# Patient Record
Sex: FEMALE | Race: ASIAN | NOT HISPANIC OR LATINO | URBAN - METROPOLITAN AREA
[De-identification: names, ages, dates, MRNs, and addresses within clinical notes are randomized per-mention and may not be internally consistent; named-entity substitution may affect disease eponyms.]

---

## 2017-09-15 ENCOUNTER — INPATIENT (INPATIENT)
Facility: HOSPITAL | Age: 82
LOS: 0 days | Discharge: LEFT BEFORE DISCHARGE | DRG: 446 | End: 2017-09-16
Attending: INTERNAL MEDICINE | Admitting: INTERNAL MEDICINE
Payer: MEDICARE

## 2017-09-15 VITALS
HEIGHT: 57 IN | RESPIRATION RATE: 18 BRPM | DIASTOLIC BLOOD PRESSURE: 81 MMHG | TEMPERATURE: 98 F | OXYGEN SATURATION: 99 % | HEART RATE: 76 BPM | WEIGHT: 80.03 LBS | SYSTOLIC BLOOD PRESSURE: 156 MMHG

## 2017-09-15 DIAGNOSIS — E78.5 HYPERLIPIDEMIA, UNSPECIFIED: ICD-10-CM

## 2017-09-15 DIAGNOSIS — R93.5 ABNORMAL FINDINGS ON DIAGNOSTIC IMAGING OF OTHER ABDOMINAL REGIONS, INCLUDING RETROPERITONEUM: ICD-10-CM

## 2017-09-15 DIAGNOSIS — K80.20 CALCULUS OF GALLBLADDER WITHOUT CHOLECYSTITIS WITHOUT OBSTRUCTION: ICD-10-CM

## 2017-09-15 DIAGNOSIS — R10.9 UNSPECIFIED ABDOMINAL PAIN: ICD-10-CM

## 2017-09-15 DIAGNOSIS — E11.9 TYPE 2 DIABETES MELLITUS WITHOUT COMPLICATIONS: ICD-10-CM

## 2017-09-15 DIAGNOSIS — E03.9 HYPOTHYROIDISM, UNSPECIFIED: ICD-10-CM

## 2017-09-15 DIAGNOSIS — R10.13 EPIGASTRIC PAIN: ICD-10-CM

## 2017-09-15 DIAGNOSIS — Z29.9 ENCOUNTER FOR PROPHYLACTIC MEASURES, UNSPECIFIED: ICD-10-CM

## 2017-09-15 DIAGNOSIS — G45.9 TRANSIENT CEREBRAL ISCHEMIC ATTACK, UNSPECIFIED: ICD-10-CM

## 2017-09-15 LAB
ALBUMIN SERPL ELPH-MCNC: 3.5 G/DL — SIGNIFICANT CHANGE UP (ref 3.5–5)
ALP SERPL-CCNC: 48 U/L — SIGNIFICANT CHANGE UP (ref 40–120)
ALT FLD-CCNC: 32 U/L DA — SIGNIFICANT CHANGE UP (ref 10–60)
ANION GAP SERPL CALC-SCNC: 5 MMOL/L — SIGNIFICANT CHANGE UP (ref 5–17)
ANION GAP SERPL CALC-SCNC: 8 MMOL/L — SIGNIFICANT CHANGE UP (ref 5–17)
APTT BLD: 30.5 SEC — SIGNIFICANT CHANGE UP (ref 27.5–37.4)
AST SERPL-CCNC: 29 U/L — SIGNIFICANT CHANGE UP (ref 10–40)
BASOPHILS # BLD AUTO: 0 K/UL — SIGNIFICANT CHANGE UP (ref 0–0.2)
BASOPHILS NFR BLD AUTO: 0.3 % — SIGNIFICANT CHANGE UP (ref 0–2)
BILIRUB DIRECT SERPL-MCNC: 0.2 MG/DL — SIGNIFICANT CHANGE UP (ref 0–0.2)
BILIRUB SERPL-MCNC: 0.5 MG/DL — SIGNIFICANT CHANGE UP (ref 0.2–1.2)
BUN SERPL-MCNC: 21 MG/DL — HIGH (ref 7–18)
BUN SERPL-MCNC: 26 MG/DL — HIGH (ref 7–18)
CALCIUM SERPL-MCNC: 9.2 MG/DL — SIGNIFICANT CHANGE UP (ref 8.4–10.5)
CALCIUM SERPL-MCNC: 9.6 MG/DL — SIGNIFICANT CHANGE UP (ref 8.4–10.5)
CHLORIDE SERPL-SCNC: 100 MMOL/L — SIGNIFICANT CHANGE UP (ref 96–108)
CHLORIDE SERPL-SCNC: 103 MMOL/L — SIGNIFICANT CHANGE UP (ref 96–108)
CHOLEST SERPL-MCNC: 92 MG/DL — SIGNIFICANT CHANGE UP (ref 10–199)
CO2 SERPL-SCNC: 26 MMOL/L — SIGNIFICANT CHANGE UP (ref 22–31)
CO2 SERPL-SCNC: 28 MMOL/L — SIGNIFICANT CHANGE UP (ref 22–31)
CREAT SERPL-MCNC: 0.86 MG/DL — SIGNIFICANT CHANGE UP (ref 0.5–1.3)
CREAT SERPL-MCNC: 1.01 MG/DL — SIGNIFICANT CHANGE UP (ref 0.5–1.3)
EOSINOPHIL # BLD AUTO: 0 K/UL — SIGNIFICANT CHANGE UP (ref 0–0.5)
EOSINOPHIL NFR BLD AUTO: 0 % — SIGNIFICANT CHANGE UP (ref 0–6)
FERRITIN SERPL-MCNC: 55 NG/ML — SIGNIFICANT CHANGE UP (ref 15–150)
FOLATE SERPL-MCNC: >20 NG/ML — SIGNIFICANT CHANGE UP (ref 4.8–24.2)
GGT SERPL-CCNC: 17 U/L — SIGNIFICANT CHANGE UP (ref 8–40)
GLUCOSE SERPL-MCNC: 141 MG/DL — HIGH (ref 70–99)
GLUCOSE SERPL-MCNC: 82 MG/DL — SIGNIFICANT CHANGE UP (ref 70–99)
HBA1C BLD-MCNC: 7.2 % — HIGH (ref 4–5.6)
HCT VFR BLD CALC: 32.7 % — LOW (ref 34.5–45)
HDLC SERPL-MCNC: 73 MG/DL — SIGNIFICANT CHANGE UP (ref 40–125)
HGB BLD-MCNC: 10.7 G/DL — LOW (ref 11.5–15.5)
INR BLD: 1.01 RATIO — SIGNIFICANT CHANGE UP (ref 0.88–1.16)
IRON SATN MFR SERPL: 18 % — SIGNIFICANT CHANGE UP (ref 15–50)
IRON SATN MFR SERPL: 54 UG/DL — SIGNIFICANT CHANGE UP (ref 40–150)
LIDOCAIN IGE QN: 453 U/L — HIGH (ref 73–393)
LIPID PNL WITH DIRECT LDL SERPL: 10 MG/DL — SIGNIFICANT CHANGE UP
LYMPHOCYTES # BLD AUTO: 0.7 K/UL — LOW (ref 1–3.3)
LYMPHOCYTES # BLD AUTO: 7.2 % — LOW (ref 13–44)
MAGNESIUM SERPL-MCNC: 2.2 MG/DL — SIGNIFICANT CHANGE UP (ref 1.6–2.6)
MCHC RBC-ENTMCNC: 29.1 PG — SIGNIFICANT CHANGE UP (ref 27–34)
MCHC RBC-ENTMCNC: 32.6 GM/DL — SIGNIFICANT CHANGE UP (ref 32–36)
MCV RBC AUTO: 89.2 FL — SIGNIFICANT CHANGE UP (ref 80–100)
MONOCYTES # BLD AUTO: 0.3 K/UL — SIGNIFICANT CHANGE UP (ref 0–0.9)
MONOCYTES NFR BLD AUTO: 3.4 % — SIGNIFICANT CHANGE UP (ref 2–14)
NEUTROPHILS # BLD AUTO: 8.6 K/UL — HIGH (ref 1.8–7.4)
NEUTROPHILS NFR BLD AUTO: 89 % — HIGH (ref 43–77)
PHOSPHATE SERPL-MCNC: 3.4 MG/DL — SIGNIFICANT CHANGE UP (ref 2.5–4.5)
PLATELET # BLD AUTO: 178 K/UL — SIGNIFICANT CHANGE UP (ref 150–400)
POTASSIUM SERPL-MCNC: 4.2 MMOL/L — SIGNIFICANT CHANGE UP (ref 3.5–5.3)
POTASSIUM SERPL-MCNC: 4.3 MMOL/L — SIGNIFICANT CHANGE UP (ref 3.5–5.3)
POTASSIUM SERPL-SCNC: 4.2 MMOL/L — SIGNIFICANT CHANGE UP (ref 3.5–5.3)
POTASSIUM SERPL-SCNC: 4.3 MMOL/L — SIGNIFICANT CHANGE UP (ref 3.5–5.3)
PROT SERPL-MCNC: 8.1 G/DL — SIGNIFICANT CHANGE UP (ref 6–8.3)
PROTHROM AB SERPL-ACNC: 11 SEC — SIGNIFICANT CHANGE UP (ref 9.8–12.7)
RBC # BLD: 3.3 M/UL — LOW (ref 3.8–5.2)
RBC # BLD: 3.67 M/UL — LOW (ref 3.8–5.2)
RBC # FLD: 13.7 % — SIGNIFICANT CHANGE UP (ref 10.3–14.5)
SODIUM SERPL-SCNC: 134 MMOL/L — LOW (ref 135–145)
SODIUM SERPL-SCNC: 136 MMOL/L — SIGNIFICANT CHANGE UP (ref 135–145)
TIBC SERPL-MCNC: 304 UG/DL — SIGNIFICANT CHANGE UP (ref 250–450)
TOTAL CHOLESTEROL/HDL RATIO MEASUREMENT: 1.3 RATIO — LOW (ref 3.3–7.1)
TRIGL SERPL-MCNC: 46 MG/DL — SIGNIFICANT CHANGE UP (ref 10–149)
TROPONIN I SERPL-MCNC: <0.015 NG/ML — SIGNIFICANT CHANGE UP (ref 0–0.04)
TSH SERPL-MCNC: 1.08 UU/ML — SIGNIFICANT CHANGE UP (ref 0.34–4.82)
UIBC SERPL-MCNC: 250 UG/DL — SIGNIFICANT CHANGE UP (ref 110–370)
VIT B12 SERPL-MCNC: 535 PG/ML — SIGNIFICANT CHANGE UP (ref 243–894)
WBC # BLD: 9.7 K/UL — SIGNIFICANT CHANGE UP (ref 3.8–10.5)
WBC # FLD AUTO: 9.7 K/UL — SIGNIFICANT CHANGE UP (ref 3.8–10.5)

## 2017-09-15 PROCEDURE — 71010: CPT | Mod: 26

## 2017-09-15 PROCEDURE — 74182 MRI ABDOMEN W/CONTRAST: CPT | Mod: 26

## 2017-09-15 PROCEDURE — 99222 1ST HOSP IP/OBS MODERATE 55: CPT

## 2017-09-15 PROCEDURE — 74177 CT ABD & PELVIS W/CONTRAST: CPT | Mod: 26

## 2017-09-15 PROCEDURE — 99285 EMERGENCY DEPT VISIT HI MDM: CPT | Mod: 25

## 2017-09-15 RX ORDER — INSULIN LISPRO 100/ML
VIAL (ML) SUBCUTANEOUS
Qty: 0 | Refills: 0 | Status: DISCONTINUED | OUTPATIENT
Start: 2017-09-15 | End: 2017-09-16

## 2017-09-15 RX ORDER — ASPIRIN/CALCIUM CARB/MAGNESIUM 324 MG
81 TABLET ORAL DAILY
Qty: 0 | Refills: 0 | Status: DISCONTINUED | OUTPATIENT
Start: 2017-09-15 | End: 2017-09-16

## 2017-09-15 RX ORDER — METRONIDAZOLE 500 MG
500 TABLET ORAL EVERY 8 HOURS
Qty: 0 | Refills: 0 | Status: DISCONTINUED | OUTPATIENT
Start: 2017-09-15 | End: 2017-09-15

## 2017-09-15 RX ORDER — ASPIRIN/CALCIUM CARB/MAGNESIUM 324 MG
324 TABLET ORAL ONCE
Qty: 0 | Refills: 0 | Status: COMPLETED | OUTPATIENT
Start: 2017-09-15 | End: 2017-09-15

## 2017-09-15 RX ORDER — MONTELUKAST 4 MG/1
10 TABLET, CHEWABLE ORAL DAILY
Qty: 0 | Refills: 0 | Status: DISCONTINUED | OUTPATIENT
Start: 2017-09-15 | End: 2017-09-16

## 2017-09-15 RX ORDER — SUCRALFATE 1 G
1 TABLET ORAL
Qty: 0 | Refills: 0 | Status: DISCONTINUED | OUTPATIENT
Start: 2017-09-15 | End: 2017-09-16

## 2017-09-15 RX ORDER — METRONIDAZOLE 500 MG
TABLET ORAL
Qty: 0 | Refills: 0 | Status: DISCONTINUED | OUTPATIENT
Start: 2017-09-15 | End: 2017-09-15

## 2017-09-15 RX ORDER — LEVOTHYROXINE SODIUM 125 MCG
25 TABLET ORAL DAILY
Qty: 0 | Refills: 0 | Status: DISCONTINUED | OUTPATIENT
Start: 2017-09-15 | End: 2017-09-16

## 2017-09-15 RX ORDER — DEXTROSE 50 % IN WATER 50 %
12.5 SYRINGE (ML) INTRAVENOUS ONCE
Qty: 0 | Refills: 0 | Status: DISCONTINUED | OUTPATIENT
Start: 2017-09-15 | End: 2017-09-16

## 2017-09-15 RX ORDER — SODIUM CHLORIDE 9 MG/ML
1000 INJECTION, SOLUTION INTRAVENOUS
Qty: 0 | Refills: 0 | Status: DISCONTINUED | OUTPATIENT
Start: 2017-09-15 | End: 2017-09-16

## 2017-09-15 RX ORDER — DEXTROSE 50 % IN WATER 50 %
25 SYRINGE (ML) INTRAVENOUS ONCE
Qty: 0 | Refills: 0 | Status: DISCONTINUED | OUTPATIENT
Start: 2017-09-15 | End: 2017-09-16

## 2017-09-15 RX ORDER — LISINOPRIL 2.5 MG/1
10 TABLET ORAL DAILY
Qty: 0 | Refills: 0 | Status: DISCONTINUED | OUTPATIENT
Start: 2017-09-15 | End: 2017-09-16

## 2017-09-15 RX ORDER — HEPARIN SODIUM 5000 [USP'U]/ML
5000 INJECTION INTRAVENOUS; SUBCUTANEOUS EVERY 12 HOURS
Qty: 0 | Refills: 0 | Status: DISCONTINUED | OUTPATIENT
Start: 2017-09-15 | End: 2017-09-16

## 2017-09-15 RX ORDER — DOCUSATE SODIUM 100 MG
100 CAPSULE ORAL THREE TIMES A DAY
Qty: 0 | Refills: 0 | Status: DISCONTINUED | OUTPATIENT
Start: 2017-09-15 | End: 2017-09-16

## 2017-09-15 RX ORDER — INSULIN LISPRO 100/ML
VIAL (ML) SUBCUTANEOUS EVERY 6 HOURS
Qty: 0 | Refills: 0 | Status: DISCONTINUED | OUTPATIENT
Start: 2017-09-15 | End: 2017-09-15

## 2017-09-15 RX ORDER — ALENDRONATE SODIUM 70 MG/1
1 TABLET ORAL
Qty: 0 | Refills: 0 | COMMUNITY

## 2017-09-15 RX ORDER — PANTOPRAZOLE SODIUM 20 MG/1
40 TABLET, DELAYED RELEASE ORAL ONCE
Qty: 0 | Refills: 0 | Status: COMPLETED | OUTPATIENT
Start: 2017-09-15 | End: 2017-09-15

## 2017-09-15 RX ORDER — PANTOPRAZOLE SODIUM 20 MG/1
40 TABLET, DELAYED RELEASE ORAL
Qty: 0 | Refills: 0 | Status: DISCONTINUED | OUTPATIENT
Start: 2017-09-15 | End: 2017-09-16

## 2017-09-15 RX ORDER — CIPROFLOXACIN LACTATE 400MG/40ML
VIAL (ML) INTRAVENOUS
Qty: 0 | Refills: 0 | Status: DISCONTINUED | OUTPATIENT
Start: 2017-09-15 | End: 2017-09-15

## 2017-09-15 RX ORDER — MONTELUKAST 4 MG/1
1 TABLET, CHEWABLE ORAL
Qty: 0 | Refills: 0 | COMMUNITY

## 2017-09-15 RX ORDER — SODIUM CHLORIDE 9 MG/ML
1000 INJECTION, SOLUTION INTRAVENOUS
Qty: 0 | Refills: 0 | Status: DISCONTINUED | OUTPATIENT
Start: 2017-09-15 | End: 2017-09-15

## 2017-09-15 RX ORDER — ONDANSETRON 8 MG/1
4 TABLET, FILM COATED ORAL EVERY 4 HOURS
Qty: 0 | Refills: 0 | Status: DISCONTINUED | OUTPATIENT
Start: 2017-09-15 | End: 2017-09-16

## 2017-09-15 RX ORDER — CIPROFLOXACIN LACTATE 400MG/40ML
200 VIAL (ML) INTRAVENOUS ONCE
Qty: 0 | Refills: 0 | Status: DISCONTINUED | OUTPATIENT
Start: 2017-09-15 | End: 2017-09-15

## 2017-09-15 RX ORDER — GLUCAGON INJECTION, SOLUTION 0.5 MG/.1ML
1 INJECTION, SOLUTION SUBCUTANEOUS ONCE
Qty: 0 | Refills: 0 | Status: DISCONTINUED | OUTPATIENT
Start: 2017-09-15 | End: 2017-09-16

## 2017-09-15 RX ORDER — CIPROFLOXACIN LACTATE 400MG/40ML
200 VIAL (ML) INTRAVENOUS EVERY 12 HOURS
Qty: 0 | Refills: 0 | Status: DISCONTINUED | OUTPATIENT
Start: 2017-09-15 | End: 2017-09-15

## 2017-09-15 RX ORDER — ATORVASTATIN CALCIUM 80 MG/1
80 TABLET, FILM COATED ORAL AT BEDTIME
Qty: 0 | Refills: 0 | Status: DISCONTINUED | OUTPATIENT
Start: 2017-09-15 | End: 2017-09-16

## 2017-09-15 RX ORDER — METRONIDAZOLE 500 MG
500 TABLET ORAL ONCE
Qty: 0 | Refills: 0 | Status: DISCONTINUED | OUTPATIENT
Start: 2017-09-15 | End: 2017-09-15

## 2017-09-15 RX ORDER — DEXTROSE 50 % IN WATER 50 %
1 SYRINGE (ML) INTRAVENOUS ONCE
Qty: 0 | Refills: 0 | Status: DISCONTINUED | OUTPATIENT
Start: 2017-09-15 | End: 2017-09-16

## 2017-09-15 RX ORDER — ASPIRIN/CALCIUM CARB/MAGNESIUM 324 MG
1 TABLET ORAL
Qty: 0 | Refills: 0 | COMMUNITY

## 2017-09-15 RX ORDER — MORPHINE SULFATE 50 MG/1
2 CAPSULE, EXTENDED RELEASE ORAL ONCE
Qty: 0 | Refills: 0 | Status: DISCONTINUED | OUTPATIENT
Start: 2017-09-15 | End: 2017-09-15

## 2017-09-15 RX ORDER — SODIUM CHLORIDE 9 MG/ML
3 INJECTION INTRAMUSCULAR; INTRAVENOUS; SUBCUTANEOUS ONCE
Qty: 0 | Refills: 0 | Status: COMPLETED | OUTPATIENT
Start: 2017-09-15 | End: 2017-09-15

## 2017-09-15 RX ADMIN — SODIUM CHLORIDE 3 MILLILITER(S): 9 INJECTION INTRAMUSCULAR; INTRAVENOUS; SUBCUTANEOUS at 01:08

## 2017-09-15 RX ADMIN — Medication 1 GRAM(S): at 18:09

## 2017-09-15 RX ADMIN — Medication 100 MILLIGRAM(S): at 15:40

## 2017-09-15 RX ADMIN — ATORVASTATIN CALCIUM 80 MILLIGRAM(S): 80 TABLET, FILM COATED ORAL at 23:03

## 2017-09-15 RX ADMIN — PANTOPRAZOLE SODIUM 40 MILLIGRAM(S): 20 TABLET, DELAYED RELEASE ORAL at 15:39

## 2017-09-15 RX ADMIN — SODIUM CHLORIDE 125 MILLILITER(S): 9 INJECTION, SOLUTION INTRAVENOUS at 06:55

## 2017-09-15 RX ADMIN — MORPHINE SULFATE 2 MILLIGRAM(S): 50 CAPSULE, EXTENDED RELEASE ORAL at 02:39

## 2017-09-15 RX ADMIN — HEPARIN SODIUM 5000 UNIT(S): 5000 INJECTION INTRAVENOUS; SUBCUTANEOUS at 18:09

## 2017-09-15 RX ADMIN — MORPHINE SULFATE 2 MILLIGRAM(S): 50 CAPSULE, EXTENDED RELEASE ORAL at 01:21

## 2017-09-15 RX ADMIN — Medication 324 MILLIGRAM(S): at 01:23

## 2017-09-15 NOTE — H&P ADULT - PROBLEM SELECTOR PLAN 2
-Result as above, however labs grossly normal including LFT.  -MRCP was performed and awaiting result.  -Plans as above.

## 2017-09-15 NOTE — H&P ADULT - HISTORY OF PRESENT ILLNESS
86 years old female lives at home alone, AAO x3, walks with cane, with PMH of DM on metformin, TIA multiple times, HTN, hypothyroidism, and osteoporosis came to ED with above symptoms last night. Patient's daughter at the bedside helped history taking, both patient and daughter are a good historian. She ate hamburger in lunch yesterday 86 years old female lives at home alone, AAO x3, walks with cane, with PMH of DM on metformin, acid reflux, constipation, gall stones, TIA multiple times, HTN, hypothyroidism, and osteoporosis came to ED with above symptoms last night. Patient's daughter at the bedside helped history taking, both patient and daughter are a good historian. She ate hamburger in lunch yesterday 86 years old female lives at home alone, AAO x3, walks with cane, with PMH of DM on metformin, acid reflux, constipation, gall stones, TIA multiple times, HTN, hypothyroidism, and osteoporosis came to ED with above symptoms last night. Patient's daughter at the bedside helped history taking, both patient and daughter are a good historian. She ate hamburger in lunch yesterday and started feeling burning like epigastric pain. In evening after she ate dinner, pain got worse and she started vomiting 4 times with food particles coming out. Denies any chest pain, SOB, dizziness, headache, melena, bowel habit change, urinary symptoms, fever, chills, or any other symptoms. Also endorsed unintentional gradual weight loss over the last few years. She frequently takes Advil for 'any pain' on her body PRN, which she took as well yesterday.     In ED, patient's vital signs normal, labs grossly normal except mild elevation of lipase to 453. CT of the abdomen with contrast was performed and showed; Cholelithiasis with hyperemia of the gallbladder and biliary tree. Mild intrahepatic bile duct dilatation. Correlate for cholecystitis. Superimposed cholangitis not excluded. Recommend MRI/MRCP.    MRCP was performed and result is pending. When seen by me, patient was totally asymptomatic with negative findings on abdominal exam. Surgery was consulted overnight and no acute surgical intervention was recommended. Admitted to the medicine floor for epigastric pain likely from gastritis, also possible acute cholecystitis however is quite less likely considering clinical presentation. 86 years old female lives at home alone, AAO x3, walks with cane, with PMH of DM on metformin, acid reflux, constipation, gall stones, TIA multiple times, HTN, hypothyroidism, and osteoporosis came to ED with above symptoms last night. Patient's daughter at the bedside helped history taking, both patient and daughter are a good historian. She ate hamburger in lunch yesterday and started feeling burning like epigastric pain. In evening after she ate dinner, pain got worse and she started vomiting 4 times with food particles coming out. Denies any chest pain, SOB, dizziness, headache, melena, bowel habit change, urinary symptoms, fever, chills, or any other symptoms. Also endorsed unintentional gradual weight loss over the last few years. She frequently takes Advil for 'any pain' on her body PRN, which she took as well yesterday.     In ED, patient's vital signs normal, labs grossly normal except mild elevation of lipase to 453. CT of the abdomen with contrast was performed and showed; Cholelithiasis with hyperemia of the gallbladder and biliary tree. Mild intrahepatic bile duct dilatation. Correlate for cholecystitis. Superimposed cholangitis not excluded. Recommend MRI/MRCP.    MRCP was performed and result is pending. When seen by me, patient was totally asymptomatic with negative findings on abdominal exam. Surgery was consulted overnight and no acute surgical intervention was recommended. Admitted to the medicine floor for epigastric pain likely from gastritis, also possible acute cholecystitis however is quite less likely considering clinical presentation.     GOC was discussed with patient and daughter at bedside, patient does not want any invasive procedure including  EGD/colonoscopy, and wants DNR/DNI.

## 2017-09-15 NOTE — CONSULT NOTE ADULT - SUBJECTIVE AND OBJECTIVE BOX
GI INITIAL CONSULT    HPI: 86F w/stated PMH p/w N/V and epigastric pain that started last night. Pt had multiple episodes of vomiting (NBNB) that have resolved on their own. Pt states that her abd pain resolved as well. Pt takes daily ASA 81 mg as well as prn Advil. Pt does NOT take any PPIs or H2RAs. Does not report any melena, diarrhea, constipation, or bleeding.    PMH: DM2, HTN, TIAs, hypthyroidism     Meds: MEDICATIONS  (STANDING):  dextrose 5%. 1000 milliLiter(s) (50 mL/Hr) IV Continuous <Continuous>  dextrose 50% Injectable 12.5 Gram(s) IV Push once  dextrose 50% Injectable 25 Gram(s) IV Push once  dextrose 50% Injectable 25 Gram(s) IV Push once  heparin  Injectable 5000 Unit(s) SubCutaneous every 12 hours  docusate sodium 100 milliGRAM(s) Oral three times a day  pantoprazole    Tablet 40 milliGRAM(s) Oral before breakfast  insulin lispro (HumaLOG) corrective regimen sliding scale   SubCutaneous Before meals and at bedtime  pantoprazole   Suspension 40 milliGRAM(s) Oral once    SH: noncontributory  FH: no h/o GI malignancies  ROS:  CONSTITUTIONAL: No fever, weight loss, or fatigue  EYES: No eye pain, visual disturbances, or discharge  ENT:  No difficulty hearing, tinnitus, vertigo; No sinus or throat pain  NECK: No pain or stiffness  RESPIRATORY: No cough, wheezing, chills or hemoptysis, shortness of Breath  CARDIOVASCULAR: No chest pain, palpitations, passing out, dizziness, or leg swelling  GASTROINTESTINAL: see HPI  GENITOURINARY: No dysuria, frequency, hematuria, or incontinence  NEUROLOGICAL: No headaches, memory loss, loss of strength, numbness, or tremors  SKIN: No itching, burning, rashes, or lesions   MUSCULOSKELETAL: No arthralgia, myalgia, or back pain.     Vitals: T(C): 36.6 (09-15-17 @ 12:06)  T(F): 97.8 (09-15-17 @ 12:06), Max: 98.2 (09-15-17 @ 07:10)  HR: 73 (09-15-17 @ 12:06) (73 - 76)  BP: 132/53 (09-15-17 @ 12:06) (122/69 - 156/81)    Gen: NAD  Chest: CTABL  CVS: RRR; S1/S2  Abd: S/NT/ND                        10.7   9.7   )-----------( 178      ( 15 Sep 2017 01:09 )             32.7   09-15    136  |  103  |  21<H>  ----------------------------<  82  4.2   |  28  |  0.86    Ca    9.2      15 Sep 2017 10:53  Phos  3.4     09-15  Mg     2.2     09-15    TPro  x   /  Alb  x   /  TBili  x   /  DBili  0.2  /  AST  x   /  ALT  x   /  AlkPhos  x   09-15    CT Abdomen and Pelvis w/ Oral Cont and w/ IV Cont (09.15.17 @ 04:41)  IMPRESSION: Cholelithiasis with hyperemia of the gallbladder and biliary   tree. Mild intrahepatic bile duct dilatation. Correlate for   cholecystitis. Superimposed cholangitis not excluded. Recommend MRI/MRCP.

## 2017-09-15 NOTE — H&P ADULT - PROBLEM SELECTOR PLAN 7
-IMPROVE score 2, will give heparin subcu q 12hrs. -Continue statin at home dose, check lipid panel.

## 2017-09-15 NOTE — CONSULT NOTE ADULT - PROBLEM SELECTOR RECOMMENDATION 9
RUQ US  GI consult  May need MRCP  Pt does not want surgery in case she needs one; in that case recommend percutaneous cholecystostomy by IR if indicated

## 2017-09-15 NOTE — H&P ADULT - PROBLEM SELECTOR PLAN 4
-Check Hba1c and monitor accucheck.  -Holding metformin at least 48 hours as patient received IV contrast.  -Sliding scale with diabetic diet. -No focal neurologic deficit.  -Past history of multiple TIA without true stroke. On aspirin, plavix, and statin for it.  -Per Dr. Rain, giving only aspirin, holding plavix for acute gastritis  -Neuro Dr. Boogie was consulted.

## 2017-09-15 NOTE — ED PROVIDER NOTE - OBJECTIVE STATEMENT
87 y/o female with PMHx of DM presents to the ED c/o epigastric pain/CP x 4PM today. Pt notes she has not been able to tolerate PO or pill intake, will vomit up. Pt denies shortness of breath, diaphoresis, or any other complaints. Pt allergic to Penicillin (hives).

## 2017-09-15 NOTE — H&P ADULT - PROBLEM SELECTOR PLAN 1
-More likely from gastritis, patient is taking NSAIDS frequently as well as aspirin. Dr. Griffith was consulted.  -Less likely pancreatitis/cholecystitis/cholangitis given normal LFT and normal pancreas on CT scan. Patient is not jaundiced and does not look toxic.  -Clear diet was started as patient is asymptomatic now. Advance diet as tolerated.  -Per Dr. Griffith, if MRCP result is benign then DC plan to home. Starting PPI and carafate as recommended.  -Pt and family refuse any invasive procedure, and surgery did not recommend acute intervention. Will continue medical management.  -Will continue aspirin only, holding plavix, per attending Dr. Rain. No NSAIDS.

## 2017-09-15 NOTE — H&P ADULT - PROBLEM SELECTOR PLAN 3
-MRCP was performed and awaiting result.  -Less likely cholecystitis given no fever and leukocytosis, not starting antibiotics.

## 2017-09-15 NOTE — CONSULT NOTE ADULT - ASSESSMENT
85 y/o woman with cholelithiasis with GB and biliary tree hyperemia and mild intrahepatic  bile duct dilatation.

## 2017-09-15 NOTE — H&P ADULT - PROBLEM SELECTOR PLAN 6
-Continue statin at home dose, check lipid panel. -Check TSH, continue synthroid 25mcg at home dose.

## 2017-09-15 NOTE — PROGRESS NOTE ADULT - ASSESSMENT
gastritis vs pud vs bile duct/ GB etiology.  no nsaid.  hold plavix for 4-5 days  stool for occult. ? had egd / colonoscopy 2 yrs back.  surgery appreciated.  will get GI.  ppi   clear liquid diet.

## 2017-09-15 NOTE — CONSULT NOTE ADULT - ASSESSMENT
H/o Multiple TIA's  The patient should be on single antiplatelet for stroke prevention i.e either ASA or Plavix, however given her medical issues I defer this decision (benefit vs risk) to primary medical team in collaboration with GI.

## 2017-09-15 NOTE — H&P ADULT - NSHPLABSRESULTS_GEN_ALL_CORE
LABS:                        10.7   9.7   )-----------( 178      ( 15 Sep 2017 01:09 )             32.7     09-15    136  |  103  |  21<H>  ----------------------------<  82  4.2   |  28  |  0.86    Ca    9.2      15 Sep 2017 10:53  Phos  3.4     09-15  Mg     2.2     09-15    TPro  x   /  Alb  x   /  TBili  x   /  DBili  0.2  /  AST  x   /  ALT  x   /  AlkPhos  x   09-15    PT/INR - ( 15 Sep 2017 01:09 )   PT: 11.0 sec;   INR: 1.01 ratio         PTT - ( 15 Sep 2017 01:09 )  PTT:30.5 sec    CAPILLARY BLOOD GLUCOSE  104 (15 Sep 2017 07:10)        LIVER FUNCTIONS - ( 15 Sep 2017 01:09 )  Alb: 3.5 g/dL / Pro: 8.1 g/dL / ALK PHOS: 48 U/L / ALT: 32 U/L DA / AST: 29 U/L / GGT: x             CARDIAC MARKERS ( 15 Sep 2017 01:09 )  <0.015 ng/mL / x     / x     / x     / x LABS:                        10.7   9.7   )-----------( 178      ( 15 Sep 2017 01:09 )             32.7     09-15    136  |  103  |  21<H>  ----------------------------<  82  4.2   |  28  |  0.86    Ca    9.2      15 Sep 2017 10:53  Phos  3.4     09-15  Mg     2.2     09-15    TPro  x   /  Alb  x   /  TBili  x   /  DBili  0.2  /  AST  x   /  ALT  x   /  AlkPhos  x   09-15    PT/INR - ( 15 Sep 2017 01:09 )   PT: 11.0 sec;   INR: 1.01 ratio         PTT - ( 15 Sep 2017 01:09 )  PTT:30.5 sec    CAPILLARY BLOOD GLUCOSE  104 (15 Sep 2017 07:10)        LIVER FUNCTIONS - ( 15 Sep 2017 01:09 )  Alb: 3.5 g/dL / Pro: 8.1 g/dL / ALK PHOS: 48 U/L / ALT: 32 U/L DA / AST: 29 U/L / GGT: x             CARDIAC MARKERS ( 15 Sep 2017 01:09 )  <0.015 ng/mL / x     / x     / x     / x    < from: CT Abdomen and Pelvis w/ Oral Cont and w/ IV Cont (09.15.17 @ 04:41) >    FINDINGS:    LOWER CHEST: Within normal limits.    LIVER: Within normal limits.  BILE DUCTS: The right hepatic duct is mildly hyperemic. Mild intrahepatic   bile duct dilatation .  GALLBLADDER: Cholelithiasis with hyperemic appearance of the gallbladder   mucosa..  SPLEEN: Within normal limits.  PANCREAS: Within normal limits.  ADRENALS: Within normal limits.  KIDNEYS/URETERS: Within normal limits.    BLADDER: Within normal limits.  REPRODUCTIVE ORGANS: 5.8 x 5.2 cm right adnexal cyst. Uterus within   normal limits.    BOWEL: No bowel obstruction. Diverticulosis coli. Normal appendix.  PERITONEUM: No ascites.  VESSELS:  Within normal limits.  RETROPERITONEUM: No lymphadenopathy.    ABDOMINAL WALL: Within normal limits.  BONES: Within normal limits.    IMPRESSION: Cholelithiasis with hyperemia of the gallbladder and biliary   tree. Mild intrahepatic bile duct dilatation. Correlate for   cholecystitis. Superimposed cholangitis not excluded. Recommend MRI/MRCP.    < end of copied text >    < from: Xray Chest 1 View AP/PA (09.15.17 @ 01:38) >    EXAM:  CHEST SINGLE AP OR PA                            PROCEDURE DATE:  09/15/2017          INTERPRETATION:  AP chest on September 15 at 1:14 AM. Patient has chest   pain.    Heart is magnified by technique.    The lung fields and pleural surfacesare unremarkable.    The chest is similar to December 8, 2012.    IMPRESSION: No infiltrate.    < end of copied text >

## 2017-09-15 NOTE — CONSULT NOTE ADULT - SUBJECTIVE AND OBJECTIVE BOX
CC:    HPI: HPI:  86 years old female lives at home alone, AAO x3, walks with cane, with PMH of DM on metformin, acid reflux, constipation, gall stones, TIA multiple times, HTN, hypothyroidism, and osteoporosis came to ED with above symptoms last night. Patient's daughter at the bedside helped history taking, both patient and daughter are a good historian. She ate hamburger in lunch yesterday and started feeling burning like epigastric pain. In evening after she ate dinner, pain got worse and she started vomiting 4 times with food particles coming out. Denies any chest pain, SOB, dizziness, headache, melena, bowel habit change, urinary symptoms, fever, chills, or any other symptoms. Also endorsed unintentional gradual weight loss over the last few years. She frequently takes Advil for 'any pain' on her body PRN, which she took as well yesterday.     In ED, patient's vital signs normal, labs grossly normal except mild elevation of lipase to 453. CT of the abdomen with contrast was performed and showed; Cholelithiasis with hyperemia of the gallbladder and biliary tree. Mild intrahepatic bile duct dilatation. Correlate for cholecystitis. Superimposed cholangitis not excluded. Recommend MRI/MRCP.    MRCP was performed and result is pending. When seen by me, patient was totally asymptomatic with negative findings on abdominal exam. Surgery was consulted overnight and no acute surgical intervention was recommended. Admitted to the medicine floor for epigastric pain likely from gastritis, also possible acute cholecystitis however is quite less likely considering clinical presentation.     GOC was discussed with patient and daughter at bedside, patient does not want any invasive procedure including  EGD/colonoscopy, and wants DNR/DNI. (15 Sep 2017 12:30)      ROS:  Constitutional, Neurological, Psychiatric, Eyes, ENT, Cardiovascular, Respiratory, Gastrointestinal, Genitourinary, Musculoskeletal, Integumentary, Endocrine and Heme/Lymph are otherwise negative. Except for    Vital Signs Last 24 Hrs  T(C): 36.6 (15 Sep 2017 12:06), Max: 36.8 (15 Sep 2017 07:10)  T(F): 97.8 (15 Sep 2017 12:06), Max: 98.2 (15 Sep 2017 07:10)  HR: 73 (15 Sep 2017 12:06) (73 - 76)  BP: 132/53 (15 Sep 2017 12:06) (122/69 - 156/81)  BP(mean): --  RR: 17 (15 Sep 2017 12:06) (16 - 18)  SpO2: 97% (15 Sep 2017 12:06) (97% - 99%)    Allergies    penicillin (Hives)    Intolerances      MEDICATIONS  (STANDING):  dextrose 5%. 1000 milliLiter(s) (50 mL/Hr) IV Continuous <Continuous>  dextrose 50% Injectable 12.5 Gram(s) IV Push once  dextrose 50% Injectable 25 Gram(s) IV Push once  dextrose 50% Injectable 25 Gram(s) IV Push once  heparin  Injectable 5000 Unit(s) SubCutaneous every 12 hours  docusate sodium 100 milliGRAM(s) Oral three times a day  pantoprazole    Tablet 40 milliGRAM(s) Oral before breakfast  insulin lispro (HumaLOG) corrective regimen sliding scale   SubCutaneous Before meals and at bedtime  pantoprazole   Suspension 40 milliGRAM(s) Oral once  aspirin enteric coated 81 milliGRAM(s) Oral daily  lisinopril 10 milliGRAM(s) Oral daily  atorvastatin 80 milliGRAM(s) Oral at bedtime  montelukast 10 milliGRAM(s) Oral daily  levothyroxine 25 MICROGram(s) Oral daily  sucralfate 1 Gram(s) Oral four times a day    MEDICATIONS  (PRN):  ondansetron Injectable 4 milliGRAM(s) IV Push every 4 hours PRN Nausea and/or Vomiting  dextrose Gel 1 Dose(s) Oral once PRN Blood Glucose LESS THAN 70 milliGRAM(s)/deciLiter  glucagon  Injectable 1 milliGRAM(s) IntraMuscular once PRN Glucose <70 milliGRAM(s)/deciLiter      LABS:                        10.7   9.7   )-----------( 178      ( 15 Sep 2017 01:09 )             32.7     09-15    136  |  103  |  21<H>  ----------------------------<  82  4.2   |  28  |  0.86    Ca    9.2      15 Sep 2017 10:53  Phos  3.4     09-15  Mg     2.2     09-15    TPro  x   /  Alb  x   /  TBili  x   /  DBili  0.2  /  AST  x   /  ALT  x   /  AlkPhos  x   09-15    PT/INR - ( 15 Sep 2017 01:09 )   PT: 11.0 sec;   INR: 1.01 ratio         PTT - ( 15 Sep 2017 01:09 )  PTT:30.5 sec        Neuro Imaging: none

## 2017-09-15 NOTE — CONSULT NOTE ADULT - PROBLEM SELECTOR RECOMMENDATION 9
-given ASA and concomitant NSAID use along with elevated BUN/Cr ratio pt likely has PUD vs. NSAID gastropathy  -pt strongly advised to stay off NSAIDs, but can cont ASA  -start Protonix 40 mg daily  -start Carafate liquid 1 gm QID x8 wks  -pt and family refusing all invasive procedures (e.g., EGD)  -given lab and clinical findings it is unlikely that the pt has cholangitis or any biliary pathology; however, given CT findings would await MRCP results; if MRCP is unrevelaing, can D/C pt home on PPI and Carafate as she is currently tolerating liquids without any issues  -please reconsult prn

## 2017-09-15 NOTE — H&P ADULT - NSHPPHYSICALEXAM_GEN_ALL_CORE
T(C): 36.6 (09-15-17 @ 12:06), Max: 36.8 (09-15-17 @ 07:10)  HR: 73 (09-15-17 @ 12:06) (73 - 76)  BP: 132/53 (09-15-17 @ 12:06) (122/69 - 156/81)  RR: 17 (09-15-17 @ 12:06) (16 - 18)  SpO2: 97% (09-15-17 @ 12:06) (97% - 99%)  Wt(kg): --  I&O's Summary    PHYSICAL EXAM:  GENERAL: NAD, well-groomed, well-developed  HEAD:  Atraumatic, Normocephalic  EYES: EOMI, PERRLA, conjunctiva and sclera clear  ENMT: No tonsillar erythema, exudates, or enlargement; Moist mucous membranes.  NECK: Supple, No JVD, Normal thyroid  NERVOUS SYSTEM:  Alert & Oriented X3, Good concentration; Motor Strength 5/5 B/L upper and lower extremities.  CHEST/LUNG: Clear to percussion bilaterally; No rales, rhonchi, wheezing, or rubs  HEART: Regular rate and rhythm; systolic murmurs, No rubs, or gallops  ABDOMEN: Soft, Nontender, Nondistended; Bowel sounds present  EXTREMITIES:  2+ Peripheral Pulses bilaterally, No clubbing, cyanosis, or edema  LYMPH: No lymphadenopathy noted  SKIN: No rashes or lesions T(C): 36.6 (09-15-17 @ 12:06), Max: 36.8 (09-15-17 @ 07:10)  HR: 73 (09-15-17 @ 12:06) (73 - 76)  BP: 132/53 (09-15-17 @ 12:06) (122/69 - 156/81)  RR: 17 (09-15-17 @ 12:06) (16 - 18)  SpO2: 97% (09-15-17 @ 12:06) (97% - 99%)  Wt(kg): --  I&O's Summary    PHYSICAL EXAM:  GENERAL: NAD, well-groomed, well-developed  HEAD:  Atraumatic, Normocephalic  EYES: EOMI, PERRLA, conjunctiva and sclera clear  ENMT: No tonsillar erythema, exudates, or enlargement; Moist mucous membranes.  NECK: Supple, No JVD, Normal thyroid  NERVOUS SYSTEM:  Alert & Oriented X3, Good concentration; Motor Strength 5/5 B/L upper and lower extremities.  CHEST/LUNG: Clear to percussion bilaterally; No rales, rhonchi, wheezing, or rubs  HEART: Regular rate and rhythm; systolic murmurs, No rubs, or gallops  ABDOMEN: Soft, Nontender, Nondistended; Bowel sounds present. Negative Marr sign.  EXTREMITIES:  2+ Peripheral Pulses bilaterally, No clubbing, cyanosis, or edema  LYMPH: No lymphadenopathy noted  SKIN: No rashes or lesions

## 2017-09-15 NOTE — H&P ADULT - ASSESSMENT
Patient is a 86y old  Female who presents with a chief complaint of 4 times of NBNB vomiting and epigastric pain onset last night after food intake. (15 Sep 2017 12:30). Admitted to the medicine floor for suspected NSAIDS induced gastritis, concern for cholecystitis.

## 2017-09-15 NOTE — ED PROVIDER NOTE - MEDICAL DECISION MAKING DETAILS
85 y/o female presents to the ED c/o epigastric pain/CP x today. Concern for intra-abdomin process. Will get CT abd pelvis with contrast. Less likely cardiac, will get 2 Trop however probably not cardiac due to nonreproducible pain. Will reeval after CT scan.

## 2017-09-15 NOTE — H&P ADULT - PROBLEM SELECTOR PLAN 5
-Check TSH, continue synthroid 25mcg at home dose. -Check Hba1c and monitor accucheck.  -Holding metformin at least 48 hours as patient received IV contrast.  -Sliding scale with diabetic diet.

## 2017-09-15 NOTE — CONSULT NOTE ADULT - SUBJECTIVE AND OBJECTIVE BOX
87 y/o woman c/o epigastric and chest pain for one day. Pain sharp with few episodes of vomiting. Pt denies fever/chills, SOB, dizziness, diaphoresis, hematemesis, dysuria or diarrhea. Pt has known hx of cholelithiasis and had undergone GI procedure (?ERCP) at [facility] few years ago. Presently, Pt feeling minimal to no pain.    PMH:   DM    PSH:   None    Social Hx:   no EtOH/tobacco    PE: elderly, thin woman, A & O X 3, NAD    Vital Signs Last 24 Hrs  T(C): 36.7 (15 Sep 2017 00:37), Max: 36.7 (15 Sep 2017 00:37)  T(F): 98 (15 Sep 2017 00:37), Max: 98 (15 Sep 2017 00:37)  HR: 76 (15 Sep 2017 00:37) (76 - 76)  BP: 156/81 (15 Sep 2017 00:37) (156/81 - 156/81)  BP(mean): --  RR: 18 (15 Sep 2017 00:37) (18 - 18)  SpO2: 99% (15 Sep 2017 00:37) (99% - 99%)    Sclerae anicteric  Abdomen: soft, ND, mild tenderness in epigastrium on deep palpation, no Marr's sign                          10.7   9.7   )-----------( 178      ( 15 Sep 2017 01:09 )             32.7     09-15    134<L>  |  100  |  26<H>  ----------------------------<  141<H>  4.3   |  26  |  1.01    Ca    9.6      15 Sep 2017 01:09    TPro  8.1  /  Alb  3.5  /  TBili  0.5  /  DBili  x   /  AST  29  /  ALT  32  /  AlkPhos  48  09-15    Lipase 453    Patient ID: [id number] Patient Name: MARISOL COMBS   YOB: 1930 Sex: F        EXAM: CT ABDOMEN AND PELVIS OC IC      PROCEDURE DATE: 09/15/2017        INTERPRETATION: CLINICAL INFORMATION: Abdominal pain    COMPARISON: None    PROCEDURE:  CT of the Abdomen and Pelvis was performed with intravenous contrast.  Intravenous contrast: 90 ml Omnipaque 350. 10 ml discarded.  Oral contrast: positive contrast was administered.  Sagittal and coronal reformats were performed.    FINDINGS:    LOWER CHEST: Within normal limits.    LIVER: Within normal limits.  BILE DUCTS: The right hepatic duct is mildly hyperemic. Mild intrahepatic  bile duct dilatation .  GALLBLADDER: Cholelithiasis with hyperemic appearance of the gallbladder  mucosa..  SPLEEN: Within normal limits.  PANCREAS: Within normal limits.  ADRENALS: Within normal limits.  KIDNEYS/URETERS: Within normal limits.    BLADDER: Within normal limits.  REPRODUCTIVE ORGANS: 5.8 x 5.2 cm right adnexal cyst. Uterus within normal  limits.    BOWEL: No bowel obstruction. Diverticulosis coli. Normal appendix.  PERITONEUM: No ascites.  VESSELS: Within normal limits.  RETROPERITONEUM: No lymphadenopathy.  ABDOMINAL WALL: Within normal limits.  BONES: Within normal limits.    IMPRESSION: Cholelithiasis with hyperemia of the gallbladder and biliary  tree. Mild intrahepatic bile duct dilatation. Correlate for cholecystitis.  Superimposed cholangitis not excluded. Recommend MRI/MRCP.      ARSENIO REID M.D., ATTENDING RADIOLOGIST

## 2017-09-16 ENCOUNTER — TRANSCRIPTION ENCOUNTER (OUTPATIENT)
Age: 82
End: 2017-09-16

## 2017-09-16 VITALS
HEART RATE: 75 BPM | DIASTOLIC BLOOD PRESSURE: 52 MMHG | OXYGEN SATURATION: 99 % | SYSTOLIC BLOOD PRESSURE: 108 MMHG | RESPIRATION RATE: 16 BRPM | TEMPERATURE: 98 F

## 2017-09-16 LAB
ANION GAP SERPL CALC-SCNC: 7 MMOL/L — SIGNIFICANT CHANGE UP (ref 5–17)
BUN SERPL-MCNC: 14 MG/DL — SIGNIFICANT CHANGE UP (ref 7–18)
CALCIUM SERPL-MCNC: 9.4 MG/DL — SIGNIFICANT CHANGE UP (ref 8.4–10.5)
CHLORIDE SERPL-SCNC: 107 MMOL/L — SIGNIFICANT CHANGE UP (ref 96–108)
CO2 SERPL-SCNC: 28 MMOL/L — SIGNIFICANT CHANGE UP (ref 22–31)
CREAT SERPL-MCNC: 0.85 MG/DL — SIGNIFICANT CHANGE UP (ref 0.5–1.3)
GLUCOSE SERPL-MCNC: 95 MG/DL — SIGNIFICANT CHANGE UP (ref 70–99)
HAV IGM SER-ACNC: SIGNIFICANT CHANGE UP
HBV CORE IGM SER-ACNC: SIGNIFICANT CHANGE UP
HBV SURFACE AG SER-ACNC: SIGNIFICANT CHANGE UP
HCT VFR BLD CALC: 30.9 % — LOW (ref 34.5–45)
HCV AB S/CO SERPL IA: 0.18 S/CO — SIGNIFICANT CHANGE UP
HCV AB SERPL-IMP: SIGNIFICANT CHANGE UP
HGB BLD-MCNC: 10.1 G/DL — LOW (ref 11.5–15.5)
LIDOCAIN IGE QN: 207 U/L — SIGNIFICANT CHANGE UP (ref 73–393)
MAGNESIUM SERPL-MCNC: 2.3 MG/DL — SIGNIFICANT CHANGE UP (ref 1.6–2.6)
MCHC RBC-ENTMCNC: 29.7 PG — SIGNIFICANT CHANGE UP (ref 27–34)
MCHC RBC-ENTMCNC: 32.8 GM/DL — SIGNIFICANT CHANGE UP (ref 32–36)
MCV RBC AUTO: 90.5 FL — SIGNIFICANT CHANGE UP (ref 80–100)
PHOSPHATE SERPL-MCNC: 3.5 MG/DL — SIGNIFICANT CHANGE UP (ref 2.5–4.5)
PLATELET # BLD AUTO: 157 K/UL — SIGNIFICANT CHANGE UP (ref 150–400)
POTASSIUM SERPL-MCNC: 4.1 MMOL/L — SIGNIFICANT CHANGE UP (ref 3.5–5.3)
POTASSIUM SERPL-SCNC: 4.1 MMOL/L — SIGNIFICANT CHANGE UP (ref 3.5–5.3)
RBC # BLD: 3.41 M/UL — LOW (ref 3.8–5.2)
RBC # FLD: 13.5 % — SIGNIFICANT CHANGE UP (ref 10.3–14.5)
RETICS #: 45.3 K/UL — SIGNIFICANT CHANGE UP (ref 25–125)
RETICS/RBC NFR: 1.4 % — SIGNIFICANT CHANGE UP (ref 0.5–2.5)
SODIUM SERPL-SCNC: 142 MMOL/L — SIGNIFICANT CHANGE UP (ref 135–145)
TRANSFERRIN SERPL-MCNC: 231 MG/DL — SIGNIFICANT CHANGE UP (ref 200–360)
WBC # BLD: 4.3 K/UL — SIGNIFICANT CHANGE UP (ref 3.8–10.5)
WBC # FLD AUTO: 4.3 K/UL — SIGNIFICANT CHANGE UP (ref 3.8–10.5)

## 2017-09-16 RX ORDER — PANTOPRAZOLE SODIUM 20 MG/1
1 TABLET, DELAYED RELEASE ORAL
Qty: 30 | Refills: 3 | OUTPATIENT
Start: 2017-09-16 | End: 2018-01-13

## 2017-09-16 RX ORDER — METFORMIN HYDROCHLORIDE 850 MG/1
1 TABLET ORAL
Qty: 0 | Refills: 0 | COMMUNITY

## 2017-09-16 RX ORDER — LISINOPRIL 2.5 MG/1
1 TABLET ORAL
Qty: 30 | Refills: 3
Start: 2017-09-16 | End: 2018-01-13

## 2017-09-16 RX ORDER — ROSUVASTATIN CALCIUM 5 MG/1
1 TABLET ORAL
Qty: 0 | Refills: 0 | COMMUNITY

## 2017-09-16 RX ORDER — SUCRALFATE 1 G
1 TABLET ORAL
Qty: 120 | Refills: 0 | OUTPATIENT
Start: 2017-09-16 | End: 2017-10-16

## 2017-09-16 RX ORDER — ATORVASTATIN CALCIUM 80 MG/1
1 TABLET, FILM COATED ORAL
Qty: 30 | Refills: 3 | OUTPATIENT
Start: 2017-09-16 | End: 2018-01-13

## 2017-09-16 RX ORDER — RAMIPRIL 5 MG
1 CAPSULE ORAL
Qty: 0 | Refills: 0 | COMMUNITY

## 2017-09-16 RX ADMIN — Medication 81 MILLIGRAM(S): at 12:26

## 2017-09-16 RX ADMIN — Medication 1 GRAM(S): at 05:30

## 2017-09-16 RX ADMIN — Medication 25 MICROGRAM(S): at 05:30

## 2017-09-16 RX ADMIN — PANTOPRAZOLE SODIUM 40 MILLIGRAM(S): 20 TABLET, DELAYED RELEASE ORAL at 05:30

## 2017-09-16 RX ADMIN — Medication 1 GRAM(S): at 12:27

## 2017-09-16 RX ADMIN — Medication 100 MILLIGRAM(S): at 13:39

## 2017-09-16 RX ADMIN — Medication 1 GRAM(S): at 17:44

## 2017-09-16 RX ADMIN — MONTELUKAST 10 MILLIGRAM(S): 4 TABLET, CHEWABLE ORAL at 17:44

## 2017-09-16 RX ADMIN — LISINOPRIL 10 MILLIGRAM(S): 2.5 TABLET ORAL at 05:30

## 2017-09-16 NOTE — PROGRESS NOTE ADULT - SUBJECTIVE AND OBJECTIVE BOX
HPI:  86 years old female lives at home alone, AAO x3, walks with cane, with PMH of DM on metformin, acid reflux, constipation, gall stones, TIA multiple times, HTN, hypothyroidism, and osteoporosis came to ED with above symptoms last night. Patient's daughter at the bedside helped history taking, both patient and daughter are a good historian. She ate hamburger in lunch yesterday and started feeling burning like epigastric pain. In evening after she ate dinner, pain got worse and she started vomiting 4 times with food particles coming out. Denies any chest pain, SOB, dizziness, headache, melena, bowel habit change, urinary symptoms, fever, chills, or any other symptoms. Also endorsed unintentional gradual weight loss over the last few years. She frequently takes Advil for 'any pain' on her body PRN, which she took as well yesterday.     In ED, patient's vital signs normal, labs grossly normal except mild elevation of lipase to 453. CT of the abdomen with contrast was performed and showed; Cholelithiasis with hyperemia of the gallbladder and biliary tree. Mild intrahepatic bile duct dilatation. Correlate for cholecystitis. Superimposed cholangitis not excluded. Recommend MRI/MRCP.    MRCP was performed and result is pending. When seen by me, patient was totally asymptomatic with negative findings on abdominal exam. Surgery was consulted overnight and no acute surgical intervention was recommended. Admitted to the medicine floor for epigastric pain likely from gastritis, also possible acute cholecystitis however is quite less likely considering clinical presentation.     GOC was discussed with patient and daughter at bedside, patient does not want any invasive procedure including  EGD/colonoscopy, and wants DNR/DNI. (15 Sep 2017 12:30)      Patient is a 86y old  Female who presents with a chief complaint of 4 times of NBNB vomiting and epigastric pain onset last night after food intake. (15 Sep 2017 12:30)  pt seen and examined  comfortable  no abd pain no vomiting  moved bowels    INTERVAL HPI/OVERNIGHT EVENTS:  T(C): 36.7 (09-16-17 @ 04:49), Max: 37.1 (09-15-17 @ 15:26)  HR: 68 (09-16-17 @ 04:49) (68 - 74)  BP: 134/55 (09-16-17 @ 04:49) (125/53 - 149/61)  RR: 15 (09-16-17 @ 04:49) (15 - 16)  SpO2: 98% (09-16-17 @ 04:49) (98% - 100%)  Wt(kg): --  I&O's Summary      REVIEW OF SYSTEMS: denies fever, chills, SOB, palpitations, chest pain, abdominal pain, nausea, vomitting, diarrhea, constipation, dizziness    MEDICATIONS  (STANDING):  dextrose 5%. 1000 milliLiter(s) (50 mL/Hr) IV Continuous <Continuous>  dextrose 50% Injectable 12.5 Gram(s) IV Push once  dextrose 50% Injectable 25 Gram(s) IV Push once  dextrose 50% Injectable 25 Gram(s) IV Push once  heparin  Injectable 5000 Unit(s) SubCutaneous every 12 hours  docusate sodium 100 milliGRAM(s) Oral three times a day  pantoprazole    Tablet 40 milliGRAM(s) Oral before breakfast  insulin lispro (HumaLOG) corrective regimen sliding scale   SubCutaneous Before meals and at bedtime  aspirin enteric coated 81 milliGRAM(s) Oral daily  lisinopril 10 milliGRAM(s) Oral daily  atorvastatin 80 milliGRAM(s) Oral at bedtime  montelukast 10 milliGRAM(s) Oral daily  levothyroxine 25 MICROGram(s) Oral daily  sucralfate 1 Gram(s) Oral four times a day    MEDICATIONS  (PRN):  ondansetron Injectable 4 milliGRAM(s) IV Push every 4 hours PRN Nausea and/or Vomiting  dextrose Gel 1 Dose(s) Oral once PRN Blood Glucose LESS THAN 70 milliGRAM(s)/deciLiter  glucagon  Injectable 1 milliGRAM(s) IntraMuscular once PRN Glucose <70 milliGRAM(s)/deciLiter      PHYSICAL EXAM:  GENERAL: NAD, well-groomed, well-developed  HEAD:  Atraumatic, Normocephalic  EYES: EOMI, PERRLA, conjunctiva and sclera clear  ENMT: No tonsillar erythema, exudates, or enlargement; Moist mucous membranes, Good dentition, No lesions  NECK: Supple, No JVD, Normal thyroid  NERVOUS SYSTEM:  Alert & Oriented X3, Good concentration; Motor Strength 5/5 B/L upper and lower extremities; DTRs 2+ intact and symmetric  CHEST/LUNG: Clear to percussion bilaterally; No rales, rhonchi, wheezing, or rubs  HEART: Regular rate and rhythm; No murmurs, rubs, or gallops  ABDOMEN: Soft, Nontender, Nondistended; Bowel sounds present no corey sign  EXTREMITIES:  2+ Peripheral Pulses, No clubbing, cyanosis, or edema  LYMPH: No lymphadenopathy noted  SKIN: No rashes or lesions  LABS:                        10.1   4.3   )-----------( 157      ( 16 Sep 2017 06:51 )             30.9     09-16    142  |  107  |  14  ----------------------------<  95  4.1   |  28  |  0.85    Ca    9.4      16 Sep 2017 06:51  Phos  3.5     09-16  Mg     2.3     09-16    TPro  x   /  Alb  x   /  TBili  x   /  DBili  0.2  /  AST  x   /  ALT  x   /  AlkPhos  x   09-15    PT/INR - ( 15 Sep 2017 01:09 )   PT: 11.0 sec;   INR: 1.01 ratio         PTT - ( 15 Sep 2017 01:09 )  PTT:30.5 sec    CAPILLARY BLOOD GLUCOSE  114 (16 Sep 2017 12:22)  109 (15 Sep 2017 21:58)  130 (15 Sep 2017 16:31)
HPI:  86 years old female lives at home alone, AAO x3, walks with cane, with PMH of DM on metformin, acid reflux, constipation, gall stones, TIA multiple times, HTN, hypothyroidism, and osteoporosis came to ED with above symptoms last night. Patient's daughter at the bedside helped history taking, both patient and daughter are a good historian. She ate hamburger in lunch yesterday (15 Sep 2017 12:30)  pt takes sometimes advil for headache.  also takes plavix and asa for multiple lacunar infarcts.  occassional heart burns  10 lbs wt loss in 2 years   ct abd has dilated duct and gall bladder abnormality.  pt vomited many time yesterday with upper abd discomfort, but today pt is ok.  wants to eat.      Patient is a 86y old  Female who presents with a chief complaint of 4 times of NBNB vomiting and epigastric pain onset last night after food intake. (15 Sep 2017 12:30)      INTERVAL HPI/OVERNIGHT EVENTS:  T(C): 36.6 (09-15-17 @ 12:06), Max: 36.8 (09-15-17 @ 07:10)  HR: 73 (09-15-17 @ 12:06) (73 - 76)  BP: 132/53 (09-15-17 @ 12:06) (122/69 - 156/81)  RR: 17 (09-15-17 @ 12:06) (16 - 18)  SpO2: 97% (09-15-17 @ 12:06) (97% - 99%)  Wt(kg): --  I&O's Summary      REVIEW OF SYSTEMS: denies fever, chills, SOB, palpitations, chest pain, abdominal pain, nausea, vomitting, diarrhea, constipation, dizziness    MEDICATIONS  (STANDING):  dextrose 5%. 1000 milliLiter(s) (50 mL/Hr) IV Continuous <Continuous>  dextrose 50% Injectable 12.5 Gram(s) IV Push once  dextrose 50% Injectable 25 Gram(s) IV Push once  dextrose 50% Injectable 25 Gram(s) IV Push once  heparin  Injectable 5000 Unit(s) SubCutaneous every 12 hours  docusate sodium 100 milliGRAM(s) Oral three times a day  pantoprazole    Tablet 40 milliGRAM(s) Oral before breakfast  insulin lispro (HumaLOG) corrective regimen sliding scale   SubCutaneous Before meals and at bedtime    MEDICATIONS  (PRN):  ondansetron Injectable 4 milliGRAM(s) IV Push every 4 hours PRN Nausea and/or Vomiting  dextrose Gel 1 Dose(s) Oral once PRN Blood Glucose LESS THAN 70 milliGRAM(s)/deciLiter  glucagon  Injectable 1 milliGRAM(s) IntraMuscular once PRN Glucose <70 milliGRAM(s)/deciLiter      PHYSICAL EXAM:  GENERAL: NAD, well-groomed, well-developed  HEAD:  Atraumatic, Normocephalic  EYES: EOMI, PERRLA, conjunctiva and sclera clear  ENMT: No tonsillar erythema, exudates, or enlargement; Moist mucous membranes, Good dentition, No lesions  NECK: Supple, No JVD, Normal thyroid  NERVOUS SYSTEM:  Alert & Oriented X3, Good concentration; Motor Strength 5/5 B/L upper and lower extremities; DTRs 2+ intact and symmetric  CHEST/LUNG: Clear to percussion bilaterally; No rales, rhonchi, wheezing, or rubs  HEART: Regular rate and rhythm; No murmurs, rubs, or gallops  ABDOMEN: Soft, Nontender, Nondistended; Bowel sounds present corey sign negative,  no epigastric tenderness  EXTREMITIES:  2+ Peripheral Pulses, No clubbing, cyanosis, or edema  LYMPH: No lymphadenopathy noted  SKIN: No rashes or lesions  LABS:                        10.7   9.7   )-----------( 178      ( 15 Sep 2017 01:09 )             32.7     09-15    136  |  103  |  21<H>  ----------------------------<  82  4.2   |  28  |  0.86    Ca    9.2      15 Sep 2017 10:53  Phos  3.4     09-15  Mg     2.2     09-15    TPro  x   /  Alb  x   /  TBili  x   /  DBili  0.2  /  AST  x   /  ALT  x   /  AlkPhos  x   09-15    PT/INR - ( 15 Sep 2017 01:09 )   PT: 11.0 sec;   INR: 1.01 ratio         PTT - ( 15 Sep 2017 01:09 )  PTT:30.5 sec    CAPILLARY BLOOD GLUCOSE  104 (15 Sep 2017 07:10)

## 2017-09-16 NOTE — DISCHARGE NOTE ADULT - CARE PROVIDERS DIRECT ADDRESSES
,DirectAddress_Unknown,DirectAddress_Unknown,[email or web address]

## 2017-09-16 NOTE — DISCHARGE NOTE ADULT - PATIENT PORTAL LINK FT
“You can access the Follow[facility] Patient Portal, offered by [facility], by registering with the following website: [email or web address]”

## 2017-09-16 NOTE — DISCHARGE NOTE ADULT - CARE PLAN
Principal Discharge DX:	Abdominal pain, unspecified location  Goal:	Follow up with your gastroenterologist as soon as possible  Instructions for follow-up, activity and diet:	Patient seen and examined at bedside in no acute distress requesting to be discharged because she is pain free. I explained to you and your daughter that while the diet in the hospital isn't causing her abdominal pain, fat containing food may - you and your daughter at bedside understand the risks with leaving against medical advice while the work up is incomplete. The risks explain include worsening abdominal pain, infection, sepsis, and possible death from complications. The patient is advised to follow up with her primary care physician, her neurologist, and her gastroenterologist. Please take the medications sent to your pharmacy as prescribed and if you experience worsening of your clinical condition to return to the nearest emergency department. The daughter at bedside agrees and demonstrates understanding.     Workup was incomplete as the HIDA scan was pending for Monday. Patient given the results of her studies so far during her hospital stay.  Secondary Diagnosis:	TIA (transient ischemic attack)  Instructions for follow-up, activity and diet:	Avoid NSAIDs like Advil and Motrin but continue to take aspirin and Plavix as prescribed. follow up with your neurologist to determine which medications would be best continued in light of your abdominal pain.

## 2017-09-16 NOTE — DISCHARGE NOTE ADULT - CARE PROVIDER_API CALL
Mason Griffith), Internal Medicine  [address] [place] [address]  Phone: [phone]  Fax: [phone]    Juan Rain), Internal Medicine  [address] [address]  Phone: [phone]  Fax: [phone]    Chinmay Boogie (MONA), Neurology; Vascular Neurology  [address] [address]  Phone: [phone]  Fax: [phone]

## 2017-09-16 NOTE — DISCHARGE NOTE ADULT - MEDICATION SUMMARY - MEDICATIONS TO STOP TAKING
I will STOP taking the medications listed below when I get home from the hospital:    ramipril 2.5 mg oral capsule  -- 1 cap(s) by mouth once a day    ibuprofen

## 2017-09-16 NOTE — DISCHARGE NOTE ADULT - PLAN OF CARE
Follow up with your gastroenterologist as soon as possible Patient seen and examined at bedside in no acute distress requesting to be discharged because she is pain free. I explained to you and your daughter that while the diet in the hospital isn't causing her abdominal pain, fat containing food may - you and your daughter at bedside understand the risks with leaving against medical advice while the work up is incomplete. The risks explain include worsening abdominal pain, infection, sepsis, and possible death from complications. The patient is advised to follow up with her primary care physician, her neurologist, and her gastroenterologist. Please take the medications sent to your pharmacy as prescribed and if you experience worsening of your clinical condition to return to the nearest emergency department. The daughter at bedside agrees and demonstrates understanding.     Workup was incomplete as the HIDA scan was pending for Monday. Patient given the results of her studies so far during her hospital stay. Avoid NSAIDs like Advil and Motrin but continue to take aspirin and Plavix as prescribed. follow up with your neurologist to determine which medications would be best continued in light of your abdominal pain.

## 2017-09-16 NOTE — DISCHARGE NOTE ADULT - MEDICATION SUMMARY - MEDICATIONS TO TAKE
I will START or STAY ON the medications listed below when I get home from the hospital:    Aspir 81 oral delayed release tablet  -- 1 tab(s) by mouth once a day  -- Indication: For TIA (transient ischemic attack)    ibuprofen  -- Indication: For STOP TAKING    lisinopril 10 mg oral tablet  -- 1 tab(s) by mouth once a day  -- Indication: For HTN    atorvastatin 80 mg oral tablet  -- 1 tab(s) by mouth once a day (at bedtime)  -- Indication: For Hyperlipidemia    Plavix 75 mg oral tablet  -- 1 tab(s) by mouth once a day  -- Indication: For TIA (transient ischemic attack)    alendronate 70 mg oral tablet  -- 1 tab(s) by mouth once a week  -- Indication: For Osteoporosis    Singulair 10 mg oral tablet  -- 1 tab(s) by mouth once a day  -- Indication: For Asthma    sucralfate 1 g oral tablet  -- 1 tab(s) by mouth 4 times a day  -- Indication: For Hyperlipidemia    pantoprazole 40 mg oral delayed release tablet  -- 1 tab(s) by mouth once a day (before a meal)  -- Indication: For GERD    Synthroid 25 mcg (0.025 mg) oral tablet  -- 1 tab(s) by mouth once a day  -- Indication: For Hypothyroid    Calcium 600+D oral tablet  -- 1 tab(s) by mouth 2 times a day  -- Indication: For Need for prophylactic measure    Centrum oral tablet  -- 1 tab(s) by mouth once a day  -- Indication: For Need for prophylactic measure

## 2017-09-16 NOTE — PROGRESS NOTE ADULT - ASSESSMENT
seen by GI avoid NSAID and ppi .  as per neuro for multiple tia  , need either asa or plavix so he is leaving on medicine and GI  seen by GI as per him only asa  i will cont asa  and plavix tiw then his pcp, neuro to decide.  Mri abd goes in favour of acute brock  i ordered hida scan.  has no corey sign.  surgery reeval. seen by GI avoid NSAID and ppi .  as per neuro for multiple tia  , need either asa or plavix so he is leaving on medicine and GI  seen by GI as per him only asa  i will cont asa  and plavix tiw then his pcp, neuro to decide.  Mri abd goes in favour of acute brock  i ordered hida scan.  has no corey sign.  surgery reeval.  a1c 7.2  on metformin  diet  out pt can be managed by PCP

## 2017-09-19 DIAGNOSIS — E11.9 TYPE 2 DIABETES MELLITUS WITHOUT COMPLICATIONS: ICD-10-CM

## 2017-09-19 DIAGNOSIS — Z86.73 PERSONAL HISTORY OF TRANSIENT ISCHEMIC ATTACK (TIA), AND CEREBRAL INFARCTION WITHOUT RESIDUAL DEFICITS: ICD-10-CM

## 2017-09-19 DIAGNOSIS — M81.0 AGE-RELATED OSTEOPOROSIS WITHOUT CURRENT PATHOLOGICAL FRACTURE: ICD-10-CM

## 2017-09-19 DIAGNOSIS — Z79.84 LONG TERM (CURRENT) USE OF ORAL HYPOGLYCEMIC DRUGS: ICD-10-CM

## 2017-09-19 DIAGNOSIS — R10.13 EPIGASTRIC PAIN: ICD-10-CM

## 2017-09-19 DIAGNOSIS — Z79.02 LONG TERM (CURRENT) USE OF ANTITHROMBOTICS/ANTIPLATELETS: ICD-10-CM

## 2017-09-19 DIAGNOSIS — R10.9 UNSPECIFIED ABDOMINAL PAIN: ICD-10-CM

## 2017-09-19 DIAGNOSIS — K21.9 GASTRO-ESOPHAGEAL REFLUX DISEASE WITHOUT ESOPHAGITIS: ICD-10-CM

## 2017-09-19 DIAGNOSIS — Z66 DO NOT RESUSCITATE: ICD-10-CM

## 2017-09-19 DIAGNOSIS — I10 ESSENTIAL (PRIMARY) HYPERTENSION: ICD-10-CM

## 2017-09-19 DIAGNOSIS — E78.5 HYPERLIPIDEMIA, UNSPECIFIED: ICD-10-CM

## 2017-09-19 DIAGNOSIS — T39.395A ADVERSE EFFECT OF OTHER NONSTEROIDAL ANTI-INFLAMMATORY DRUGS [NSAID], INITIAL ENCOUNTER: ICD-10-CM

## 2017-09-19 DIAGNOSIS — Z53.21 PROCEDURE AND TREATMENT NOT CARRIED OUT DUE TO PATIENT LEAVING PRIOR TO BEING SEEN BY HEALTH CARE PROVIDER: ICD-10-CM

## 2017-09-19 DIAGNOSIS — Z88.0 ALLERGY STATUS TO PENICILLIN: ICD-10-CM

## 2017-09-19 DIAGNOSIS — E03.9 HYPOTHYROIDISM, UNSPECIFIED: ICD-10-CM

## 2017-09-19 DIAGNOSIS — K59.00 CONSTIPATION, UNSPECIFIED: ICD-10-CM

## 2017-09-20 LAB
CULTURE RESULTS: SIGNIFICANT CHANGE UP
CULTURE RESULTS: SIGNIFICANT CHANGE UP
SPECIMEN SOURCE: SIGNIFICANT CHANGE UP
SPECIMEN SOURCE: SIGNIFICANT CHANGE UP

## 2017-09-25 DIAGNOSIS — K80.00 CALCULUS OF GALLBLADDER WITH ACUTE CHOLECYSTITIS WITHOUT OBSTRUCTION: ICD-10-CM

## 2017-10-19 PROCEDURE — 83690 ASSAY OF LIPASE: CPT

## 2017-10-19 PROCEDURE — 83036 HEMOGLOBIN GLYCOSYLATED A1C: CPT

## 2017-10-19 PROCEDURE — 83550 IRON BINDING TEST: CPT

## 2017-10-19 PROCEDURE — 85730 THROMBOPLASTIN TIME PARTIAL: CPT

## 2017-10-19 PROCEDURE — 84466 ASSAY OF TRANSFERRIN: CPT

## 2017-10-19 PROCEDURE — 85027 COMPLETE CBC AUTOMATED: CPT

## 2017-10-19 PROCEDURE — 84484 ASSAY OF TROPONIN QUANT: CPT

## 2017-10-19 PROCEDURE — 99285 EMERGENCY DEPT VISIT HI MDM: CPT | Mod: 25

## 2017-10-19 PROCEDURE — 80061 LIPID PANEL: CPT

## 2017-10-19 PROCEDURE — 82607 VITAMIN B-12: CPT

## 2017-10-19 PROCEDURE — 80048 BASIC METABOLIC PNL TOTAL CA: CPT

## 2017-10-19 PROCEDURE — 84100 ASSAY OF PHOSPHORUS: CPT

## 2017-10-19 PROCEDURE — 82746 ASSAY OF FOLIC ACID SERUM: CPT

## 2017-10-19 PROCEDURE — 71045 X-RAY EXAM CHEST 1 VIEW: CPT

## 2017-10-19 PROCEDURE — 84443 ASSAY THYROID STIM HORMONE: CPT

## 2017-10-19 PROCEDURE — 85610 PROTHROMBIN TIME: CPT

## 2017-10-19 PROCEDURE — 74177 CT ABD & PELVIS W/CONTRAST: CPT

## 2017-10-19 PROCEDURE — 80074 ACUTE HEPATITIS PANEL: CPT

## 2017-10-19 PROCEDURE — 82728 ASSAY OF FERRITIN: CPT

## 2017-10-19 PROCEDURE — 85045 AUTOMATED RETICULOCYTE COUNT: CPT

## 2017-10-19 PROCEDURE — 93005 ELECTROCARDIOGRAM TRACING: CPT

## 2017-10-19 PROCEDURE — 82248 BILIRUBIN DIRECT: CPT

## 2017-10-19 PROCEDURE — 83735 ASSAY OF MAGNESIUM: CPT

## 2017-10-19 PROCEDURE — 80053 COMPREHEN METABOLIC PANEL: CPT

## 2017-10-19 PROCEDURE — 74182 MRI ABDOMEN W/CONTRAST: CPT

## 2017-10-19 PROCEDURE — 87040 BLOOD CULTURE FOR BACTERIA: CPT

## 2017-10-19 PROCEDURE — 82977 ASSAY OF GGT: CPT

## 2020-02-01 ENCOUNTER — INPATIENT (INPATIENT)
Facility: HOSPITAL | Age: 85
LOS: 23 days | Discharge: NURSING, REHAB OR LONG-TERM CARE | DRG: 291 | End: 2020-02-25
Attending: INTERNAL MEDICINE | Admitting: INTERNAL MEDICINE
Payer: COMMERCIAL

## 2020-02-01 VITALS
DIASTOLIC BLOOD PRESSURE: 82 MMHG | TEMPERATURE: 97 F | HEART RATE: 80 BPM | SYSTOLIC BLOOD PRESSURE: 168 MMHG | RESPIRATION RATE: 18 BRPM | OXYGEN SATURATION: 100 % | WEIGHT: 80.03 LBS

## 2020-02-01 DIAGNOSIS — I10 ESSENTIAL (PRIMARY) HYPERTENSION: ICD-10-CM

## 2020-02-01 DIAGNOSIS — E11.9 TYPE 2 DIABETES MELLITUS WITHOUT COMPLICATIONS: ICD-10-CM

## 2020-02-01 DIAGNOSIS — D64.9 ANEMIA, UNSPECIFIED: ICD-10-CM

## 2020-02-01 DIAGNOSIS — R60.1 GENERALIZED EDEMA: ICD-10-CM

## 2020-02-01 DIAGNOSIS — Z87.19 PERSONAL HISTORY OF OTHER DISEASES OF THE DIGESTIVE SYSTEM: Chronic | ICD-10-CM

## 2020-02-01 DIAGNOSIS — E03.9 HYPOTHYROIDISM, UNSPECIFIED: ICD-10-CM

## 2020-02-01 DIAGNOSIS — R06.02 SHORTNESS OF BREATH: ICD-10-CM

## 2020-02-01 DIAGNOSIS — N18.3 CHRONIC KIDNEY DISEASE, STAGE 3 (MODERATE): ICD-10-CM

## 2020-02-01 LAB
ALBUMIN SERPL ELPH-MCNC: 3.6 G/DL — SIGNIFICANT CHANGE UP (ref 3.3–5)
ALP SERPL-CCNC: 70 U/L — SIGNIFICANT CHANGE UP (ref 40–120)
ALT FLD-CCNC: 14 U/L — SIGNIFICANT CHANGE UP (ref 10–45)
ANION GAP SERPL CALC-SCNC: 14 MMOL/L — SIGNIFICANT CHANGE UP (ref 5–17)
APPEARANCE UR: ABNORMAL
APTT BLD: 29.8 SEC — SIGNIFICANT CHANGE UP (ref 27.5–36.3)
AST SERPL-CCNC: 49 U/L — HIGH (ref 10–40)
BACTERIA # UR AUTO: ABNORMAL
BASOPHILS # BLD AUTO: 0.02 K/UL — SIGNIFICANT CHANGE UP (ref 0–0.2)
BASOPHILS NFR BLD AUTO: 0.5 % — SIGNIFICANT CHANGE UP (ref 0–2)
BILIRUB SERPL-MCNC: 0.7 MG/DL — SIGNIFICANT CHANGE UP (ref 0.2–1.2)
BILIRUB UR-MCNC: NEGATIVE — SIGNIFICANT CHANGE UP
BUN SERPL-MCNC: 26 MG/DL — HIGH (ref 7–23)
CALCIUM SERPL-MCNC: 9.6 MG/DL — SIGNIFICANT CHANGE UP (ref 8.4–10.5)
CHLORIDE SERPL-SCNC: 101 MMOL/L — SIGNIFICANT CHANGE UP (ref 96–108)
CO2 SERPL-SCNC: 21 MMOL/L — LOW (ref 22–31)
COLOR SPEC: YELLOW — SIGNIFICANT CHANGE UP
CREAT SERPL-MCNC: 1.45 MG/DL — HIGH (ref 0.5–1.3)
DIFF PNL FLD: NEGATIVE — SIGNIFICANT CHANGE UP
EOSINOPHIL # BLD AUTO: 0.03 K/UL — SIGNIFICANT CHANGE UP (ref 0–0.5)
EOSINOPHIL NFR BLD AUTO: 0.7 % — SIGNIFICANT CHANGE UP (ref 0–6)
EPI CELLS # UR: 2 /HPF — SIGNIFICANT CHANGE UP
GLUCOSE BLDC GLUCOMTR-MCNC: 123 MG/DL — HIGH (ref 70–99)
GLUCOSE SERPL-MCNC: 140 MG/DL — HIGH (ref 70–99)
GLUCOSE UR QL: NEGATIVE — SIGNIFICANT CHANGE UP
HCT VFR BLD CALC: 25.1 % — LOW (ref 34.5–45)
HGB BLD-MCNC: 7.8 G/DL — LOW (ref 11.5–15.5)
HYALINE CASTS # UR AUTO: 1 /LPF — SIGNIFICANT CHANGE UP (ref 0–2)
IMM GRANULOCYTES NFR BLD AUTO: 0.2 % — SIGNIFICANT CHANGE UP (ref 0–1.5)
INR BLD: 1.18 RATIO — HIGH (ref 0.88–1.16)
KETONES UR-MCNC: NEGATIVE — SIGNIFICANT CHANGE UP
LEUKOCYTE ESTERASE UR-ACNC: ABNORMAL
LYMPHOCYTES # BLD AUTO: 0.75 K/UL — LOW (ref 1–3.3)
LYMPHOCYTES # BLD AUTO: 18.1 % — SIGNIFICANT CHANGE UP (ref 13–44)
MAGNESIUM SERPL-MCNC: 2.2 MG/DL — SIGNIFICANT CHANGE UP (ref 1.6–2.6)
MCHC RBC-ENTMCNC: 29.8 PG — SIGNIFICANT CHANGE UP (ref 27–34)
MCHC RBC-ENTMCNC: 31.1 GM/DL — LOW (ref 32–36)
MCV RBC AUTO: 95.8 FL — SIGNIFICANT CHANGE UP (ref 80–100)
MONOCYTES # BLD AUTO: 0.34 K/UL — SIGNIFICANT CHANGE UP (ref 0–0.9)
MONOCYTES NFR BLD AUTO: 8.2 % — SIGNIFICANT CHANGE UP (ref 2–14)
NEUTROPHILS # BLD AUTO: 2.99 K/UL — SIGNIFICANT CHANGE UP (ref 1.8–7.4)
NEUTROPHILS NFR BLD AUTO: 72.3 % — SIGNIFICANT CHANGE UP (ref 43–77)
NITRITE UR-MCNC: NEGATIVE — SIGNIFICANT CHANGE UP
NRBC # BLD: 0 /100 WBCS — SIGNIFICANT CHANGE UP (ref 0–0)
NT-PROBNP SERPL-SCNC: 7125 PG/ML — HIGH (ref 0–300)
PH UR: 8 — SIGNIFICANT CHANGE UP (ref 5–8)
PHOSPHATE SERPL-MCNC: 1.8 MG/DL — LOW (ref 2.5–4.5)
PLATELET # BLD AUTO: 365 K/UL — SIGNIFICANT CHANGE UP (ref 150–400)
POTASSIUM SERPL-MCNC: 5 MMOL/L — SIGNIFICANT CHANGE UP (ref 3.5–5.3)
POTASSIUM SERPL-SCNC: 5 MMOL/L — SIGNIFICANT CHANGE UP (ref 3.5–5.3)
PROT SERPL-MCNC: 8.3 G/DL — SIGNIFICANT CHANGE UP (ref 6–8.3)
PROT UR-MCNC: 100 — SIGNIFICANT CHANGE UP
PROTHROM AB SERPL-ACNC: 13.5 SEC — HIGH (ref 10–12.9)
RBC # BLD: 2.62 M/UL — LOW (ref 3.8–5.2)
RBC # FLD: 17.4 % — HIGH (ref 10.3–14.5)
RBC CASTS # UR COMP ASSIST: 1 /HPF — SIGNIFICANT CHANGE UP (ref 0–4)
SODIUM SERPL-SCNC: 136 MMOL/L — SIGNIFICANT CHANGE UP (ref 135–145)
SP GR SPEC: 1.02 — SIGNIFICANT CHANGE UP (ref 1.01–1.02)
TROPONIN T, HIGH SENSITIVITY RESULT: 33 NG/L — SIGNIFICANT CHANGE UP (ref 0–51)
UROBILINOGEN FLD QL: ABNORMAL
WBC # BLD: 4.14 K/UL — SIGNIFICANT CHANGE UP (ref 3.8–10.5)
WBC # FLD AUTO: 4.14 K/UL — SIGNIFICANT CHANGE UP (ref 3.8–10.5)
WBC UR QL: 13 /HPF — HIGH (ref 0–5)

## 2020-02-01 PROCEDURE — 99285 EMERGENCY DEPT VISIT HI MDM: CPT

## 2020-02-01 PROCEDURE — 71250 CT THORAX DX C-: CPT | Mod: 26

## 2020-02-01 PROCEDURE — 93308 TTE F-UP OR LMTD: CPT | Mod: 26

## 2020-02-01 PROCEDURE — 71046 X-RAY EXAM CHEST 2 VIEWS: CPT | Mod: 26

## 2020-02-01 PROCEDURE — 93010 ELECTROCARDIOGRAM REPORT: CPT

## 2020-02-01 RX ORDER — MULTIVIT-MIN/FERROUS GLUCONATE 9 MG/15 ML
1 LIQUID (ML) ORAL DAILY
Refills: 0 | Status: DISCONTINUED | OUTPATIENT
Start: 2020-02-01 | End: 2020-02-25

## 2020-02-01 RX ORDER — FUROSEMIDE 40 MG
20 TABLET ORAL DAILY
Refills: 0 | Status: DISCONTINUED | OUTPATIENT
Start: 2020-02-02 | End: 2020-02-02

## 2020-02-01 RX ORDER — ASPIRIN/CALCIUM CARB/MAGNESIUM 324 MG
81 TABLET ORAL DAILY
Refills: 0 | Status: DISCONTINUED | OUTPATIENT
Start: 2020-02-01 | End: 2020-02-01

## 2020-02-01 RX ORDER — FUROSEMIDE 40 MG
40 TABLET ORAL ONCE
Refills: 0 | Status: COMPLETED | OUTPATIENT
Start: 2020-02-01 | End: 2020-02-01

## 2020-02-01 RX ORDER — DEXTROSE 50 % IN WATER 50 %
15 SYRINGE (ML) INTRAVENOUS ONCE
Refills: 0 | Status: DISCONTINUED | OUTPATIENT
Start: 2020-02-01 | End: 2020-02-04

## 2020-02-01 RX ORDER — DEXTROSE 50 % IN WATER 50 %
12.5 SYRINGE (ML) INTRAVENOUS ONCE
Refills: 0 | Status: DISCONTINUED | OUTPATIENT
Start: 2020-02-01 | End: 2020-02-04

## 2020-02-01 RX ORDER — HEPARIN SODIUM 5000 [USP'U]/ML
5000 INJECTION INTRAVENOUS; SUBCUTANEOUS EVERY 12 HOURS
Refills: 0 | Status: DISCONTINUED | OUTPATIENT
Start: 2020-02-01 | End: 2020-02-02

## 2020-02-01 RX ORDER — DEXTROSE 50 % IN WATER 50 %
25 SYRINGE (ML) INTRAVENOUS ONCE
Refills: 0 | Status: DISCONTINUED | OUTPATIENT
Start: 2020-02-01 | End: 2020-02-04

## 2020-02-01 RX ORDER — PANTOPRAZOLE SODIUM 20 MG/1
40 TABLET, DELAYED RELEASE ORAL
Refills: 0 | Status: DISCONTINUED | OUTPATIENT
Start: 2020-02-01 | End: 2020-02-16

## 2020-02-01 RX ORDER — LABETALOL HCL 100 MG
200 TABLET ORAL
Refills: 0 | Status: DISCONTINUED | OUTPATIENT
Start: 2020-02-01 | End: 2020-02-02

## 2020-02-01 RX ORDER — SODIUM CHLORIDE 9 MG/ML
1000 INJECTION, SOLUTION INTRAVENOUS
Refills: 0 | Status: DISCONTINUED | OUTPATIENT
Start: 2020-02-01 | End: 2020-02-04

## 2020-02-01 RX ORDER — ATORVASTATIN CALCIUM 80 MG/1
80 TABLET, FILM COATED ORAL AT BEDTIME
Refills: 0 | Status: DISCONTINUED | OUTPATIENT
Start: 2020-02-01 | End: 2020-02-02

## 2020-02-01 RX ORDER — LEVOTHYROXINE SODIUM 125 MCG
50 TABLET ORAL DAILY
Refills: 0 | Status: DISCONTINUED | OUTPATIENT
Start: 2020-02-01 | End: 2020-02-02

## 2020-02-01 RX ORDER — INSULIN LISPRO 100/ML
VIAL (ML) SUBCUTANEOUS
Refills: 0 | Status: DISCONTINUED | OUTPATIENT
Start: 2020-02-01 | End: 2020-02-02

## 2020-02-01 RX ORDER — GLUCAGON INJECTION, SOLUTION 0.5 MG/.1ML
1 INJECTION, SOLUTION SUBCUTANEOUS ONCE
Refills: 0 | Status: DISCONTINUED | OUTPATIENT
Start: 2020-02-01 | End: 2020-02-25

## 2020-02-01 RX ORDER — MIRTAZAPINE 45 MG/1
3.75 TABLET, ORALLY DISINTEGRATING ORAL AT BEDTIME
Refills: 0 | Status: DISCONTINUED | OUTPATIENT
Start: 2020-02-01 | End: 2020-02-02

## 2020-02-01 RX ADMIN — Medication 40 MILLIGRAM(S): at 22:25

## 2020-02-01 RX ADMIN — Medication 200 MILLIGRAM(S): at 22:21

## 2020-02-01 RX ADMIN — MIRTAZAPINE 3.75 MILLIGRAM(S): 45 TABLET, ORALLY DISINTEGRATING ORAL at 22:36

## 2020-02-01 RX ADMIN — ATORVASTATIN CALCIUM 80 MILLIGRAM(S): 80 TABLET, FILM COATED ORAL at 22:21

## 2020-02-01 RX ADMIN — HEPARIN SODIUM 5000 UNIT(S): 5000 INJECTION INTRAVENOUS; SUBCUTANEOUS at 19:03

## 2020-02-01 NOTE — H&P ADULT - NSICDXPASTMEDICALHX_GEN_ALL_CORE_FT
PAST MEDICAL HISTORY:  CAD in native artery     Diabetes     Hypertension     Hypothyroid     Mini stroke no residual paralysis

## 2020-02-01 NOTE — H&P ADULT - ASSESSMENT
89F with HTN, DM, hypothyroidism admitted after coming to the Emergency Department with generalized fatigue, hardly getting out of bed, diagnosed with generalized anasarca and bilaterally pleural effusions and severe anemia

## 2020-02-01 NOTE — H&P ADULT - PROBLEM SELECTOR PLAN 6
daughter states has h/o kidney disease  will monitor  at present stage 3 daughter states has h/o kidney disease but creatinine was 1.1 in Oct 2019 and 1.2 in Jan 2020  will monitor  at present fits criteria for EFRAIN on CKD stage 3

## 2020-02-01 NOTE — ED ADULT NURSE NOTE - OBJECTIVE STATEMENT
89 y F h/o DM, hypothyroid, HTN presents with SOB, weakness, and +2 pitting edema in the lower extremities and RUE. Pt reported to Urgent care today, X-ray performed showing RML consolidation or mass. Reports to a heart "valve issue" but is unclear of d/x. Pt resides in [place] but visiting her daughter for the last 2 weeks. Denies fever, chills, N/V/D. A&Ox3. Skin warm dry and intact. Crackles noted bilaterally, reports to HIGUERA- breathing in NAD. Abdomen soft, nontender, nondistended. Daughter at bedside.

## 2020-02-01 NOTE — ED PROVIDER NOTE - NS ED ROS FT
General: +fatigue  Eyes: Denies blurry vision  ENMT: Denies rhinorrhea  Respiratory: +HIGUERA  Cardiovascular: Denies palpitations, CP  Gastrointestinal: Denies abd pain, N/V/D/C, hematochezia, melena  : Denies dysuria, increased freq  Musculoskeletal: +LE edema  Endocrine: Denies increased thirst, increased frequency  Allergic/Immunologic: Denies rashes or hives

## 2020-02-01 NOTE — ED PROVIDER NOTE - PHYSICAL EXAMINATION
GENERAL: No acute distress, thin elderly female  HEAD:  Atraumatic, Normocephalic  EYES: EOMI, PERRLA, conjunctiva and sclera clear  NECK: Supple, no lymphadenopathy  CHEST/LUNG: crackles b/l, decreased breath sounds at bases  HEART: Regular rate and rhythm; +systolic murmur  ABDOMEN: Soft, non-tender, non-distended; normal bowel sounds, no organomegaly  EXTREMITIES:  2+ peripheral pulses b/l, 1+ pitting edema up to knees   NEUROLOGY: A&O x 3, no focal deficits  SKIN: No rashes or lesions

## 2020-02-01 NOTE — H&P ADULT - PROBLEM SELECTOR PLAN 1
unclear etiology  has bilaterally pleural effusions, upper and LE edema  albumin is 3.5 unlikely poor nutrition though that can certainly contribute  pulmonary evaluation for large bilateral pleural effusions  POCUS in Emergency Department normal LV function  will get formal echocardiogram  IV furosemide 40 qd with close watch on creatinine

## 2020-02-01 NOTE — H&P ADULT - PROBLEM SELECTOR PLAN 4
uncontrolled  not on any meds for HTN  will start labetalol 200 BID with hold parameters   avoid amlodipine secondary to leg swelling

## 2020-02-01 NOTE — H&P ADULT - PROBLEM SELECTOR PLAN 3
daughter in law states she has a hemoglobin in the low 8s usually and it is chronic  will send work up however for reversible causes  likely secondary to CKD

## 2020-02-01 NOTE — H&P ADULT - NSHPREVIEWOFSYSTEMS_GEN_ALL_CORE
Gen: no loss of wt no loss of appetite  ENT: no dizziness no hearing loss  Ophth: no blurring of vision no loss of vision  Resp: No cough no sputum production  CVS: No chest pain no palpitations no orthopnea  GI: no nausea, vomiting or diarrhea   : no dysuria, hematuria states has uterine prolapse   Endo: no polyuria no excessive sweating  Neuro: no weakness no paresthesias + some memory loss per daughter   Psych: No suicidal no depressive ideation  Heme: No petechiae no easy bruising  Msk: No joint pain no swelling  Skin: No rash no itching  All other ROS negative

## 2020-02-01 NOTE — ED PROVIDER NOTE - OBJECTIVE STATEMENT
89F with HTN, DM, hypothyroidism p/w SOB and LE edema. Accompanied by daughter. She had gone to urgent care today for extreme fatigue and HIGUERA for the last 2 days. CXR done at urgent care notable for b/l pleural effusions and RML consolidation. She was sent to the ED. Patient currently endorses some HIGUERA and LE swelling which is chronic. Denies fever, chills, cough, orthopnea, chest pain, GI/ sx. She ambulates with a cane.  She has been living with her daughter for about 2 weeks now, most of her medical care is in [place] including cardiology. Pt's daughter states the patient has some history of "valve problem". Does not take diuretic.

## 2020-02-01 NOTE — H&P ADULT - NSHPPHYSICALEXAM_GEN_ALL_CORE
PHYSICAL EXAM: vital signs as above  exam with daughter present  in no apparent distress  cachectic  HEENT: KALPESH EOMI  Neck: Supple, no JVD, no thyromegaly  Lungs: no wheeze, decreased breath sounds at bases R>>L  CVS: S1 S2 soft ejection systolic murmur best heard at left sternal border   Abdomen: no tenderness, no organomegaly, BS present  Neuro: AO x 2 pleasant no focal weakness, no sensory abnormalities  Skin: warm, dry  Ext: no cyanosis or clubbing, bilateral LE 2 + edema  Msk: no joint swelling or deformities  Back: no CVA tenderness, no kyphosis/scoliosis  no evidence of uterine prolapse at this time

## 2020-02-01 NOTE — ED ADULT NURSE REASSESSMENT NOTE - NS ED NURSE REASSESS COMMENT FT1
Report given to Jett Garcia on 6 Ed at 1805. Pt. at CT scan, will go to floor after. Awaiting TTE and Lasix. Resting comfortably in bed. IV intact. Daughter at bedside.

## 2020-02-01 NOTE — ED ADULT NURSE NOTE - NSIMPLEMENTINTERV_GEN_ALL_ED
Implemented All Fall with Harm Risk Interventions:  [place] to call system. Call bell, personal items and telephone within reach. Instruct patient to call for assistance. Room bathroom lighting operational. Non-slip footwear when patient is off stretcher. Physically safe environment: no spills, clutter or unnecessary equipment. Stretcher in lowest position, wheels locked, appropriate side rails in place. Provide visual cue, wrist band, yellow gown, etc. Monitor gait and stability. Monitor for mental status changes and reorient to person, place, and time. Review medications for side effects contributing to fall risk. Reinforce activity limits and safety measures with patient and family. Provide visual clues: red socks.

## 2020-02-01 NOTE — H&P ADULT - ATTENDING COMMENTS
discussed with daughter at bedside in detail   discussed with son and daughter in law over the phone in detail

## 2020-02-01 NOTE — ED PROVIDER NOTE - CLINICAL SUMMARY MEDICAL DECISION MAKING FREE TEXT BOX
Attending MD Cochran: 89F with unclear medical history presenting with fatigue and dyspnea, exam with anasarca, POCUS with b/l moderate pleural effusions. Ddx includes CHF, renal dysfunction. Plan for labs, EKG, CXR, admission for IV diuresis and formal TTE.

## 2020-02-01 NOTE — H&P ADULT - HISTORY OF PRESENT ILLNESS
89F with HTN, DM, hypothyroidism admitted after coming to the Emergency Department with generalized fatigue, hardly getting out of bed, which the daughter states is unusual for her mom. SHe also c/o shortness of breath and sw 89F with HTN, DM, hypothyroidism admitted after coming to the Emergency Department with generalized fatigue, hardly getting out of bed, which the daughter states is unusual for her mom. SHe also c/o shortness of breath and swellingonsolidation. She was sent to the ED. Patient currently endorses some HIGUERA and LE swelling which is chronic. Denies fever, chills, cough, orthopnea, chest pain, GI/ sx. She ambulates with a cane.  She has been living with her daughter for about 2 weeks now, most of her medical care is in [place] including cardiology. Pt's daughter states the patient has some history of "valve prophylaxisoblem". Does not take diuretic. 89F with HTN, DM, hypothyroidism admitted after coming to the Emergency Department with generalized fatigue, hardly getting out of bed, which the daughter states is unusual for her mom. She also c/o shortness of breath and swelling of upper and lower extremities. She had gone to urgent care today for extreme fatigue and HIGUERA for the last 2 days where a CXR done was notable for b/l pleural effusions and RML consolidation. She was sent to the Emergency Department. Denies fever, chills, cough, orthopnea, chest pain. She ambulates with a cane.  She has been living with her daughter for about 2 weeks now, her son is on a visit to [place], most of her medical care is in [place] including cardiology. Pt's daughter states the patient has some history of "valve problem". Does not take any water pills/diuretic. Currently comfortably lying flat in bed on the floor. The daughter states her mom has never been diagnosed with dementia, but her memory has been failing recently. The patient also has been c/o frequent protrusion of uterus thru vagina

## 2020-02-02 DIAGNOSIS — I10 ESSENTIAL (PRIMARY) HYPERTENSION: ICD-10-CM

## 2020-02-02 DIAGNOSIS — N17.9 ACUTE KIDNEY FAILURE, UNSPECIFIED: ICD-10-CM

## 2020-02-02 DIAGNOSIS — E78.49 OTHER HYPERLIPIDEMIA: ICD-10-CM

## 2020-02-02 DIAGNOSIS — I95.9 HYPOTENSION, UNSPECIFIED: ICD-10-CM

## 2020-02-02 DIAGNOSIS — E11.69 TYPE 2 DIABETES MELLITUS WITH OTHER SPECIFIED COMPLICATION: ICD-10-CM

## 2020-02-02 DIAGNOSIS — M81.8 OTHER OSTEOPOROSIS WITHOUT CURRENT PATHOLOGICAL FRACTURE: ICD-10-CM

## 2020-02-02 DIAGNOSIS — E03.9 HYPOTHYROIDISM, UNSPECIFIED: ICD-10-CM

## 2020-02-02 LAB
ALBUMIN SERPL ELPH-MCNC: 2.9 G/DL — LOW (ref 3.3–5)
ALBUMIN SERPL ELPH-MCNC: 2.9 G/DL — LOW (ref 3.3–5)
ALBUMIN SERPL ELPH-MCNC: 3 G/DL — LOW (ref 3.3–5)
ALP SERPL-CCNC: 154 U/L — HIGH (ref 40–120)
ALP SERPL-CCNC: 164 U/L — HIGH (ref 40–120)
ALP SERPL-CCNC: 83 U/L — SIGNIFICANT CHANGE UP (ref 40–120)
ALT FLD-CCNC: 206 U/L — HIGH (ref 10–45)
ALT FLD-CCNC: 215 U/L — HIGH (ref 10–45)
ALT FLD-CCNC: 33 U/L — SIGNIFICANT CHANGE UP (ref 10–45)
ANION GAP SERPL CALC-SCNC: 13 MMOL/L — SIGNIFICANT CHANGE UP (ref 5–17)
ANION GAP SERPL CALC-SCNC: 13 MMOL/L — SIGNIFICANT CHANGE UP (ref 5–17)
ANION GAP SERPL CALC-SCNC: 15 MMOL/L — SIGNIFICANT CHANGE UP (ref 5–17)
ANION GAP SERPL CALC-SCNC: 19 MMOL/L — HIGH (ref 5–17)
APTT BLD: 28.4 SEC — SIGNIFICANT CHANGE UP (ref 27.5–36.3)
AST SERPL-CCNC: 1219 U/L — HIGH (ref 10–40)
AST SERPL-CCNC: 1424 U/L — HIGH (ref 10–40)
AST SERPL-CCNC: 233 U/L — HIGH (ref 10–40)
BASE EXCESS BLDV CALC-SCNC: -4.4 MMOL/L — LOW (ref -2–2)
BASE EXCESS BLDV CALC-SCNC: -4.8 MMOL/L — LOW (ref -2–2)
BASE EXCESS BLDV CALC-SCNC: -7.5 MMOL/L — LOW (ref -2–2)
BASE EXCESS BLDV CALC-SCNC: -7.7 MMOL/L — LOW (ref -2–2)
BILIRUB SERPL-MCNC: 0.7 MG/DL — SIGNIFICANT CHANGE UP (ref 0.2–1.2)
BILIRUB SERPL-MCNC: 0.9 MG/DL — SIGNIFICANT CHANGE UP (ref 0.2–1.2)
BILIRUB SERPL-MCNC: 1.2 MG/DL — SIGNIFICANT CHANGE UP (ref 0.2–1.2)
BLD GP AB SCN SERPL QL: NEGATIVE — SIGNIFICANT CHANGE UP
BUN SERPL-MCNC: 24 MG/DL — HIGH (ref 7–23)
BUN SERPL-MCNC: 25 MG/DL — HIGH (ref 7–23)
BUN SERPL-MCNC: 28 MG/DL — HIGH (ref 7–23)
BUN SERPL-MCNC: 32 MG/DL — HIGH (ref 7–23)
CALCIUM SERPL-MCNC: 8.5 MG/DL — SIGNIFICANT CHANGE UP (ref 8.4–10.5)
CALCIUM SERPL-MCNC: 8.6 MG/DL — SIGNIFICANT CHANGE UP (ref 8.4–10.5)
CALCIUM SERPL-MCNC: 8.8 MG/DL — SIGNIFICANT CHANGE UP (ref 8.4–10.5)
CALCIUM SERPL-MCNC: 9 MG/DL — SIGNIFICANT CHANGE UP (ref 8.4–10.5)
CHLORIDE SERPL-SCNC: 100 MMOL/L — SIGNIFICANT CHANGE UP (ref 96–108)
CHLORIDE SERPL-SCNC: 102 MMOL/L — SIGNIFICANT CHANGE UP (ref 96–108)
CHOLEST SERPL-MCNC: 93 MG/DL — SIGNIFICANT CHANGE UP (ref 10–199)
CK MB CFR SERPL CALC: 3.7 NG/ML — SIGNIFICANT CHANGE UP (ref 0–3.8)
CK SERPL-CCNC: 83 U/L — SIGNIFICANT CHANGE UP (ref 25–170)
CO2 BLDV-SCNC: 19 MMOL/L — LOW (ref 22–30)
CO2 BLDV-SCNC: 20 MMOL/L — LOW (ref 22–30)
CO2 BLDV-SCNC: 22 MMOL/L — SIGNIFICANT CHANGE UP (ref 22–30)
CO2 BLDV-SCNC: 22 MMOL/L — SIGNIFICANT CHANGE UP (ref 22–30)
CO2 SERPL-SCNC: 12 MMOL/L — LOW (ref 22–31)
CO2 SERPL-SCNC: 15 MMOL/L — LOW (ref 22–31)
CO2 SERPL-SCNC: 17 MMOL/L — LOW (ref 22–31)
CO2 SERPL-SCNC: 17 MMOL/L — LOW (ref 22–31)
CREAT SERPL-MCNC: 1.5 MG/DL — HIGH (ref 0.5–1.3)
CREAT SERPL-MCNC: 1.55 MG/DL — HIGH (ref 0.5–1.3)
CREAT SERPL-MCNC: 1.68 MG/DL — HIGH (ref 0.5–1.3)
CREAT SERPL-MCNC: 1.75 MG/DL — HIGH (ref 0.5–1.3)
FERRITIN SERPL-MCNC: 151 NG/ML — HIGH (ref 15–150)
FERRITIN SERPL-MCNC: 99 NG/ML — SIGNIFICANT CHANGE UP (ref 15–150)
GAS PNL BLDA: SIGNIFICANT CHANGE UP
GAS PNL BLDV: SIGNIFICANT CHANGE UP
GAS PNL BLDV: SIGNIFICANT CHANGE UP
GLUCOSE BLDC GLUCOMTR-MCNC: 150 MG/DL — HIGH (ref 70–99)
GLUCOSE BLDC GLUCOMTR-MCNC: 83 MG/DL — SIGNIFICANT CHANGE UP (ref 70–99)
GLUCOSE BLDC GLUCOMTR-MCNC: 85 MG/DL — SIGNIFICANT CHANGE UP (ref 70–99)
GLUCOSE SERPL-MCNC: 123 MG/DL — HIGH (ref 70–99)
GLUCOSE SERPL-MCNC: 144 MG/DL — HIGH (ref 70–99)
GLUCOSE SERPL-MCNC: 162 MG/DL — HIGH (ref 70–99)
GLUCOSE SERPL-MCNC: 243 MG/DL — HIGH (ref 70–99)
HAPTOGLOB SERPL-MCNC: 42 MG/DL — SIGNIFICANT CHANGE UP (ref 34–200)
HBA1C BLD-MCNC: 5.7 % — HIGH (ref 4–5.6)
HCO3 BLDV-SCNC: 18 MMOL/L — LOW (ref 21–29)
HCO3 BLDV-SCNC: 19 MMOL/L — LOW (ref 21–29)
HCO3 BLDV-SCNC: 21 MMOL/L — SIGNIFICANT CHANGE UP (ref 21–29)
HCO3 BLDV-SCNC: 21 MMOL/L — SIGNIFICANT CHANGE UP (ref 21–29)
HCT VFR BLD CALC: 21.9 % — LOW (ref 34.5–45)
HCT VFR BLD CALC: 22.8 % — LOW (ref 34.5–45)
HCT VFR BLD CALC: 28.7 % — LOW (ref 34.5–45)
HCT VFR BLD CALC: 31.4 % — LOW (ref 34.5–45)
HDLC SERPL-MCNC: 61 MG/DL — SIGNIFICANT CHANGE UP
HGB BLD-MCNC: 6.6 G/DL — CRITICAL LOW (ref 11.5–15.5)
HGB BLD-MCNC: 7 G/DL — CRITICAL LOW (ref 11.5–15.5)
HGB BLD-MCNC: 9.1 G/DL — LOW (ref 11.5–15.5)
HGB BLD-MCNC: 9.7 G/DL — LOW (ref 11.5–15.5)
HOROWITZ INDEX BLDV+IHG-RTO: 21 — SIGNIFICANT CHANGE UP
HOROWITZ INDEX BLDV+IHG-RTO: SIGNIFICANT CHANGE UP
INR BLD: 1.44 RATIO — HIGH (ref 0.88–1.16)
IRON SATN MFR SERPL: 11 % — LOW (ref 14–50)
IRON SATN MFR SERPL: 11 % — LOW (ref 14–50)
IRON SATN MFR SERPL: 28 UG/DL — LOW (ref 30–160)
IRON SATN MFR SERPL: 29 UG/DL — LOW (ref 30–160)
LACTATE SERPL-SCNC: 4.8 MMOL/L — CRITICAL HIGH (ref 0.7–2)
LACTATE SERPL-SCNC: 5.2 MMOL/L — CRITICAL HIGH (ref 0.7–2)
LDH SERPL L TO P-CCNC: 878 U/L — HIGH (ref 50–242)
LIPID PNL WITH DIRECT LDL SERPL: 25 MG/DL — SIGNIFICANT CHANGE UP
MAGNESIUM SERPL-MCNC: 2.2 MG/DL — SIGNIFICANT CHANGE UP (ref 1.6–2.6)
MAGNESIUM SERPL-MCNC: 2.3 MG/DL — SIGNIFICANT CHANGE UP (ref 1.6–2.6)
MAGNESIUM SERPL-MCNC: 2.8 MG/DL — HIGH (ref 1.6–2.6)
MAGNESIUM SERPL-MCNC: 2.8 MG/DL — HIGH (ref 1.6–2.6)
MCHC RBC-ENTMCNC: 28.4 PG — SIGNIFICANT CHANGE UP (ref 27–34)
MCHC RBC-ENTMCNC: 29 PG — SIGNIFICANT CHANGE UP (ref 27–34)
MCHC RBC-ENTMCNC: 29.5 PG — SIGNIFICANT CHANGE UP (ref 27–34)
MCHC RBC-ENTMCNC: 30.1 GM/DL — LOW (ref 32–36)
MCHC RBC-ENTMCNC: 30.2 PG — SIGNIFICANT CHANGE UP (ref 27–34)
MCHC RBC-ENTMCNC: 30.7 GM/DL — LOW (ref 32–36)
MCHC RBC-ENTMCNC: 30.9 GM/DL — LOW (ref 32–36)
MCHC RBC-ENTMCNC: 31.7 GM/DL — LOW (ref 32–36)
MCV RBC AUTO: 91.4 FL — SIGNIFICANT CHANGE UP (ref 80–100)
MCV RBC AUTO: 91.8 FL — SIGNIFICANT CHANGE UP (ref 80–100)
MCV RBC AUTO: 97.8 FL — SIGNIFICANT CHANGE UP (ref 80–100)
MCV RBC AUTO: 98.3 FL — SIGNIFICANT CHANGE UP (ref 80–100)
NRBC # BLD: 0 /100 WBCS — SIGNIFICANT CHANGE UP (ref 0–0)
PCO2 BLDV: 40 MMHG — SIGNIFICANT CHANGE UP (ref 35–50)
PCO2 BLDV: 41 MMHG — SIGNIFICANT CHANGE UP (ref 35–50)
PCO2 BLDV: 44 MMHG — SIGNIFICANT CHANGE UP (ref 35–50)
PCO2 BLDV: 44 MMHG — SIGNIFICANT CHANGE UP (ref 35–50)
PH BLDV: 7.25 — LOW (ref 7.35–7.45)
PH BLDV: 7.27 — LOW (ref 7.35–7.45)
PH BLDV: 7.3 — LOW (ref 7.35–7.45)
PH BLDV: 7.32 — LOW (ref 7.35–7.45)
PHOSPHATE SERPL-MCNC: 3.5 MG/DL — SIGNIFICANT CHANGE UP (ref 2.5–4.5)
PHOSPHATE SERPL-MCNC: 3.5 MG/DL — SIGNIFICANT CHANGE UP (ref 2.5–4.5)
PHOSPHATE SERPL-MCNC: 4.1 MG/DL — SIGNIFICANT CHANGE UP (ref 2.5–4.5)
PHOSPHATE SERPL-MCNC: 4.2 MG/DL — SIGNIFICANT CHANGE UP (ref 2.5–4.5)
PLATELET # BLD AUTO: 209 K/UL — SIGNIFICANT CHANGE UP (ref 150–400)
PLATELET # BLD AUTO: 210 K/UL — SIGNIFICANT CHANGE UP (ref 150–400)
PLATELET # BLD AUTO: 272 K/UL — SIGNIFICANT CHANGE UP (ref 150–400)
PLATELET # BLD AUTO: 275 K/UL — SIGNIFICANT CHANGE UP (ref 150–400)
PO2 BLDV: 24 MMHG — LOW (ref 25–45)
PO2 BLDV: 37 MMHG — SIGNIFICANT CHANGE UP (ref 25–45)
PO2 BLDV: 44 MMHG — SIGNIFICANT CHANGE UP (ref 25–45)
PO2 BLDV: 47 MMHG — HIGH (ref 25–45)
POTASSIUM SERPL-MCNC: 4.9 MMOL/L — SIGNIFICANT CHANGE UP (ref 3.5–5.3)
POTASSIUM SERPL-MCNC: 5 MMOL/L — SIGNIFICANT CHANGE UP (ref 3.5–5.3)
POTASSIUM SERPL-MCNC: 5.1 MMOL/L — SIGNIFICANT CHANGE UP (ref 3.5–5.3)
POTASSIUM SERPL-MCNC: 5.1 MMOL/L — SIGNIFICANT CHANGE UP (ref 3.5–5.3)
POTASSIUM SERPL-SCNC: 4.9 MMOL/L — SIGNIFICANT CHANGE UP (ref 3.5–5.3)
POTASSIUM SERPL-SCNC: 5 MMOL/L — SIGNIFICANT CHANGE UP (ref 3.5–5.3)
POTASSIUM SERPL-SCNC: 5.1 MMOL/L — SIGNIFICANT CHANGE UP (ref 3.5–5.3)
POTASSIUM SERPL-SCNC: 5.1 MMOL/L — SIGNIFICANT CHANGE UP (ref 3.5–5.3)
PROT SERPL-MCNC: 6.2 G/DL — SIGNIFICANT CHANGE UP (ref 6–8.3)
PROT SERPL-MCNC: 6.7 G/DL — SIGNIFICANT CHANGE UP (ref 6–8.3)
PROT SERPL-MCNC: 7 G/DL — SIGNIFICANT CHANGE UP (ref 6–8.3)
PROTHROM AB SERPL-ACNC: 16.6 SEC — HIGH (ref 10–12.9)
RAPID RVP RESULT: SIGNIFICANT CHANGE UP
RBC # BLD: 2.24 M/UL — LOW (ref 3.8–5.2)
RBC # BLD: 2.3 M/UL — LOW (ref 3.8–5.2)
RBC # BLD: 2.32 M/UL — LOW (ref 3.8–5.2)
RBC # BLD: 3.14 M/UL — LOW (ref 3.8–5.2)
RBC # BLD: 3.42 M/UL — LOW (ref 3.8–5.2)
RBC # FLD: 16.7 % — HIGH (ref 10.3–14.5)
RBC # FLD: 16.8 % — HIGH (ref 10.3–14.5)
RBC # FLD: 17.5 % — HIGH (ref 10.3–14.5)
RBC # FLD: 17.5 % — HIGH (ref 10.3–14.5)
RETICS #: 73.6 K/UL — SIGNIFICANT CHANGE UP (ref 25–125)
RETICS/RBC NFR: 3.2 % — HIGH (ref 0.5–2.5)
RH IG SCN BLD-IMP: POSITIVE — SIGNIFICANT CHANGE UP
RH IG SCN BLD-IMP: POSITIVE — SIGNIFICANT CHANGE UP
SAO2 % BLDV: 32 % — LOW (ref 67–88)
SAO2 % BLDV: 61 % — LOW (ref 67–88)
SAO2 % BLDV: 70 % — SIGNIFICANT CHANGE UP (ref 67–88)
SAO2 % BLDV: 71 % — SIGNIFICANT CHANGE UP (ref 67–88)
SODIUM SERPL-SCNC: 130 MMOL/L — LOW (ref 135–145)
SODIUM SERPL-SCNC: 131 MMOL/L — LOW (ref 135–145)
SODIUM SERPL-SCNC: 132 MMOL/L — LOW (ref 135–145)
SODIUM SERPL-SCNC: 134 MMOL/L — LOW (ref 135–145)
T3 SERPL-MCNC: 43 NG/DL — LOW (ref 80–200)
T4 AB SER-ACNC: 4.1 UG/DL — LOW (ref 4.6–12)
TIBC SERPL-MCNC: 267 UG/DL — SIGNIFICANT CHANGE UP (ref 220–430)
TIBC SERPL-MCNC: 267 UG/DL — SIGNIFICANT CHANGE UP (ref 220–430)
TOTAL CHOLESTEROL/HDL RATIO MEASUREMENT: 1.5 RATIO — LOW (ref 3.3–7.1)
TRANSFERRIN SERPL-MCNC: 207 MG/DL — SIGNIFICANT CHANGE UP (ref 200–360)
TRIGL SERPL-MCNC: 37 MG/DL — SIGNIFICANT CHANGE UP (ref 10–149)
TROPONIN T, HIGH SENSITIVITY RESULT: 34 NG/L — SIGNIFICANT CHANGE UP (ref 0–51)
TROPONIN T, HIGH SENSITIVITY RESULT: 40 NG/L — SIGNIFICANT CHANGE UP (ref 0–51)
TSH SERPL-MCNC: 49.3 UIU/ML — HIGH (ref 0.27–4.2)
UIBC SERPL-MCNC: 238 UG/DL — SIGNIFICANT CHANGE UP (ref 110–370)
UIBC SERPL-MCNC: 238 UG/DL — SIGNIFICANT CHANGE UP (ref 110–370)
VIT B12 SERPL-MCNC: 1804 PG/ML — HIGH (ref 232–1245)
WBC # BLD: 5.4 K/UL — SIGNIFICANT CHANGE UP (ref 3.8–10.5)
WBC # BLD: 7.28 K/UL — SIGNIFICANT CHANGE UP (ref 3.8–10.5)
WBC # BLD: 8.59 K/UL — SIGNIFICANT CHANGE UP (ref 3.8–10.5)
WBC # BLD: 9.6 K/UL — SIGNIFICANT CHANGE UP (ref 3.8–10.5)
WBC # FLD AUTO: 5.4 K/UL — SIGNIFICANT CHANGE UP (ref 3.8–10.5)
WBC # FLD AUTO: 7.28 K/UL — SIGNIFICANT CHANGE UP (ref 3.8–10.5)
WBC # FLD AUTO: 8.59 K/UL — SIGNIFICANT CHANGE UP (ref 3.8–10.5)
WBC # FLD AUTO: 9.6 K/UL — SIGNIFICANT CHANGE UP (ref 3.8–10.5)

## 2020-02-02 PROCEDURE — 99292 CRITICAL CARE ADDL 30 MIN: CPT | Mod: 25

## 2020-02-02 PROCEDURE — 93306 TTE W/DOPPLER COMPLETE: CPT | Mod: 26

## 2020-02-02 PROCEDURE — 99223 1ST HOSP IP/OBS HIGH 75: CPT | Mod: GC

## 2020-02-02 PROCEDURE — 99222 1ST HOSP IP/OBS MODERATE 55: CPT | Mod: GC

## 2020-02-02 PROCEDURE — 71045 X-RAY EXAM CHEST 1 VIEW: CPT | Mod: 26

## 2020-02-02 PROCEDURE — 71045 X-RAY EXAM CHEST 1 VIEW: CPT | Mod: 26,77

## 2020-02-02 PROCEDURE — 93010 ELECTROCARDIOGRAM REPORT: CPT

## 2020-02-02 PROCEDURE — 36620 INSERTION CATHETER ARTERY: CPT

## 2020-02-02 PROCEDURE — 99291 CRITICAL CARE FIRST HOUR: CPT

## 2020-02-02 PROCEDURE — 36800 INSERTION OF CANNULA: CPT

## 2020-02-02 PROCEDURE — 36556 INSERT NON-TUNNEL CV CATH: CPT

## 2020-02-02 RX ORDER — HEPARIN SODIUM 5000 [USP'U]/ML
5000 INJECTION INTRAVENOUS; SUBCUTANEOUS EVERY 12 HOURS
Refills: 0 | Status: DISCONTINUED | OUTPATIENT
Start: 2020-02-02 | End: 2020-02-16

## 2020-02-02 RX ORDER — DEXTROSE 50 % IN WATER 50 %
50 SYRINGE (ML) INTRAVENOUS ONCE
Refills: 0 | Status: DISCONTINUED | OUTPATIENT
Start: 2020-02-02 | End: 2020-02-02

## 2020-02-02 RX ORDER — VASOPRESSIN 20 [USP'U]/ML
0.01 INJECTION INTRAVENOUS
Qty: 50 | Refills: 0 | Status: DISCONTINUED | OUTPATIENT
Start: 2020-02-02 | End: 2020-02-04

## 2020-02-02 RX ORDER — ONDANSETRON 8 MG/1
4 TABLET, FILM COATED ORAL ONCE
Refills: 0 | Status: COMPLETED | OUTPATIENT
Start: 2020-02-02 | End: 2020-02-02

## 2020-02-02 RX ORDER — NOREPINEPHRINE BITARTRATE/D5W 8 MG/250ML
0.05 PLASTIC BAG, INJECTION (ML) INTRAVENOUS
Qty: 16 | Refills: 0 | Status: DISCONTINUED | OUTPATIENT
Start: 2020-02-02 | End: 2020-02-02

## 2020-02-02 RX ORDER — GLUCAGON INJECTION, SOLUTION 0.5 MG/.1ML
3 INJECTION, SOLUTION SUBCUTANEOUS ONCE
Refills: 0 | Status: COMPLETED | OUTPATIENT
Start: 2020-02-02 | End: 2020-02-02

## 2020-02-02 RX ORDER — LEVOTHYROXINE SODIUM 125 MCG
37 TABLET ORAL AT BEDTIME
Refills: 0 | Status: DISCONTINUED | OUTPATIENT
Start: 2020-02-02 | End: 2020-02-07

## 2020-02-02 RX ORDER — NOREPINEPHRINE BITARTRATE/D5W 8 MG/250ML
0.01 PLASTIC BAG, INJECTION (ML) INTRAVENOUS
Qty: 8 | Refills: 0 | Status: DISCONTINUED | OUTPATIENT
Start: 2020-02-02 | End: 2020-02-02

## 2020-02-02 RX ORDER — MIDODRINE HYDROCHLORIDE 2.5 MG/1
20 TABLET ORAL ONCE
Refills: 0 | Status: COMPLETED | OUTPATIENT
Start: 2020-02-02 | End: 2020-02-02

## 2020-02-02 RX ORDER — INSULIN HUMAN 100 [IU]/ML
10 INJECTION, SOLUTION SUBCUTANEOUS ONCE
Refills: 0 | Status: DISCONTINUED | OUTPATIENT
Start: 2020-02-02 | End: 2020-02-02

## 2020-02-02 RX ORDER — FUROSEMIDE 40 MG
80 TABLET ORAL ONCE
Refills: 0 | Status: COMPLETED | OUTPATIENT
Start: 2020-02-02 | End: 2020-02-02

## 2020-02-02 RX ORDER — NOREPINEPHRINE BITARTRATE/D5W 8 MG/250ML
0.9 PLASTIC BAG, INJECTION (ML) INTRAVENOUS
Qty: 16 | Refills: 0 | Status: DISCONTINUED | OUTPATIENT
Start: 2020-02-02 | End: 2020-02-04

## 2020-02-02 RX ORDER — VASOPRESSIN 20 [USP'U]/ML
0.2 INJECTION INTRAVENOUS
Qty: 50 | Refills: 0 | Status: DISCONTINUED | OUTPATIENT
Start: 2020-02-02 | End: 2020-02-02

## 2020-02-02 RX ORDER — DOBUTAMINE HCL 250MG/20ML
2.5 VIAL (ML) INTRAVENOUS
Qty: 500 | Refills: 0 | Status: DISCONTINUED | OUTPATIENT
Start: 2020-02-02 | End: 2020-02-05

## 2020-02-02 RX ORDER — BUMETANIDE 0.25 MG/ML
3 INJECTION INTRAMUSCULAR; INTRAVENOUS
Qty: 20 | Refills: 0 | Status: DISCONTINUED | OUTPATIENT
Start: 2020-02-02 | End: 2020-02-03

## 2020-02-02 RX ORDER — MAGNESIUM SULFATE 500 MG/ML
2 VIAL (ML) INJECTION ONCE
Refills: 0 | Status: COMPLETED | OUTPATIENT
Start: 2020-02-02 | End: 2020-02-02

## 2020-02-02 RX ORDER — GLUCAGON INJECTION, SOLUTION 0.5 MG/.1ML
0.05 INJECTION, SOLUTION SUBCUTANEOUS
Qty: 5 | Refills: 0 | Status: DISCONTINUED | OUTPATIENT
Start: 2020-02-02 | End: 2020-02-02

## 2020-02-02 RX ORDER — ALBUTEROL 90 UG/1
10 AEROSOL, METERED ORAL ONCE
Refills: 0 | Status: DISCONTINUED | OUTPATIENT
Start: 2020-02-02 | End: 2020-02-02

## 2020-02-02 RX ORDER — BUMETANIDE 0.25 MG/ML
1 INJECTION INTRAMUSCULAR; INTRAVENOUS
Qty: 20 | Refills: 0 | Status: DISCONTINUED | OUTPATIENT
Start: 2020-02-02 | End: 2020-02-02

## 2020-02-02 RX ORDER — CHLORHEXIDINE GLUCONATE 213 G/1000ML
1 SOLUTION TOPICAL
Refills: 0 | Status: DISCONTINUED | OUTPATIENT
Start: 2020-02-02 | End: 2020-02-07

## 2020-02-02 RX ADMIN — VASOPRESSIN 1.2 UNIT(S)/MIN: 20 INJECTION INTRAVENOUS at 05:59

## 2020-02-02 RX ADMIN — Medication 37 MICROGRAM(S): at 17:59

## 2020-02-02 RX ADMIN — Medication 80 MILLIGRAM(S): at 06:00

## 2020-02-02 RX ADMIN — Medication 0.9 MICROGRAM(S)/KG/MIN: at 04:58

## 2020-02-02 RX ADMIN — Medication 5.45 MICROGRAM(S)/KG/MIN: at 05:59

## 2020-02-02 RX ADMIN — Medication 30.63 MICROGRAM(S)/KG/MIN: at 19:23

## 2020-02-02 RX ADMIN — Medication 30.63 MICROGRAM(S)/KG/MIN: at 09:31

## 2020-02-02 RX ADMIN — Medication 8.17 MICROGRAM(S)/KG/MIN: at 19:24

## 2020-02-02 RX ADMIN — PANTOPRAZOLE SODIUM 40 MILLIGRAM(S): 20 TABLET, DELAYED RELEASE ORAL at 08:32

## 2020-02-02 RX ADMIN — BUMETANIDE 15 MG/HR: 0.25 INJECTION INTRAMUSCULAR; INTRAVENOUS at 19:22

## 2020-02-02 RX ADMIN — Medication 50 GRAM(S): at 06:01

## 2020-02-02 RX ADMIN — VASOPRESSIN 1.2 UNIT(S)/MIN: 20 INJECTION INTRAVENOUS at 19:24

## 2020-02-02 RX ADMIN — BUMETANIDE 5 MG/HR: 0.25 INJECTION INTRAMUSCULAR; INTRAVENOUS at 10:19

## 2020-02-02 RX ADMIN — VASOPRESSIN 1.2 UNIT(S)/MIN: 20 INJECTION INTRAVENOUS at 09:30

## 2020-02-02 RX ADMIN — HEPARIN SODIUM 5000 UNIT(S): 5000 INJECTION INTRAVENOUS; SUBCUTANEOUS at 17:25

## 2020-02-02 RX ADMIN — BUMETANIDE 15 MG/HR: 0.25 INJECTION INTRAMUSCULAR; INTRAVENOUS at 17:25

## 2020-02-02 RX ADMIN — Medication 1 TABLET(S): at 13:26

## 2020-02-02 RX ADMIN — Medication 8.17 MICROGRAM(S)/KG/MIN: at 09:45

## 2020-02-02 RX ADMIN — CHLORHEXIDINE GLUCONATE 1 APPLICATION(S): 213 SOLUTION TOPICAL at 06:01

## 2020-02-02 RX ADMIN — Medication 5.45 MICROGRAM(S)/KG/MIN: at 09:31

## 2020-02-02 NOTE — CHART NOTE - NSCHARTNOTEFT_GEN_A_CORE
CCU Accept Note    JOHNNY COMBS  89y  Patient is a 89y old  Female who presents with a chief complaint of generalized weakness and body swelling (2020 19:22)    ====================  HPI & Hospital Course:   89F with HTN, DM, hypothyroidism admitted after coming to the Emergency Department with generalized fatigue, hardly getting out of bed, which the daughter states is unusual for her mom. She also c/o shortness of breath and swelling of upper and lower extremities. She had gone to urgent care today for extreme fatigue and HIGUERA for the last 2 days where a CXR done was notable for b/l pleural effusions and RML consolidation. She was sent to the Emergency Department. Denies fever, chills, cough, orthopnea, chest pain. She ambulates with a cane.  She has been living with her daughter for about 2 weeks now, her son is on a visit to [place], most of her medical care is in [place] including cardiology. Pt's daughter states the patient has some history of "valve problem". Does not take any water pills/diuretic. Currently comfortably lying flat in bed on the floor. The daughter states her mom has never been diagnosed with dementia, but her memory has been failing recently. The patient also has been c/o frequent protrusion of uterus thru vagina.    After admission, patient was given furosemide 40mg IV one time for bilateral pleural effusions. Patient was also started on labetalol 200 BID (SBPs were in the 170s). RRT was called for unresponsiveness. Patient was found to be have worsening pleural effusions and JVD and elevated lactate to 7.7. At that tinme, patients SBP was 60s and she was SOB. There was a concern for cardiogenic shock dude to intolerance of labetalol. Patient was started on levophed and dobutamine and started on Non-rebreather and transferred to the CCU for further management.     REVIEW OF SYSTEMS      General:  Ophthalmologic:  ENMT:	  Respiratory and Thorax:  Cardiovascular:	  Gastrointestinal:	  Genitourinary:	  Musculoskeletal:	  Neurological:	  Psychiatric:	  Hematology/Lymphatics:	  Endocrine:		    Past Medical History:   CAD in native artery   Diabetes   Hypertension   Hypothyroid   Mini stroke no residual paralysis.   Gallstones    Past Surgical History: None  Home Medications:  alendronate 70 mg oral tablet: 1 tab(s) orally once a week (Tuesday) (2020 18:49)  Calcium 600m tab(s) orally once a day (2020 18:49)  Centrum Adults oral tablet: 1 tab(s) orally once a day (2020 18:49)  clopidogrel 75 mg oral tablet: 1 tab(s) orally once a day (2020 18:49)  Ecotrin Adult Low Strength 81 mg oral delayed release tablet: 1 tab(s) orally once a day (2020 18:49)  levothyroxine 50 mcg (0.05 mg) oral tablet: 1 tab(s) orally once a day (2020 18:49)  metFORMIN 1000 mg oral tablet: Take 0.5 (1/2) tablet 2 times daily (2020 18:49)  mirtazapine 7.5 mg oral tablet: Take 0.5 (1/2) tablet once daily (2020 18:49)  pantoprazole 40 mg oral delayed release tablet: 1 tab(s) orally 2 times a day (2020 18:49)  rosuvastatin 10 mg oral tablet: 2 tab(s) orally once a day (2020 18:49)    Current Medications:   MEDICATIONS  (STANDING):  atorvastatin 80 milliGRAM(s) Oral at bedtime  chlorhexidine 2% Cloths 1 Application(s) Topical <User Schedule>  dextrose 5%. 1000 milliLiter(s) (50 mL/Hr) IV Continuous <Continuous>  dextrose 50% Injectable 12.5 Gram(s) IV Push once  dextrose 50% Injectable 25 Gram(s) IV Push once  dextrose 50% Injectable 25 Gram(s) IV Push once  furosemide   Injectable 20 milliGRAM(s) IV Push daily  glucagon  Injectable 3 milliGRAM(s) IV Push once  heparin  Injectable 5000 Unit(s) SubCutaneous every 12 hours  insulin lispro (HumaLOG) corrective regimen sliding scale   SubCutaneous three times a day before meals  labetalol 200 milliGRAM(s) Oral two times a day  levothyroxine 50 MICROGram(s) Oral daily  midodrine. 20 milliGRAM(s) Oral once  mirtazapine 3.75 milliGRAM(s) Oral at bedtime  multivitamin/minerals 1 Tablet(s) Oral daily  ondansetron Injectable 4 milliGRAM(s) IV Push once  pantoprazole    Tablet 40 milliGRAM(s) Oral before breakfast    MEDICATIONS  (PRN):  dextrose 40% Gel 15 Gram(s) Oral once PRN Blood Glucose LESS THAN 70 milliGRAM(s)/deciliter  glucagon  Injectable 1 milliGRAM(s) IntraMuscular once PRN Glucose LESS THAN 70 milligrams/deciliter      Allergies:     Family History:     Social History:     ====================  Vital Signs Last 24 Hrs  T(C): 36.5 (2020 22:11), Max: 36.5 (2020 22:11)  T(F): 97.7 (2020 22:11), Max: 97.7 (2020 22:11)  HR: 92 (2020 22:11) (80 - 95)  BP: 119/61 (2020 22:11) (119/61 - 174/84)  BP(mean): --  RR: 18 (2020 22:11) (17 - 18)  SpO2: 96% (2020 22:) (95% - 100%)    Adult Advanced Hemodynamics Last 24 Hrs  CVP(mm Hg): --  CVP(cm H2O): --  CO: --  CI: --  PA: --  PA(mean): --  PCWP: --  SVR: --  SVRI: --  PVR: --  PVRI: --    Physical Exam:   General: NAD  HEENT: PERRL, EOMI, normal sclera and conjunctiva, no oral lesions  Neck: Supple, + JVD  Lungs: CTA bilaterally  Heart: RRR, normal S1S2, no murmurs/rubs/gallops  Abdomen: Soft, ND/NT  Extremities: 2+ peripheral pulses, no cyanosis/clubbing/edema, full ROM  Skin: Warm, well-perfused  Neuro: A&O x2, no focal deficits      ====================  Labs & Imaging:   CBC Full  -  ( 2020 01:11 )  WBC Count : 5.40 K/uL  RBC Count : 2.32 M/uL  Hemoglobin : 7.0 g/dL  Hematocrit : 22.8 %  Platelet Count - Automated : 272 K/uL  Mean Cell Volume : 98.3 fl  Mean Cell Hemoglobin : 30.2 pg  Mean Cell Hemoglobin Concentration : 30.7 gm/dL  Auto Neutrophil # : x  Auto Lymphocyte # : x  Auto Monocyte # : x  Auto Eosinophil # : x  Auto Basophil # : x  Auto Neutrophil % : x  Auto Lymphocyte % : x  Auto Monocyte % : x  Auto Eosinophil % : x  Auto Basophil % : x        131<L>  |  100  |  24<H>  ----------------------------<  243<H>  4.9   |  12<L>  |  1.50<H>    Ca    9.0      2020 01:11  Phos  3.5       Mg     2.3         TPro  6.7  /  Alb  3.0<L>  /  TBili  0.7  /  DBili  x   /  AST  233<H>  /  ALT  33  /  AlkPhos  83  -    PT/INR - ( 2020 15:27 )   PT: 13.5 sec;   INR: 1.18 ratio         PTT - ( 2020 15:27 )  PTT:29.8 sec  ABG - ( 2020 01:10 )  pH, Arterial: 7.33  pH, Blood: x     /  pCO2: 28    /  pO2: 264   / HCO3: 14    / Base Excess: -10.1 /  SaO2: 100             Urinalysis Basic - ( 2020 18:11 )    Color: Yellow / Appearance: Slightly Turbid / S.023 / pH: x  Gluc: x / Ketone: Negative  / Bili: Negative / Urobili: 2 mg/dL   Blood: x / Protein: 100 / Nitrite: Negative   Leuk Esterase: Moderate / RBC: 1 /hpf / WBC 13 /HPF   Sq Epi: x / Non Sq Epi: 2 /hpf / Bacteria: Few          ====================  Assessment & Plan:   ====================  89F with HTN, DM, hypothyroidism admitted after coming to the Emergency Department with generalized fatigue, hardly getting out of bed, diagnosed with generalized anasarca and bilaterally pleural effusions and severe anemia. Course complicated by ADHF likely 2/2 to labetalol toxicity.     Neuro  -A&Ox2 during the RRT, now resolved, ctm    Resp  -SOB 2/2 to pulmonary edema  -on nasal canula, ctm    CV  #ADHF  -likely secondary to labetalol toxicity (received 200mg)    #GI    #ID    #Renal    #Heme    #Endo    #DVT PPx CCU Accept Note    JOHNNY COMBS  89y  Patient is a 89y old  Female who presents with a chief complaint of generalized weakness and body swelling (2020 19:22)    ====================  HPI & Hospital Course:   89F with HTN, DM, hypothyroidism admitted after coming to the Emergency Department with generalized fatigue, hardly getting out of bed, which the daughter states is unusual for her mom. She also c/o shortness of breath and swelling of upper and lower extremities. She had gone to urgent care today for extreme fatigue and HIGUERA for the last 2 days where a CXR done was notable for b/l pleural effusions and RML consolidation. She was sent to the Emergency Department. Denies fever, chills, cough, orthopnea, chest pain. She ambulates with a cane.  She has been living with her daughter for about 2 weeks now, her son is on a visit to [place], most of her medical care is in [place] including cardiology. Pt's daughter states the patient has some history of "valve problem". Does not take any water pills/diuretic. Currently comfortably lying flat in bed on the floor. The daughter states her mom has never been diagnosed with dementia, but her memory has been failing recently. The patient also has been c/o frequent protrusion of uterus thru vagina.    After admission, patient was given furosemide 40mg IV one time for bilateral pleural effusions. Patient was also started on labetalol 200 BID (SBPs were in the 170s). RRT was called for unresponsiveness. Patient was found to be have worsening pleural effusions and JVD and elevated lactate to 7.7. At that tinme, patients SBP was 60s and she was SOB. There was a concern for cardiogenic shock dude to intolerance of labetalol. Patient was started on levophed and dobutamine and started on Non-rebreather and transferred to the CCU for further management.     REVIEW OF SYSTEMS      General:  Gen: + weight loss for a few months and fatigue   ENT: no dizziness no hearing loss  Ophth: no blurring of vision no loss of vision  Resp: Dyspnea at baseline  CVS: No chest pain no palpitations no orthopnea  GI: no nausea, vomiting or diarrhea   : no dysuria, hematuria. 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   Gallstones    Past Surgical History: None  Home Medications:  alendronate 70 mg oral tablet: 1 tab(s) orally once a week (Tuesday) (2020 18:49)  Calcium 600m tab(s) orally once a day (2020 18:49)  Centrum Adults oral tablet: 1 tab(s) orally once a day (2020 18:49)  clopidogrel 75 mg oral tablet: 1 tab(s) orally once a day (2020 18:49)  Ecotrin Adult Low Strength 81 mg oral delayed release tablet: 1 tab(s) orally once a day (2020 18:49)  levothyroxine 50 mcg (0.05 mg) oral tablet: 1 tab(s) orally once a day (2020 18:49)  metFORMIN 1000 mg oral tablet: Take 0.5 (1/2) tablet 2 times daily (2020 18:49)  mirtazapine 7.5 mg oral tablet: Take 0.5 (1/2) tablet once daily (2020 18:49)  pantoprazole 40 mg oral delayed release tablet: 1 tab(s) orally 2 times a day (2020 18:49)  rosuvastatin 10 mg oral tablet: 2 tab(s) orally once a day (2020 18:49)    Current Medications:   MEDICATIONS  (STANDING):  atorvastatin 80 milliGRAM(s) Oral at bedtime  chlorhexidine 2% Cloths 1 Application(s) Topical <User Schedule>  dextrose 5%. 1000 milliLiter(s) (50 mL/Hr) IV Continuous <Continuous>  dextrose 50% Injectable 12.5 Gram(s) IV Push once  dextrose 50% Injectable 25 Gram(s) IV Push once  dextrose 50% Injectable 25 Gram(s) IV Push once  furosemide   Injectable 20 milliGRAM(s) IV Push daily  glucagon  Injectable 3 milliGRAM(s) IV Push once  heparin  Injectable 5000 Unit(s) SubCutaneous every 12 hours  insulin lispro (HumaLOG) corrective regimen sliding scale   SubCutaneous three times a day before meals  labetalol 200 milliGRAM(s) Oral two times a day  levothyroxine 50 MICROGram(s) Oral daily  midodrine. 20 milliGRAM(s) Oral once  mirtazapine 3.75 milliGRAM(s) Oral at bedtime  multivitamin/minerals 1 Tablet(s) Oral daily  ondansetron Injectable 4 milliGRAM(s) IV Push once  pantoprazole    Tablet 40 milliGRAM(s) Oral before breakfast    MEDICATIONS  (PRN):  dextrose 40% Gel 15 Gram(s) Oral once PRN Blood Glucose LESS THAN 70 milliGRAM(s)/deciliter  glucagon  Injectable 1 milliGRAM(s) IntraMuscular once PRN Glucose LESS THAN 70 milligrams/deciliter      Allergies:     Family History:     Social History:     ====================  Vital Signs Last 24 Hrs  T(C): 36.5 (2020 22:11), Max: 36.5 (2020 22:11)  T(F): 97.7 (2020 22:11), Max: 97.7 (2020 22:11)  HR: 92 (2020 22:11) (80 - 95)  BP: 119/61 (2020 22:11) (119/61 - 174/84)  BP(mean): --  RR: 18 (2020 22:11) (17 - 18)  SpO2: 96% (2020 22:11) (95% - 100%)    Adult Advanced Hemodynamics Last 24 Hrs  CVP(mm Hg): --  CVP(cm H2O): --  CO: --  CI: --  PA: --  PA(mean): --  PCWP: --  SVR: --  SVRI: --  PVR: --  PVRI: --    Physical Exam:   General: resting comfortable in bed with nasal canula  HEENT: PERRL, EOMI, normal sclera and conjunctiva, no oral lesions  Neck: Supple, + JVD  Lungs: crackles at base of lungs. no wheezing  Heart: RRR, normal S1S2, no murmurs/rubs/gallops  Abdomen: Soft, ND/NT  Extremities: 2+ peripheral pulses, no cyanosis/clubbing/edema, full ROM  Skin: Warm, well-perfused  Neuro: A&O x2, no focal deficits  Skin: RIJ catheter in place, without oozing. A-line L radial    ====================  Labs & Imaging:   CBC Full  -  ( 2020 01:11 )  WBC Count : 5.40 K/uL  RBC Count : 2.32 M/uL  Hemoglobin : 7.0 g/dL  Hematocrit : 22.8 %  Platelet Count - Automated : 272 K/uL  Mean Cell Volume : 98.3 fl  Mean Cell Hemoglobin : 30.2 pg  Mean Cell Hemoglobin Concentration : 30.7 gm/dL  Auto Neutrophil # : x  Auto Lymphocyte # : x  Auto Monocyte # : x  Auto Eosinophil # : x  Auto Basophil # : x  Auto Neutrophil % : x  Auto Lymphocyte % : x  Auto Monocyte % : x  Auto Eosinophil % : x  Auto Basophil % : x        131<L>  |  100  |  24<H>  ----------------------------<  243<H>  4.9   |  12<L>  |  1.50<H>    Ca    9.0      2020 01:11  Phos  3.5     02-  Mg     2.3     02-02    TPro  6.7  /  Alb  3.0<L>  /  TBili  0.7  /  DBili  x   /  AST  233<H>  /  ALT  33  /  AlkPhos  83  02-02    PT/INR - ( 2020 15:27 )   PT: 13.5 sec;   INR: 1.18 ratio         PTT - ( 2020 15:27 )  PTT:29.8 sec  ABG - ( 2020 01:10 )  pH, Arterial: 7.33  pH, Blood: x     /  pCO2: 28    /  pO2: 264   / HCO3: 14    / Base Excess: -10.1 /  SaO2: 100             Urinalysis Basic - ( 2020 18:11 )    Color: Yellow / Appearance: Slightly Turbid / S.023 / pH: x  Gluc: x / Ketone: Negative  / Bili: Negative / Urobili: 2 mg/dL   Blood: x / Protein: 100 / Nitrite: Negative   Leuk Esterase: Moderate / RBC: 1 /hpf / WBC 13 /HPF   Sq Epi: x / Non Sq Epi: 2 /hpf / Bacteria: Few    ====================  Assessment & Plan:   ====================  89F with HTN, DM, hypothyroidism admitted after coming to the Emergency Department with generalized fatigue, hardly getting out of bed, diagnosed with generalized anasarca and bilaterally pleural effusions and severe anemia. Course complicated by ADHF likely 2/2 to labetalol toxicity.     Neuro  -A&Ox2 during the RRT, now resolved, ctm    Resp  -SOB 2/2 to pulmonary edema  -on nasal canula, ctm    CV  #ADHF  - likely secondary to labetalol toxicity (received 200mg)  - CXR shows bilateral pleural effusions  - on levophed and vasopressin, requirements are decreasing. wean as tolerated  - received 40 lasix at 10pm and then 80iv at ~5am  - monitor Is and Os, keep net negative. K>4, Mg>2  #Cardiogenic Shock  - Likely secondary to excessive beta blockade  - received glucagon x3 and then briefly put on glucagon gtt in the CCU but started dry heaving  - c/w dobutamine, wean as tolerated (currently on 5)  - daughter states that pt has a "valve" issue but is not sure exactly  - f/u TTE    - CVP 12. CO 2.8 and CI 2.367 overnight  - f/u lactate and hemodynamics at 730am    GI  -NPO because was on bipap previously    ID  -no s/s of infection. no febrile, no cough, negative RVp    Renal  #EFRAIN   -likely secondary to fluid overload    Heme  #Anemia  -Hb dropped 6.6, repleting  -f/u iron studies, f/u haptoglobin (added on to lab)    Endo  #DM on oral agents  -c/w sliding scale insulin    #DVT PPx  -heparin subq CCU Accept Note    JOHNNY COMBS  89y  Patient is a 89y old  Female who presents with a chief complaint of generalized weakness and body swelling (2020 19:22)    ====================  HPI & Hospital Course:   89F with HTN, DM, hypothyroidism admitted after coming to the Emergency Department with generalized fatigue, hardly getting out of bed, which the daughter states is unusual for her mom. She also c/o shortness of breath and swelling of upper and lower extremities. She had gone to urgent care today for extreme fatigue and HIGUERA for the last 2 days where a CXR done was notable for b/l pleural effusions and RML consolidation. She was sent to the Emergency Department. Denies fever, chills, cough, orthopnea, chest pain. She ambulates with a cane.  She has been living with her daughter for about 2 weeks now, her son is on a visit to [place], most of her medical care is in [place] including cardiology. Pt's daughter states the patient has some history of "valve problem". Does not take any water pills/diuretic. Currently comfortably lying flat in bed on the floor. The daughter states her mom has never been diagnosed with dementia, but her memory has been failing recently. The patient also has been c/o frequent protrusion of uterus thru vagina.    After admission, patient was given furosemide 40mg IV one time for bilateral pleural effusions. Patient was also started on labetalol 200 BID (SBPs were in the 170s). RRT was called for unresponsiveness. Patient was found to be have worsening pleural effusions and JVD and elevated lactate to 7.7. At that tinme, patients SBP was 60s and she was SOB. There was a concern for cardiogenic shock dude to intolerance of labetalol. Patient was started on levophed and dobutamine and started on Non-rebreather and transferred to the CCU for further management.     REVIEW OF SYSTEMS      General:  Gen: + weight loss for a few months and fatigue   ENT: no dizziness no hearing loss  Ophth: no blurring of vision no loss of vision  Resp: Dyspnea at baseline  CVS: No chest pain no palpitations no orthopnea  GI: no nausea, vomiting or diarrhea   : no dysuria, hematuria. 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   Gallstones    Past Surgical History: None  Home Medications:  alendronate 70 mg oral tablet: 1 tab(s) orally once a week (Tuesday) (2020 18:49)  Calcium 600m tab(s) orally once a day (2020 18:49)  Centrum Adults oral tablet: 1 tab(s) orally once a day (2020 18:49)  clopidogrel 75 mg oral tablet: 1 tab(s) orally once a day (2020 18:49)  Ecotrin Adult Low Strength 81 mg oral delayed release tablet: 1 tab(s) orally once a day (2020 18:49)  levothyroxine 50 mcg (0.05 mg) oral tablet: 1 tab(s) orally once a day (2020 18:49)  metFORMIN 1000 mg oral tablet: Take 0.5 (1/2) tablet 2 times daily (2020 18:49)  mirtazapine 7.5 mg oral tablet: Take 0.5 (1/2) tablet once daily (2020 18:49)  pantoprazole 40 mg oral delayed release tablet: 1 tab(s) orally 2 times a day (2020 18:49)  rosuvastatin 10 mg oral tablet: 2 tab(s) orally once a day (2020 18:49)    Current Medications:   MEDICATIONS  (STANDING):  atorvastatin 80 milliGRAM(s) Oral at bedtime  chlorhexidine 2% Cloths 1 Application(s) Topical <User Schedule>  dextrose 5%. 1000 milliLiter(s) (50 mL/Hr) IV Continuous <Continuous>  dextrose 50% Injectable 12.5 Gram(s) IV Push once  dextrose 50% Injectable 25 Gram(s) IV Push once  dextrose 50% Injectable 25 Gram(s) IV Push once  furosemide   Injectable 20 milliGRAM(s) IV Push daily  glucagon  Injectable 3 milliGRAM(s) IV Push once  heparin  Injectable 5000 Unit(s) SubCutaneous every 12 hours  insulin lispro (HumaLOG) corrective regimen sliding scale   SubCutaneous three times a day before meals  labetalol 200 milliGRAM(s) Oral two times a day  levothyroxine 50 MICROGram(s) Oral daily  midodrine. 20 milliGRAM(s) Oral once  mirtazapine 3.75 milliGRAM(s) Oral at bedtime  multivitamin/minerals 1 Tablet(s) Oral daily  ondansetron Injectable 4 milliGRAM(s) IV Push once  pantoprazole    Tablet 40 milliGRAM(s) Oral before breakfast    MEDICATIONS  (PRN):  dextrose 40% Gel 15 Gram(s) Oral once PRN Blood Glucose LESS THAN 70 milliGRAM(s)/deciliter  glucagon  Injectable 1 milliGRAM(s) IntraMuscular once PRN Glucose LESS THAN 70 milligrams/deciliter      Allergies:     Family History:     Social History:     ====================  Vital Signs Last 24 Hrs  T(C): 36.5 (2020 22:11), Max: 36.5 (2020 22:11)  T(F): 97.7 (2020 22:11), Max: 97.7 (2020 22:11)  HR: 92 (2020 22:11) (80 - 95)  BP: 119/61 (2020 22:11) (119/61 - 174/84)  BP(mean): --  RR: 18 (2020 22:11) (17 - 18)  SpO2: 96% (2020 22:11) (95% - 100%)    Adult Advanced Hemodynamics Last 24 Hrs  CVP(mm Hg): --  CVP(cm H2O): --  CO: --  CI: --  PA: --  PA(mean): --  PCWP: --  SVR: --  SVRI: --  PVR: --  PVRI: --    Physical Exam:   General: resting comfortable in bed with nasal canula  HEENT: PERRL, EOMI, normal sclera and conjunctiva, no oral lesions  Neck: Supple, + JVD  Lungs: crackles at base of lungs. no wheezing  Heart: RRR, normal S1S2, no murmurs/rubs/gallops  Abdomen: Soft, ND/NT  Extremities: 2+ peripheral pulses, no cyanosis/clubbing/edema, full ROM  Skin: Warm, well-perfused  Neuro: A&O x2, no focal deficits  Skin: RIJ catheter in place, without oozing. A-line L radial    ====================  Labs & Imaging:   CBC Full  -  ( 2020 01:11 )  WBC Count : 5.40 K/uL  RBC Count : 2.32 M/uL  Hemoglobin : 7.0 g/dL  Hematocrit : 22.8 %  Platelet Count - Automated : 272 K/uL  Mean Cell Volume : 98.3 fl  Mean Cell Hemoglobin : 30.2 pg  Mean Cell Hemoglobin Concentration : 30.7 gm/dL  Auto Neutrophil # : x  Auto Lymphocyte # : x  Auto Monocyte # : x  Auto Eosinophil # : x  Auto Basophil # : x  Auto Neutrophil % : x  Auto Lymphocyte % : x  Auto Monocyte % : x  Auto Eosinophil % : x  Auto Basophil % : x        131<L>  |  100  |  24<H>  ----------------------------<  243<H>  4.9   |  12<L>  |  1.50<H>    Ca    9.0      2020 01:11  Phos  3.5     02-  Mg     2.3     02-02    TPro  6.7  /  Alb  3.0<L>  /  TBili  0.7  /  DBili  x   /  AST  233<H>  /  ALT  33  /  AlkPhos  83  02-02    PT/INR - ( 2020 15:27 )   PT: 13.5 sec;   INR: 1.18 ratio         PTT - ( 2020 15:27 )  PTT:29.8 sec  ABG - ( 2020 01:10 )  pH, Arterial: 7.33  pH, Blood: x     /  pCO2: 28    /  pO2: 264   / HCO3: 14    / Base Excess: -10.1 /  SaO2: 100             Urinalysis Basic - ( 2020 18:11 )    Color: Yellow / Appearance: Slightly Turbid / S.023 / pH: x  Gluc: x / Ketone: Negative  / Bili: Negative / Urobili: 2 mg/dL   Blood: x / Protein: 100 / Nitrite: Negative   Leuk Esterase: Moderate / RBC: 1 /hpf / WBC 13 /HPF   Sq Epi: x / Non Sq Epi: 2 /hpf / Bacteria: Few    ====================  Assessment & Plan:   ====================  89F with HTN, DM, hypothyroidism admitted after coming to the Emergency Department with generalized fatigue, hardly getting out of bed, diagnosed with generalized anasarca and bilaterally pleural effusions and severe anemia. Course complicated by ADHF likely 2/2 to labetalol toxicity.     Neuro  -A&Ox2 during the RRT, now resolved, ctm    Resp  -SOB 2/2 to pulmonary edema  -on nasal canula, ctm    CV  #ADHF  - likely secondary to labetalol toxicity (received 200mg)  - CXR shows bilateral pleural effusions, worsening throughout the night  - on levophed and vasopressin, requirements are decreasing. wean as tolerated.  - received 40 lasix at 10pm and then 80iv at ~5am  - monitor Is and Os, keep net negative. K>4, Mg>2  #Cardiogenic Shock  - Likely secondary to excessive beta blockade  - received glucagon x3 and then briefly put on glucagon gtt in the CCU but started dry heaving  - c/w dobutamine, wean as tolerated (currently on 5)  - daughter states that pt has a "valve" issue but is not sure exactly  - f/u TTE    - CVP 12. CO 2.8 and CI 2.367 overnight  - f/u lactate and hemodynamics in the AM    GI  -NPO because was on bipap previously    ID  -no s/s of infection. no febrile, no cough, negative RVP    Renal  #EFRAIN   -likely secondary to fluid overload  -strict Is and Os (has adams)  -replete electrolytes as needed  -trend creatinine     Heme  #Anemia  -Hb dropped 6.6, repleting  -f/u iron studies, f/u haptoglobin (added on to lab)    Endo  #DM on oral agents  -c/w sliding scale insulin  #Hypothyroidism  -c/w levothyroxine  -f/u TFTs    #DVT PPx  -SCDs because of anemia CCU Accept Note    JOHNNY COMBS  89y  Patient is a 89y old  Female who presents with a chief complaint of generalized weakness and body swelling (2020 19:22)    ====================  HPI & Hospital Course:   89F with HTN, DM, hypothyroidism admitted after coming to the Emergency Department with generalized fatigue, hardly getting out of bed, which the daughter states is unusual for her mom. She also c/o shortness of breath and swelling of upper and lower extremities. She had gone to urgent care today for extreme fatigue and HIGUERA for the last 2 days where a CXR done was notable for b/l pleural effusions and RML consolidation. She was sent to the Emergency Department. Denies fever, chills, cough, orthopnea, chest pain. She ambulates with a cane.  She has been living with her daughter for about 2 weeks now, her son is on a visit to [place], most of her medical care is in [place] including cardiology. Pt's daughter states the patient has some history of "valve problem". Does not take any water pills/diuretic. Currently comfortably lying flat in bed on the floor. The daughter states her mom has never been diagnosed with dementia, but her memory has been failing recently. The patient also has been c/o frequent protrusion of uterus thru vagina.    After admission, patient was given furosemide 40mg IV one time for bilateral pleural effusions. Patient was also started on labetalol 200 BID (SBPs were in the 170s). RRT was called for unresponsiveness. Patient was found to be have worsening pleural effusions and JVD and elevated lactate to 7.7. At that tinme, patients SBP was 60s and she was SOB. There was a concern for cardiogenic shock dude to intolerance of labetalol. Patient was started on levophed and dobutamine and started on Non-rebreather and transferred to the CCU for further management.     REVIEW OF SYSTEMS      General:  Gen: + weight loss for a few months and fatigue   ENT: no dizziness no hearing loss  Ophth: no blurring of vision no loss of vision  Resp: Dyspnea at baseline  CVS: No chest pain no palpitations no orthopnea  GI: no nausea, vomiting or diarrhea   : no dysuria, hematuria. 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   Gallstones    Past Surgical History: None  Home Medications:  alendronate 70 mg oral tablet: 1 tab(s) orally once a week (Tuesday) (2020 18:49)  Calcium 600m tab(s) orally once a day (2020 18:49)  Centrum Adults oral tablet: 1 tab(s) orally once a day (2020 18:49)  clopidogrel 75 mg oral tablet: 1 tab(s) orally once a day (2020 18:49)  Ecotrin Adult Low Strength 81 mg oral delayed release tablet: 1 tab(s) orally once a day (2020 18:49)  levothyroxine 50 mcg (0.05 mg) oral tablet: 1 tab(s) orally once a day (2020 18:49)  metFORMIN 1000 mg oral tablet: Take 0.5 (1/2) tablet 2 times daily (2020 18:49)  mirtazapine 7.5 mg oral tablet: Take 0.5 (1/2) tablet once daily (2020 18:49)  pantoprazole 40 mg oral delayed release tablet: 1 tab(s) orally 2 times a day (2020 18:49)  rosuvastatin 10 mg oral tablet: 2 tab(s) orally once a day (2020 18:49)    Current Medications:   MEDICATIONS  (STANDING):  atorvastatin 80 milliGRAM(s) Oral at bedtime  chlorhexidine 2% Cloths 1 Application(s) Topical <User Schedule>  dextrose 5%. 1000 milliLiter(s) (50 mL/Hr) IV Continuous <Continuous>  dextrose 50% Injectable 12.5 Gram(s) IV Push once  dextrose 50% Injectable 25 Gram(s) IV Push once  dextrose 50% Injectable 25 Gram(s) IV Push once  furosemide   Injectable 20 milliGRAM(s) IV Push daily  glucagon  Injectable 3 milliGRAM(s) IV Push once  heparin  Injectable 5000 Unit(s) SubCutaneous every 12 hours  insulin lispro (HumaLOG) corrective regimen sliding scale   SubCutaneous three times a day before meals  labetalol 200 milliGRAM(s) Oral two times a day  levothyroxine 50 MICROGram(s) Oral daily  midodrine. 20 milliGRAM(s) Oral once  mirtazapine 3.75 milliGRAM(s) Oral at bedtime  multivitamin/minerals 1 Tablet(s) Oral daily  ondansetron Injectable 4 milliGRAM(s) IV Push once  pantoprazole    Tablet 40 milliGRAM(s) Oral before breakfast    MEDICATIONS  (PRN):  dextrose 40% Gel 15 Gram(s) Oral once PRN Blood Glucose LESS THAN 70 milliGRAM(s)/deciliter  glucagon  Injectable 1 milliGRAM(s) IntraMuscular once PRN Glucose LESS THAN 70 milligrams/deciliter      Allergies:     Family History:     Social History:     ====================  Vital Signs Last 24 Hrs  T(C): 36.5 (2020 22:11), Max: 36.5 (2020 22:11)  T(F): 97.7 (2020 22:11), Max: 97.7 (2020 22:11)  HR: 92 (2020 22:11) (80 - 95)  BP: 119/61 (2020 22:11) (119/61 - 174/84)  BP(mean): --  RR: 18 (2020 22:11) (17 - 18)  SpO2: 96% (2020 22:11) (95% - 100%)    Adult Advanced Hemodynamics Last 24 Hrs  CVP(mm Hg): --  CVP(cm H2O): --  CO: --  CI: --  PA: --  PA(mean): --  PCWP: --  SVR: --  SVRI: --  PVR: --  PVRI: --    Physical Exam:   General: resting comfortable in bed with nasal canula  HEENT: PERRL, EOMI, normal sclera and conjunctiva, no oral lesions  Neck: Supple, + JVD  Lungs: crackles at base of lungs. no wheezing  Heart: RRR, normal S1S2, no murmurs/rubs/gallops  Abdomen: Soft, ND/NT  Extremities: 2+ peripheral pulses, no cyanosis/clubbing/edema, full ROM  Skin: Warm, well-perfused  Neuro: A&O x2, no focal deficits  Skin: RIJ catheter in place, without oozing. A-line L radial    ====================  Labs & Imaging:   CBC Full  -  ( 2020 01:11 )  WBC Count : 5.40 K/uL  RBC Count : 2.32 M/uL  Hemoglobin : 7.0 g/dL  Hematocrit : 22.8 %  Platelet Count - Automated : 272 K/uL  Mean Cell Volume : 98.3 fl  Mean Cell Hemoglobin : 30.2 pg  Mean Cell Hemoglobin Concentration : 30.7 gm/dL  Auto Neutrophil # : x  Auto Lymphocyte # : x  Auto Monocyte # : x  Auto Eosinophil # : x  Auto Basophil # : x  Auto Neutrophil % : x  Auto Lymphocyte % : x  Auto Monocyte % : x  Auto Eosinophil % : x  Auto Basophil % : x        131<L>  |  100  |  24<H>  ----------------------------<  243<H>  4.9   |  12<L>  |  1.50<H>    Ca    9.0      2020 01:11  Phos  3.5     02-  Mg     2.3     02-02    TPro  6.7  /  Alb  3.0<L>  /  TBili  0.7  /  DBili  x   /  AST  233<H>  /  ALT  33  /  AlkPhos  83  02-02    PT/INR - ( 2020 15:27 )   PT: 13.5 sec;   INR: 1.18 ratio         PTT - ( 2020 15:27 )  PTT:29.8 sec  ABG - ( 2020 01:10 )  pH, Arterial: 7.33  pH, Blood: x     /  pCO2: 28    /  pO2: 264   / HCO3: 14    / Base Excess: -10.1 /  SaO2: 100             Urinalysis Basic - ( 2020 18:11 )    Color: Yellow / Appearance: Slightly Turbid / S.023 / pH: x  Gluc: x / Ketone: Negative  / Bili: Negative / Urobili: 2 mg/dL   Blood: x / Protein: 100 / Nitrite: Negative   Leuk Esterase: Moderate / RBC: 1 /hpf / WBC 13 /HPF   Sq Epi: x / Non Sq Epi: 2 /hpf / Bacteria: Few    ====================  Assessment & Plan:   ====================  89F with HTN, DM, hypothyroidism admitted after coming to the Emergency Department with generalized fatigue, hardly getting out of bed, diagnosed with generalized anasarca and bilaterally pleural effusions and severe anemia. Course complicated by ADHF likely 2/2 to labetalol toxicity.     Neuro  -A&Ox2 during the RRT, now resolved, ctm    Resp  -SOB 2/2 to pulmonary edema  -on nasal canula, ctm    CV  #ADHF  - likely secondary to labetalol toxicity (received 200mg)  - CXR shows bilateral pleural effusions, worsening throughout the night  - on levophed and vasopressin, requirements are decreasing. wean as tolerated.  - received 40 lasix at 10pm and then 80iv at ~5am  - monitor Is and Os, keep net negative. K>4, Mg>2  #Cardiogenic Shock  - Likely secondary to excessive beta blockade  - received glucagon x3 and then briefly put on glucagon gtt in the CCU but started dry heaving  - c/w dobutamine, wean as tolerated (currently on 5)  - daughter states that pt has a "valve" issue but is not sure exactly  - f/u TTE    - CVP 12. CO 2.8 and CI 2.367 overnight  - f/u lactate and hemodynamics in the AM    GI  -soft +consistent carb diet    ID  -no s/s of infection. no febrile, no cough, negative RVP    Renal  #EFRAIN   -likely secondary to fluid overload  -strict Is and Os (has adams)  -replete electrolytes as needed  -trend creatinine     Heme  #Anemia  -Hb dropped 6.6, repleting  -f/u iron studies, f/u haptoglobin (added on to lab)    Endo  #DM on oral agents  -c/w sliding scale insulin  #Hypothyroidism  -c/w levothyroxine  -f/u TFTs    DVT PPx  -SCDs because of anemia CCU Accept Note    JOHNNY COMBS  89y  Patient is a 89y old  Female who presents with a chief complaint of generalized weakness and body swelling (2020 19:22)    ====================  HPI & Hospital Course:   89F with HTN, DM, hypothyroidism admitted after coming to the Emergency Department with generalized fatigue, hardly getting out of bed, which the daughter states is unusual for her mom. She also c/o shortness of breath and swelling of upper and lower extremities. She had gone to urgent care today for extreme fatigue and HIGUERA for the last 2 days where a CXR done was notable for b/l pleural effusions and RML consolidation. She was sent to the Emergency Department. Denies fever, chills, cough, orthopnea, chest pain. She ambulates with a cane.  She has been living with her daughter for about 2 weeks now, her son is on a visit to [place], most of her medical care is in [place] including cardiology. Pt's daughter states the patient has some history of "valve problem". Does not take any water pills/diuretic. Currently comfortably lying flat in bed on the floor. The daughter states her mom has never been diagnosed with dementia, but her memory has been failing recently. The patient also has been c/o frequent protrusion of uterus thru vagina.    After admission, patient was given furosemide 40mg IV one time for bilateral pleural effusions. Patient was also started on labetalol 200 BID (SBPs were in the 170s). RRT was called for unresponsiveness. Patient was found to be have worsening pleural effusions and JVD and elevated lactate to 7.7. At that tinme, patients SBP was 60s and she was SOB. There was a concern for cardiogenic shock dude to intolerance of labetalol. Patient was started on levophed and dobutamine and started on Non-rebreather and transferred to the CCU for further management.     REVIEW OF SYSTEMS      General:  Gen: + weight loss for a few months and fatigue   ENT: no dizziness no hearing loss  Ophth: no blurring of vision no loss of vision  Resp: Dyspnea at baseline  CVS: No chest pain no palpitations no orthopnea  GI: no nausea, vomiting or diarrhea   : no dysuria, hematuria. 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   Gallstones    Past Surgical History: None  Home Medications:  alendronate 70 mg oral tablet: 1 tab(s) orally once a week (Tuesday) (2020 18:49)  Calcium 600m tab(s) orally once a day (2020 18:49)  Centrum Adults oral tablet: 1 tab(s) orally once a day (2020 18:49)  clopidogrel 75 mg oral tablet: 1 tab(s) orally once a day (2020 18:49)  Ecotrin Adult Low Strength 81 mg oral delayed release tablet: 1 tab(s) orally once a day (2020 18:49)  levothyroxine 50 mcg (0.05 mg) oral tablet: 1 tab(s) orally once a day (2020 18:49)  metFORMIN 1000 mg oral tablet: Take 0.5 (1/2) tablet 2 times daily (2020 18:49)  mirtazapine 7.5 mg oral tablet: Take 0.5 (1/2) tablet once daily (2020 18:49)  pantoprazole 40 mg oral delayed release tablet: 1 tab(s) orally 2 times a day (2020 18:49)  rosuvastatin 10 mg oral tablet: 2 tab(s) orally once a day (2020 18:49)    Current Medications:   MEDICATIONS  (STANDING):  atorvastatin 80 milliGRAM(s) Oral at bedtime  chlorhexidine 2% Cloths 1 Application(s) Topical <User Schedule>  dextrose 5%. 1000 milliLiter(s) (50 mL/Hr) IV Continuous <Continuous>  dextrose 50% Injectable 12.5 Gram(s) IV Push once  dextrose 50% Injectable 25 Gram(s) IV Push once  dextrose 50% Injectable 25 Gram(s) IV Push once  furosemide   Injectable 20 milliGRAM(s) IV Push daily  glucagon  Injectable 3 milliGRAM(s) IV Push once  heparin  Injectable 5000 Unit(s) SubCutaneous every 12 hours  insulin lispro (HumaLOG) corrective regimen sliding scale   SubCutaneous three times a day before meals  labetalol 200 milliGRAM(s) Oral two times a day  levothyroxine 50 MICROGram(s) Oral daily  midodrine. 20 milliGRAM(s) Oral once  mirtazapine 3.75 milliGRAM(s) Oral at bedtime  multivitamin/minerals 1 Tablet(s) Oral daily  ondansetron Injectable 4 milliGRAM(s) IV Push once  pantoprazole    Tablet 40 milliGRAM(s) Oral before breakfast    MEDICATIONS  (PRN):  dextrose 40% Gel 15 Gram(s) Oral once PRN Blood Glucose LESS THAN 70 milliGRAM(s)/deciliter  glucagon  Injectable 1 milliGRAM(s) IntraMuscular once PRN Glucose LESS THAN 70 milligrams/deciliter      Allergies:     Family History:     Social History:     ====================  Vital Signs Last 24 Hrs  T(C): 36.5 (2020 22:11), Max: 36.5 (2020 22:11)  T(F): 97.7 (2020 22:11), Max: 97.7 (2020 22:11)  HR: 92 (2020 22:11) (80 - 95)  BP: 119/61 (2020 22:11) (119/61 - 174/84)  BP(mean): --  RR: 18 (2020 22:11) (17 - 18)  SpO2: 96% (2020 22:11) (95% - 100%)    Adult Advanced Hemodynamics Last 24 Hrs  CVP(mm Hg): --  CVP(cm H2O): --  CO: --  CI: --  PA: --  PA(mean): --  PCWP: --  SVR: --  SVRI: --  PVR: --  PVRI: --    Physical Exam:   General: resting comfortable in bed with nasal canula  HEENT: PERRL, EOMI, normal sclera and conjunctiva, no oral lesions  Neck: Supple, + JVD  Lungs: crackles at base of lungs. no wheezing  Heart: RRR, normal S1S2, no murmurs/rubs/gallops  Abdomen: Soft, ND/NT  Extremities: 2+ peripheral pulses, no cyanosis/clubbing/edema, full ROM  Skin: Warm, well-perfused  Neuro: A&O x2, no focal deficits  Skin: RIJ catheter in place, without oozing. A-line L radial    ====================  Labs & Imaging:   CBC Full  -  ( 2020 01:11 )  WBC Count : 5.40 K/uL  RBC Count : 2.32 M/uL  Hemoglobin : 7.0 g/dL  Hematocrit : 22.8 %  Platelet Count - Automated : 272 K/uL  Mean Cell Volume : 98.3 fl  Mean Cell Hemoglobin : 30.2 pg  Mean Cell Hemoglobin Concentration : 30.7 gm/dL  Auto Neutrophil # : x  Auto Lymphocyte # : x  Auto Monocyte # : x  Auto Eosinophil # : x  Auto Basophil # : x  Auto Neutrophil % : x  Auto Lymphocyte % : x  Auto Monocyte % : x  Auto Eosinophil % : x  Auto Basophil % : x        131<L>  |  100  |  24<H>  ----------------------------<  243<H>  4.9   |  12<L>  |  1.50<H>    Ca    9.0      2020 01:11  Phos  3.5     02-  Mg     2.3     02-02    TPro  6.7  /  Alb  3.0<L>  /  TBili  0.7  /  DBili  x   /  AST  233<H>  /  ALT  33  /  AlkPhos  83  02-02    PT/INR - ( 2020 15:27 )   PT: 13.5 sec;   INR: 1.18 ratio         PTT - ( 2020 15:27 )  PTT:29.8 sec  ABG - ( 2020 01:10 )  pH, Arterial: 7.33  pH, Blood: x     /  pCO2: 28    /  pO2: 264   / HCO3: 14    / Base Excess: -10.1 /  SaO2: 100             Urinalysis Basic - ( 2020 18:11 )    Color: Yellow / Appearance: Slightly Turbid / S.023 / pH: x  Gluc: x / Ketone: Negative  / Bili: Negative / Urobili: 2 mg/dL   Blood: x / Protein: 100 / Nitrite: Negative   Leuk Esterase: Moderate / RBC: 1 /hpf / WBC 13 /HPF   Sq Epi: x / Non Sq Epi: 2 /hpf / Bacteria: Few    ====================  Assessment & Plan:   ====================  89F with HTN, DM, hypothyroidism admitted after coming to the Emergency Department with generalized fatigue, hardly getting out of bed, diagnosed with generalized anasarca and bilaterally pleural effusions and severe anemia. Course complicated by ADHF likely 2/2 to labetalol toxicity.     Neuro  -A&Ox2 during the RRT, now resolved, ctm    Resp  -SOB 2/2 to pulmonary edema  -on nasal canula, ctm    CV  #ADHF  - likely secondary to labetalol toxicity (received 200mg)  - CXR shows bilateral pleural effusions, worsening throughout the night  - on levophed and vasopressin, requirements are decreasing. wean as tolerated.  - received 40 lasix at 10pm and then 80iv at ~5am  - monitor Is and Os, keep net negative. K>4, Mg>2  #Cardiogenic Shock  - Likely secondary to excessive beta blockade  - received glucagon x3 and then briefly put on glucagon gtt in the CCU but started dry heaving  - c/w dobutamine, wean as tolerated (currently on 5)  - daughter states that pt has a "valve" issue but is not sure exactly  - f/u TTE    - CVP 12. CO 2.8 and CI 2.367 overnight  - f/u lactate and hemodynamics in the AM  - holding home plavix right now for anemia    GI  -soft +consistent carb diet    ID  -no s/s of infection. no febrile, no cough, negative RVP    Renal  #EFRAIN   -likely secondary to fluid overload  -strict Is and Os (has angie)  -replete electrolytes as needed  -trend creatinine     Heme  #Anemia  -Hb dropped 6.6, repleting  -f/u iron studies, f/u haptoglobin (added on to lab)    Endo  #DM on oral agents  -c/w sliding scale insulin  #Hypothyroidism  -c/w levothyroxine  -f/u TFTs    DVT PPx  -SCDs because of anemia

## 2020-02-02 NOTE — DIETITIAN INITIAL EVALUATION ADULT. - OTHER INFO
DIET Hx:    DM Hx: Takes metformin PTA. Recent HbA1c of 5.7% (2/2) indicates good glycemic maintenance PTA with consideration for advanced age of 89 years.    WEIGHT Hx: Weights this admission: 80 pounds (2/1), 89.9 pounds (2/2).     Skin per chart: free of pressure injuries  Edema per chart: 1+ bilateral feet    Ht: 55 inches, Wt: , BMI: kg/m2, IBW:  pounds (+/-10%), %IBW: % DIET Hx: Daughter reports that ~1 year ago, pts PO intake declined to about 10% of baseline. At that time, was supplementing with Glucerna and Boost supplements. Daughter reports that intake has since improved to about 50% of baseline. Reports that pt has been requesting salt as of late. Otherwise denies pt following certain modified diet. NKFA. Denies PTA micronutrient supplementation. Pt with PMH of DM. Daughter confirms pt takes metformin PTA. Per report, finger sticks are not checked at home. Recent HbA1c of 5.7% (2/2) indicates good glycemic maintenance PTA with consideration for advanced age of 89 years.    WEIGHT Hx: Daughter reports UBW of 100 pounds 1 year ago and endorses gradual weight loss to recent UBW of 80 pounds. Weights this admission: 80 pounds (2/1), 89.9 pounds (2/2, suspect fluid rentention as pt with edema). Considering reported and current weights, pt noted with 20% weight loss in 1 year, which is severe. Nutrition Focused Physical Exam deferred at this time as pt verbalizing being uncomfortable and was visibly restless. Per visual examination, pt noted with muscle wasting to temples, clavicles, shoulders; however, cannot assess severity without palpation. Will follow up to perform as feasible.     Daughter reports pt with chewing difficulty related to poor dentition. Reports tolerating current diet texture; however, with poor appetite/intake and consumed ~25% of breakfast this morning. Denies nausea/vomiting, diarrhea/constipation or other acute GI distress at this time. Per chart, last BM today. RD briefly reviewed importance of monitoring sodium intake in setting of ADHF; extensive education deferred by daughter. RD availability made known.    Skin per chart: free of pressure injuries  Edema per chart: 1+ bilateral feet    Ht: 55 inches, Wt: 36.3 kg (2/1), BMI: 18.6 kg/m2, IBW: 87.5 pounds (+/-10%), %IBW: 91% DIET Hx: Daughter reports that ~1 year ago, pts PO intake declined to about 10% of baseline. At that time, was supplementing with Glucerna and Boost supplements. Daughter reports that intake has since improved to about 50% of baseline. Reports that pt has been requesting salt as of late. Otherwise denies pt following certain modified diet. NKFA. Denies PTA micronutrient supplementation. Pt with PMH of DM. Daughter confirms pt takes metformin PTA. Per report, finger sticks are not checked at home. Recent HbA1c of 5.7% (2/2) indicates good glycemic maintenance PTA, particularly with consideration for advanced age of 89 years.    WEIGHT Hx: Daughter reports UBW of 100 pounds 1 year ago and endorses gradual weight loss to recent UBW of 80 pounds. Weights this admission: 80 pounds (2/1), 89.9 pounds (2/2, suspect fluid rentention as pt with edema). Considering reported and current weights, pt noted with 20% weight loss in 1 year, which is severe. Nutrition Focused Physical Exam deferred at this time as pt verbalizing being uncomfortable and was visibly restless. Per visual examination, pt noted with muscle wasting to temples, clavicles, shoulders; however, cannot assess severity without palpation. Will follow up to perform as feasible.     Daughter reports pt with chewing difficulty related to poor dentition. Reports tolerating current diet texture; however, with poor appetite/intake and consumed ~25% of breakfast this morning. Denies nausea/vomiting, diarrhea/constipation or other acute GI distress at this time. Per chart, last BM today. RD briefly reviewed importance of monitoring sodium intake in setting of ADHF; extensive education deferred by daughter. RD availability made known.    Skin per chart: free of pressure injuries  Edema per chart: 1+ bilateral feet    Ht: 55 inches, Wt: 36.3 kg (2/1), BMI: 18.6 kg/m2, IBW: 87.5 pounds (+/-10%), %IBW: 91%

## 2020-02-02 NOTE — PROGRESS NOTE ADULT - SUBJECTIVE AND OBJECTIVE BOX
PATIENT:  JOHNNY COMBS  [id number]    CHIEF COMPLAINT:  Patient is a 89y old  Female who presents with a chief complaint of Cardiogenic Shock (2020 09:16)      INTERVAL HISTORY/OVERNIGHT EVENTS: Patient seen and examined at bedside. Vital signs stable overnight.     REVIEW OF SYSTEMS:    Constitutional:     [ ] negative [ ] fevers [ ] chills [ ] weight loss [ ] weight gain  HEENT:                  [ ] negative [ ] dry eyes [ ] eye irritation [ ] postnasal drip [ ] nasal congestion  CV:                         [ ] negative  [ ] chest pain [ ] orthopnea [ ] palpitations [ ] murmur  Resp:                     [ ] negative [ ] cough [ ] shortness of breath [ ] dyspnea [ ] wheezing [ ] sputum [ ] hemoptysis  GI:                          [ ] negative [ ] nausea [ ] vomiting [ ] diarrhea [ ] constipation [ ] abd pain [ ] dysphagia   :                        [ ] negative [ ] dysuria [ ] nocturia [ ] hematuria [ ] increased urinary frequency  Musculoskeletal: [ ] negative [ ] back pain [ ] myalgias [ ] arthralgias [ ] fracture  Skin:                       [ ] negative [ ] rash [ ] itch  Neurological:        [ ] negative [ ] headache [ ] dizziness [ ] syncope [ ] weakness [ ] numbness  Psychiatric:           [ ] negative [ ] anxiety [ ] depression  Endocrine:            [ ] negative [ ] diabetes [ ] thyroid problem  Heme/Lymph:      [ ] negative [ ] anemia [ ] bleeding problem  Allergic/Immune: [ ] negative [ ] itchy eyes [ ] nasal discharge [ ] hives [ ] angioedema    [x ] All other systems negative  [ ] Unable to assess ROS because ________.    MEDICATIONS:  MEDICATIONS  (STANDING):  atorvastatin 80 milliGRAM(s) Oral at bedtime  buMETAnide Infusion 1 mG/Hr (5 mL/Hr) IV Continuous <Continuous>  chlorhexidine 2% Cloths 1 Application(s) Topical <User Schedule>  dextrose 5%. 1000 milliLiter(s) (50 mL/Hr) IV Continuous <Continuous>  dextrose 50% Injectable 12.5 Gram(s) IV Push once  dextrose 50% Injectable 25 Gram(s) IV Push once  dextrose 50% Injectable 25 Gram(s) IV Push once  DOBUTamine Infusion 7.5 MICROgram(s)/kG/Min (8.168 mL/Hr) IV Continuous <Continuous>  heparin  Injectable 5000 Unit(s) SubCutaneous every 12 hours  insulin lispro (HumaLOG) corrective regimen sliding scale   SubCutaneous three times a day before meals  levothyroxine 50 MICROGram(s) Oral daily  mirtazapine 3.75 milliGRAM(s) Oral at bedtime  multivitamin/minerals 1 Tablet(s) Oral daily  norepinephrine Infusion 0.9 MICROgram(s)/kG/Min (30.628 mL/Hr) IV Continuous <Continuous>  pantoprazole    Tablet 40 milliGRAM(s) Oral before breakfast  vasopressin Infusion 0.02 Unit(s)/Min (1.2 mL/Hr) IV Continuous <Continuous>    MEDICATIONS  (PRN):  dextrose 40% Gel 15 Gram(s) Oral once PRN Blood Glucose LESS THAN 70 milliGRAM(s)/deciliter  glucagon  Injectable 1 milliGRAM(s) IntraMuscular once PRN Glucose LESS THAN 70 milligrams/deciliter      ALLERGIES:  Allergies    penicillin (Unknown)    Intolerances        OBJECTIVE:  ICU Vital Signs Last 24 Hrs  T(C): 36 (2020 09:30), Max: 36.5 (2020 22:11)  T(F): 96.8 (2020 09:30), Max: 97.7 (2020 22:11)  HR: 68 (2020 10:00) (64 - 95)  BP: 106/60 (2020 08:23) (79/55 - 174/84)  BP(mean): 77 (2020 08:23) (65 - 77)  ABP: 106/52 (2020 10:00) (82/42 - 154/70)  ABP(mean): 74 (2020 10:00) (58 - 104)  RR: 19 (2020 10:00) (13 - 35)  SpO2: 95% (2020 10:00) (82% - 100%)      Adult Advanced Hemodynamics Last 24 Hrs  CVP(mm Hg): 19 (2020 10:00) (14 - 20)  CVP(cm H2O): --  CO: --  CI: --  PA: --  PA(mean): --  PCWP: --  SVR: --  SVRI: --  PVR: --  PVRI: --  CAPILLARY BLOOD GLUCOSE      POCT Blood Glucose.: 85 mg/dL (2020 07:49)  POCT Blood Glucose.: 150 mg/dL (2020 00:55)  POCT Blood Glucose.: 123 mg/dL (2020 22:07)    CAPILLARY BLOOD GLUCOSE      POCT Blood Glucose.: 85 mg/dL (2020 07:49)    I&O's Summary    2020 07:01  -  2020 07:00  --------------------------------------------------------  IN: 461.8 mL / OUT: 110 mL / NET: 351.8 mL    2020 07:01  -  2020 10:15  --------------------------------------------------------  IN: 424.8 mL / OUT: 145 mL / NET: 279.8 mL      Daily Height in cm: 139.7 (2020 18:31)    Daily Weight in k.8 (2020 04:00)    PHYSICAL EXAMINATION:  General: WN/WD NAD  HEENT: PERRLA, EOMI, moist mucous membranes  Neurology: A&Ox3, nonfocal, TONY x 4  Respiratory: CTA B/L, normal respiratory effort, no wheezes, crackles, rales  CV: RRR, S1S2, no murmurs, rubs or gallops  Abdominal: Soft, NT, ND +BS, Last BM  Extremities: No edema, + peripheral pulses  Incisions:   Tubes:    LABS:  ABG - ( 2020 04:15 )  pH, Arterial: 7.39  pH, Blood: x     /  pCO2: 29    /  pO2: 190   / HCO3: 17    / Base Excess: -6.5  /  SaO2: 100                                     6.6    7.28  )-----------( 275      ( 2020 04:20 )             21.9     02-02    132<L>  |  102  |  25<H>  ----------------------------<  144<H>  5.0   |  17<L>  |  1.55<H>    Ca    8.5      2020 04:20  Phos  3.5       Mg     2.2     -    TPro  6.2  /  Alb  2.9<L>  /  TBili  0.9  /  DBili  x   /  AST  1424<H>  /  ALT  206<H>  /  AlkPhos  154<H>      LIVER FUNCTIONS - ( 2020 04:20 )  Alb: 2.9 g/dL / Pro: 6.2 g/dL / ALK PHOS: 154 U/L / ALT: 206 U/L / AST: 1424 U/L / GGT: x           PT/INR - ( 2020 04:20 )   PT: 16.6 sec;   INR: 1.44 ratio         PTT - ( 2020 04:20 )  PTT:28.4 sec  CKMB Units: 3.7 ng/mL ( @ 04:20)  Creatine Kinase, Serum: 83 U/L ( @ 04:20)    CARDIAC MARKERS ( 2020 04:20 )  x     / x     / 83 U/L / x     / 3.7 ng/mL      Urinalysis Basic - ( 2020 18:11 )    Color: Yellow / Appearance: Slightly Turbid / S.023 / pH: x  Gluc: x / Ketone: Negative  / Bili: Negative / Urobili: 2 mg/dL   Blood: x / Protein: 100 / Nitrite: Negative   Leuk Esterase: Moderate / RBC: 1 /hpf / WBC 13 /HPF   Sq Epi: x / Non Sq Epi: 2 /hpf / Bacteria: Few        TELEMETRY:     EKG:     IMAGING:

## 2020-02-02 NOTE — CONSULT NOTE ADULT - SUBJECTIVE AND OBJECTIVE BOX
[facility] DIVISION OF KIDNEY DISEASES AND HYPERTENSION -- INITIAL CONSULT NOTE  --------------------------------------------------------------------------------  HPI: Patient is a 89y old F with pmhx of HTN, DM, hypothyroidism admitted on 2/1 for generalized fatigue, found to be in cardiogenic shock, transferred to CCU for further management. In the CCU patient was placed on dobutamine gtt, levophed gtt, and vasopression. Given 80mg IV lasix and placed on bumex gtt with one dose of metolzaone. Minimal UOP with diuretics. On admission Scr was 1.45mg/dL, now 1.68mg/dL, acidosis worsening, lactate 5.2, pH 7.25-7.35. 195 cc of UOP total. History provided mostly by the daughter as patient is lethargic. Daughter states her mother has a hx of cardiac disease, namely valvular disease. 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 1000 milliLiter(s) IV Continuous <Continuous>  dextrose 50% Injectable 12.5 Gram(s) IV Push once  dextrose 50% Injectable 25 Gram(s) IV Push once  dextrose 50% Injectable 25 Gram(s) IV Push once  DOBUTamine Infusion 7.5 MICROgram(s)/kG/Min IV Continuous <Continuous>  heparin  Injectable 5000 Unit(s) SubCutaneous every 12 hours  levothyroxine Injectable 37 MICROGram(s) IV Push at bedtime  mirtazapine 3.75 milliGRAM(s) Oral at bedtime  multivitamin/minerals 1 Tablet(s) Oral daily  norepinephrine Infusion 0.9 MICROgram(s)/kG/Min IV Continuous <Continuous>  pantoprazole    Tablet 40 milliGRAM(s) Oral before breakfast  vasopressin Infusion 0.02 Unit(s)/Min IV Continuous <Continuous>    PRN Inpatient Medications  dextrose 40% Gel 15 Gram(s) Oral once PRN  glucagon  Injectable 1 milliGRAM(s) IntraMuscular once PRN      REVIEW OF SYSTEMS  --------------------------------------------------------------------------------  Unable to assess 2/2 clinical condition     VITALS/PHYSICAL EXAM  --------------------------------------------------------------------------------  T(C): 36.2 (02-02-20 @ 10:30), Max: 36.5 (02-01-20 @ 22:11)  HR: 72 (02-02-20 @ 13:15) (64 - 95)  BP: 151/78 (02-02-20 @ 11:45) (79/55 - 174/84)  RR: 16 (02-02-20 @ 13:15) (13 - 35)  SpO2: 95% (02-02-20 @ 13:15) (82% - 100%)  Wt(kg): --  Height (cm): 139.7 (02-01-20 @ 18:31)  Weight (kg): 36.3 (02-01-20 @ 13:54)  BMI (kg/m2): 18.6 (02-01-20 @ 18:31)  BSA (m2): 1.19 (02-01-20 @ 18:31)      02-01-20 @ 07:01  -  02-02-20 @ 07:00  --------------------------------------------------------  IN: 461.8 mL / OUT: 110 mL / NET: 351.8 mL    02-02-20 @ 07:01  -  02-02-20 @ 13:27  --------------------------------------------------------  IN: 825.6 mL / OUT: 195 mL / NET: 630.6 mL      Physical Exam:    	Gen: NAD  	HEENT: Anicteric, + JVP   	Pulm: Crackles bibasilar   	CV: RRR, S1S2; systolic murmur best heard at left sternal border   	Abd: +BS, soft, nontender/nondistended       	: No suprapubic tenderness  	MSK: Warm, 1+ edema to knees  	Neuro: Awake, lethargic      	Vascular Access:      LABS/STUDIES  --------------------------------------------------------------------------------              6.6    7.28  >-----------<  275      [02-02-20 @ 04:20]              21.9     130  |  102  |  28  ----------------------------<  123      [02-02-20 @ 12:09]  5.1   |  15  |  1.68        Ca     8.6     [02-02-20 @ 12:09]      Mg     2.8     [02-02-20 @ 12:09]      Phos  4.2     [02-02-20 @ 12:09]    TPro  7.0  /  Alb  2.9  /  TBili  1.2  /  DBili  x   /  AST  1219  /  ALT  215  /  AlkPhos  164  [02-02-20 @ 12:09]    PT/INR: PT 16.6 , INR 1.44       [02-02-20 @ 04:20]  PTT: 28.4       [02-02-20 @ 04:20]    CK 83      [02-02-20 @ 04:20]        [02-02-20 @ 04:20]    Creatinine Trend:  SCr 1.68 [02-02 @ 12:09]  SCr 1.55 [02-02 @ 04:20]  SCr 1.50 [02-02 @ 01:11]  SCr 1.45 [02-01 @ 15:27]    Urinalysis - [02-01-20 @ 18:11]      Color Yellow / Appearance Slightly Turbid / SG 1.023 / pH 8.0      Gluc Negative / Ketone Negative  / Bili Negative / Urobili 2 mg/dL       Blood Negative / Protein 100 / Leuk Est Moderate / Nitrite Negative      RBC 1 / WBC 13 / Hyaline 1 / Gran  / Sq Epi  / Non Sq Epi 2 / Bacteria Few      Iron 29, TIBC 267, %sat 11      [02-02-20 @ 09:42]  Ferritin 99      [02-02-20 @ 09:43]  HbA1c 5.7      [02-02-20 @ 09:20]  TSH 49.30      [02-02-20 @ 09:48]  Lipid: chol 93, TG 37, HDL 61, LDL 25      [02-02-20 @ 09:48]

## 2020-02-02 NOTE — DIETITIAN INITIAL EVALUATION ADULT. - PERTINENT LABORATORY DATA
(2/2) Sodium 132, BUN 25, Creatinine 1.55, Glucose 144, Albumin 2.9, Alkaline Phosphatase 154, AST 1424, , eGFR if Non African American 29, Lactate 4.8, HbA1c 5.7%

## 2020-02-02 NOTE — CONSULT NOTE ADULT - PROBLEM SELECTOR RECOMMENDATION 2
hgb a1c 5.7 is too tightly controlled given age and metformin is also not recommended given her EFRAIN and lactic acidosis  -if glucose remains stable, may d/c FS and sliding scale   -prelim plan to discharge without any DM medications hgb a1c 5.7 is more tightly controlled than necessary given age and metformin is also not recommended given her EFRAIN and lactic acidosis  -if glucose remains stable, may d/c FS and sliding scale   -consider discharge without any DM medications

## 2020-02-02 NOTE — PROGRESS NOTE ADULT - SUBJECTIVE AND OBJECTIVE BOX
Patient is a 89y old  Female who presents with a chief complaint of generalized weakness and body swelling (2020 13:26)      SUBJECTIVE / OVERNIGHT EVENTS: overnight events noted  s/p RRT and transfer to CCU due to hypotension  seen in the CCU    ROS:  CVS: No chest pain no palpitations no orthopnea  GI: no N/V/D  : no dysuria, no hematuria  Skin: No rash no itching        MEDICATIONS  (STANDING):  atorvastatin 80 milliGRAM(s) Oral at bedtime  buMETAnide Infusion 1 mG/Hr (5 mL/Hr) IV Continuous <Continuous>  chlorhexidine 2% Cloths 1 Application(s) Topical <User Schedule>  dextrose 5%. 1000 milliLiter(s) (50 mL/Hr) IV Continuous <Continuous>  dextrose 50% Injectable 12.5 Gram(s) IV Push once  dextrose 50% Injectable 25 Gram(s) IV Push once  dextrose 50% Injectable 25 Gram(s) IV Push once  DOBUTamine Infusion 7.5 MICROgram(s)/kG/Min (8.168 mL/Hr) IV Continuous <Continuous>  heparin  Injectable 5000 Unit(s) SubCutaneous every 12 hours  levothyroxine Injectable 37 MICROGram(s) IV Push at bedtime  mirtazapine 3.75 milliGRAM(s) Oral at bedtime  multivitamin/minerals 1 Tablet(s) Oral daily  norepinephrine Infusion 0.9 MICROgram(s)/kG/Min (30.628 mL/Hr) IV Continuous <Continuous>  pantoprazole    Tablet 40 milliGRAM(s) Oral before breakfast  vasopressin Infusion 0.02 Unit(s)/Min (1.2 mL/Hr) IV Continuous <Continuous>    MEDICATIONS  (PRN):  dextrose 40% Gel 15 Gram(s) Oral once PRN Blood Glucose LESS THAN 70 milliGRAM(s)/deciliter  glucagon  Injectable 1 milliGRAM(s) IntraMuscular once PRN Glucose LESS THAN 70 milligrams/deciliter        CAPILLARY BLOOD GLUCOSE      POCT Blood Glucose.: 83 mg/dL (2020 11:18)  POCT Blood Glucose.: 85 mg/dL (2020 07:49)  POCT Blood Glucose.: 150 mg/dL (2020 00:55)  POCT Blood Glucose.: 123 mg/dL (2020 22:07)    I&O's Summary    2020 07:01  -  2020 07:00  --------------------------------------------------------  IN: 461.8 mL / OUT: 110 mL / NET: 351.8 mL    2020 07:01  -  2020 13:54  --------------------------------------------------------  IN: 825.6 mL / OUT: 195 mL / NET: 630.6 mL        Vital Signs Last 24 Hrs  T(C): 36.4 (2020 13:30), Max: 36.5 (2020 22:11)  T(F): 97.5 (2020 13:30), Max: 97.7 (2020 22:11)  HR: 70 (2020 13:45) (64 - 95)  BP: 151/78 (2020 11:45) (79/55 - 174/84)  BP(mean): 104 (2020 11:45) (65 - 104)  RR: 22 (2020 13:45) (13 - 35)  SpO2: 93% (2020 13:45) (82% - 99%)    PHYSICAL EXAM: vital signs as above  restless and cachectic  HEENT: KALPESH EOMI  Neck: Supple, no JVD, no thyromegaly  Lungs: decreased breath sounds at bases R>>L  CVS: S1 S2 soft ejection systolic murmur best heard at left sternal border   Abdomen: no tenderness, no organomegaly, BS present  Neuro: confused moving all limbs   Skin: warm, dry  Ext: no cyanosis or clubbing, bilateral LE 2 + edema  Msk: no joint swelling or deformities  Back: no CVA tenderness, no kyphosis/scoliosis    LABS:                        6.6    7.28  )-----------( 275      ( 2020 04:20 )             21.9     02-02    130<L>  |  102  |  28<H>  ----------------------------<  123<H>  5.1   |  15<L>  |  1.68<H>    Ca    8.6      2020 12:09  Phos  4.2     -  Mg     2.8         TPro  7.0  /  Alb  2.9<L>  /  TBili  1.2  /  DBili  x   /  AST  1219<H>  /  ALT  215<H>  /  AlkPhos  164<H>  -    PT/INR - ( 2020 04:20 )   PT: 16.6 sec;   INR: 1.44 ratio         PTT - ( 2020 04:20 )  PTT:28.4 sec  CARDIAC MARKERS ( 2020 04:20 )  x     / x     / 83 U/L / x     / 3.7 ng/mL      Urinalysis Basic - ( 2020 18:11 )    Color: Yellow / Appearance: Slightly Turbid / S.023 / pH: x  Gluc: x / Ketone: Negative  / Bili: Negative / Urobili: 2 mg/dL   Blood: x / Protein: 100 / Nitrite: Negative   Leuk Esterase: Moderate / RBC: 1 /hpf / WBC 13 /HPF   Sq Epi: x / Non Sq Epi: 2 /hpf / Bacteria: Few          All consultant(s) notes reviewed and care discussed with other providers        Contact Number, Dr Moser [phone]

## 2020-02-02 NOTE — RAPID RESPONSE TEAM SUMMARY - NSADDTLFINDINGSRRT_GEN_ALL_CORE
RRT called for AMS. Pt was AAOx3 at baseline, however, was unresponsive when RRT called. On arrival, pt unresposive, tachycardic, hypotensive. In the process of getting an accurate O2 sat, pt's mental status returned. Pt noted that she was in no acute distress, no chest pain, no SOB. On physical exam, pt with with crackles over lungs, tachycardic heart, pitting edema.     Of note, pt was noted to be given 200mg Labetalol previously.

## 2020-02-02 NOTE — PROCEDURE NOTE - NSPROCDETAILS_GEN_ALL_CORE
sterile technique, catheter placed/ultrasound guidance/lumen(s) aspirated and flushed/sterile dressing applied/guidewire recovered
ultrasound guidance/connected to a pressurized flush line/hemostasis with direct pressure, dressing applied/location identified, draped/prepped, sterile technique used, needle inserted/introduced/sutured in place/positive blood return obtained via catheter/all materials/supplies accounted for at end of procedure
guidewire recovered/lumen(s) aspirated and flushed/sterile dressing applied/ultrasound guidance/sterile technique, catheter placed
lumen(s) aspirated and flushed/ultrasound guidance/guidewire recovered/sterile dressing applied/sterile technique, catheter placed

## 2020-02-02 NOTE — DIETITIAN INITIAL EVALUATION ADULT. - PROBLEM SELECTOR PLAN 6
daughter states has h/o kidney disease but creatinine was 1.1 in Oct 2019 and 1.2 in Jan 2020  will monitor  at present fits criteria for EFRAIN on CKD stage 3

## 2020-02-02 NOTE — CONSULT NOTE ADULT - ASSESSMENT
89y old F with pmhx of HTN, DM, hypothyroidism admitted on 2/1 for generalized fatigue, found to be in cardiogenic shock    #EFRAIN  - Scr 1.45mg/dL on admission, now up to 1.68mg/dL  - Patient with cardiogenic shock; currently on Dobutamine, Levophed, Vasopressin Gtt  - Minimal UOP with Lasix and Bumex gtt  - Daughter consented for CRRT, will start once catheter is placed as patient is having worsening acidosis, rising LFT's,  minimal UOP and signs of volume overload  - Monitor UOP and daily weights. Avoid volume depletion, NSAIDs, PPI's, ARB/ACE-I. Dose medications as per eGFR.      Mason Young   Nephrology Fellow  Pager NS: [phone]/ [facility]: [phone]  (After 5 pm or on weekends please page the on-call fellow) 89y old F with pmhx of HTN, DM, hypothyroidism admitted on 2/1 for generalized fatigue, found to be in cardiogenic shock    #EFRAIN  - Scr 1.45mg/dL on admission, now up to 1.68mg/dL  - Patient with cardiogenic shock; currently on Dobutamine, Levophed, Vasopressin Gtt  - Minimal UOP with Lasix and Bumex gtt, lactic acidosis  - Daughter consented for CRRT, will start once catheter is placed as patient is having worsening acidosis, rising LFT's,  minimal UOP and signs of volume overload  - Monitor UOP and daily weights. Avoid volume depletion, NSAIDs, PPI's, ARB/ACE-I. Dose medications as per eGFR.      Mason Young   Nephrology Fellow  Pager NS: [phone]/ [facility]: [phone]  (After 5 pm or on weekends please page the on-call fellow)

## 2020-02-02 NOTE — DIETITIAN INITIAL EVALUATION ADULT. - REASON INDICATOR FOR ASSESSMENT
Consult received for "BMI<18"  Source: pt, daughter, EMR    Per chart, pt is a 89 year old female with PMH of HTN, T2DM, hypothyroidism, admitted with generalized fatigue and HIGUERA. Found to have generalized anasarca and bilaterally pleural effusions and severe anemia. S/p RRT for unresponsiveness, transferred to CCU with cardiogenic shock, ADHF (2/2).

## 2020-02-02 NOTE — PROGRESS NOTE ADULT - SUBJECTIVE AND OBJECTIVE BOX
====================  CCU MIDNIGHT ROUNDS  ====================    JOHNNY COMBS  [id number]  Patient is a 89y old  Female who presents with a chief complaint of generalized weakness and body swelling (02 Feb 2020 13:54)      ====================  SUMMARY:  ====================        ====================  NEW EVENTS:  ====================        ====================  VITALS (Last 12 hrs):  ====================    T(C): 36.6 (02-02-20 @ 19:00), Max: 36.6 (02-02-20 @ 19:00)  T(F): 97.9 (02-02-20 @ 19:00), Max: 97.9 (02-02-20 @ 19:00)  HR: 80 (02-02-20 @ 19:45) (66 - 82)  BP: 151/78 (02-02-20 @ 11:45) (106/60 - 151/78)  BP(mean): 104 (02-02-20 @ 11:45) (77 - 104)  ABP: 112/46 (02-02-20 @ 19:45) (80/40 - 150/72)  ABP(mean): 74 (02-02-20 @ 19:45) (58 - 106)  RR: 12 (02-02-20 @ 19:45) (10 - 33)  SpO2: 97% (02-02-20 @ 19:45) (87% - 99%)  Wt(kg): --  CVP(mm Hg): 8 (02-02-20 @ 19:45) (6 - 21)  CVP(cm H2O): --  CO: --  CI: --  PA: --  PA(mean): --  PCWP: --  SVR: --  PVR: --    I&O's Summary    01 Feb 2020 07:01  -  02 Feb 2020 07:00  --------------------------------------------------------  IN: 461.8 mL / OUT: 110 mL / NET: 351.8 mL    02 Feb 2020 07:01  -  02 Feb 2020 19:55  --------------------------------------------------------  IN: 1222.5 mL / OUT: 675 mL / NET: 547.5 mL            ====================  NEW LABS:  ====================                          9.7    9.60  )-----------( 210      ( 02 Feb 2020 15:47 )             31.4     02-02    134<L>  |  102  |  32<H>  ----------------------------<  162<H>  5.1   |  17<L>  |  1.75<H>    Ca    8.8      02 Feb 2020 18:14  Phos  4.1     02-02  Mg     2.8     02-02    TPro  7.0  /  Alb  2.9<L>  /  TBili  1.2  /  DBili  x   /  AST  1219<H>  /  ALT  215<H>  /  AlkPhos  164<H>  02-02    PT/INR - ( 02 Feb 2020 04:20 )   PT: 16.6 sec;   INR: 1.44 ratio         PTT - ( 02 Feb 2020 04:20 )  PTT:28.4 sec  Creatine Kinase, Serum: 83 U/L (02-02-20 @ 04:20)    CKMB Units: 3.7 ng/mL (02-02 @ 04:20)    ABG - ( 02 Feb 2020 18:51 )  pH, Arterial: 7.34  pH, Blood: x     /  pCO2: 34    /  pO2: 70    / HCO3: 18    / Base Excess: -6.8  /  SaO2: 93                    ====================  PLAN:  ====================  #neuro  #card  #GI  #  #ID  #discharge planning ====================  CCU MIDNIGHT ROUNDS  ====================  JOHNNY COMBS  [id number]  Patient is a 89y old  Female who presents with a chief complaint of generalized weakness and body swelling (2020 13:54)  ====================  SUMMARY:  89F with HTN, DM, hypothyroidism admitted after coming to the Emergency Department with generalized fatigue, hardly getting out of bed, which the daughter states is unusual for her mom. She also c/o shortness of breath and swelling of upper and lower extremities. She had gone to urgent care today for extreme fatigue and HIGUERA for the last 2 days where a CXR done was notable for b/l pleural effusions and RML consolidation. She was sent to the Emergency Department. Denies fever, chills, cough, orthopnea, chest pain. She ambulates with a cane.  She has been living with her daughter for about 2 weeks now, her son is on a visit to [place], most of her medical care is in [place] including cardiology. Pt's daughter states the patient has some history of "valve problem". Does not take any water pills/diuretic. Currently comfortably lying flat in bed on the floor. The daughter states her mom has never been diagnosed with dementia, but her memory has been failing recently. The patient also has been c/o frequent protrusion of uterus thru vagina (2020 19:22)  ====================  ====================  NEW EVENTS: vaso 0.02, levo 0.1, dobutamine 7.5, bumex 3mg infusing, urine o/p 150's-175 cc/hr   ====================  ====================  VITALS (Last 12 hrs):  ====================  T(C): 36.6 (20 @ 19:00), Max: 36.6 (20 @ 19:00)  T(F): 97.9 (20 @ 19:00), Max: 97.9 (20 @ 19:00)  HR: 80 (20 @ 19:45) (66 - 82)  BP: 151/78 (20 @ 11:45) (106/60 - 151/78)  BP(mean): 104 (20 @ 11:45) (77 - 104)  ABP: 112/46 (20 @ 19:45) (80/40 - 150/72)  ABP(mean): 74 (20 @ 19:45) (58 - 106)  RR: 12 (20 @ 19:45) (10 - 33)  SpO2: 97% (20 @ 19:45) (87% - 99%)  CVP(mm Hg): 8 (20 @ 19:45) (6 - 21)  I&O's Summary    2020 07:  -  2020 07:00  --------------------------------------------------------  IN: 461.8 mL / OUT: 110 mL / NET: 351.8 mL    2020 07:  -  2020 19:55  --------------------------------------------------------  IN: 1222.5 mL / OUT: 675 mL / NET: 547.5 mL  ====================  NEW LABS:  ====================                          9.7    9.60  )-----------( 210      ( 2020 15:47 )             31.4         134<L>  |  102  |  32<H>  ----------------------------<  162<H>  5.1   |  17<L>  |  1.75<H>    Ca    8.8      2020 18:14  Phos  4.1       Mg     2.8         TPro  7.0  /  Alb  2.9<L>  /  TBili  1.2  /  DBili  x   /  AST  1219<H>  /  ALT  215<H>  /  AlkPhos  164<H>      PT/INR - ( 2020 04:20 )   PT: 16.6 sec;   INR: 1.44 ratio         PTT - ( 2020 04:20 )  PTT:28.4 sec  Creatine Kinase, Serum: 83 U/L (20 @ 04:20)    CKMB Units: 3.7 ng/mL ( @ 04:20)    ABG - ( 2020 18:51 )  pH, Arterial: 7.34  pH, Blood: x     /  pCO2: 34    /  pO2: 70    / HCO3: 18    / Base Excess: -6.8  /  SaO2: 93      ====================  PLAN:  ====================  #neuro  - no active issue at this time     #resp  -tolerating O2 2L SpO2 >92%    #card  ADHF  TTE EF 40-45%, severe TR, RA enlargement  - likely secondary to labetalol toxicity (received 200mg)  - c/w vasopressin, levophed wean as tolerated   - c/w dobutamine and bumex   - cvp 8, decrease bumex if cvp <6   - Renal consulted for worsening acidosis and oliguria, plan for CRRT now deferred as to pt is making urine 150- 175 cc/hr   - monitor Is and Os, keep net negative. K>4, Mg>2    Cardiogenic Shock  - Likely secondary to excessive beta blockade  - received glucagon x3 and then briefly put on glucagon gtt in the CCU but started dry heaving  - c/w  to 7.5  - holding home plavix 2/2 anemia  - Hold Statin given elevated LFTs    GI  -soft +consistent carb diet  - elevated LFTs likely 2/2 ADHF  - hold statin    ID  -no s/s of infection. no febrile, no cough, negative RVP    Renal  #EFRAIN   -likely secondary to fluid overload  -strict Is and Os (has adams)  -replete electrolytes as needed  -trend creatinine     Heme  #Anemia  - Likely 2/2 AOCD 2/2 long standing CKD  -Hb dropped 6.6, will receive 1 unit of pRBCs  -f/u iron studies, f/u haptoglobin (added on to lab)    Endo  #DM on oral agents  - Hold off on checking POCT as T2DM is well controlled  #Hypothyroidism  -c/w levothyroxine IV given bowel edema in the setting of ADHF  -f/u TFTs in 1 week    DVT PPx  -Hep subQ    #card  #GI  #  #ID  #discharge planning ====================  CCU MIDNIGHT ROUNDS  ====================  JOHNNY COMBS  [id number]  Patient is a 89y old  Female who presents with a chief complaint of generalized weakness and body swelling (2020 13:54)  ====================  SUMMARY:  89F with HTN, DM, hypothyroidism admitted after coming to the Emergency Department with generalized fatigue, hardly getting out of bed, which the daughter states is unusual for her mom. She also c/o shortness of breath and swelling of upper and lower extremities. She had gone to urgent care today for extreme fatigue and HIGUERA for the last 2 days where a CXR done was notable for b/l pleural effusions and RML consolidation. She was sent to the Emergency Department. Denies fever, chills, cough, orthopnea, chest pain. She ambulates with a cane.  She has been living with her daughter for about 2 weeks now, her son is on a visit to [place], most of her medical care is in [place] including cardiology. Pt's daughter states the patient has some history of "valve problem". Does not take any water pills/diuretic. Currently comfortably lying flat in bed on the floor. The daughter states her mom has never been diagnosed with dementia, but her memory has been failing recently. The patient also has been c/o frequent protrusion of uterus thru vagina (2020 19:22)  ====================  ====================  NEW EVENTS: vaso 0.02, levo 0.1, dobutamine 7.5, bumex 3mg infusing, urine o/p 150's-175 cc/hr   ====================  ====================  VITALS (Last 12 hrs):  ====================  T(C): 36.6 (20 @ 19:00), Max: 36.6 (20 @ 19:00)  T(F): 97.9 (20 @ 19:00), Max: 97.9 (20 @ 19:00)  HR: 80 (20 @ 19:45) (66 - 82)  BP: 151/78 (20 @ 11:45) (106/60 - 151/78)  BP(mean): 104 (20 @ 11:45) (77 - 104)  ABP: 112/46 (20 @ 19:45) (80/40 - 150/72)  ABP(mean): 74 (20 @ 19:45) (58 - 106)  RR: 12 (20 @ 19:45) (10 - 33)  SpO2: 97% (20 @ 19:45) (87% - 99%)  CVP(mm Hg): 8 (20 @ 19:45) (6 - 21)  I&O's Summary    2020 07:  -  2020 07:00  --------------------------------------------------------  IN: 461.8 mL / OUT: 110 mL / NET: 351.8 mL    2020 07:  -  2020 19:55  --------------------------------------------------------  IN: 1222.5 mL / OUT: 675 mL / NET: 547.5 mL  ====================  NEW LABS:  ====================                          9.7    9.60  )-----------( 210      ( 2020 15:47 )             31.4         134<L>  |  102  |  32<H>  ----------------------------<  162<H>  5.1   |  17<L>  |  1.75<H>    Ca    8.8      2020 18:14  Phos  4.1       Mg     2.8         TPro  7.0  /  Alb  2.9<L>  /  TBili  1.2  /  DBili  x   /  AST  1219<H>  /  ALT  215<H>  /  AlkPhos  164<H>      PT/INR - ( 2020 04:20 )   PT: 16.6 sec;   INR: 1.44 ratio         PTT - ( 2020 04:20 )  PTT:28.4 sec  Creatine Kinase, Serum: 83 U/L (20 @ 04:20)    CKMB Units: 3.7 ng/mL ( @ 04:20)    ABG - ( 2020 18:51 )  pH, Arterial: 7.34  pH, Blood: x     /  pCO2: 34    /  pO2: 70    / HCO3: 18    / Base Excess: -6.8  /  SaO2: 93      ====================  PLAN:  ====================  #neuro  - no active issue at this time     #resp  -tolerating O2 2L SpO2 >92%    #card  ADHF  TTE EF 40-45%, severe TR, RA enlargement  - likely secondary to labetalol toxicity (received 200mg)  - c/w vasopressin, levophed wean as tolerated   - c/w dobutamine and bumex   - cvp 8, decrease bumex if cvp <6   - Renal consulted for worsening acidosis and oliguria, plan for CRRT now deferred as to pt is making urine 150- 175 cc/hr   - monitor Is and Os, keep net negative. K>4, Mg>2    Cardiogenic Shock  - Likely secondary to excessive beta blockade  - received glucagon x3 and then briefly put on glucagon gtt in the CCU but started dry heaving  - c/w  to 7.5  - trend lactate, cVO2     GI  -soft +consistent carb diet  - elevated LFTs likely 2/2 ADHF    ID  - no s/s of infection  - afebrile, negative RVP    #Renal  EFRAIN   -likely secondary to fluid overload  -strict Is and Os   -replete electrolytes as needed  -monitor CMP     #Heme  Anemia  - s/p 2 units PRBC   -f/u iron studies, f/u haptoglobin (added on to lab)    Endo  T2DM  - well controlled, hold off ISS    Hypothyroidism  -c/w levothyroxine IV given bowel edema in the setting of ADHF    #DVT PPx  -Hep subQ ====================  CCU MIDNIGHT ROUNDS  ====================  JOHNNY COMBS  [id number]  Patient is a 89y old  Female who presents with a chief complaint of generalized weakness and body swelling (2020 13:54)  ====================  SUMMARY:  89F with HTN, DM, hypothyroidism admitted after coming to the Emergency Department with generalized fatigue, hardly getting out of bed, which the daughter states is unusual for her mom. She also c/o shortness of breath and swelling of upper and lower extremities. She had gone to urgent care today for extreme fatigue and HIGUERA for the last 2 days where a CXR done was notable for b/l pleural effusions and RML consolidation. She was sent to the Emergency Department. Denies fever, chills, cough, orthopnea, chest pain. She ambulates with a cane.  She has been living with her daughter for about 2 weeks now, her son is on a visit to [place], most of her medical care is in [place] including cardiology. Pt's daughter states the patient has some history of "valve problem". Does not take any water pills/diuretic. Currently comfortably lying flat in bed on the floor. The daughter states her mom has never been diagnosed with dementia, but her memory has been failing recently. The patient also has been c/o frequent protrusion of uterus thru vagina (2020 19:22)  ====================  ====================  NEW EVENTS: vaso 0.02, levo 0.1, dobutamine 7.5, bumex 3mg infusing, urine o/p 150's-175 cc/hr, pt pulled Left IJ joaquin   ====================  ====================  VITALS (Last 12 hrs):  ====================  T(C): 36.6 (20 @ 19:00), Max: 36.6 (20 @ 19:00)  T(F): 97.9 (20 @ 19:00), Max: 97.9 (20 @ 19:00)  HR: 80 (20 @ 19:45) (66 - 82)  BP: 151/78 (20 @ 11:45) (106/60 - 151/78)  BP(mean): 104 (20 @ 11:45) (77 - 104)  ABP: 112/46 (20 @ 19:45) (80/40 - 150/72)  ABP(mean): 74 (20 @ 19:45) (58 - 106)  RR: 12 (20 @ 19:45) (10 - 33)  SpO2: 97% (20 @ 19:45) (87% - 99%)  CVP(mm Hg): 8 (20 @ 19:45) (6 - 21)  I&O's Summary    2020 07:  -  2020 07:00  --------------------------------------------------------  IN: 461.8 mL / OUT: 110 mL / NET: 351.8 mL    2020 07:01  -  2020 19:55  --------------------------------------------------------  IN: 1222.5 mL / OUT: 675 mL / NET: 547.5 mL  ====================  NEW LABS:  ====================                          9.7    9.60  )-----------( 210      ( 2020 15:47 )             31.4         134<L>  |  102  |  32<H>  ----------------------------<  162<H>  5.1   |  17<L>  |  1.75<H>    Ca    8.8      2020 18:14  Phos  4.1       Mg     2.8         TPro  7.0  /  Alb  2.9<L>  /  TBili  1.2  /  DBili  x   /  AST  1219<H>  /  ALT  215<H>  /  AlkPhos  164<H>      PT/INR - ( 2020 04:20 )   PT: 16.6 sec;   INR: 1.44 ratio         PTT - ( 2020 04:20 )  PTT:28.4 sec  Creatine Kinase, Serum: 83 U/L (20 @ 04:20)    CKMB Units: 3.7 ng/mL ( @ 04:20)    ABG - ( 2020 18:51 )  pH, Arterial: 7.34  pH, Blood: x     /  pCO2: 34    /  pO2: 70    / HCO3: 18    / Base Excess: -6.8  /  SaO2: 93      ====================  PLAN:  ====================  #neuro  - no active issue at this time     #resp  -tolerating O2 2L SpO2 >92%    #card  ADHF  TTE EF 40-45%, severe TR, RA enlargement  - likely secondary to labetalol toxicity (received 200mg)  - c/w vasopressin, levophed wean as tolerated   - c/w dobutamine and bumex   - cvp 8, decrease bumex if cvp <6   - Renal consulted for worsening acidosis and oliguria, plan for CRRT now deferred as to pt is making urine 150- 175 cc/hr   - monitor Is and Os, keep net negative. K>4, Mg>2    Cardiogenic Shock  - Likely secondary to excessive beta blockade  - received glucagon x3 and then briefly put on glucagon gtt in the CCU but started dry heaving  - c/w  to 7.5  - trend lactate, cVO2     GI  -soft +consistent carb diet  - elevated LFTs likely 2/2 ADHF    ID  - no s/s of infection  - afebrile, negative RVP    #Renal  EFRAIN   -creat 1.88, unknown baseline   - oliguric, [facility] sh[facility] place for CRRT  - may need access placement d/t pulled [facility]   -likely secondary to fluid overload, now diuresing   - c/w bumex and dobutamine   -strict Is and Os   -replete electrolytes as needed  -monitor CMP     #Heme  Anemia  - s/p 2 units PRBC   -f/u iron studies, f/u haptoglobin (added on to lab)    Endo  T2DM  - well controlled, hold off ISS    Hypothyroidism  -c/w levothyroxine IV given bowel edema in the setting of ADHF    #DVT PPx  -Hep subQ

## 2020-02-02 NOTE — PROGRESS NOTE ADULT - PROBLEM SELECTOR PLAN 1
unclear etiology  now in the CCU  renal cardiology attending and pulmonary help appreciated  will defer management to CCU team  she may need hemodialysis

## 2020-02-02 NOTE — DIETITIAN INITIAL EVALUATION ADULT. - ETIOLOGY
inadequate protein-energy intake 2/2 decreased appetite, advanced age inadequate protein energy intake, malnutrition

## 2020-02-02 NOTE — CONSULT NOTE ADULT - PROBLEM SELECTOR RECOMMENDATION 9
with moderate bilateral pleural effusions : She has mod MR , Mod MS as well as mod AS: with mod pulmonary hypertension: Shei so n  and vasopressors at this time: cont per ccu

## 2020-02-02 NOTE — CONSULT NOTE ADULT - PROBLEM SELECTOR RECOMMENDATION 3
presented with HTN and became hypotensive requiring pressors after labetalol was given  -bp control as per ICU team

## 2020-02-02 NOTE — DIETITIAN INITIAL EVALUATION ADULT. - ADD RECOMMEND
Continue micronutrient supplementation. Change diet to Soft, Low Sodium, Ensure Enlive x1 daily, Ensure Pudding x1 daily to increase/optimize PO intake in setting of malnourishment. Continue micronutrient supplementation. Reinforce nutrition education PRN. Monitor tolerance to diet prescription, nutritional intake, weight trends, labs and skin integrity. Malnutrition/underweight alert placed in chart; discussed with provider.

## 2020-02-02 NOTE — PROGRESS NOTE ADULT - SUBJECTIVE AND OBJECTIVE BOX
JOHNNY COMBS   MRN#: [id number]    The patient is a 89y Female who was seen, evaluated, & examined with the CCU staff with a multidisciplinary care plan formulated & implemented.  All available clinical, laboratory, radiographic, pharmacologic, and electrocardiographic data were reviewed & analyzed.      The patient was in the CTICU in critical condition secondary to:     persistent cardiopulmonary dysfunction  cardiogenic shock-cardiovascular dysfunction  hyperlactatemia-acidosis    For support and evaluation & prevention of further decompensation secondary to persistent cardiopulmonary dysfunction and cardiogenic shock-cardiovascular dysfunction, respiratory status required:     supplemental oxygen with nasal cannula  continuous pulse oximetry monitoring  following ABGs with A-line monitoring    Invasive hemodynamic monitoring with     an A-line was required for continuous MAP/BP monitoring     to ensure adequate cardiovascular support and to evaluate for & help prevent decompensation while receiving     blood transfusions  IV Levophed infusion  IV Vasopressin infusion  IV Dobutamine infusion    secondary to     cardiogenic shock-cardiovascular dysfunction  hemodynamically significant anemia  hyperlactatemia-acidosis    In addition:  Presented with volume overload, likely new-onset heart failure  Given Labetalol for hypertension which likely threw her into cardiogenic shock   Did not tolerate glucagon drip  Now requiring Dobutamine with multiple pressors, central venous sat is low at 38% (although low hgb is likely contributing to this), lactate is elevated but decreasing - repeat central venous sat, increase Dobutamine, may add Primacor until Labetalol wears off  TTE on my read shows severely elevated right sided pressures with severe TR, mildly to moderately decrease RV function and moderately increased size with mildly reduced LV function  Stable on nasal cannula  Cr elevated, unclear baseline - ? if acute due to poor forward flow from heart failure  Not responding to Lasix 80 IV - start Bumex drip, add Metolazone  H/H low, unclear why - no signs of active bleed, ? chronic in the setting of kidney disease - transfusing 1 unit PRBC  Low likelihood for infection, no antibiotics currently    The patient required critical care management and I personally provided 75 minutes of non-continuous care to the patient, excluding separate procedures, in addition to discussing the patient and plan at length with the CCU staff and helping coordinate care. JOHNNY COMBS   MRN#: [id number]    The patient is a 89y Female who was seen, evaluated, & examined with the CCU staff with a multidisciplinary care plan formulated & implemented.  All available clinical, laboratory, radiographic, pharmacologic, and electrocardiographic data were reviewed & analyzed.      The patient was in the CTICU in critical condition secondary to:     persistent cardiopulmonary dysfunction  cardiogenic shock-cardiovascular dysfunction  hyperlactatemia-acidosis    For support and evaluation & prevention of further decompensation secondary to persistent cardiopulmonary dysfunction and cardiogenic shock-cardiovascular dysfunction, respiratory status required:     supplemental oxygen with nasal cannula  continuous pulse oximetry monitoring  following ABGs with A-line monitoring    Invasive hemodynamic monitoring with     an A-line was required for continuous MAP/BP monitoring     to ensure adequate cardiovascular support and to evaluate for & help prevent decompensation while receiving     blood transfusions  IV Levophed infusion  IV Vasopressin infusion  IV Dobutamine infusion    secondary to     cardiogenic shock-cardiovascular dysfunction  hemodynamically significant anemia  hyperlactatemia-acidosis    In addition:  Presented with volume overload, likely new-onset heart failure  Given Labetalol for hypertension which likely threw her into cardiogenic shock   Did not tolerate glucagon drip  Now requiring Dobutamine with multiple pressors, central venous sat is low at 38% (although low hgb is likely contributing to this), lactate is elevated but decreasing - repeat central venous sat, increase Dobutamine, may add Primacor until Labetalol wears off  TTE on my read shows severely elevated right sided pressures with severe TR, mildly to moderately decrease RV function and moderately increased size with mildly reduced LV function  Stable on nasal cannula  Cr elevated, unclear baseline - ? if acute due to poor forward flow from heart failure  Not responding to Lasix 80 IV - start Bumex drip, add Metolazone - may need CVVH if she does not respond to diuretics and is in ATN from poor perfusion  H/H low, unclear why - no signs of active bleed, ? chronic in the setting of kidney disease - transfusing 1 unit PRBC  Low likelihood for infection given no fever or leukocytosis, no antibiotics currently  TSH very elevated, ? hypothyroidism/thyroid storm vs sick euthyroid in a critically ill patient - will check TFTs and consult Endocrine    The patient required critical care management and I personally provided 75 minutes of non-continuous care to the patient, excluding separate procedures, in addition to discussing the patient and plan at length with the CCU staff and helping coordinate care.

## 2020-02-02 NOTE — CHART NOTE - NSCHARTNOTEFT_GEN_A_CORE
Upon Nutritional Assessment by the Registered Dietitian your patient was determined to meet criteria / has evidence of the following diagnosis/diagnoses:          [ ]  Mild Protein Calorie Malnutrition        [ ]  Moderate Protein Calorie Malnutrition        [x ] Severe Protein Calorie Malnutrition, chronic        [ ] Unspecified Protein Calorie Malnutrition        [x ] Underweight / BMI <19        [ ] Morbid Obesity / BMI > 40      Findings as based on:  [x ] Comprehensive nutrition assessment   [ ] Nutrition Focused Physical Exam  [x ] Other: BMI 18.6 kg/m2      Nutrition Plan/Recommendations:    1) Change diet to Soft, Low Sodium, Ensure Enlive x1 daily, Ensure Pudding x1 daily to increase/optimize PO intake in setting of malnourishment.   2) Continue micronutrient supplementation.  3) Reinforce nutrition education PRN.   4) Monitor tolerance to diet prescription, nutritional intake, weight trends, labs and skin integrity.       PROVIDER Section:     By signing this assessment you are acknowledging and agree with the diagnosis/diagnoses assigned by the Registered Dietitian    Comments:

## 2020-02-02 NOTE — DIETITIAN INITIAL EVALUATION ADULT. - DIET TYPE
Interview deferred to daughter as pt Cantonese speaking, restless and not appropriate for  services at time of RD interview.

## 2020-02-02 NOTE — CONSULT NOTE ADULT - ASSESSMENT
89F with HTN, DM, hypothyroidism admitted to CCU HF exacerbation and hypotension. 89F with HTN, DM, hypothyroidism admitted to CCU HF exacerbation and hypotension.  Consulted for hypothyroidism with TSh 49. Suspect severe hypothyroidism, pending FT4 and TT3. Switched levothyroxine from 50 mg po to 37 mcg IV, suspecting decreased absorption as reason for hypothyroidism. 89F with HTN, DM, hypothyroidism admitted to CCU HF exacerbation and hypotension.  Consulted for hypothyroidism with TSh 49. Suspect severe hypothyroidism, pending FT4 and TT3. Switched levothyroxine from 50 mg po to 37 mcg IV, as she may have decreased absorption contributing to hypothyroidism.

## 2020-02-02 NOTE — CONSULT NOTE ADULT - PROBLEM SELECTOR RECOMMENDATION 9
TSH 49 on LT4 50 mcg. Pharmacy called and patient gets regular refills. Hypotensive and edematous in the setting of HF exacerbation, severe pHTN, and oliguria. Currently on 3 pressors, requirements are downtitrating. No bradycardia or hypothermia and patient is AOx2. Suspect severe hypothyroidism without myxedema coma as severe pHTN and oliguria can both cause anasarca as well.   -would switch to LT4 IV 37 mcg qday, first dose now (even weight based would be 57 mcg oral for her)   -please send free thyroxine and total T3   -continue to monitor vitals   -would repeat TFTs in a week TSH 49 on LT4 50 mcg. Pharmacy called and patient gets regular refills. Suspecting decreased absorption as reason for hypothyroidism as patient is adherent. Hypotensive and edematous in the setting of HF exacerbation, severe pHTN, and oliguria. Currently on 3 pressors, requirements are downtitrating. No bradycardia or hypothermia and patient is AOx2. Suspect severe hypothyroidism without myxedema coma as severe pHTN and oliguria can both cause anasarca as well.   -would switch to LT4 IV 37 mcg qday, first dose now (even weight based would be 57 mcg oral for her)   -please send free thyroxine and total T3   -continue to monitor vitals   -would repeat TFTs in a week

## 2020-02-02 NOTE — PROCEDURE NOTE - NSINDICATIONS_GEN_A_CORE
venous access/critical illness
monitoring purposes/arterial puncture to obtain ABG's/blood sampling/critical patient
dialysis/CRRT
dialysis/CRRT/critical illness/emergency venous access

## 2020-02-02 NOTE — RAPID RESPONSE TEAM SUMMARY - NSSITUATIONBACKGROUNDRRT_GEN_ALL_CORE
89F with HTN, DM, hypothyroidism admitted after coming to the Emergency Department with generalized fatigue, hardly getting out of bed, diagnosed with generalized anasarca and bilaterally pleural effusions and severe anemia.

## 2020-02-02 NOTE — CONSULT NOTE ADULT - PROBLEM SELECTOR RECOMMENDATION 4
currently on rosuvastatin with LDL 25 currently on rosuvastatin with LDL 25  -discontinue statin due to LFTs currently on rosuvastatin with LDL 25  -discontinue statin due to elevated LFTs

## 2020-02-02 NOTE — CONSULT NOTE ADULT - SUBJECTIVE AND OBJECTIVE BOX
HPI:  89F with HTN, DM, hypothyroidism admitted to CCU HF exacerbation and hypotension.    Endocrine History: Spoke with patient and daughter at bedside. Spoke to patient in cantonese.   History limited as patient is not well aware of her medical history     Hypothyroidism for unknown amount of time. No hx of head/neck surgery or radiation in the past. Patient endorses adherence to LT4 1/2 to 1 hour before food, daughter confirms with pill box. She has been having increasing fatigue and ?feeling colder for the past 2-3 months. Patient lost 20 lbs in the same interim due to poor po intake. No constipation.     Osteoporosis on alendronate for an unknown period of time. Patient is a fall risk and had a fall at home this past month but no fractures in the past. Adherent to alendroante 70mg qweekly. She also takes vitamin D and calcium supplements.    T2DM diagnosed 2-3 years ago with hgb a1c of 5.7 on metformin 1g BID. Does not check FS at home. Diet has been poor of late due to lack of appetite. No known microvascular complications of diabetes. TIA in the past and no CAD.     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   No history of tobacco, etoh or illicit drug use.     Outpatient Medications: Home Medications:  alendronate 70 mg oral tablet: 1 tab(s) orally once a week (Tuesday) (2020 18:49)  Calcium 600m tab(s) orally once a day (2020 18:49)  Centrum Adults oral tablet: 1 tab(s) orally once a day (2020 18:49)  clopidogrel 75 mg oral tablet: 1 tab(s) orally once a day (2020 18:49)  Ecotrin Adult Low Strength 81 mg oral delayed release tablet: 1 tab(s) orally once a day (2020 18:49)  levothyroxine 50 mcg (0.05 mg) oral tablet: 1 tab(s) orally once a day (2020 18:49)  metFORMIN 1000 mg oral tablet: Take 0.5 (1/2) tablet 2 times daily (2020 18:49)  mirtazapine 7.5 mg oral tablet: Take 0.5 (1/2) tablet once daily (2020 18:49)  pantoprazole 40 mg oral delayed release tablet: 1 tab(s) orally 2 times a day (2020 18:49)  rosuvastatin 10 mg oral tablet: 2 tab(s) orally once a day (2020 18:49)      MEDICATIONS  (STANDING):  atorvastatin 80 milliGRAM(s) Oral at bedtime  buMETAnide Infusion 1 mG/Hr (5 mL/Hr) IV Continuous <Continuous>  chlorhexidine 2% Cloths 1 Application(s) Topical <User Schedule>  dextrose 5%. 1000 milliLiter(s) (50 mL/Hr) IV Continuous <Continuous>  dextrose 50% Injectable 12.5 Gram(s) IV Push once  dextrose 50% Injectable 25 Gram(s) IV Push once  dextrose 50% Injectable 25 Gram(s) IV Push once  DOBUTamine Infusion 7.5 MICROgram(s)/kG/Min (8.168 mL/Hr) IV Continuous <Continuous>  heparin  Injectable 5000 Unit(s) SubCutaneous every 12 hours  levothyroxine Injectable 37 MICROGram(s) IV Push at bedtime  mirtazapine 3.75 milliGRAM(s) Oral at bedtime  multivitamin/minerals 1 Tablet(s) Oral daily  norepinephrine Infusion 0.9 MICROgram(s)/kG/Min (30.628 mL/Hr) IV Continuous <Continuous>  pantoprazole    Tablet 40 milliGRAM(s) Oral before breakfast  vasopressin Infusion 0.02 Unit(s)/Min (1.2 mL/Hr) IV Continuous <Continuous>    MEDICATIONS  (PRN):  dextrose 40% Gel 15 Gram(s) Oral once PRN Blood Glucose LESS THAN 70 milliGRAM(s)/deciliter  glucagon  Injectable 1 milliGRAM(s) IntraMuscular once PRN Glucose LESS THAN 70 milligrams/deciliter      Allergies    penicillin (Unknown)    Intolerances      Review of Systems:  Constitutional: No fever, chills   Neuro: No tremors, headache   Cardiovascular: No chest pain, palpitations  Respiratory: + SOB, no cough  GI: No nausea, vomiting, abdominal pain  : No dysuria, polyuria   Skin: no rash, ulcers   Psych: no depression, anxiety   Endocrine: no polyphagia, polydipsia. + fatigue and LE swelling.     ALL OTHER SYSTEMS REVIEWED AND NEGATIVE        PHYSICAL EXAM:  VITALS: T(C): 36.2 (02-02-20 @ 10:30)  T(F): 97.2 (20 @ 10:30), Max: 97.7 (20 @ 22:11)  HR: 72 (20 @ 12:45) (64 - 95)  BP: 151/78 (20 @ 11:45) (79/55 - 174/84)  RR:  (13 - 35)  SpO2:  (82% - 100%)  Wt(kg): --  GENERAL: NAD, well-groomed, well-developed  EYES: No proptosis, anicteric  HEENT:  Atraumatic, Normocephalic, moist mucous membranes  THYROID: Normal size, nodular. Nontender, no surgical scars.   RESPIRATORY: Clear to auscultation bilaterally; No rales, rhonchi, wheezing  CARDIOVASCULAR: Regular rate and rhythm; No murmurs  GI: Soft, nontender, non distended, normal bowel sounds  SKIN: Dry, intact, No rashes or lesions. b/l LE pitting edema up to abd   NEURO: AOx2, moves all extremities spontaneously   PSYCH: Reactive affect, euthymic mood    POCT Blood Glucose.: 83 mg/dL (20 @ 11:18)  POCT Blood Glucose.: 85 mg/dL (20 @ 07:49)  POCT Blood Glucose.: 150 mg/dL (20 @ 00:55)  POCT Blood Glucose.: 123 mg/dL (20 @ 22:07)                            6.6    7.28  )-----------( 275      ( 2020 04:20 )             21.9       02-    130<L>  |  102  |  28<H>  ----------------------------<  123<H>  5.1   |  15<L>  |  1.68<H>    EGFR if : 31<L>  EGFR if non : 27<L>    Ca    8.6      02-  Mg     2.8     02-  Phos  4.2     02-02    TPro  7.0  /  Alb  2.9<L>  /  TBili  1.2  /  DBili  x   /  AST  1219<H>  /  ALT  215<H>  /  AlkPhos  164<H>        Thyroid Function Tests:   @ 09:48 TSH 49.30 FreeT4 -- T3 -- Anti TPO -- Anti Thyroglobulin Ab -- TSI --      Hemoglobin A1C, Whole Blood: 5.7 % <H> [4.0 - 5.6] (20 @ 09:20)       Chol 93 LDL 25 HDL 61 Trig 37      Radiology: HPI:  89F with HTN, DM, hypothyroidism admitted to CCU HF exacerbation and hypotension.    Endocrine History: Spoke with patient and daughter at bedside. Spoke to patient in cantonese.   History limited as patient is not well aware of her medical history     Hypothyroidism for unknown amount of time. No hx of head/neck surgery or radiation in the past. Patient endorses adherence to LT4 1/2 to 1 hour before food, daughter confirms with pill box. She has been having increasing fatigue and ?feeling colder for the past 2-3 months. Patient lost 20 lbs in the same interim due to poor po intake. No constipation.     Osteoporosis on alendronate for an unknown period of time. Patient is a fall risk and had a fall at home this past month but no fractures in the past. Adherent to alendronate 70mg qweekly. She also takes vitamin D and calcium supplements.    T2DM diagnosed 2-3 years ago with hgb a1c of 5.7 on metformin 1g BID. Does not check FS at home. Diet has been poor of late due to lack of appetite. No known microvascular complications of diabetes. TIA in the past and no CAD.     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   No history of tobacco, etoh or illicit drug use.     Outpatient Medications: Home Medications:  alendronate 70 mg oral tablet: 1 tab(s) orally once a week (Tuesday) (2020 18:49)  Calcium 600m tab(s) orally once a day (2020 18:49)  Centrum Adults oral tablet: 1 tab(s) orally once a day (2020 18:49)  clopidogrel 75 mg oral tablet: 1 tab(s) orally once a day (2020 18:49)  Ecotrin Adult Low Strength 81 mg oral delayed release tablet: 1 tab(s) orally once a day (2020 18:49)  levothyroxine 50 mcg (0.05 mg) oral tablet: 1 tab(s) orally once a day (2020 18:49)  metFORMIN 1000 mg oral tablet: Take 0.5 (1/2) tablet 2 times daily (2020 18:49)  mirtazapine 7.5 mg oral tablet: Take 0.5 (1/2) tablet once daily (2020 18:49)  pantoprazole 40 mg oral delayed release tablet: 1 tab(s) orally 2 times a day (2020 18:49)  rosuvastatin 10 mg oral tablet: 2 tab(s) orally once a day (2020 18:49)      MEDICATIONS  (STANDING):  atorvastatin 80 milliGRAM(s) Oral at bedtime  buMETAnide Infusion 1 mG/Hr (5 mL/Hr) IV Continuous <Continuous>  chlorhexidine 2% Cloths 1 Application(s) Topical <User Schedule>  dextrose 5%. 1000 milliLiter(s) (50 mL/Hr) IV Continuous <Continuous>  dextrose 50% Injectable 12.5 Gram(s) IV Push once  dextrose 50% Injectable 25 Gram(s) IV Push once  dextrose 50% Injectable 25 Gram(s) IV Push once  DOBUTamine Infusion 7.5 MICROgram(s)/kG/Min (8.168 mL/Hr) IV Continuous <Continuous>  heparin  Injectable 5000 Unit(s) SubCutaneous every 12 hours  levothyroxine Injectable 37 MICROGram(s) IV Push at bedtime  mirtazapine 3.75 milliGRAM(s) Oral at bedtime  multivitamin/minerals 1 Tablet(s) Oral daily  norepinephrine Infusion 0.9 MICROgram(s)/kG/Min (30.628 mL/Hr) IV Continuous <Continuous>  pantoprazole    Tablet 40 milliGRAM(s) Oral before breakfast  vasopressin Infusion 0.02 Unit(s)/Min (1.2 mL/Hr) IV Continuous <Continuous>    MEDICATIONS  (PRN):  dextrose 40% Gel 15 Gram(s) Oral once PRN Blood Glucose LESS THAN 70 milliGRAM(s)/deciliter  glucagon  Injectable 1 milliGRAM(s) IntraMuscular once PRN Glucose LESS THAN 70 milligrams/deciliter      Allergies    penicillin (Unknown)    Intolerances      Review of Systems:  Constitutional: No fever, chills   Neuro: No tremors, headache   Cardiovascular: No chest pain, palpitations  Respiratory: + SOB, no cough  GI: No nausea, vomiting, abdominal pain  : No dysuria, polyuria   Skin: no rash, ulcers   Psych: no depression, anxiety   Endocrine: no polyphagia, polydipsia. + fatigue and LE swelling.     ALL OTHER SYSTEMS REVIEWED AND NEGATIVE        PHYSICAL EXAM:  VITALS: T(C): 36.2 (20 @ 10:30)  T(F): 97.2 (20 @ 10:30), Max: 97.7 (20 @ 22:11)  HR: 72 (20 @ 12:45) (64 - 95)  BP: 151/78 (20 @ 11:45) (79/55 - 174/84)  RR:  (13 - 35)  SpO2:  (82% - 100%)  Wt(kg): 36  GENERAL: NAD, well-groomed, well-developed  EYES: No proptosis, anicteric  HEENT:  Atraumatic, Normocephalic, moist mucous membranes  THYROID: Normal size, nodular. Nontender, no surgical scars.   RESPIRATORY: Clear to auscultation bilaterally; No rales, rhonchi, wheezing  CARDIOVASCULAR: Regular rate and rhythm; No murmurs  GI: Soft, nontender, non distended, normal bowel sounds  SKIN: Dry, intact, No rashes or lesions. b/l LE pitting edema up to abd   NEURO: AOx2, moves all extremities spontaneously   PSYCH: Reactive affect, euthymic mood    POCT Blood Glucose.: 83 mg/dL (20 @ 11:18)  POCT Blood Glucose.: 85 mg/dL (20 @ 07:49)  POCT Blood Glucose.: 150 mg/dL (20 @ 00:55)  POCT Blood Glucose.: 123 mg/dL (20 @ 22:07)                            6.6    7.28  )-----------( 275      ( 2020 04:20 )             21.9       02-    130<L>  |  102  |  28<H>  ----------------------------<  123<H>  5.1   |  15<L>  |  1.68<H>    EGFR if : 31<L>  EGFR if non : 27<L>    Ca    8.6      02-  Mg     2.8     02-  Phos  4.2     02-    TPro  7.0  /  Alb  2.9<L>  /  TBili  1.2  /  DBili  x   /  AST  1219<H>  /  ALT  215<H>  /  AlkPhos  164<H>        Thyroid Function Tests:   @ 09:48 TSH 49.30 FreeT4 -- T3 -- Anti TPO -- Anti Thyroglobulin Ab -- TSI --      Hemoglobin A1C, Whole Blood: 5.7 % <H> [4.0 - 5.6] (20 @ 09:20)       Chol 93 LDL 25 HDL 61 Trig 37      Radiology:

## 2020-02-02 NOTE — CONSULT NOTE ADULT - SUBJECTIVE AND OBJECTIVE BOX
Patient is a 89y old  Female who presents with a chief complaint of generalized weakness and body swelling (2020 10:14)      HPI:  89F with HTN, DM, hypothyroidism admitted after coming to the Emergency Department with generalized fatigue, hardly getting out of bed, which the daughter states is unusual for her mom. She also c/o shortness of breath and swelling of upper and lower extremities. She had gone to urgent care today for extreme fatigue and HIGUERA for the last 2 days where a CXR done was notable for b/l pleural effusions and RML consolidation. She was sent to the Emergency Department. Denies fever, chills, cough, orthopnea, chest pain. She ambulates with a cane.  She has been living with her daughter for about 2 weeks now, her son is on a visit to [place], most of her medical care is in [place] including cardiology. Pt's daughter states the patient has some history of "valve problem". Does not take any water pills/diuretic. Currently comfortably lying flat in bed on the floor. The daughter states her mom has never been diagnosed with dementia, but her memory has been failing recently. The patient also has been c/o frequent protrusion of uterus thru vagina (2020 19:22)   spoke to daughter at bedside: as well as the ccu team     she basically was very weak at home and was on bed for two days and hence was brought in to the hospital: She became hypotensive yesterday on floor and currently on vasopressin, ner epi as wellas dobutamine and getting blood transfusion : She has no underlying lung disease per daughter at bedside    ?FOLLOWING PRESENT  [x ] Hx of PE/DVT, [ x] Hx COPD, [x ] Hx of Asthma, [y ] Hx of Hospitalization, [ x]  Hx of BiPAP/CPAP use, [x ] Hx of JAZMYN    Allergies    penicillin (Unknown)    Intolerances        PAST MEDICAL & SURGICAL HISTORY:  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  Diabetes  Hypothyroid  Hypertension  History of gallstones      FAMILY HISTORY:  No pertinent family history in first degree relatives      Social History: [ x ] TOBACCO                  [  x] ETOH                                 [  x] IVDA/DRUGS    REVIEW OF SYSTEMS      General:x	    Skin/Breast:x  	  Ophthalmologic:x  	  ENMT:	x    Respiratory and Thorax: weakness: no sob : no cough : no phlegm   	  Cardiovascular:	x    Gastrointestinal:	x    Genitourinary:	x    Musculoskeletal:	x    Neurological:	x    Psychiatric:	x    Hematology/Lymphatics:	x    Endocrine:	x    Allergic/Immunologic:	x    MEDICATIONS  (STANDING):  atorvastatin 80 milliGRAM(s) Oral at bedtime  buMETAnide Infusion 1 mG/Hr (5 mL/Hr) IV Continuous <Continuous>  chlorhexidine 2% Cloths 1 Application(s) Topical <User Schedule>  dextrose 5%. 1000 milliLiter(s) (50 mL/Hr) IV Continuous <Continuous>  dextrose 50% Injectable 12.5 Gram(s) IV Push once  dextrose 50% Injectable 25 Gram(s) IV Push once  dextrose 50% Injectable 25 Gram(s) IV Push once  DOBUTamine Infusion 7.5 MICROgram(s)/kG/Min (8.168 mL/Hr) IV Continuous <Continuous>  heparin  Injectable 5000 Unit(s) SubCutaneous every 12 hours  insulin lispro (HumaLOG) corrective regimen sliding scale   SubCutaneous three times a day before meals  levothyroxine 50 MICROGram(s) Oral daily  mirtazapine 3.75 milliGRAM(s) Oral at bedtime  multivitamin/minerals 1 Tablet(s) Oral daily  norepinephrine Infusion 0.9 MICROgram(s)/kG/Min (30.628 mL/Hr) IV Continuous <Continuous>  pantoprazole    Tablet 40 milliGRAM(s) Oral before breakfast  vasopressin Infusion 0.02 Unit(s)/Min (1.2 mL/Hr) IV Continuous <Continuous>    MEDICATIONS  (PRN):  dextrose 40% Gel 15 Gram(s) Oral once PRN Blood Glucose LESS THAN 70 milliGRAM(s)/deciliter  glucagon  Injectable 1 milliGRAM(s) IntraMuscular once PRN Glucose LESS THAN 70 milligrams/deciliter       Vital Signs Last 24 Hrs  T(C): 36.2 (2020 10:30), Max: 36.5 (2020 22:11)  T(F): 97.2 (2020 10:30), Max: 97.7 (2020 22:11)  HR: 72 (2020 10:45) (64 - 95)  BP: 106/60 (2020 08:23) (79/55 - 174/84)  BP(mean): 77 (2020 08:23) (65 - 77)  RR: 22 (2020 10:45) (13 - 35)  SpO2: 94% (2020 10:45) (82% - 100%)        I&O's Summary    2020 07:01  -  2020 07:00  --------------------------------------------------------  IN: 461.8 mL / OUT: 110 mL / NET: 351.8 mL    2020 07:01  -  2020 11:14  --------------------------------------------------------  IN: 424.8 mL / OUT: 145 mL / NET: 279.8 mL        Physical Exam:   GENERAL: NAD, well-groomed, well-developed  HEENT: KALPESH/   Atraumatic, Normocephalic  ENMT: No tonsillar erythema, exudates, or enlargement; Moist mucous membranes, Good dentition, No lesions  NECK: Supple, No JVD, Normal thyroid  CHEST/LUNG: decreased air e ntry bilaterally: no wheezing  CVS: Regular rate and rhythm; No murmurs, rubs, or gallops  GI: : Soft, Nontender, Nondistended; Bowel sounds present  NERVOUS SYSTEM:  awake not in any distress  EXTREMITIES:+edema  LYMPH: No lymphadenopathy noted  SKIN: No rashes or lesions  ENDOCRINOLOGY: No Thyromegaly  PSYCH: Appropriate    Labs:  ABG - ( 2020 04:15 )  pH, Arterial: 7.39  pH, Blood: x     /  pCO2: 29    /  pO2: 190   / HCO3: 17    / Base Excess: -6.5  /  SaO2: 100             RA<44<4>>47<<7.255>>Venous<<3><<4><<5<<479>>, Venous<44<4>>24<<7.305>>Venous<<3><<4><<5<<249>>  CARDIAC MARKERS ( 2020 04:20 )  x     / x     / 83 U/L / x     / 3.7 ng/mL                            6.6    7.28  )-----------( 275      ( 2020 04:20 )             21.9                         7.0    5.40  )-----------( 272      ( 2020 01:11 )             22.8                         7.8    4.14  )-----------( 365      ( 2020 15:27 )             25.1     02-02    132<L>  |  102  |  25<H>  ----------------------------<  144<H>  5.0   |  17<L>  |  1.55<H>  02-    131<L>  |  100  |  24<H>  ----------------------------<  243<H>  4.9   |  12<L>  |  1.50<H>  -    136  |  101  |  26<H>  ----------------------------<  140<H>  5.0   |  21<L>  |  1.45<H>    Ca    8.5      2020 04:20  Ca    9.0      2020 01:11  Ca    9.6      2020 15:27  Phos  3.5     02-02  Phos  3.5     02-02  Phos  1.8     02-01  Mg     2.2     02-02  Mg     2.3     02-02  Mg     2.2     02-01    TPro  6.2  /  Alb  2.9<L>  /  TBili  0.9  /  DBili  x   /  AST  1424<H>  /  ALT  206<H>  /  AlkPhos  154<H>  02-02  TPro  6.7  /  Alb  3.0<L>  /  TBili  0.7  /  DBili  x   /  AST  233<H>  /  ALT  33  /  AlkPhos  83  02-02  TPro  8.3  /  Alb  3.6  /  TBili  0.7  /  DBili  x   /  AST  49<H>  /  ALT  14  /  AlkPhos  70  02-01    CAPILLARY BLOOD GLUCOSE      POCT Blood Glucose.: 85 mg/dL (2020 07:49)  POCT Blood Glucose.: 150 mg/dL (2020 00:55)  POCT Blood Glucose.: 123 mg/dL (2020 22:07)    LIVER FUNCTIONS - ( 2020 04:20 )  Alb: 2.9 g/dL / Pro: 6.2 g/dL / ALK PHOS: 154 U/L / ALT: 206 U/L / AST: 1424 U/L / GGT: x           PT/INR - ( 2020 04:20 )   PT: 16.6 sec;   INR: 1.44 ratio         PTT - ( 2020 04:20 )  PTT:28.4 sec  Urinalysis Basic - ( 2020 18:11 )    Color: Yellow / Appearance: Slightly Turbid / S.023 / pH: x  Gluc: x / Ketone: Negative  / Bili: Negative / Urobili: 2 mg/dL   Blood: x / Protein: 100 / Nitrite: Negative   Leuk Esterase: Moderate / RBC: 1 /hpf / WBC 13 /HPF   Sq Epi: x / Non Sq Epi: 2 /hpf / Bacteria: Few      D DImerLactate, Blood: 4.8 mmol/L ( @ 04:20)    Serum Pro-Brain Natriuretic Peptide: 7125 pg/mL ( @ 15:27)      Studies  Chest X-RAY  CT SCAN Chest   CT Abdomen  Venous Dopplers: LE:   Others  < from: Xray Chest 1 View- PORTABLE-Urgent (20 @ 05:33) >    ******PRELIMINARY REPORT******    ******PRELIMINARY REPORT******          EXAM:  XR CHEST PORTABLE URGENT 1V                            PROCEDURE DATE:  2020      ******PRELIMINARY REPORT******    ******PRELIMINARY REPORT******              INTERPRETATION:  right ij with tip in svc. discussed with dr jacques.      < from: CT Chest No Cont (20 @ 18:03) >    EXAM:  CT CHEST                            PROCEDURE DATE:  2020            INTERPRETATION:  CLINICAL INFORMATION: Shortness of breath    COMPARISON: Chest radiograph 2020.    PROCEDURE:   CT of the Chest was performed without intravenouscontrast.  Sagittal and coronal reformats were performed.      FINDINGS:    LUNGS, PLEURA, AND AIRWAYS: Moderate bilateral pleural effusions. Areas of adjacent compressive atelectasis and near complete collapse of the right middle lobe.     MEDIASTINUM AND RAVI: Calcified lymph node is present in the left hilum.    VESSELS: Calcifications of the thoracic aorta and coronary arteries.    HEART: Cardiomegaly. No pericardial effusion.    CHEST WALL AND LOWER NECK: Anasarca.    VISUALIZED UPPER ABDOMEN: Small perihepatic ascites. Atherosclerotic changes of the abdominal aorta.    BONES: Degenerative changes of the spine and chronic fracture of the posterior right 12th rib.    IMPRESSION:     Moderate bilateral pleural effusions.                     CATHI TAYLOR M.D., RADIOLOGY RESIDENT  This document has been electronically signed.  JENNIFER MCKENNA M.D., ATTENDING RADIOLOGIST  This document has been electronically signed. 2020  9:04AM        < end of copied text >          ******PRELIMINARY REPORT******    ******PRELIMINARY REPORT******          ANITRA VALENCIA M.D., RADIOLOGY RESIDENT    < end of copied text >      < from: Transthoracic Echocardiogram (20 @ 06:46) >  equals > 58.56 mm Hg, assuming right atrial pressure equals  > 15 mm Hg, consistent with moderate pulmonary  hypertension.  ------------------------------------------------------------------------  Conclusions:  1. Mitral annular calcification and calcified mitral  leaflets with decreased diastolic opening. Mild-moderate  mitral regurgitation. Peak mitral valve gradient equals 13  mm Hg, mean transmitral valve gradient equals 6 mm Hg,  consistent with moderate mitral stenosis.  Gradient  measured at a HR of 71 bpm.  2. Calcified aortic valve with decreased opening. Peak  transaortic valve gradient equals 6 mm Hg, mean transaortic  valve gradient equals 3 mm Hg, estimated aortic valvearea  equals 1 sqcm (by continuity equation), aortic valve  velocity time integral equals 26 cm, consistent with  moderate aortic stenosis. Mild aortic regurgitation.  3. Mild global left ventricular systolic dysfunction.  4. Moderate diastolic dysfunction (Stage II).  5. Severe right atrial enlargement.  6. The right ventricle is not well visualized; grossly  right ventricular enlargement with mildly decreased right  ventricular systolic function.  7. Thickened and tethered tricuspid valve. Severe tricuspid  regurgitation.  8. Estimated pulmonary artery systolic pressure equals 52  mm Hg, assuming right atrial pressure equals > 15 mm Hg,  consistent with moderate pulmonary pressures.  9. Thickened pericardium with small pericardial effusion.  The largest pocket measures 1.1cm anterior to the right  ventricle.   No evidence of right atrial or right  ventricular collapse.  No echocardiographic evidence of  pericardial tamponade.  *** No previous Echo exam.  ------------------------------------------------------------------------  Confirmed on  2020 - 10:00:29 by Lamberto Arevalo M.D.  ------------------------------------------------------------------------    < end of copied text >

## 2020-02-02 NOTE — PROCEDURE NOTE - NSPOSTPRCRAD_GEN_A_CORE
Pending
no pneumothorax/central line located in the superior vena cava/post-procedure radiography performed/depth of insertion/post procedure radiography not performed

## 2020-02-02 NOTE — CHART NOTE - NSCHARTNOTEFT_GEN_A_CORE
RRT Medicine Attending Note    RRT called for unresponsiveness    During triage patient identified to be in shock in setting of anasarca with b/l pleural effusion, jugular vein distention and elevated lactate. Norepinephrine and dobutamine started.  Patient accepted to CCU    See MAR notation for further detail of RRT intervention    I provided 45 minutes (01:15 to 02:00) of critical care in the management of shock.    Jakub Yap MD  Department of Hospital Medicine  pager: [phone] (available from 20:00 to 08:00) RRT Medicine Attending Note    RRT called for unresponsiveness    During triage patient identified to be in shock in setting of anasarca with b/l pleural effusion, jugular vein distention and elevated lactate. Concern for cardiogenic shock with comorbid intolerance to labetalol. Norepinephrine and dobutamine started.  Patient accepted to CCU    See MAR notation for further detail of RRT intervention    I provided 45 minutes (01:15 to 02:00) of critical care in the management of shock.    Jakub Yap MD  Department of Hospital Medicine  pager: [phone] (available from 20:00 to 08:00)

## 2020-02-02 NOTE — CHART NOTE - NSCHARTNOTEFT_GEN_A_CORE
JOHNNY COMBS    Notified by RN patient with hypotension and hypoxemias after OOB to bathroom and called RRT. Informed pt's daughter at [phone] regarding pt's condition. Pt is transferring to CCU by RRT team.                                Maya Lozano ANP-BC  Spectralink #[id number]

## 2020-02-03 PROBLEM — M81.0 AGE-RELATED OSTEOPOROSIS WITHOUT CURRENT PATHOLOGICAL FRACTURE: Chronic | Status: ACTIVE | Noted: 2017-09-15

## 2020-02-03 PROBLEM — E03.9 HYPOTHYROIDISM, UNSPECIFIED: Chronic | Status: ACTIVE | Noted: 2017-09-15

## 2020-02-03 PROBLEM — K80.20 CALCULUS OF GALLBLADDER WITHOUT CHOLECYSTITIS WITHOUT OBSTRUCTION: Chronic | Status: ACTIVE | Noted: 2017-09-15

## 2020-02-03 PROBLEM — E11.9 TYPE 2 DIABETES MELLITUS WITHOUT COMPLICATIONS: Chronic | Status: ACTIVE | Noted: 2017-09-15

## 2020-02-03 PROBLEM — E78.5 HYPERLIPIDEMIA, UNSPECIFIED: Chronic | Status: ACTIVE | Noted: 2017-09-15

## 2020-02-03 LAB
ALBUMIN SERPL ELPH-MCNC: 2.8 G/DL — LOW (ref 3.3–5)
ALBUMIN SERPL ELPH-MCNC: 2.9 G/DL — LOW (ref 3.3–5)
ALBUMIN SERPL ELPH-MCNC: 2.9 G/DL — LOW (ref 3.3–5)
ALP SERPL-CCNC: 137 U/L — HIGH (ref 40–120)
ALP SERPL-CCNC: 143 U/L — HIGH (ref 40–120)
ALP SERPL-CCNC: 148 U/L — HIGH (ref 40–120)
ALT FLD-CCNC: 115 U/L — HIGH (ref 10–45)
ALT FLD-CCNC: 137 U/L — HIGH (ref 10–45)
ALT FLD-CCNC: 160 U/L — HIGH (ref 10–45)
ANION GAP SERPL CALC-SCNC: 12 MMOL/L — SIGNIFICANT CHANGE UP (ref 5–17)
ANION GAP SERPL CALC-SCNC: 15 MMOL/L — SIGNIFICANT CHANGE UP (ref 5–17)
ANION GAP SERPL CALC-SCNC: 15 MMOL/L — SIGNIFICANT CHANGE UP (ref 5–17)
APTT BLD: 69.7 SEC — HIGH (ref 27.5–36.3)
AST SERPL-CCNC: 278 U/L — HIGH (ref 10–40)
AST SERPL-CCNC: 431 U/L — HIGH (ref 10–40)
AST SERPL-CCNC: 637 U/L — HIGH (ref 10–40)
BASOPHILS # BLD AUTO: 0 K/UL — SIGNIFICANT CHANGE UP (ref 0–0.2)
BASOPHILS # BLD AUTO: 0.01 K/UL — SIGNIFICANT CHANGE UP (ref 0–0.2)
BASOPHILS NFR BLD AUTO: 0 % — SIGNIFICANT CHANGE UP (ref 0–2)
BASOPHILS NFR BLD AUTO: 0.1 % — SIGNIFICANT CHANGE UP (ref 0–2)
BILIRUB SERPL-MCNC: 0.8 MG/DL — SIGNIFICANT CHANGE UP (ref 0.2–1.2)
BILIRUB SERPL-MCNC: 0.9 MG/DL — SIGNIFICANT CHANGE UP (ref 0.2–1.2)
BILIRUB SERPL-MCNC: 1.1 MG/DL — SIGNIFICANT CHANGE UP (ref 0.2–1.2)
BUN SERPL-MCNC: 31 MG/DL — HIGH (ref 7–23)
BUN SERPL-MCNC: 34 MG/DL — HIGH (ref 7–23)
BUN SERPL-MCNC: 38 MG/DL — HIGH (ref 7–23)
CALCIUM SERPL-MCNC: 8.7 MG/DL — SIGNIFICANT CHANGE UP (ref 8.4–10.5)
CALCIUM SERPL-MCNC: 8.7 MG/DL — SIGNIFICANT CHANGE UP (ref 8.4–10.5)
CALCIUM SERPL-MCNC: 8.8 MG/DL — SIGNIFICANT CHANGE UP (ref 8.4–10.5)
CHLORIDE SERPL-SCNC: 101 MMOL/L — SIGNIFICANT CHANGE UP (ref 96–108)
CHLORIDE SERPL-SCNC: 98 MMOL/L — SIGNIFICANT CHANGE UP (ref 96–108)
CHLORIDE SERPL-SCNC: 99 MMOL/L — SIGNIFICANT CHANGE UP (ref 96–108)
CO2 SERPL-SCNC: 18 MMOL/L — LOW (ref 22–31)
CO2 SERPL-SCNC: 19 MMOL/L — LOW (ref 22–31)
CO2 SERPL-SCNC: 23 MMOL/L — SIGNIFICANT CHANGE UP (ref 22–31)
CREAT SERPL-MCNC: 1.88 MG/DL — HIGH (ref 0.5–1.3)
CREAT SERPL-MCNC: 2.02 MG/DL — HIGH (ref 0.5–1.3)
CREAT SERPL-MCNC: 2.08 MG/DL — HIGH (ref 0.5–1.3)
EOSINOPHIL # BLD AUTO: 0.01 K/UL — SIGNIFICANT CHANGE UP (ref 0–0.5)
EOSINOPHIL # BLD AUTO: 0.03 K/UL — SIGNIFICANT CHANGE UP (ref 0–0.5)
EOSINOPHIL NFR BLD AUTO: 0.1 % — SIGNIFICANT CHANGE UP (ref 0–6)
EOSINOPHIL NFR BLD AUTO: 0.4 % — SIGNIFICANT CHANGE UP (ref 0–6)
GAS PNL BLDA: SIGNIFICANT CHANGE UP
GAS PNL BLDV: SIGNIFICANT CHANGE UP
GLUCOSE SERPL-MCNC: 158 MG/DL — HIGH (ref 70–99)
GLUCOSE SERPL-MCNC: 162 MG/DL — HIGH (ref 70–99)
GLUCOSE SERPL-MCNC: 192 MG/DL — HIGH (ref 70–99)
HCT VFR BLD CALC: 26.7 % — LOW (ref 34.5–45)
HCT VFR BLD CALC: 27.4 % — LOW (ref 34.5–45)
HGB BLD-MCNC: 8.8 G/DL — LOW (ref 11.5–15.5)
HGB BLD-MCNC: 8.9 G/DL — LOW (ref 11.5–15.5)
IMM GRANULOCYTES NFR BLD AUTO: 0.4 % — SIGNIFICANT CHANGE UP (ref 0–1.5)
IMM GRANULOCYTES NFR BLD AUTO: 0.4 % — SIGNIFICANT CHANGE UP (ref 0–1.5)
INR BLD: 1.4 RATIO — HIGH (ref 0.88–1.16)
LACTATE SERPL-SCNC: 1 MMOL/L — SIGNIFICANT CHANGE UP (ref 0.7–2)
LYMPHOCYTES # BLD AUTO: 0.59 K/UL — LOW (ref 1–3.3)
LYMPHOCYTES # BLD AUTO: 0.65 K/UL — LOW (ref 1–3.3)
LYMPHOCYTES # BLD AUTO: 8.3 % — LOW (ref 13–44)
LYMPHOCYTES # BLD AUTO: 8.7 % — LOW (ref 13–44)
MAGNESIUM SERPL-MCNC: 2.4 MG/DL — SIGNIFICANT CHANGE UP (ref 1.6–2.6)
MAGNESIUM SERPL-MCNC: 2.6 MG/DL — SIGNIFICANT CHANGE UP (ref 1.6–2.6)
MAGNESIUM SERPL-MCNC: 2.7 MG/DL — HIGH (ref 1.6–2.6)
MCHC RBC-ENTMCNC: 29 PG — SIGNIFICANT CHANGE UP (ref 27–34)
MCHC RBC-ENTMCNC: 29.4 PG — SIGNIFICANT CHANGE UP (ref 27–34)
MCHC RBC-ENTMCNC: 32.5 GM/DL — SIGNIFICANT CHANGE UP (ref 32–36)
MCHC RBC-ENTMCNC: 33 GM/DL — SIGNIFICANT CHANGE UP (ref 32–36)
MCV RBC AUTO: 88.1 FL — SIGNIFICANT CHANGE UP (ref 80–100)
MCV RBC AUTO: 90.4 FL — SIGNIFICANT CHANGE UP (ref 80–100)
MONOCYTES # BLD AUTO: 0.55 K/UL — SIGNIFICANT CHANGE UP (ref 0–0.9)
MONOCYTES # BLD AUTO: 0.62 K/UL — SIGNIFICANT CHANGE UP (ref 0–0.9)
MONOCYTES NFR BLD AUTO: 7.7 % — SIGNIFICANT CHANGE UP (ref 2–14)
MONOCYTES NFR BLD AUTO: 8.3 % — SIGNIFICANT CHANGE UP (ref 2–14)
NEUTROPHILS # BLD AUTO: 5.9 K/UL — SIGNIFICANT CHANGE UP (ref 1.8–7.4)
NEUTROPHILS # BLD AUTO: 6.16 K/UL — SIGNIFICANT CHANGE UP (ref 1.8–7.4)
NEUTROPHILS NFR BLD AUTO: 82.4 % — HIGH (ref 43–77)
NEUTROPHILS NFR BLD AUTO: 83.2 % — HIGH (ref 43–77)
NRBC # BLD: 0 /100 WBCS — SIGNIFICANT CHANGE UP (ref 0–0)
NRBC # BLD: 0 /100 WBCS — SIGNIFICANT CHANGE UP (ref 0–0)
PHOSPHATE SERPL-MCNC: 4.5 MG/DL — SIGNIFICANT CHANGE UP (ref 2.5–4.5)
PHOSPHATE SERPL-MCNC: 5 MG/DL — HIGH (ref 2.5–4.5)
PHOSPHATE SERPL-MCNC: 5 MG/DL — HIGH (ref 2.5–4.5)
PLATELET # BLD AUTO: 215 K/UL — SIGNIFICANT CHANGE UP (ref 150–400)
PLATELET # BLD AUTO: 220 K/UL — SIGNIFICANT CHANGE UP (ref 150–400)
POTASSIUM SERPL-MCNC: 4.4 MMOL/L — SIGNIFICANT CHANGE UP (ref 3.5–5.3)
POTASSIUM SERPL-MCNC: 4.4 MMOL/L — SIGNIFICANT CHANGE UP (ref 3.5–5.3)
POTASSIUM SERPL-MCNC: 4.6 MMOL/L — SIGNIFICANT CHANGE UP (ref 3.5–5.3)
POTASSIUM SERPL-SCNC: 4.4 MMOL/L — SIGNIFICANT CHANGE UP (ref 3.5–5.3)
POTASSIUM SERPL-SCNC: 4.4 MMOL/L — SIGNIFICANT CHANGE UP (ref 3.5–5.3)
POTASSIUM SERPL-SCNC: 4.6 MMOL/L — SIGNIFICANT CHANGE UP (ref 3.5–5.3)
PROT SERPL-MCNC: 6.4 G/DL — SIGNIFICANT CHANGE UP (ref 6–8.3)
PROT SERPL-MCNC: 6.4 G/DL — SIGNIFICANT CHANGE UP (ref 6–8.3)
PROT SERPL-MCNC: 6.5 G/DL — SIGNIFICANT CHANGE UP (ref 6–8.3)
PROTHROM AB SERPL-ACNC: 16.3 SEC — HIGH (ref 10–12.9)
RBC # BLD: 3.03 M/UL — LOW (ref 3.8–5.2)
RBC # BLD: 3.03 M/UL — LOW (ref 3.8–5.2)
RBC # FLD: 16.5 % — HIGH (ref 10.3–14.5)
RBC # FLD: 16.8 % — HIGH (ref 10.3–14.5)
SODIUM SERPL-SCNC: 133 MMOL/L — LOW (ref 135–145)
SODIUM SERPL-SCNC: 133 MMOL/L — LOW (ref 135–145)
SODIUM SERPL-SCNC: 134 MMOL/L — LOW (ref 135–145)
WBC # BLD: 7.1 K/UL — SIGNIFICANT CHANGE UP (ref 3.8–10.5)
WBC # BLD: 7.48 K/UL — SIGNIFICANT CHANGE UP (ref 3.8–10.5)
WBC # FLD AUTO: 7.1 K/UL — SIGNIFICANT CHANGE UP (ref 3.8–10.5)
WBC # FLD AUTO: 7.48 K/UL — SIGNIFICANT CHANGE UP (ref 3.8–10.5)

## 2020-02-03 PROCEDURE — 99233 SBSQ HOSP IP/OBS HIGH 50: CPT | Mod: GC

## 2020-02-03 PROCEDURE — 71045 X-RAY EXAM CHEST 1 VIEW: CPT | Mod: 26

## 2020-02-03 PROCEDURE — 99232 SBSQ HOSP IP/OBS MODERATE 35: CPT | Mod: GC

## 2020-02-03 PROCEDURE — 93010 ELECTROCARDIOGRAM REPORT: CPT

## 2020-02-03 RX ORDER — BUMETANIDE 0.25 MG/ML
0.5 INJECTION INTRAMUSCULAR; INTRAVENOUS
Qty: 20 | Refills: 0 | Status: DISCONTINUED | OUTPATIENT
Start: 2020-02-03 | End: 2020-02-03

## 2020-02-03 RX ORDER — BUMETANIDE 0.25 MG/ML
2 INJECTION INTRAMUSCULAR; INTRAVENOUS
Qty: 20 | Refills: 0 | Status: DISCONTINUED | OUTPATIENT
Start: 2020-02-03 | End: 2020-02-03

## 2020-02-03 RX ORDER — MIRTAZAPINE 45 MG/1
0 TABLET, ORALLY DISINTEGRATING ORAL
Qty: 0 | Refills: 0 | DISCHARGE

## 2020-02-03 RX ORDER — CLOPIDOGREL BISULFATE 75 MG/1
1 TABLET, FILM COATED ORAL
Qty: 0 | Refills: 0 | DISCHARGE

## 2020-02-03 RX ORDER — METFORMIN HYDROCHLORIDE 850 MG/1
0 TABLET ORAL
Qty: 0 | Refills: 0 | DISCHARGE

## 2020-02-03 RX ORDER — ROSUVASTATIN CALCIUM 5 MG/1
1 TABLET ORAL
Qty: 0 | Refills: 0 | DISCHARGE

## 2020-02-03 RX ORDER — MULTIVIT-MIN/FERROUS GLUCONATE 9 MG/15 ML
1 LIQUID (ML) ORAL
Qty: 0 | Refills: 0 | DISCHARGE

## 2020-02-03 RX ORDER — LEVOTHYROXINE SODIUM 125 MCG
1 TABLET ORAL
Qty: 0 | Refills: 0 | DISCHARGE

## 2020-02-03 RX ORDER — SODIUM CHLORIDE 9 MG/ML
250 INJECTION INTRAMUSCULAR; INTRAVENOUS; SUBCUTANEOUS ONCE
Refills: 0 | Status: COMPLETED | OUTPATIENT
Start: 2020-02-03 | End: 2020-02-03

## 2020-02-03 RX ADMIN — Medication 8.17 MICROGRAM(S)/KG/MIN: at 08:24

## 2020-02-03 RX ADMIN — Medication 30.63 MICROGRAM(S)/KG/MIN: at 08:26

## 2020-02-03 RX ADMIN — BUMETANIDE 10 MG/HR: 0.25 INJECTION INTRAMUSCULAR; INTRAVENOUS at 08:27

## 2020-02-03 RX ADMIN — Medication 5.45 MICROGRAM(S)/KG/MIN: at 21:39

## 2020-02-03 RX ADMIN — SODIUM CHLORIDE 250 MILLILITER(S): 9 INJECTION INTRAMUSCULAR; INTRAVENOUS; SUBCUTANEOUS at 12:20

## 2020-02-03 RX ADMIN — PANTOPRAZOLE SODIUM 40 MILLIGRAM(S): 20 TABLET, DELAYED RELEASE ORAL at 06:14

## 2020-02-03 RX ADMIN — VASOPRESSIN 1.2 UNIT(S)/MIN: 20 INJECTION INTRAVENOUS at 20:00

## 2020-02-03 RX ADMIN — Medication 5.45 MICROGRAM(S)/KG/MIN: at 14:34

## 2020-02-03 RX ADMIN — VASOPRESSIN 1.2 UNIT(S)/MIN: 20 INJECTION INTRAVENOUS at 08:25

## 2020-02-03 RX ADMIN — HEPARIN SODIUM 5000 UNIT(S): 5000 INJECTION INTRAVENOUS; SUBCUTANEOUS at 17:05

## 2020-02-03 RX ADMIN — Medication 37 MICROGRAM(S): at 21:39

## 2020-02-03 RX ADMIN — CHLORHEXIDINE GLUCONATE 1 APPLICATION(S): 213 SOLUTION TOPICAL at 06:15

## 2020-02-03 RX ADMIN — Medication 5.45 MICROGRAM(S)/KG/MIN: at 15:17

## 2020-02-03 RX ADMIN — Medication 1 TABLET(S): at 12:19

## 2020-02-03 RX ADMIN — HEPARIN SODIUM 5000 UNIT(S): 5000 INJECTION INTRAVENOUS; SUBCUTANEOUS at 06:15

## 2020-02-03 RX ADMIN — BUMETANIDE 2.5 MG/HR: 0.25 INJECTION INTRAMUSCULAR; INTRAVENOUS at 10:09

## 2020-02-03 NOTE — PROGRESS NOTE ADULT - ASSESSMENT
89F with HTN, DM, hypothyroidism admitted to CCU HF exacerbation and hypotension.  Consulted for hypothyroidism with TSh 49, c/w severe hypothyroidism

## 2020-02-03 NOTE — PROGRESS NOTE ADULT - ATTENDING COMMENTS
Patient is seen and examined with fellow, NP and the CCU house-staff. I agree with the history, physical and the assessment and plan.  on pressor support will wean to maintain SBP > 90  CVP ~ 2  appears to have over diuresed - will give 250 cc   f/u with endo

## 2020-02-03 NOTE — PHYSICAL THERAPY INITIAL EVALUATION ADULT - PLANNED THERAPY INTERVENTIONS, PT EVAL
transfer training/bed mobility training/Stair negotiation GOAL: Patient will negotiate up / down 5 steps with CG step to step pattern in 2 weeks/gait training

## 2020-02-03 NOTE — PROGRESS NOTE ADULT - ASSESSMENT
#neuro  - no active issue at this time     #resp  -tolerating O2 2L SpO2 >92%    #card  ADHF  TTE EF 40-45%, severe TR, RA enlargement  - likely secondary to labetalol toxicity (received 200mg)  - c/w vasopressin, levophed wean as tolerated   - c/w dobutamine and bumex   - cvp 8, decrease bumex if cvp <6   - Renal consulted for worsening acidosis and oliguria, plan for CRRT now deferred as to pt is making urine 150- 175 cc/hr   - monitor Is and Os, keep net negative. K>4, Mg>2    Cardiogenic Shock  - Likely secondary to excessive beta blockade  - received glucagon x3 and then briefly put on glucagon gtt in the CCU but started dry heaving  - c/w  to 7.5  - trend lactate, cVO2     GI  -soft +consistent carb diet  - elevated LFTs likely 2/2 ADHF    ID  - no s/s of infection  - afebrile, negative RVP    #Renal  EFRAIN   -creat 1.88, unknown baseline   [facility]  [facility]   -likely secondary to fluid overload, now diuresing   - c/w bumex and dobutamine   -strict Is and Os   -replete electrolytes as needed  -monitor CMP     #Heme  Anemia  - s/p 2 units PRBC   -f/u iron studies, f/u haptoglobin (added on to lab)    Endo  T2DM  - well controlled, hold off ISS    Hypothyroidism  -c/w levothyroxine IV given bowel edema in the setting of ADHF    #DVT PPx  -Hep subQ #neuro  - no active issue at this time     #resp  -tolerating O2 2L SpO2 >92%    #card  ADHF  TTE EF 40-45%, severe TR, RA enlargement  - off levophed. on a touch of vasopressin, will turn off once BP stable with goal SBP>90.   - will call PCP and pharmacy for more collateral before starting med rec   - monitor Is and Os, keep net negative. K>4, Mg>2  - unknown etiology of heart failure and enlargement of RA/LV. will get cardiac MRI     Cardiogenic Shock  - Likely secondary to excessive beta blockade  - received glucagon x3 and then briefly put on glucagon gtt in the CCU but started dry heaving  - c/w  to 7.5, will wean as tolerated  -     GI  -soft +consistent carb diet  - elevated LFTs likely 2/2 ADHF    ID  - no s/s of infection  - afebrile, negative RVP    #Renal  EFRAIN   -creat 1.88, unknown baseline   - [facility]  - may need access placement d/t [facility]   -likely secondary to fluid overload, now diuresing   - c/w bumex and dobutamine   -strict Is and Os   -replete electrolytes as needed  -monitor CMP     #Heme  Anemia  - s/p 2 units PRBC   -f/u iron studies, f/u haptoglobin (added on to lab)    Endo  T2DM  - well controlled, hold off ISS    Hypothyroidism  -c/w levothyroxine IV given bowel edema in the setting of ADHF    #DVT PPx  -Hep subQ #neuro  - no active issue at this time     #resp  -tolerating O2 2L SpO2 >92%    #card  ADHF  TTE EF 40-45%, severe TR, RA enlargement  - off levophed. on a touch of vasopressin, will turn off once BP stable with goal SBP>90.   - will call PCP and pharmacy for more collateral before starting med rec   - monitor Is and Os, keep net negative. K>4, Mg>2  - unknown etiology of heart failure and enlargement of RA/LV. will get cardiac MRI     Cardiogenic Shock  - Likely secondary to excessive beta blockade  - received glucagon x3 and then briefly put on glucagon gtt in the CCU but started dry heaving  - c/w  to 7.5, will wean as tolerated  - off bumex.   - latest HD -> CO 7.428, CI 6.24,   - CVP was 4 with SBP 99    GI  -soft +consistent carb diet  - elevated LFTs likely 2/2 ADHF    ID  - no s/s of infection  - afebrile, negative RVP    #Renal  EFRAIN   -creat 2.02, increasing, unknown baseline   -making urine, now off bumex  -likely secondary to fluid overload, now diuresing   -strict Is and Os   -replete electrolytes as needed  -monitor CMP     #Heme  Anemia  - s/p 2 units PRBC   -iron 28, TIBC 238 iron sat 11 ferritin 151.     Endo  T2DM  - well controlled, hold off ISS    Hypothyroidism  -c/w levothyroxine IV given bowel edema in the setting of ADHF    #DVT PPx  -Hep subQ 89F with HTN, DM, hypothyroidism admitted after coming to the Emergency Department with generalized fatigue, hardly getting out of bed, diagnosed with generalized anasarca and bilaterally pleural effusions and severe anemia. Course complicated by ADHF and cardiogenic shock    #neuro  - no active issue at this time     #resp  -tolerating O2 2L SpO2 >92%    #card  ADHF  TTE EF 40-45%, severe TR, RA enlargement  - off levophed. on a touch of vasopressin, will turn off once BP stable with goal SBP>90.   - will call PCP and pharmacy for more collateral before starting med rec   - monitor Is and Os, keep net negative. K>4, Mg>2  - unknown etiology of heart failure and enlargement of RA/LV. will get cardiac MRI     Cardiogenic Shock  - Likely secondary to excessive beta blockade  - received glucagon x3 and then briefly put on glucagon gtt in the CCU but started dry heaving  - c/w  to 7.5, will wean as tolerated  - off bumex.   - latest HD -> CO 7.428, CI 6.24,   - CVP was 4 with SBP 99    GI  -soft +consistent carb diet  - elevated LFTs likely 2/2 ADHF    ID  - no s/s of infection  - afebrile, negative RVP    #Renal  EFRAIN   -creat 2.02, increasing, unknown baseline   -making urine, now off bumex  -likely secondary to fluid overload, now diuresing   -strict Is and Os   -replete electrolytes as needed  -monitor CMP     #Heme  Anemia  - s/p 2 units PRBC   -iron 28, TIBC 238 iron sat 11 ferritin 151.     Endo  T2DM  - well controlled, hold off ISS    Hypothyroidism  -c/w levothyroxine IV given bowel edema in the setting of ADHF    #DVT PPx  -Hep subQ

## 2020-02-03 NOTE — PROGRESS NOTE ADULT - PROBLEM SELECTOR PLAN 1
- Scr 1.45 on admission, continuously uptrending, now 2.02 this AM.  - Patient with cardiogenic shock; currently on Dobutamine, Levophed, Vasopressin gtt. Pt. with decreasing dosages of IV vasopressors.  - Pt. now with improved UOP since initiating Bumex gtt.  - Daughter consented for CRRT, however not initiated d/t patient's self-removal of HD catheter. Pt. also with improved UOP.  - Electrolytes currently stable. No urgent indication for RRT/HD treatment currently.  - Monitor UOP and daily weights. Avoid volume depletion, NSAIDs, PPI's, ARB/ACE-I. Dose medications as per eGFR - Scr 1.45 on admission, continuously uptrending, now 2.02 this AM.  - Patient with cardiogenic shock; currently on Dobutamine, Levophed, Vasopressin gtt. Pt. with decreasing dosages of IV vasopressors.  - Pt. now with improved UOP since initiating Bumex gtt.  - Daughter consented for CRRT, however not initiated d/t patient's self-removal of HD catheter. Pt. also with improved UOP.  - Electrolytes currently stable. No urgent indication for RRT/HD treatment currently.  - Monitor UOP and daily weights. Avoid volume depletion, NSAIDs, PPI's, ARB/ACE-I. Dose medications as per eGFR.    [place] He  Nephrology Fellow  Cell: [phone] (from 8 am to 5 pm)  (After 5 pm or on weekends please page on-call fellow)

## 2020-02-03 NOTE — PHYSICAL THERAPY INITIAL EVALUATION ADULT - GENERAL OBSERVATIONS, REHAB EVAL
Patient received supine in bed in CCU, NAD, VSS on room air, +CCU monitors, +Right IJ TLC infusing (vaso gtt, dobutamine gtt), +right UE PIV capped, +Left radial yasmeen, +adams, +warming blanket, daughter present throughout.

## 2020-02-03 NOTE — PHYSICAL THERAPY INITIAL EVALUATION ADULT - ADDITIONAL COMMENTS
Patient lives in private home with son and daughter in law, 4 steps to enter, 1 flight to bedroom with chair lift. Patient ambulates with cane and is independent with basic ADLs (bathing/dressing/feeding), family assists with IADLs.

## 2020-02-03 NOTE — PHYSICAL THERAPY INITIAL EVALUATION ADULT - PERTINENT HX OF CURRENT PROBLEM, REHAB EVAL
88 yo F with HTN, DM, hypothyroidism admitted with generalized anasarca and bilateral pleural effusions and severe anemia, course complicated by ADHF with acute hypotension. 2/1: Chest Ct: Moderate bilateral pleural effusions. TTE: Trace Pericardial Effusion with no evidence of tamponade

## 2020-02-03 NOTE — PROGRESS NOTE ADULT - SUBJECTIVE AND OBJECTIVE BOX
Gopal Banks, PGY1 MD  Internal Medicine Resident    PATIENT:  JOHNNY COMBS  [id number]    CHIEF COMPLAINT:  Patient is a 89y old  Female who presents with a chief complaint of generalized weakness and body swelling (2020 19:55)      INTERVAL HISTORY/OVERNIGHT EVENTS:      REVIEW OF SYSTEMS:    Constitutional:     [ ] negative [ ] fevers [ ] chills [ ] weight loss [ ] weight gain  HEENT:                  [ ] negative [ ] dry eyes [ ] eye irritation [ ] postnasal drip [ ] nasal congestion  CV:                         [ ] negative  [ ] chest pain [ ] orthopnea [ ] palpitations [ ] murmur  Resp:                     [ ] negative [ ] cough [ ] shortness of breath [ ] dyspnea [ ] wheezing [ ] sputum [ ] hemoptysis  GI:                          [ ] negative [ ] nausea [ ] vomiting [ ] diarrhea [ ] constipation [ ] abd pain [ ] dysphagia   :                        [ ] negative [ ] dysuria [ ] nocturia [ ] hematuria [ ] increased urinary frequency  Musculoskeletal: [ ] negative [ ] back pain [ ] myalgias [ ] arthralgias [ ] fracture  Skin:                       [ ] negative [ ] rash [ ] itch  Neurological:        [ ] negative [ ] headache [ ] dizziness [ ] syncope [ ] weakness [ ] numbness  Psychiatric:           [ ] negative [ ] anxiety [ ] depression  Endocrine:            [ ] negative [ ] diabetes [ ] thyroid problem  Heme/Lymph:      [ ] negative [ ] anemia [ ] bleeding problem  Allergic/Immune: [ ] negative [ ] itchy eyes [ ] nasal discharge [ ] hives [ ] angioedema    [ ] All other systems negative  [ ] Unable to assess ROS because ________.    MEDICATIONS:  MEDICATIONS  (STANDING):  buMETAnide Infusion 2 mG/Hr (10 mL/Hr) IV Continuous <Continuous>  chlorhexidine 2% Cloths 1 Application(s) Topical <User Schedule>  CRRT Treatment    <Continuous>  dextrose 5%. 1000 milliLiter(s) (50 mL/Hr) IV Continuous <Continuous>  dextrose 50% Injectable 12.5 Gram(s) IV Push once  dextrose 50% Injectable 25 Gram(s) IV Push once  dextrose 50% Injectable 25 Gram(s) IV Push once  DOBUTamine Infusion 7.5 MICROgram(s)/kG/Min (8.168 mL/Hr) IV Continuous <Continuous>  heparin  Injectable 5000 Unit(s) SubCutaneous every 12 hours  levothyroxine Injectable 37 MICROGram(s) IV Push at bedtime  multivitamin/minerals 1 Tablet(s) Oral daily  norepinephrine Infusion 0.9 MICROgram(s)/kG/Min (30.628 mL/Hr) IV Continuous <Continuous>  pantoprazole    Tablet 40 milliGRAM(s) Oral before breakfast  PrismaSATE Dialysate BGK 4 / 2.5 5000 milliLiter(s) (600 mL/Hr) CRRT <Continuous>  PrismaSOL Filtration BGK 4 / 2.5 5000 milliLiter(s) (800 mL/Hr) CRRT <Continuous>  PrismaSOL Filtration BGK 4 / 2.5 5000 milliLiter(s) (200 mL/Hr) CRRT <Continuous>  vasopressin Infusion 0.02 Unit(s)/Min (1.2 mL/Hr) IV Continuous <Continuous>    MEDICATIONS  (PRN):  dextrose 40% Gel 15 Gram(s) Oral once PRN Blood Glucose LESS THAN 70 milliGRAM(s)/deciliter  glucagon  Injectable 1 milliGRAM(s) IntraMuscular once PRN Glucose LESS THAN 70 milligrams/deciliter      ALLERGIES:  Allergies    penicillin (Unknown)    Intolerances        OBJECTIVE:  ICU Vital Signs Last 24 Hrs  T(C): 36.4 (2020 06:30), Max: 37 (2020 22:00)  T(F): 97.5 (2020 06:30), Max: 98.6 (2020 22:00)  HR: 78 (2020 07:00) (66 - 88)  BP: 151/78 (2020 11:45) (106/60 - 151/78)  BP(mean): 104 (2020 11:45) (77 - 104)  ABP: 138/52 (2020 07:00) (80/40 - 150/72)  ABP(mean): 86 (2020 07:00) (58 - 106)  RR: 14 (2020 07:00) (10 - 33)  SpO2: 96% (2020 07:00) (87% - 100%)      Adult Advanced Hemodynamics Last 24 Hrs  CVP(mm Hg): 9 (2020 07:00) (1 - 37)  CVP(cm H2O): --  CO: --  CI: --  PA: --  PA(mean): --  PCWP: --  SVR: --  SVRI: --  PVR: --  PVRI: --  CAPILLARY BLOOD GLUCOSE      POCT Blood Glucose.: 83 mg/dL (2020 11:18)    CAPILLARY BLOOD GLUCOSE      POCT Blood Glucose.: 83 mg/dL (2020 11:18)    I&O's Summary    2020 07:01  -  2020 07:00  --------------------------------------------------------  IN: 1615.4 mL / OUT: 2200 mL / NET: -584.6 mL      Daily     Daily Weight in k.3 (2020 11:16)    PHYSICAL EXAMINATION:  General: WN/WD NAD  HEENT: PERRLA, EOMI, moist mucous membranes  Neurology: A&Ox3, nonfocal, TONY x 4  Respiratory: CTA B/L, normal respiratory effort, no wheezes, crackles, rales  CV: RRR, S1S2, no murmurs, rubs or gallops  Abdominal: Soft, NT, ND +BS, Last BM  Extremities: No edema, + peripheral pulses  Incisions:   Tubes:    LABS:  ABG - ( 2020 06:10 )  pH, Arterial: 7.37  pH, Blood: x     /  pCO2: 35    /  pO2: 133   / HCO3: 20    / Base Excess: -4.2  /  SaO2: 99                                      8.9    7.48  )-----------( 215      ( 2020 06:11 )             27.4     02-03    133<L>  |  99  |  34<H>  ----------------------------<  158<H>  4.4   |  19<L>  |  2.02<H>    Ca    8.8      2020 06:11  Phos  5.0     02-03  Mg     2.6     02-03    TPro  6.4  /  Alb  2.9<L>  /  TBili  0.9  /  DBili  x   /  AST  431<H>  /  ALT  137<H>  /  AlkPhos  143<H>  02-03    LIVER FUNCTIONS - ( 2020 06:11 )  Alb: 2.9 g/dL / Pro: 6.4 g/dL / ALK PHOS: 143 U/L / ALT: 137 U/L / AST: 431 U/L / GGT: x           PT/INR - ( 2020 06:11 )   PT: 16.3 sec;   INR: 1.40 ratio         PTT - ( 2020 06:11 )  PTT:69.7 sec    CARDIAC MARKERS ( 2020 04:20 )  x     / x     / 83 U/L / x     / 3.7 ng/mL      Urinalysis Basic - ( 2020 18:11 )    Color: Yellow / Appearance: Slightly Turbid / S.023 / pH: x  Gluc: x / Ketone: Negative  / Bili: Negative / Urobili: 2 mg/dL   Blood: x / Protein: 100 / Nitrite: Negative   Leuk Esterase: Moderate / RBC: 1 /hpf / WBC 13 /HPF   Sq Epi: x / Non Sq Epi: 2 /hpf / Bacteria: Few        TELEMETRY:     EKG:     IMAGING: Gopal Banks, PGY1 MD  Internal Medicine Resident    PATIENT:  JOHNNY COMBS  [id number]    CHIEF COMPLAINT:  Patient is a 89y old  Female who presents with a chief complaint of generalized weakness and body swelling (2020 19:55)      INTERVAL HISTORY/OVERNIGHT EVENTS:  Overnight, patient's levophed has been weaned off and bumex titrated. CVP has been coming down. Patient pulled out shiley as well. Patient continues to make urine, around 150-200cc/hr.     REVIEW OF SYSTEMS:    Constitutional:     [x] negative [ ] fevers [ ] chills [ ] weight loss [ ] weight gain  HEENT:                  [x] negative [ ] dry eyes [ ] eye irritation [ ] postnasal drip [ ] nasal congestion  CV:                         [x] negative  [ ] chest pain [ ] orthopnea [ ] palpitations [ ] murmur  Resp:                     [x] negative [ ] cough [ ] shortness of breath [ ] dyspnea [ ] wheezing [ ] sputum [ ] hemoptysis  GI:                          [x] negative [ ] nausea [ ] vomiting [ ] diarrhea [ ] constipation [ ] abd pain [ ] dysphagia   :                        [x] negative [ ] dysuria [ ] nocturia [ ] hematuria [ ] increased urinary frequency  Musculoskeletal: [x] negative [ ] back pain [ ] myalgias [ ] arthralgias [ ] fracture  Skin:                       [x] negative [ ] rash [ ] itch  Neurological:        [x] negative [ ] headache [ ] dizziness [ ] syncope [ ] weakness [ ] numbness  Psychiatric:           [x] negative [ ] anxiety [ ] depression  Endocrine:            [x] negative [ ] diabetes [ ] thyroid problem  Heme/Lymph:      [x] negative [ ] anemia [ ] bleeding problem  Allergic/Immune: [x] negative [ ] itchy eyes [ ] nasal discharge [ ] hives [ ] angioedema    [x All other systems negative  [ ] Unable to assess ROS because ________.    MEDICATIONS:  MEDICATIONS  (STANDING):  buMETAnide Infusion 2 mG/Hr (10 mL/Hr) IV Continuous <Continuous>  chlorhexidine 2% Cloths 1 Application(s) Topical <User Schedule>  CRRT Treatment    <Continuous>  dextrose 5%. 1000 milliLiter(s) (50 mL/Hr) IV Continuous <Continuous>  dextrose 50% Injectable 12.5 Gram(s) IV Push once  dextrose 50% Injectable 25 Gram(s) IV Push once  dextrose 50% Injectable 25 Gram(s) IV Push once  DOBUTamine Infusion 7.5 MICROgram(s)/kG/Min (8.168 mL/Hr) IV Continuous <Continuous>  heparin  Injectable 5000 Unit(s) SubCutaneous every 12 hours  levothyroxine Injectable 37 MICROGram(s) IV Push at bedtime  multivitamin/minerals 1 Tablet(s) Oral daily  norepinephrine Infusion 0.9 MICROgram(s)/kG/Min (30.628 mL/Hr) IV Continuous <Continuous>  pantoprazole    Tablet 40 milliGRAM(s) Oral before breakfast  PrismaSATE Dialysate BGK 4 / 2.5 5000 milliLiter(s) (600 mL/Hr) CRRT <Continuous>  PrismaSOL Filtration BGK 4 / 2.5 5000 milliLiter(s) (800 mL/Hr) CRRT <Continuous>  PrismaSOL Filtration BGK 4 / 2.5 5000 milliLiter(s) (200 mL/Hr) CRRT <Continuous>  vasopressin Infusion 0.02 Unit(s)/Min (1.2 mL/Hr) IV Continuous <Continuous>    MEDICATIONS  (PRN):  dextrose 40% Gel 15 Gram(s) Oral once PRN Blood Glucose LESS THAN 70 milliGRAM(s)/deciliter  glucagon  Injectable 1 milliGRAM(s) IntraMuscular once PRN Glucose LESS THAN 70 milligrams/deciliter      ALLERGIES:  Allergies    penicillin (Unknown)    Intolerances        OBJECTIVE:  ICU Vital Signs Last 24 Hrs  T(C): 36.4 (2020 06:30), Max: 37 (2020 22:00)  T(F): 97.5 (2020 06:30), Max: 98.6 (2020 22:00)  HR: 78 (2020 07:00) (66 - 88)  BP: 151/78 (2020 11:45) (106/60 - 151/78)  BP(mean): 104 (2020 11:45) (77 - 104)  ABP: 138/52 (2020 07:00) (80/40 - 150/72)  ABP(mean): 86 (2020 07:00) (58 - 106)  RR: 14 (2020 07:00) (10 - 33)  SpO2: 96% (2020 07:00) (87% - 100%)      Adult Advanced Hemodynamics Last 24 Hrs  CVP(mm Hg): 9 (2020 07:00) (1 - 37)  CVP(cm H2O): --  CO: --  CI: --  PA: --  PA(mean): --  PCWP: --  SVR: --  SVRI: --  PVR: --  PVRI: --  CAPILLARY BLOOD GLUCOSE      POCT Blood Glucose.: 83 mg/dL (2020 11:18)    CAPILLARY BLOOD GLUCOSE      POCT Blood Glucose.: 83 mg/dL (2020 11:18)    I&O's Summary    2020 07:01  -  2020 07:00  --------------------------------------------------------  IN: 1615.4 mL / OUT: 2200 mL / NET: -584.6 mL      Daily     Daily Weight in k.3 (2020 11:16)    PHYSICAL EXAMINATION:  General: WN/WD NAD  HEENT: PERRLA, EOMI, moist mucous membranes  Neurology: A&Ox3, nonfocal, TONY x 4  Respiratory: CTA B/L, normal respiratory effort, no wheezes, crackles, rales  CV: RRR, S1S2, no murmurs, rubs or gallops  Abdominal: Soft, NT, ND +BS, Last BM  Extremities: No edema, + peripheral pulses  Incisions:   Tubes:    LABS:  ABG - ( 2020 06:10 )  pH, Arterial: 7.37  pH, Blood: x     /  pCO2: 35    /  pO2: 133   / HCO3: 20    / Base Excess: -4.2  /  SaO2: 99                                      8.9    7.48  )-----------( 215      ( 2020 06:11 )             27.4     02-03    133<L>  |  99  |  34<H>  ----------------------------<  158<H>  4.4   |  19<L>  |  2.02<H>    Ca    8.8      2020 06:11  Phos  5.0     02-03  Mg     2.6     -    TPro  6.4  /  Alb  2.9<L>  /  TBili  0.9  /  DBili  x   /  AST  431<H>  /  ALT  137<H>  /  AlkPhos  143<H>  -03    LIVER FUNCTIONS - ( 2020 06:11 )  Alb: 2.9 g/dL / Pro: 6.4 g/dL / ALK PHOS: 143 U/L / ALT: 137 U/L / AST: 431 U/L / GGT: x           PT/INR - ( 2020 06:11 )   PT: 16.3 sec;   INR: 1.40 ratio         PTT - ( 2020 06:11 )  PTT:69.7 sec    CARDIAC MARKERS ( 2020 04:20 )  x     / x     / 83 U/L / x     / 3.7 ng/mL      Urinalysis Basic - ( 2020 18:11 )    Color: Yellow / Appearance: Slightly Turbid / S.023 / pH: x  Gluc: x / Ketone: Negative  / Bili: Negative / Urobili: 2 mg/dL   Blood: x / Protein: 100 / Nitrite: Negative   Leuk Esterase: Moderate / RBC: 1 /hpf / WBC 13 /HPF   Sq Epi: x / Non Sq Epi: 2 /hpf / Bacteria: Few        TELEMETRY:     EKG:     IMAGING:    < from: Transthoracic Echocardiogram (20 @ 06:46) >  Conclusions:  1. Mitral annular calcification and calcified mitral  leaflets with decreased diastolic opening. Mild-moderate  mitral regurgitation. Peak mitral valve gradient equals 13  mm Hg, mean transmitral valve gradient equals 6 mm Hg,  consistent with moderate mitral stenosis.  Gradient  measured at a HR of 71 bpm.  2. Calcified aortic valve with decreased opening. Peak  transaortic valve gradient equals 6 mm Hg, mean transaortic  valve gradient equals 3 mm Hg, estimated aortic valvearea  equals 1 sqcm (by continuity equation), aortic valve  velocity time integral equals 26 cm, consistent with  moderate aortic stenosis. Mild aortic regurgitation.  3. Mild global left ventricular systolic dysfunction.  4. Moderate diastolic dysfunction (Stage II).  5. Severe right atrial enlargement.  6. The right ventricle is not well visualized; grossly  right ventricular enlargement with mildly decreased right  ventricular systolic function.  7. Thickened and tethered tricuspid valve. Severe tricuspid  regurgitation.  8. Estimated pulmonary artery systolic pressure equals 52  mm Hg, assuming right atrial pressure equals > 15 mm Hg,  consistent with moderate pulmonary pressures.  9. Thickened pericardium with small pericardial effusion.  The largest pocket measures 1.1cm anterior to the right  ventricle.   No evidence of right atrial or right  ventricular collapse.  No echocardiographic evidence of  pericardial tamponade.    < end of copied text > Gopal Banks, PGY1 MD  Internal Medicine Resident    PATIENT:  JOHNNY COMBS  [id number]    CHIEF COMPLAINT:  Patient is a 89y old  Female who presents with a chief complaint of generalized weakness and body swelling (2020 19:55)      INTERVAL HISTORY/OVERNIGHT EVENTS:  Overnight, patient's levophed has been weaned off and bumex titrated. CVP has been coming down. Patient pulled out shiley as well. Patient continues to make urine, around 150-200cc/hr.     REVIEW OF SYSTEMS:    Constitutional:     [x] negative [ ] fevers [ ] chills [ ] weight loss [ ] weight gain  HEENT:                  [x] negative [ ] dry eyes [ ] eye irritation [ ] postnasal drip [ ] nasal congestion  CV:                         [x] negative  [ ] chest pain [ ] orthopnea [ ] palpitations [ ] murmur  Resp:                     [x] negative [ ] cough [ ] shortness of breath [ ] dyspnea [ ] wheezing [ ] sputum [ ] hemoptysis  GI:                          [x] negative [ ] nausea [ ] vomiting [ ] diarrhea [ ] constipation [ ] abd pain [ ] dysphagia   :                        [x] negative [ ] dysuria [ ] nocturia [ ] hematuria [ ] increased urinary frequency  Musculoskeletal: [x] negative [ ] back pain [ ] myalgias [ ] arthralgias [ ] fracture  Skin:                       [x] negative [ ] rash [ ] itch  Neurological:        [x] negative [ ] headache [ ] dizziness [ ] syncope [ ] weakness [ ] numbness  Psychiatric:           [x] negative [ ] anxiety [ ] depression  Endocrine:            [x] negative [ ] diabetes [ ] thyroid problem  Heme/Lymph:      [x] negative [ ] anemia [ ] bleeding problem  Allergic/Immune: [x] negative [ ] itchy eyes [ ] nasal discharge [ ] hives [ ] angioedema    [x All other systems negative  [ ] Unable to assess ROS because ________.    MEDICATIONS:  MEDICATIONS  (STANDING):  buMETAnide Infusion 2 mG/Hr (10 mL/Hr) IV Continuous <Continuous>  chlorhexidine 2% Cloths 1 Application(s) Topical <User Schedule>  CRRT Treatment    <Continuous>  dextrose 5%. 1000 milliLiter(s) (50 mL/Hr) IV Continuous <Continuous>  dextrose 50% Injectable 12.5 Gram(s) IV Push once  dextrose 50% Injectable 25 Gram(s) IV Push once  dextrose 50% Injectable 25 Gram(s) IV Push once  DOBUTamine Infusion 7.5 MICROgram(s)/kG/Min (8.168 mL/Hr) IV Continuous <Continuous>  heparin  Injectable 5000 Unit(s) SubCutaneous every 12 hours  levothyroxine Injectable 37 MICROGram(s) IV Push at bedtime  multivitamin/minerals 1 Tablet(s) Oral daily  norepinephrine Infusion 0.9 MICROgram(s)/kG/Min (30.628 mL/Hr) IV Continuous <Continuous>  pantoprazole    Tablet 40 milliGRAM(s) Oral before breakfast  PrismaSATE Dialysate BGK 4 / 2.5 5000 milliLiter(s) (600 mL/Hr) CRRT <Continuous>  PrismaSOL Filtration BGK 4 / 2.5 5000 milliLiter(s) (800 mL/Hr) CRRT <Continuous>  PrismaSOL Filtration BGK 4 / 2.5 5000 milliLiter(s) (200 mL/Hr) CRRT <Continuous>  vasopressin Infusion 0.02 Unit(s)/Min (1.2 mL/Hr) IV Continuous <Continuous>    MEDICATIONS  (PRN):  dextrose 40% Gel 15 Gram(s) Oral once PRN Blood Glucose LESS THAN 70 milliGRAM(s)/deciliter  glucagon  Injectable 1 milliGRAM(s) IntraMuscular once PRN Glucose LESS THAN 70 milligrams/deciliter      ALLERGIES:  Allergies    penicillin (Unknown)    Intolerances        OBJECTIVE:  ICU Vital Signs Last 24 Hrs  T(C): 36.4 (2020 06:30), Max: 37 (2020 22:00)  T(F): 97.5 (2020 06:30), Max: 98.6 (2020 22:00)  HR: 78 (2020 07:00) (66 - 88)  BP: 151/78 (2020 11:45) (106/60 - 151/78)  BP(mean): 104 (2020 11:45) (77 - 104)  ABP: 138/52 (2020 07:00) (80/40 - 150/72)  ABP(mean): 86 (2020 07:00) (58 - 106)  RR: 14 (2020 07:00) (10 - 33)  SpO2: 96% (2020 07:00) (87% - 100%)      Adult Advanced Hemodynamics Last 24 Hrs  CVP(mm Hg): 9 (2020 07:00) (1 - 37)  CVP(cm H2O): --  CO: --  CI: --  PA: --  PA(mean): --  PCWP: --  SVR: --  SVRI: --  PVR: --  PVRI: --  CAPILLARY BLOOD GLUCOSE      POCT Blood Glucose.: 83 mg/dL (2020 11:18)    CAPILLARY BLOOD GLUCOSE      POCT Blood Glucose.: 83 mg/dL (2020 11:18)    I&O's Summary    2020 07:01  -  2020 07:00  --------------------------------------------------------  IN: 1615.4 mL / OUT: 2200 mL / NET: -584.6 mL      Daily     Daily Weight in k.3 (2020 11:16)    PHYSICAL EXAMINATION:  General: WN/WD NAD  HEENT: PERRLA, EOMI, moist mucous membranes  Neurology: A&Ox3, nonfocal, TONY x 4  Respiratory: CTA B/L, normal respiratory effort, no wheezes, + crackles, rales  CV: tricuspid regurg murmur. + JVD.   Abdominal: Soft, NT, ND +BS, Last BM  Extremities: +1 pitting edema, + peripheral pulses    LABS:  ABG - ( 2020 06:10 )  pH, Arterial: 7.37  pH, Blood: x     /  pCO2: 35    /  pO2: 133   / HCO3: 20    / Base Excess: -4.2  /  SaO2: 99                                      8.9    7.48  )-----------( 215      ( 2020 06:11 )             27.4     02-03    133<L>  |  99  |  34<H>  ----------------------------<  158<H>  4.4   |  19<L>  |  2.02<H>    Ca    8.8      2020 06:11  Phos  5.0     02-03  Mg     2.6     02-03    TPro  6.4  /  Alb  2.9<L>  /  TBili  0.9  /  DBili  x   /  AST  431<H>  /  ALT  137<H>  /  AlkPhos  143<H>  -    LIVER FUNCTIONS - ( 2020 06:11 )  Alb: 2.9 g/dL / Pro: 6.4 g/dL / ALK PHOS: 143 U/L / ALT: 137 U/L / AST: 431 U/L / GGT: x           PT/INR - ( 2020 06:11 )   PT: 16.3 sec;   INR: 1.40 ratio         PTT - ( 2020 06:11 )  PTT:69.7 sec    CARDIAC MARKERS ( 2020 04:20 )  x     / x     / 83 U/L / x     / 3.7 ng/mL      Urinalysis Basic - ( 2020 18:11 )    Color: Yellow / Appearance: Slightly Turbid / S.023 / pH: x  Gluc: x / Ketone: Negative  / Bili: Negative / Urobili: 2 mg/dL   Blood: x / Protein: 100 / Nitrite: Negative   Leuk Esterase: Moderate / RBC: 1 /hpf / WBC 13 /HPF   Sq Epi: x / Non Sq Epi: 2 /hpf / Bacteria: Few        TELEMETRY:     EKG:     IMAGING:    < from: Transthoracic Echocardiogram (20 @ 06:46) >  Conclusions:  1. Mitral annular calcification and calcified mitral  leaflets with decreased diastolic opening. Mild-moderate  mitral regurgitation. Peak mitral valve gradient equals 13  mm Hg, mean transmitral valve gradient equals 6 mm Hg,  consistent with moderate mitral stenosis.  Gradient  measured at a HR of 71 bpm.  2. Calcified aortic valve with decreased opening. Peak  transaortic valve gradient equals 6 mm Hg, mean transaortic  valve gradient equals 3 mm Hg, estimated aortic valvearea  equals 1 sqcm (by continuity equation), aortic valve  velocity time integral equals 26 cm, consistent with  moderate aortic stenosis. Mild aortic regurgitation.  3. Mild global left ventricular systolic dysfunction.  4. Moderate diastolic dysfunction (Stage II).  5. Severe right atrial enlargement.  6. The right ventricle is not well visualized; grossly  right ventricular enlargement with mildly decreased right  ventricular systolic function.  7. Thickened and tethered tricuspid valve. Severe tricuspid  regurgitation.  8. Estimated pulmonary artery systolic pressure equals 52  mm Hg, assuming right atrial pressure equals > 15 mm Hg,  consistent with moderate pulmonary pressures.  9. Thickened pericardium with small pericardial effusion.  The largest pocket measures 1.1cm anterior to the right  ventricle.   No evidence of right atrial or right  ventricular collapse.  No echocardiographic evidence of  pericardial tamponade.    < end of copied text >

## 2020-02-03 NOTE — PROGRESS NOTE ADULT - SUBJECTIVE AND OBJECTIVE BOX
Patient is a 89y old  Female who presents with a chief complaint of generalized weakness and body swelling (2020 08:21)      Any change in ROS: Still on room air: on low dose vasopressin: off another vasopressros    MEDICATIONS  (STANDING):  chlorhexidine 2% Cloths 1 Application(s) Topical <User Schedule>  dextrose 5%. 1000 milliLiter(s) (50 mL/Hr) IV Continuous <Continuous>  dextrose 50% Injectable 12.5 Gram(s) IV Push once  dextrose 50% Injectable 25 Gram(s) IV Push once  dextrose 50% Injectable 25 Gram(s) IV Push once  DOBUTamine Infusion 7.5 MICROgram(s)/kG/Min (8.168 mL/Hr) IV Continuous <Continuous>  heparin  Injectable 5000 Unit(s) SubCutaneous every 12 hours  levothyroxine Injectable 37 MICROGram(s) IV Push at bedtime  multivitamin/minerals 1 Tablet(s) Oral daily  norepinephrine Infusion 0.9 MICROgram(s)/kG/Min (30.628 mL/Hr) IV Continuous <Continuous>  pantoprazole    Tablet 40 milliGRAM(s) Oral before breakfast  vasopressin Infusion 0.02 Unit(s)/Min (1.2 mL/Hr) IV Continuous <Continuous>    MEDICATIONS  (PRN):  dextrose 40% Gel 15 Gram(s) Oral once PRN Blood Glucose LESS THAN 70 milliGRAM(s)/deciliter  glucagon  Injectable 1 milliGRAM(s) IntraMuscular once PRN Glucose LESS THAN 70 milligrams/deciliter    Vital Signs Last 24 Hrs  T(C): 36.7 (2020 12:00), Max: 37 (2020 22:00)  T(F): 98.1 (2020 12:00), Max: 98.6 (2020 22:00)  HR: 85 (2020 14:00) (66 - 88)  BP: 136/60 (2020 08:15) (136/60 - 136/60)  BP(mean): 92 (2020 08:15) (92 - 92)  RR: 34 (2020 12:45) (10 - 34)  SpO2: 94% (2020 14:00) (89% - 100%)    I&O's Summary    2020 07:01  -  2020 07:00  --------------------------------------------------------  IN: 1615.4 mL / OUT: 2200 mL / NET: -584.6 mL    2020 07:01  -  2020 14:07  --------------------------------------------------------  IN: 311.7 mL / OUT: 1000 mL / NET: -688.3 mL          Physical Exam:   GENERAL: NAD, well-groomed, well-developed  HEENT: KALPESH/   Atraumatic, Normocephalic  ENMT: No tonsillar erythema, exudates, or enlargement; Moist mucous membranes, Good dentition, No lesions  NECK: Supple, No JVD, Normal thyroid  CHEST/LUNG: decreased air entry bilaterally:   CVS: Regular rate and rhythm; No murmurs, rubs, or gallops  GI: : Soft, Nontender, Nondistended; Bowel sounds present  NERVOUS SYSTEM:  Alert & Oriented X3  EXTREMITIES:  2+ Peripheral Pulses, No clubbing, cyanosis, or edema  LYMPH: No lymphadenopathy noted  SKIN: No rashes or lesions  ENDOCRINOLOGY: No Thyromegaly  PSYCH: Appropriate    Labs:  ABG - ( 2020 06:10 )  pH, Arterial: 7.37  pH, Blood: x     /  pCO2: 35    /  pO2: 133   / HCO3: 20    / Base Excess: -4.2  /  SaO2: 99              28, 28, 21, 21, RA, 21  CARDIAC MARKERS ( 2020 04:20 )  x     / x     / 83 U/L / x     / 3.7 ng/mL                            8.9    7.48  )-----------( 215      ( 2020 06:11 )             27.4                         9.1    8.59  )-----------( 209      ( 2020 23:52 )             28.7                         9.7    9.60  )-----------( 210      ( 2020 15:47 )             31.4                         6.6    7.28  )-----------( 275      ( 2020 04:20 )             21.9                         7.0    5.40  )-----------( 272      ( 2020 01:11 )             22.8                         7.8    4.14  )-----------( 365      ( 2020 15:27 )             25.1     02-03    133<L>  |  99  |  34<H>  ----------------------------<  158<H>  4.4   |  19<L>  |  2.02<H>  02-02    134<L>  |  101  |  31<H>  ----------------------------<  162<H>  4.6   |  18<L>  |  1.88<H>  02-02    134<L>  |  102  |  32<H>  ----------------------------<  162<H>  5.1   |  17<L>  |  1.75<H>  02-02    130<L>  |  102  |  28<H>  ----------------------------<  123<H>  5.1   |  15<L>  |  1.68<H>  02-02    132<L>  |  102  |  25<H>  ----------------------------<  144<H>  5.0   |  17<L>  |  1.55<H>  02-02    131<L>  |  100  |  24<H>  ----------------------------<  243<H>  4.9   |  12<L>  |  1.50<H>  02-01    136  |  101  |  26<H>  ----------------------------<  140<H>  5.0   |  21<L>  |  1.45<H>    Ca    8.8      2020 06:11  Ca    8.7      2020 23:52  Ca    8.8      2020 18:14  Ca    8.6      2020 12:09  Ca    8.5      2020 04:20  Phos  5.0     02-  Phos  4.5     -  Phos  4.1     02-  Phos  4.2     02-  Phos  3.5     02-  Mg     2.6     02-  Mg     2.7     02-  Mg     2.8     02-  Mg     2.8     02-02  Mg     2.2     02-    TPro  6.4  /  Alb  2.9<L>  /  TBili  0.9  /  DBili  x   /  AST  431<H>  /  ALT  137<H>  /  AlkPhos  143<H>    TPro  6.4  /  Alb  2.8<L>  /  TBili  1.1  /  DBili  x   /  AST  637<H>  /  ALT  160<H>  /  AlkPhos  148<H>  -  TPro  7.0  /  Alb  2.9<L>  /  TBili  1.2  /  DBili  x   /  AST  1219<H>  /  ALT  215<H>  /  AlkPhos  164<H>  02-  TPro  6.2  /  Alb  2.9<L>  /  TBili  0.9  /  DBili  x   /  AST  1424<H>  /  ALT  206<H>  /  AlkPhos  154<H>  -  TPro  6.7  /  Alb  3.0<L>  /  TBili  0.7  /  DBili  x   /  AST  233<H>  /  ALT  33  /  AlkPhos  83  02-  TPro  8.3  /  Alb  3.6  /  TBili  0.7  /  DBili  x   /  AST  49<H>  /  ALT  14  /  AlkPhos  70  02-01    CAPILLARY BLOOD GLUCOSE          LIVER FUNCTIONS - ( 2020 06:11 )  Alb: 2.9 g/dL / Pro: 6.4 g/dL / ALK PHOS: 143 U/L / ALT: 137 U/L / AST: 431 U/L / GGT: x           PT/INR - ( 2020 06:11 )   PT: 16.3 sec;   INR: 1.40 ratio         PTT - ( 2020 06:11 )  PTT:69.7 sec  Urinalysis Basic - ( 2020 18:11 )    Color: Yellow / Appearance: Slightly Turbid / S.023 / pH: x  Gluc: x / Ketone: Negative  / Bili: Negative / Urobili: 2 mg/dL   Blood: x / Protein: 100 / Nitrite: Negative   Leuk Esterase: Moderate / RBC: 1 /hpf / WBC 13 /HPF   Sq Epi: x / Non Sq Epi: 2 /hpf / Bacteria: Few      Serum Pro-Brain Natriuretic Peptide: 7125 pg/mL ( @ 15:27)  Lactate, Blood: 5.2 mmol/L ( @ 12:09)  Lactate, Blood: 4.8 mmol/L ( @ 04:20)        RECENT CULTURES:   @ 08:21 .Blood Blood-Peripheral       < from: Xray Chest 1 View- PORTABLE-Routine (20 @ 05:18) >    EXAM:  XR CHEST PORTABLE ROUTINE 1V                            PROCEDURE DATE:  2020            INTERPRETATION:  A single chest x-ray was obtained on February 3, 2020.    Indication: Hypoxemia.    Impression:    The heart is enlarged. Bilateral pleural effusion. Bibasilar pneumonia and/or atelectasis cannot be ruled out entirely. A central line is present on the right and the tip is in the superior vena cava. A left central line was removed. No pneumothorax. Calcified aortic knob. Degenerative changes of the thoracic spine.                    TATE CYR M.D., ATTENDING RADIOLOGIST  This document has been electronically signed. Feb  3 2020  9:20AM        < end of copied text >           No growth to date.          RESPIRATORY CULTURES:          Studies  Chest X-RAY  CT SCAN Chest   Venous Dopplers: LE:   CT Abdomen  Others

## 2020-02-03 NOTE — PROGRESS NOTE ADULT - PROBLEM SELECTOR PLAN 3
osteoporosis with no fractures in the past  -would continue to hold alendronate given EFRAIN.   GFR 21

## 2020-02-03 NOTE — PROGRESS NOTE ADULT - ATTENDING COMMENTS
EFRAIN: likely due to poor renal perfusion in the setting of shock  Serum creatinine noted to be normal in 2017. No values in 2018/2019. Admitted with serum creatinine of 1.4  Renal function appears to have plateaued. Excellent U/O. Now off diuretics   Remains on pressors  Acidosis resolved. Transaminitis improving    Please check urine Protein/creatinine/myeloma w/u Renal sono  will follow      Juancho Dickson MD  O: [phone]  C: [phone]

## 2020-02-03 NOTE — PROGRESS NOTE ADULT - PROBLEM SELECTOR PLAN 1
TSH 49, TT3 43, TT4 4.1 on LT4 50 mcg.  Suspecting decreased absorption as reason for hypothyroidism as patient is adherent. Hypotensive and edematous in the setting of HF exacerbation, severe pHTN, and oliguria. Currently still on 2 pressors, requirements are downtitrating. No bradycardia or hypothermia and patient is AOx2. Suspect severe hypothyroidism without myxedema coma as severe pHTN and oliguria can both cause anasarca as well.   -currently on levothyroxine 37mcg IV q daily. c/w current dose  -can check Ft4 and repeat TSH

## 2020-02-03 NOTE — PROGRESS NOTE ADULT - PROBLEM SELECTOR PLAN 2
hgb a1c 5.7 is more tightly controlled than necessary given age and metformin is also not recommended given her EFRAIN and lactic acidosis  -currently off FS and HISS.   -consider discharge without any DM medications.

## 2020-02-03 NOTE — PROGRESS NOTE ADULT - SUBJECTIVE AND OBJECTIVE BOX
[facility] DIVISION OF KIDNEY DISEASES AND HYPERTENSION -- FOLLOW UP NOTE  --------------------------------------------------------------------------------  HPI: 89 yo F with  HTN, DM, hypothyroidism admitted on 2/1/20 for generalized fatigue, found to be in cardiogenic shock, transferred to CCU for further management. In the CCU patient was placed on dobutamine gtt, levophed gtt, and vasopression. Pt. started on diuretics, currently on Bumex gtt at 2 mg/hr with improved UOP of 2L in the past 24 hours. On admission Scr was 1.45, increased to 1.68 on 2/2/20. Scr elevated to 2.02 this AM.     Pt. seen and examined at bedside this AM. Overnight had improved UOP. Pt. had self-removed non-tunneled HD catheter. CRRT was not initiated. Pt. now with improving pressor requirements as well. Unable to complete ROS.    PAST HISTORY  --------------------------------------------------------------------------------  No significant changes to PMH, PSH, FHx, SHx, unless otherwise noted    ALLERGIES & MEDICATIONS  --------------------------------------------------------------------------------  Allergies    penicillin (Unknown)    Intolerances    Standing Inpatient Medications  buMETAnide Infusion 2 mG/Hr IV Continuous <Continuous>  chlorhexidine 2% Cloths 1 Application(s) Topical <User Schedule>  dextrose 5%. 1000 milliLiter(s) IV Continuous <Continuous>  dextrose 50% Injectable 12.5 Gram(s) IV Push once  dextrose 50% Injectable 25 Gram(s) IV Push once  dextrose 50% Injectable 25 Gram(s) IV Push once  DOBUTamine Infusion 7.5 MICROgram(s)/kG/Min IV Continuous <Continuous>  heparin  Injectable 5000 Unit(s) SubCutaneous every 12 hours  levothyroxine Injectable 37 MICROGram(s) IV Push at bedtime  multivitamin/minerals 1 Tablet(s) Oral daily  norepinephrine Infusion 0.9 MICROgram(s)/kG/Min IV Continuous <Continuous>  pantoprazole    Tablet 40 milliGRAM(s) Oral before breakfast  vasopressin Infusion 0.02 Unit(s)/Min IV Continuous <Continuous>    REVIEW OF SYSTEMS  --------------------------------------------------------------------------------  Unable to obtain.    VITALS/PHYSICAL EXAM  --------------------------------------------------------------------------------  T(C): 36.4 (02-03-20 @ 06:30), Max: 37 (02-02-20 @ 22:00)  HR: 78 (02-03-20 @ 07:00) (66 - 88)  BP: 151/78 (02-02-20 @ 11:45) (106/60 - 151/78)  RR: 14 (02-03-20 @ 07:00) (10 - 33)  SpO2: 96% (02-03-20 @ 07:00) (87% - 100%)  Wt(kg): --  Height (cm): 139.7 (02-01-20 @ 18:31)  Weight (kg): 36.3 (02-01-20 @ 13:54)  BMI (kg/m2): 18.6 (02-01-20 @ 18:31)  BSA (m2): 1.19 (02-01-20 @ 18:31)      02-02-20 @ 07:01  -  02-03-20 @ 07:00  --------------------------------------------------------  IN: 1615.4 mL / OUT: 2200 mL / NET: -584.6 mL    Physical Exam:  	Gen: NAD  	HEENT: Anicteric, + JVP   	Pulm: Crackles bibasilar   	CV: RRR, S1S2; systolic murmur best heard at left sternal border   	Abd: +BS, soft, nontender/nondistended       	: No suprapubic tenderness  	MSK: Warm, b/l LE edema present  	Neuro: Awake, lethargic      	Vascular Access: LIJ non-tunneled HD catheter removed.      LABS/STUDIES  --------------------------------------------------------------------------------              8.9    7.48  >-----------<  215      [02-03-20 @ 06:11]              27.4     133  |  99  |  34  ----------------------------<  158      [02-03-20 @ 06:11]  4.4   |  19  |  2.02        Ca     8.8     [02-03-20 @ 06:11]      Mg     2.6     [02-03-20 @ 06:11]      Phos  5.0     [02-03-20 @ 06:11]    Creatinine Trend:  SCr 2.02 [02-03 @ 06:11]  SCr 1.88 [02-02 @ 23:52]  SCr 1.75 [02-02 @ 18:14]  SCr 1.68 [02-02 @ 12:09]  SCr 1.55 [02-02 @ 04:20]    Iron 28, TIBC 267, %sat 11      [02-02-20 @ 14:19]  Ferritin 151      [02-02-20 @ 14:19]

## 2020-02-03 NOTE — PROGRESS NOTE ADULT - ATTENDING COMMENTS
Breathing wis seems OK: pretty hypotensive but getting better: cont blood transfusion and wean off vasopressors as tolerated:  2/3: breathing wise OK: on room air and in no respiratory distress

## 2020-02-04 LAB
ALBUMIN SERPL ELPH-MCNC: 3 G/DL — LOW (ref 3.3–5)
ALBUMIN SERPL ELPH-MCNC: 3.1 G/DL — LOW (ref 3.3–5)
ALBUMIN SERPL ELPH-MCNC: 3.1 G/DL — LOW (ref 3.3–5)
ALP SERPL-CCNC: 125 U/L — HIGH (ref 40–120)
ALP SERPL-CCNC: 131 U/L — HIGH (ref 40–120)
ALP SERPL-CCNC: 136 U/L — HIGH (ref 40–120)
ALT FLD-CCNC: 101 U/L — HIGH (ref 10–45)
ALT FLD-CCNC: 78 U/L — HIGH (ref 10–45)
ALT FLD-CCNC: 97 U/L — HIGH (ref 10–45)
ANION GAP SERPL CALC-SCNC: 12 MMOL/L — SIGNIFICANT CHANGE UP (ref 5–17)
ANION GAP SERPL CALC-SCNC: 12 MMOL/L — SIGNIFICANT CHANGE UP (ref 5–17)
ANION GAP SERPL CALC-SCNC: 14 MMOL/L — SIGNIFICANT CHANGE UP (ref 5–17)
ANION GAP SERPL CALC-SCNC: 14 MMOL/L — SIGNIFICANT CHANGE UP (ref 5–17)
APTT BLD: 32.7 SEC — SIGNIFICANT CHANGE UP (ref 27.5–36.3)
AST SERPL-CCNC: 127 U/L — HIGH (ref 10–40)
AST SERPL-CCNC: 183 U/L — HIGH (ref 10–40)
AST SERPL-CCNC: 207 U/L — HIGH (ref 10–40)
BASOPHILS # BLD AUTO: 0 K/UL — SIGNIFICANT CHANGE UP (ref 0–0.2)
BASOPHILS # BLD AUTO: 0.01 K/UL — SIGNIFICANT CHANGE UP (ref 0–0.2)
BASOPHILS NFR BLD AUTO: 0 % — SIGNIFICANT CHANGE UP (ref 0–2)
BASOPHILS NFR BLD AUTO: 0.2 % — SIGNIFICANT CHANGE UP (ref 0–2)
BILIRUB SERPL-MCNC: 0.6 MG/DL — SIGNIFICANT CHANGE UP (ref 0.2–1.2)
BILIRUB SERPL-MCNC: 0.7 MG/DL — SIGNIFICANT CHANGE UP (ref 0.2–1.2)
BILIRUB SERPL-MCNC: 0.8 MG/DL — SIGNIFICANT CHANGE UP (ref 0.2–1.2)
BUN SERPL-MCNC: 39 MG/DL — HIGH (ref 7–23)
BUN SERPL-MCNC: 39 MG/DL — HIGH (ref 7–23)
BUN SERPL-MCNC: 40 MG/DL — HIGH (ref 7–23)
BUN SERPL-MCNC: 40 MG/DL — HIGH (ref 7–23)
CALCIUM SERPL-MCNC: 8.7 MG/DL — SIGNIFICANT CHANGE UP (ref 8.4–10.5)
CALCIUM SERPL-MCNC: 8.9 MG/DL — SIGNIFICANT CHANGE UP (ref 8.4–10.5)
CALCIUM SERPL-MCNC: 9 MG/DL — SIGNIFICANT CHANGE UP (ref 8.4–10.5)
CALCIUM SERPL-MCNC: 9.2 MG/DL — SIGNIFICANT CHANGE UP (ref 8.4–10.5)
CHLORIDE SERPL-SCNC: 90 MMOL/L — LOW (ref 96–108)
CHLORIDE SERPL-SCNC: 91 MMOL/L — LOW (ref 96–108)
CHLORIDE SERPL-SCNC: 92 MMOL/L — LOW (ref 96–108)
CHLORIDE SERPL-SCNC: 95 MMOL/L — LOW (ref 96–108)
CO2 SERPL-SCNC: 25 MMOL/L — SIGNIFICANT CHANGE UP (ref 22–31)
CO2 SERPL-SCNC: 27 MMOL/L — SIGNIFICANT CHANGE UP (ref 22–31)
CO2 SERPL-SCNC: 29 MMOL/L — SIGNIFICANT CHANGE UP (ref 22–31)
CO2 SERPL-SCNC: 30 MMOL/L — SIGNIFICANT CHANGE UP (ref 22–31)
CREAT SERPL-MCNC: 2.3 MG/DL — HIGH (ref 0.5–1.3)
CREAT SERPL-MCNC: 2.3 MG/DL — HIGH (ref 0.5–1.3)
CREAT SERPL-MCNC: 2.33 MG/DL — HIGH (ref 0.5–1.3)
CREAT SERPL-MCNC: 2.36 MG/DL — HIGH (ref 0.5–1.3)
EOSINOPHIL # BLD AUTO: 0.05 K/UL — SIGNIFICANT CHANGE UP (ref 0–0.5)
EOSINOPHIL # BLD AUTO: 0.16 K/UL — SIGNIFICANT CHANGE UP (ref 0–0.5)
EOSINOPHIL NFR BLD AUTO: 0.9 % — SIGNIFICANT CHANGE UP (ref 0–6)
EOSINOPHIL NFR BLD AUTO: 2.7 % — SIGNIFICANT CHANGE UP (ref 0–6)
GAS PNL BLDA: SIGNIFICANT CHANGE UP
GAS PNL BLDV: SIGNIFICANT CHANGE UP
GLUCOSE BLDC GLUCOMTR-MCNC: 158 MG/DL — HIGH (ref 70–99)
GLUCOSE SERPL-MCNC: 158 MG/DL — HIGH (ref 70–99)
GLUCOSE SERPL-MCNC: 168 MG/DL — HIGH (ref 70–99)
GLUCOSE SERPL-MCNC: 216 MG/DL — HIGH (ref 70–99)
GLUCOSE SERPL-MCNC: 259 MG/DL — HIGH (ref 70–99)
HCT VFR BLD CALC: 27.9 % — LOW (ref 34.5–45)
HCT VFR BLD CALC: 28 % — LOW (ref 34.5–45)
HCT VFR BLD CALC: 28 % — LOW (ref 34.5–45)
HGB BLD-MCNC: 8.9 G/DL — LOW (ref 11.5–15.5)
HGB BLD-MCNC: 8.9 G/DL — LOW (ref 11.5–15.5)
HGB BLD-MCNC: 9 G/DL — LOW (ref 11.5–15.5)
IMM GRANULOCYTES NFR BLD AUTO: 0.3 % — SIGNIFICANT CHANGE UP (ref 0–1.5)
IMM GRANULOCYTES NFR BLD AUTO: 0.4 % — SIGNIFICANT CHANGE UP (ref 0–1.5)
INR BLD: 1.08 RATIO — SIGNIFICANT CHANGE UP (ref 0.88–1.16)
LYMPHOCYTES # BLD AUTO: 0.65 K/UL — LOW (ref 1–3.3)
LYMPHOCYTES # BLD AUTO: 0.83 K/UL — LOW (ref 1–3.3)
LYMPHOCYTES # BLD AUTO: 10.9 % — LOW (ref 13–44)
LYMPHOCYTES # BLD AUTO: 15.5 % — SIGNIFICANT CHANGE UP (ref 13–44)
MAGNESIUM SERPL-MCNC: 2.3 MG/DL — SIGNIFICANT CHANGE UP (ref 1.6–2.6)
MAGNESIUM SERPL-MCNC: 2.3 MG/DL — SIGNIFICANT CHANGE UP (ref 1.6–2.6)
MCHC RBC-ENTMCNC: 28.2 PG — SIGNIFICANT CHANGE UP (ref 27–34)
MCHC RBC-ENTMCNC: 28.4 PG — SIGNIFICANT CHANGE UP (ref 27–34)
MCHC RBC-ENTMCNC: 28.5 PG — SIGNIFICANT CHANGE UP (ref 27–34)
MCHC RBC-ENTMCNC: 31.8 GM/DL — LOW (ref 32–36)
MCHC RBC-ENTMCNC: 31.9 GM/DL — LOW (ref 32–36)
MCHC RBC-ENTMCNC: 32.1 GM/DL — SIGNIFICANT CHANGE UP (ref 32–36)
MCV RBC AUTO: 88.6 FL — SIGNIFICANT CHANGE UP (ref 80–100)
MCV RBC AUTO: 88.6 FL — SIGNIFICANT CHANGE UP (ref 80–100)
MCV RBC AUTO: 89.1 FL — SIGNIFICANT CHANGE UP (ref 80–100)
MONOCYTES # BLD AUTO: 0.47 K/UL — SIGNIFICANT CHANGE UP (ref 0–0.9)
MONOCYTES # BLD AUTO: 0.52 K/UL — SIGNIFICANT CHANGE UP (ref 0–0.9)
MONOCYTES NFR BLD AUTO: 7.8 % — SIGNIFICANT CHANGE UP (ref 2–14)
MONOCYTES NFR BLD AUTO: 9.7 % — SIGNIFICANT CHANGE UP (ref 2–14)
NEUTROPHILS # BLD AUTO: 3.92 K/UL — SIGNIFICANT CHANGE UP (ref 1.8–7.4)
NEUTROPHILS # BLD AUTO: 4.69 K/UL — SIGNIFICANT CHANGE UP (ref 1.8–7.4)
NEUTROPHILS NFR BLD AUTO: 73.3 % — SIGNIFICANT CHANGE UP (ref 43–77)
NEUTROPHILS NFR BLD AUTO: 78.3 % — HIGH (ref 43–77)
NRBC # BLD: 0 /100 WBCS — SIGNIFICANT CHANGE UP (ref 0–0)
PHOSPHATE SERPL-MCNC: 4.4 MG/DL — SIGNIFICANT CHANGE UP (ref 2.5–4.5)
PHOSPHATE SERPL-MCNC: 4.7 MG/DL — HIGH (ref 2.5–4.5)
PLATELET # BLD AUTO: 210 K/UL — SIGNIFICANT CHANGE UP (ref 150–400)
PLATELET # BLD AUTO: 211 K/UL — SIGNIFICANT CHANGE UP (ref 150–400)
PLATELET # BLD AUTO: 219 K/UL — SIGNIFICANT CHANGE UP (ref 150–400)
POTASSIUM SERPL-MCNC: 3.6 MMOL/L — SIGNIFICANT CHANGE UP (ref 3.5–5.3)
POTASSIUM SERPL-MCNC: 3.9 MMOL/L — SIGNIFICANT CHANGE UP (ref 3.5–5.3)
POTASSIUM SERPL-MCNC: 4 MMOL/L — SIGNIFICANT CHANGE UP (ref 3.5–5.3)
POTASSIUM SERPL-MCNC: 4 MMOL/L — SIGNIFICANT CHANGE UP (ref 3.5–5.3)
POTASSIUM SERPL-SCNC: 3.6 MMOL/L — SIGNIFICANT CHANGE UP (ref 3.5–5.3)
POTASSIUM SERPL-SCNC: 3.9 MMOL/L — SIGNIFICANT CHANGE UP (ref 3.5–5.3)
POTASSIUM SERPL-SCNC: 4 MMOL/L — SIGNIFICANT CHANGE UP (ref 3.5–5.3)
POTASSIUM SERPL-SCNC: 4 MMOL/L — SIGNIFICANT CHANGE UP (ref 3.5–5.3)
PROT SERPL-MCNC: 6.3 G/DL — SIGNIFICANT CHANGE UP (ref 6–8.3)
PROT SERPL-MCNC: 6.3 G/DL — SIGNIFICANT CHANGE UP (ref 6–8.3)
PROT SERPL-MCNC: 6.7 G/DL — SIGNIFICANT CHANGE UP (ref 6–8.3)
PROT SERPL-MCNC: 6.8 G/DL — SIGNIFICANT CHANGE UP (ref 6–8.3)
PROT SERPL-MCNC: 7.1 G/DL — SIGNIFICANT CHANGE UP (ref 6–8.3)
PROTHROM AB SERPL-ACNC: 12.5 SEC — SIGNIFICANT CHANGE UP (ref 10–12.9)
RBC # BLD: 3.13 M/UL — LOW (ref 3.8–5.2)
RBC # BLD: 3.16 M/UL — LOW (ref 3.8–5.2)
RBC # BLD: 3.16 M/UL — LOW (ref 3.8–5.2)
RBC # FLD: 16 % — HIGH (ref 10.3–14.5)
RBC # FLD: 16 % — HIGH (ref 10.3–14.5)
RBC # FLD: 16.1 % — HIGH (ref 10.3–14.5)
SODIUM SERPL-SCNC: 131 MMOL/L — LOW (ref 135–145)
SODIUM SERPL-SCNC: 133 MMOL/L — LOW (ref 135–145)
SODIUM SERPL-SCNC: 133 MMOL/L — LOW (ref 135–145)
SODIUM SERPL-SCNC: 134 MMOL/L — LOW (ref 135–145)
WBC # BLD: 5.35 K/UL — SIGNIFICANT CHANGE UP (ref 3.8–10.5)
WBC # BLD: 5.99 K/UL — SIGNIFICANT CHANGE UP (ref 3.8–10.5)
WBC # BLD: 6.13 K/UL — SIGNIFICANT CHANGE UP (ref 3.8–10.5)
WBC # FLD AUTO: 5.35 K/UL — SIGNIFICANT CHANGE UP (ref 3.8–10.5)
WBC # FLD AUTO: 5.99 K/UL — SIGNIFICANT CHANGE UP (ref 3.8–10.5)
WBC # FLD AUTO: 6.13 K/UL — SIGNIFICANT CHANGE UP (ref 3.8–10.5)

## 2020-02-04 PROCEDURE — 93306 TTE W/DOPPLER COMPLETE: CPT | Mod: 26

## 2020-02-04 PROCEDURE — 76770 US EXAM ABDO BACK WALL COMP: CPT | Mod: 26

## 2020-02-04 PROCEDURE — 99232 SBSQ HOSP IP/OBS MODERATE 35: CPT | Mod: GC

## 2020-02-04 PROCEDURE — 93010 ELECTROCARDIOGRAM REPORT: CPT

## 2020-02-04 RX ORDER — CLOPIDOGREL BISULFATE 75 MG/1
75 TABLET, FILM COATED ORAL DAILY
Refills: 0 | Status: DISCONTINUED | OUTPATIENT
Start: 2020-02-04 | End: 2020-02-11

## 2020-02-04 RX ORDER — ATORVASTATIN CALCIUM 80 MG/1
80 TABLET, FILM COATED ORAL AT BEDTIME
Refills: 0 | Status: DISCONTINUED | OUTPATIENT
Start: 2020-02-04 | End: 2020-02-25

## 2020-02-04 RX ORDER — MONTELUKAST 4 MG/1
10 TABLET, CHEWABLE ORAL EVERY 24 HOURS
Refills: 0 | Status: DISCONTINUED | OUTPATIENT
Start: 2020-02-04 | End: 2020-02-25

## 2020-02-04 RX ORDER — HYDRALAZINE HCL 50 MG
20 TABLET ORAL ONCE
Refills: 0 | Status: COMPLETED | OUTPATIENT
Start: 2020-02-04 | End: 2020-02-04

## 2020-02-04 RX ORDER — HYDRALAZINE HCL 50 MG
10 TABLET ORAL EVERY 8 HOURS
Refills: 0 | Status: DISCONTINUED | OUTPATIENT
Start: 2020-02-04 | End: 2020-02-04

## 2020-02-04 RX ORDER — POTASSIUM CHLORIDE 20 MEQ
40 PACKET (EA) ORAL ONCE
Refills: 0 | Status: COMPLETED | OUTPATIENT
Start: 2020-02-04 | End: 2020-02-04

## 2020-02-04 RX ORDER — MIRTAZAPINE 45 MG/1
3.75 TABLET, ORALLY DISINTEGRATING ORAL AT BEDTIME
Refills: 0 | Status: DISCONTINUED | OUTPATIENT
Start: 2020-02-04 | End: 2020-02-04

## 2020-02-04 RX ORDER — INSULIN LISPRO 100/ML
VIAL (ML) SUBCUTANEOUS
Refills: 0 | Status: DISCONTINUED | OUTPATIENT
Start: 2020-02-04 | End: 2020-02-25

## 2020-02-04 RX ORDER — INSULIN LISPRO 100/ML
VIAL (ML) SUBCUTANEOUS AT BEDTIME
Refills: 0 | Status: DISCONTINUED | OUTPATIENT
Start: 2020-02-04 | End: 2020-02-25

## 2020-02-04 RX ORDER — ASPIRIN/CALCIUM CARB/MAGNESIUM 324 MG
81 TABLET ORAL DAILY
Refills: 0 | Status: DISCONTINUED | OUTPATIENT
Start: 2020-02-04 | End: 2020-02-11

## 2020-02-04 RX ORDER — HYDRALAZINE HCL 50 MG
20 TABLET ORAL
Refills: 0 | Status: DISCONTINUED | OUTPATIENT
Start: 2020-02-04 | End: 2020-02-05

## 2020-02-04 RX ADMIN — PANTOPRAZOLE SODIUM 40 MILLIGRAM(S): 20 TABLET, DELAYED RELEASE ORAL at 06:00

## 2020-02-04 RX ADMIN — Medication 40 MILLIEQUIVALENT(S): at 07:37

## 2020-02-04 RX ADMIN — HEPARIN SODIUM 5000 UNIT(S): 5000 INJECTION INTRAVENOUS; SUBCUTANEOUS at 17:32

## 2020-02-04 RX ADMIN — Medication 3: at 17:00

## 2020-02-04 RX ADMIN — Medication 37 MICROGRAM(S): at 22:51

## 2020-02-04 RX ADMIN — Medication 81 MILLIGRAM(S): at 15:49

## 2020-02-04 RX ADMIN — Medication 1 TABLET(S): at 15:49

## 2020-02-04 RX ADMIN — HEPARIN SODIUM 5000 UNIT(S): 5000 INJECTION INTRAVENOUS; SUBCUTANEOUS at 06:00

## 2020-02-04 RX ADMIN — CLOPIDOGREL BISULFATE 75 MILLIGRAM(S): 75 TABLET, FILM COATED ORAL at 15:50

## 2020-02-04 RX ADMIN — Medication 10 MILLIGRAM(S): at 10:27

## 2020-02-04 RX ADMIN — VASOPRESSIN 0.6 UNIT(S)/MIN: 20 INJECTION INTRAVENOUS at 04:55

## 2020-02-04 RX ADMIN — MONTELUKAST 10 MILLIGRAM(S): 4 TABLET, CHEWABLE ORAL at 18:42

## 2020-02-04 RX ADMIN — ATORVASTATIN CALCIUM 80 MILLIGRAM(S): 80 TABLET, FILM COATED ORAL at 22:51

## 2020-02-04 RX ADMIN — Medication 20 MILLIGRAM(S): at 15:50

## 2020-02-04 RX ADMIN — CHLORHEXIDINE GLUCONATE 1 APPLICATION(S): 213 SOLUTION TOPICAL at 06:01

## 2020-02-04 RX ADMIN — Medication 20 MILLIGRAM(S): at 22:51

## 2020-02-04 NOTE — PROGRESS NOTE ADULT - SUBJECTIVE AND OBJECTIVE BOX
[facility] DIVISION OF KIDNEY DISEASES AND HYPERTENSION -- FOLLOW UP NOTE  --------------------------------------------------------------------------------  HPI: 89 yo F with  HTN, DM, hypothyroidism admitted on 2/1/20 for generalized fatigue, found to be in cardiogenic shock, transferred to CCU for further management. Pt. started on diuretics. On admission Scr was 1.45, increased to 1.68 on 2/2/20. Scr elevated but stable at 2.33 this AM.     Pt. seen and examined at bedside this AM. Non-oliguric urine output in past 24 hours. No longer on IV vasopressor support. States that she has LE tenderness. Otherwise denies CP, SOB, N/V/F/C.     PAST HISTORY  --------------------------------------------------------------------------------  No significant changes to PMH, PSH, FHx, SHx, unless otherwise noted    ALLERGIES & MEDICATIONS  --------------------------------------------------------------------------------  Allergies    penicillin (Hives)  penicillin (Unknown)    Intolerances    Standing Inpatient Medications  chlorhexidine 2% Cloths 1 Application(s) Topical <User Schedule>  dextrose 5%. 1000 milliLiter(s) IV Continuous <Continuous>  dextrose 50% Injectable 12.5 Gram(s) IV Push once  dextrose 50% Injectable 25 Gram(s) IV Push once  dextrose 50% Injectable 25 Gram(s) IV Push once  DOBUTamine Infusion 5 MICROgram(s)/kG/Min IV Continuous <Continuous>  heparin  Injectable 5000 Unit(s) SubCutaneous every 12 hours  levothyroxine Injectable 37 MICROGram(s) IV Push at bedtime  multivitamin/minerals 1 Tablet(s) Oral daily  pantoprazole    Tablet 40 milliGRAM(s) Oral before breakfast    REVIEW OF SYSTEMS  --------------------------------------------------------------------------------  Gen: + lethargy, + fatigue  Respiratory: No dyspnea  CV: No chest pain  GI: No abdominal pain  MSK: + LE tenderness  Neuro: No dizziness  Heme: No bleeding    All other systems were reviewed and are negative, except as noted.    VITALS/PHYSICAL EXAM  --------------------------------------------------------------------------------  T(C): 36.9 (02-04-20 @ 07:15), Max: 37.4 (02-03-20 @ 16:00)  HR: 76 (02-04-20 @ 08:30) (66 - 88)  BP: --  RR: 19 (02-04-20 @ 08:30) (11 - 29)  SpO2: 92% (02-04-20 @ 08:30) (92% - 100%)  Wt(kg): --    02-03-20 @ 07:01  -  02-04-20 @ 07:00  --------------------------------------------------------  IN: 881.9 mL / OUT: 4920 mL / NET: -4038.1 mL    02-04-20 @ 07:01  -  02-04-20 @ 09:23  --------------------------------------------------------  IN: 0 mL / OUT: 250 mL / NET: -250 mL    Physical Exam:  	Gen: NAD  	HEENT: Anicteric  	Pulm: Bibasilar crackles   	CV: RRR, S1S2; systolic murmur best heard at left sternal border   	Abd: +BS, soft, nontender/nondistended       	: No suprapubic tenderness  	MSK: Warm, b/l LE edema present (improved)  	Neuro: Awake, lethargic    LABS/STUDIES  --------------------------------------------------------------------------------              8.9    5.35  >-----------<  210      [02-04-20 @ 06:04]              28.0     133  |  92  |  39  ----------------------------<  168      [02-04-20 @ 06:04]  3.6   |  27  |  2.33        Ca     9.2     [02-04-20 @ 06:04]      Mg     2.3     [02-04-20 @ 06:04]      Phos  4.4     [02-04-20 @ 06:04]    Creatinine Trend:  SCr 2.33 [02-04 @ 06:04]  SCr 2.30 [02-04 @ 00:45]  SCr 2.08 [02-03 @ 15:51]  SCr 2.02 [02-03 @ 06:11]  SCr 1.88 [02-02 @ 23:52]

## 2020-02-04 NOTE — PROGRESS NOTE ADULT - PROBLEM SELECTOR PLAN 1
- Scr 1.45 on admission, continuously uptrending, however likely plateaued/stable at 2.33 this AM.  - Patient with cardiogenic shock; currently on Dobutamine. Pt. now off IV vasopressors.  - Pt. now with improved UOP, nonoliguric likely to have resolving ATN.  - Electrolytes currently stable. No urgent indication for RRT/HD treatment currently.  - Monitor UOP and daily weights. Avoid volume depletion, NSAIDs, PPI's, ARB/ACE-I. Dose medications as per eGFR.    [place] He  Nephrology Fellow  Cell: [phone] (from 8 am to 5 pm)  (After 5 pm or on weekends please page on-call fellow) - Scr 1.45 on admission, continuously uptrending, however likely plateaued/stable at 2.33 this AM.  - Patient with cardiogenic shock; currently on Dobutamine. Pt. now off IV vasopressors.  - Pt. now with improved UOP, nonoliguric likely to have resolving ATN.  - Electrolytes currently stable. No urgent indication for RRT/HD treatment currently.  - Monitor UOP and daily weights. Avoid volume depletion, NSAIDs, PPI's, ARB/ACE-I. Dose medications as per eGFR.    - Please check renal US, SPEP, SIFE, serum FLC, and spot urine TP/CR.    [place] He  Nephrology Fellow  Cell: [phone] (from 8 am to 5 pm)  (After 5 pm or on weekends please page on-call fellow) - Scr 1.45 on admission, continuously uptrending, however likely plateaued/stable at 2.33 this AM.  - Patient with cardiogenic shock; currently on Dobutamine. Pt. now off IV vasopressors.  - Pt. now with improved UOP, nonoliguric likely to have resolving ATN.  - Electrolytes currently stable. No urgent indication for RRT/HD treatment currently.  - Monitor UOP and daily weights. Avoid volume depletion, NSAIDs, PPI's, ARB/ACE-I. Dose medications as per eGFR.  - Please check renal US, SPEP, SIFE, serum FLC, and spot urine TP/CR.    [place] He  Nephrology Fellow  Cell: [phone] (from 8 am to 5 pm)  (After 5 pm or on weekends please page on-call fellow)

## 2020-02-04 NOTE — CHART NOTE - NSCHARTNOTEFT_GEN_A_CORE
====================  CCU MIDNIGHT ROUNDS  ====================    MARISOL COMBS  [id number]    ====================  SUMMARY: HPI:  89F with HTN, DM, hypothyroidism admitted after coming to the Emergency Department with generalized fatigue, hardly getting out of bed, which the daughter states is unusual for her mom. She also c/o shortness of breath and swelling of upper and lower extremities. She had gone to urgent care today for extreme fatigue and HIGUERA for the last 2 days where a CXR done was notable for b/l pleural effusions and RML consolidation. She was sent to the Emergency Department. Denies fever, chills, cough, orthopnea, chest pain. She ambulates with a cane.  She has been living with her daughter for about 2 weeks now, her son is on a visit to [place], most of her medical care is in [place] including cardiology. Pt's daughter states the patient has some history of "valve problem". Does not take any water pills/diuretic. Currently comfortably lying flat in bed on the floor. The daughter states her mom has never been diagnosed with dementia, but her memory has been failing recently. The patient also has been c/o frequent protrusion of uterus thru vagina (01 Feb 2020 19:22)    ====================        ====================  NEW EVENTS:  ====================        ====================  VITALS (Last 12 hrs):  ====================    T(C): 37.4 (02-03-20 @ 19:30), Max: 37.4 (02-03-20 @ 16:00)  HR: 78 (02-04-20 @ 00:30) (74 - 85)  BP: --  BP(mean): --  ABP: 152/56 (02-04-20 @ 00:30) (92/30 - 152/56)  ABP(mean): 94 (02-04-20 @ 00:30) (50 - 94)  RR: 16 (02-04-20 @ 00:30) (12 - 29)  SpO2: 100% (02-04-20 @ 00:30) (92% - 100%)  Wt(kg): --  CVP(mm Hg): 6 (02-04-20 @ 00:30) (-1 - 14)  CO: --  CI: --  PA: --  PA(mean): --  PA(direct): --  PCWP: --  SVR: --    TELEMETRY:        *BLOOD GAS/ARTERIAL/MIXED/VENOUS  *LACTATE    I&O's Summary    02 Feb 2020 07:01  -  03 Feb 2020 07:00  --------------------------------------------------------  IN: 1615.4 mL / OUT: 2200 mL / NET: -584.6 mL    03 Feb 2020 07:01  -  04 Feb 2020 01:14  --------------------------------------------------------  IN: 545.3 mL / OUT: 3320 mL / NET: -2774.7 mL        ====================  PLAN:  ====================    HEALTH ISSUES - PROBLEM Dx:  Hypotension: Hypotension  Other osteoporosis: Other osteoporosis  Other hyperlipidemia: Other hyperlipidemia  Essential hypertension: Essential hypertension  Type 2 diabetes mellitus with other specified complication, without long-term current use of insulin: Type 2 diabetes mellitus with other specified complication, without long-term current use of insulin  Hypothyroidism, unspecified type: Hypothyroidism, unspecified type  EFRAIN (acute kidney injury): EFRAIN (acute kidney injury)  CKD (chronic kidney disease), stage III: CKD (chronic kidney disease), stage III  Hypothyroid: Hypothyroid  Hypertension: Hypertension  Severe anemia: Severe anemia  Diabetes: Diabetes  Anasarca: Anasarca        HEALTH ISSUES - R/O PROBLEM Dx:      RENÉE AnnU PA #[id number] ====================  CCU MIDNIGHT ROUNDS  ====================    MARISOL COMBS  [id number]    ====================  SUMMARY: HPI:  89F with HTN, DM, hypothyroidism admitted after coming to the Emergency Department with generalized fatigue, hardly getting out of bed, which the daughter states is unusual for her mom. She also c/o shortness of breath and swelling of upper and lower extremities. She had gone to urgent care today for extreme fatigue and HIGUERA for the last 2 days where a CXR done was notable for b/l pleural effusions and RML consolidation. She was sent to the Emergency Department. Denies fever, chills, cough, orthopnea, chest pain. She ambulates with a cane.  She has been living with her daughter for about 2 weeks now, her son is on a visit to [place], most of her medical care is in [place] including cardiology. Pt's daughter states the patient has some history of "valve problem". Does not take any water pills/diuretic. Currently comfortably lying flat in bed on the floor. The daughter states her mom has never been diagnosed with dementia, but her memory has been failing recently. The patient also has been c/o frequent protrusion of uterus thru vagina (2020 19:22)    ====================        ====================  NEW EVENTS:  Remains off levo and bumex gtt.   Remains on Vaso.   No complaints.   ====================        ====================  VITALS (Last 12 hrs):  ====================    T(C): 37.4 (20 @ 19:30), Max: 37.4 (20 @ 16:00)  HR: 78 (20 @ 00:30) (74 - 85)  ABP: 152/56 (20 @ 00:30) (92/30 - 152/56)  ABP(mean): 94 (20 @ 00:30) (50 - 94)  RR: 16 (20 @ 00:30) (12 - 29)  SpO2: 100% (20 @ 00:30) (92% - 100%)  CVP(mm Hg): 6 (20 @ 00:30) (-1 - 14)      TELEMETRY: NSR        I&O's Summary    2020 07:01  -  2020 07:00  --------------------------------------------------------  IN: 1615.4 mL / OUT: 2200 mL / NET: -584.6 mL    2020 07:01  -  2020 01:14  --------------------------------------------------------  IN: 545.3 mL / OUT: 3320 mL / NET: -2774.7 mL        ====================  PLAN:  # ADHF, cardiogenic shock  - TTE EF 40-45%, severe TR, RA enlargement  - continue  @5; trend hemodynamics and consider weaning   - off levophed, wean vaso as tolerated  - monitor Is and Os, keep net negative. K>4, Mg>2    ====================    HEALTH ISSUES - PROBLEM Dx:  Hypotension: Hypotension  Other osteoporosis: Other osteoporosis  Other hyperlipidemia: Other hyperlipidemia  Essential hypertension: Essential hypertension  Type 2 diabetes mellitus with other specified complication, without long-term current use of insulin: Type 2 diabetes mellitus with other specified complication, without long-term current use of insulin  Hypothyroidism, unspecified type: Hypothyroidism, unspecified type  EFRAIN (acute kidney injury): EFRAIN (acute kidney injury)  CKD (chronic kidney disease), stage III: CKD (chronic kidney disease), stage III  Hypothyroid: Hypothyroid  Hypertension: Hypertension  Severe anemia: Severe anemia  Diabetes: Diabetes  Anasarca: Anasarca        HEALTH ISSUES - R/O PROBLEM Dx:      CHARAN Ann #[id number]

## 2020-02-04 NOTE — PROGRESS NOTE ADULT - ASSESSMENT
89F with HTN, DM, hypothyroidism admitted after coming to the Emergency Department with generalized fatigue, hardly getting out of bed, diagnosed with generalized anasarca and bilaterally pleural effusions and severe anemia. Course complicated by ADHF and cardiogenic shock    #neuro  - no active issue at this time   - will call pharmacy to confirm that patient takes plavix for hx of stroke vs TIA    #resp  -tolerating O2 2L SpO2 >92%    #card  ADHF  TTE EF 40-45%, severe TR, RA enlargement  - off levophed. on a touch of vasopressin, will turn off once BP stable with goal SBP>90.   - will call PCP and pharmacy for more collateral before starting med rec   - monitor Is and Os, keep net negative. K>4, Mg>2  - unknown etiology of heart failure and enlargement of RA/LV. will get cardiac MRI once creatine is stable    Cardiogenic Shock  - Likely secondary to excessive beta blockade  - received glucagon x3 and then briefly put on glucagon gtt in the CCU but started dry heaving  - c/w  to 7.5, will wean as tolerated  - off bumex.   - latest HD -> CO 7.428, CI 6.24,   - CVP was 4 with SBP 99    GI  -soft +consistent carb diet  - elevated LFTs likely 2/2 ADHF    ID  - no s/s of infection  - afebrile, negative RVP    #Renal  EFRAIN   -creat 2.33, increasing, unknown baseline   -making urine, now off bumex  -likely secondary to fluid overload, now diuresing   -strict Is and Os   -replete electrolytes as needed  -monitor CMP     #Heme  Anemia  - s/p 2 units PRBC   - iron 28, TIBC 238 iron sat 11 ferritin 151.      Endo  T2DM  - well controlled, hold off ISS    Hypothyroidism  -c/w levothyroxine IV given bowel edema in the setting of ADHF    #DVT PPx  -Hep subQ 89F with HTN, DM, hypothyroidism admitted after coming to the Emergency Department with generalized fatigue bilaterally pleural effusions and severe anemia. Course complicated by ADHF and cardiogenic shock.     Neuro  - no active issue at this time   - restarted home aspirin and plavix for TIA    Resp  -tolerating O2 2L SpO2 >92%    Cards  #ADHF   - TTE EF 40-45%, severe TR, RA enlargement  - off levophed, off vasopressin since 5AM. SBP goal >90  - monitor Is and Os, keep net negative. K>4, Mg>2. currently net negative 4L  - unknown etiology of heart failure and enlargement of RA/LV.   - will get cardiac MRI once creatine is stable (seems to have plateaued   - f/u repeat echocardiogram today    #Cardiogenic Shock  - likely due to beta blockers in the setting of existing severe HF  - received glucagon x3 and then briefly put on glucagon gtt in the CCU but started dry heaving  - off bumex, net negative 4L  - latest HD 2/4 8Am -> CO 4.77, CI 2.5, SVR 1174 with CVP of 7  - will add hydralazine 10 TID for afterload reduction. If patient's SBP remains >90, will decrease dobumatine to 2.5 on 2/4 in the afternoon.       GI  -soft +consistent carb diet  - elevated LFTs likely 2/2 ADHF    ID  - no s/s of infection  - afebrile, negative RVP    Renal  #EFRAIN likely ATN 2/2 to cardiogenic shock  -creat 2.33, likely plateud, unknown baseline   -making urine, now off bumex  -likely secondary to fluid overload, now diuresing   -strict Is and Os   -replete electrolytes as needed  -monitor CMP   -nephro recs appreciated: f/u renal US, SPEP, SIFE, serum FLC, spot urine TP/CR    Heme  #Anemia  -likely multifactorial in nature, 2/2 to iron deficiency (given patient initially had failure to thrive) and also CKD  - s/p 2 units PRBC   - iron 28, TIBC 238 iron sat 11 ferritin 151.    - encourage oral intake    Endo  #T2DM  - well controlled, hold off ISS    #Hypothyroidism  -c/w levothyroxine IV given bowel edema in the setting of ADHF    #DVT PPx  -Hep subQ

## 2020-02-04 NOTE — CHART NOTE - NSCHARTNOTEFT_GEN_A_CORE
====================  CCU MIDNIGHT ROUNDS  ====================    MARISOL COMBS  [id number]    ====================  SUMMARY: HPI:  89F with HTN, DM, hypothyroidism admitted after coming to the Emergency Department with generalized fatigue, hardly getting out of bed, which the daughter states is unusual for her mom. She also c/o shortness of breath and swelling of upper and lower extremities. She had gone to urgent care today for extreme fatigue and HIGUERA for the last 2 days where a CXR done was notable for b/l pleural effusions and RML consolidation. She was sent to the Emergency Department. Denies fever, chills, cough, orthopnea, chest pain. She ambulates with a cane.  She has been living with her daughter for about 2 weeks now, her son is on a visit to [place], most of her medical care is in [place] including cardiology. Pt's daughter states the patient has some history of "valve problem". Does not take any water pills/diuretic. Currently comfortably lying flat in bed on the floor. The daughter states her mom has never been diagnosed with dementia, but her memory has been failing recently. The patient also has been c/o frequent protrusion of uterus thru vagina (2020 19:22)    ====================        ====================  NEW EVENTS:   weaned to 2.5mcg today with the addition of PO hydralazine.   F/U central sats and lactates.   ====================        ====================  VITALS (Last 12 hrs):  ====================    T(C): 36.8 (20 @ 20:00), Max: 36.9 (20 @ 12:00)  HR: 76 (20 @ 20:30) (72 - 84)  ABP: 106/40 (20 @ 20:30) (104/38 - 132/54)  ABP(mean): 64 (20 @ 20:30) (62 - 84)  RR: 18 (20 @ 20:30) (13 - 38)  SpO2: 100% (20 @ 20:30) (92% - 100%)  CVP(mm Hg): 4 (20 @ 20:30) (1 - 14)        TELEMETRY: NSR        I&O's Summary    2020 07:  -  2020 07:00  --------------------------------------------------------  IN: 881.9 mL / OUT: 4920 mL / NET: -4038.1 mL    2020 07:01  -  2020 21:35  --------------------------------------------------------  IN: 560.2 mL / OUT: 1500 mL / NET: -939.8 mL        ====================  PLAN:  # ADHF, cardiogenic shock  - possibly due to BB toxicity s/p glucagon  - TTE EF 40-45%, severe TR, RA enlargement  - remains off pressors and off standing diuretics  -  weaned to 2.5mcg, monitor lactates and central sats  - continue with PO hydralazine for afterload reduction   - monitor Is and Os, weights, lytes  - cMRI once Cr stabilized   ====================    HEALTH ISSUES - PROBLEM Dx:  Hypotension: Hypotension  Other osteoporosis: Other osteoporosis  Other hyperlipidemia: Other hyperlipidemia  Essential hypertension: Essential hypertension  Type 2 diabetes mellitus with other specified complication, without long-term current use of insulin: Type 2 diabetes mellitus with other specified complication, without long-term current use of insulin  Hypothyroidism, unspecified type: Hypothyroidism, unspecified type  EFRAIN (acute kidney injury): EFRAIN (acute kidney injury)  CKD (chronic kidney disease), stage III: CKD (chronic kidney disease), stage III  Hypothyroid: Hypothyroid  Hypertension: Hypertension  Severe anemia: Severe anemia  Diabetes: Diabetes  Anasarca: Anasarca      Muna Varela, CCU PA #[id number]

## 2020-02-04 NOTE — PROGRESS NOTE ADULT - SUBJECTIVE AND OBJECTIVE BOX
Gopal Banks, PGY1 MD  Internal Medicine Resident    PATIENT:  MARISOL COMBS  [id number]    CHIEF COMPLAINT:  Patient is a 89y old  Female who presents with a chief complaint of generalized weakness and body swelling (03 Feb 2020 14:59)      INTERVAL HISTORY/OVERNIGHT EVENTS:  -Patient's vasopressin was turned off around ~5am, and patient's SBP>100.  -no other acute events overnight.   -Has been net negative 4L, with 300-400cc/hr of urine.   -venous saturation is 72.  -has frequent PVCs in the morning, around 730am.     REVIEW OF SYSTEMS:    Constitutional:     [ ] negative [ ] fevers [ ] chills [ ] weight loss [ ] weight gain  HEENT:                  [ ] negative [ ] dry eyes [ ] eye irritation [ ] postnasal drip [ ] nasal congestion  CV:                         [ ] negative  [ ] chest pain [ ] orthopnea [ ] palpitations [ ] murmur  Resp:                     [ ] negative [ ] cough [ ] shortness of breath [ ] dyspnea [ ] wheezing [ ] sputum [ ] hemoptysis  GI:                          [ ] negative [ ] nausea [ ] vomiting [ ] diarrhea [ ] constipation [ ] abd pain [ ] dysphagia   :                        [ ] negative [ ] dysuria [ ] nocturia [ ] hematuria [ ] increased urinary frequency  Musculoskeletal: [ ] negative [ ] back pain [ ] myalgias [ ] arthralgias [ ] fracture  Skin:                       [ ] negative [ ] rash [ ] itch  Neurological:        [ ] negative [ ] headache [ ] dizziness [ ] syncope [ ] weakness [ ] numbness  Psychiatric:           [ ] negative [ ] anxiety [ ] depression  Endocrine:            [ ] negative [ ] diabetes [ ] thyroid problem  Heme/Lymph:      [ ] negative [ ] anemia [ ] bleeding problem  Allergic/Immune: [ ] negative [ ] itchy eyes [ ] nasal discharge [ ] hives [ ] angioedema    [ ] All other systems negative  [ ] Unable to assess ROS because ________.    MEDICATIONS:  MEDICATIONS  (STANDING):  chlorhexidine 2% Cloths 1 Application(s) Topical <User Schedule>  dextrose 5%. 1000 milliLiter(s) (50 mL/Hr) IV Continuous <Continuous>  dextrose 50% Injectable 12.5 Gram(s) IV Push once  dextrose 50% Injectable 25 Gram(s) IV Push once  dextrose 50% Injectable 25 Gram(s) IV Push once  DOBUTamine Infusion 5 MICROgram(s)/kG/Min (5.445 mL/Hr) IV Continuous <Continuous>  heparin  Injectable 5000 Unit(s) SubCutaneous every 12 hours  levothyroxine Injectable 37 MICROGram(s) IV Push at bedtime  multivitamin/minerals 1 Tablet(s) Oral daily  pantoprazole    Tablet 40 milliGRAM(s) Oral before breakfast  potassium chloride    Tablet ER 40 milliEquivalent(s) Oral once    MEDICATIONS  (PRN):  dextrose 40% Gel 15 Gram(s) Oral once PRN Blood Glucose LESS THAN 70 milliGRAM(s)/deciliter  glucagon  Injectable 1 milliGRAM(s) IntraMuscular once PRN Glucose LESS THAN 70 milligrams/deciliter      ALLERGIES:  Allergies    penicillin (Hives)  penicillin (Unknown)    Intolerances        OBJECTIVE:  ICU Vital Signs Last 24 Hrs  T(C): 36.9 (04 Feb 2020 06:00), Max: 37.4 (03 Feb 2020 16:00)  T(F): 98.4 (04 Feb 2020 06:00), Max: 99.3 (03 Feb 2020 16:00)  HR: 68 (04 Feb 2020 06:45) (68 - 88)  BP: 136/60 (03 Feb 2020 08:15) (136/60 - 136/60)  BP(mean): 92 (03 Feb 2020 08:15) (92 - 92)  ABP: 108/42 (04 Feb 2020 06:45) (86/34 - 166/70)  ABP(mean): 66 (04 Feb 2020 06:45) (50 - 110)  RR: 11 (04 Feb 2020 06:45) (10 - 29)  SpO2: 100% (04 Feb 2020 06:45) (92% - 100%)      Adult Advanced Hemodynamics Last 24 Hrs  CVP(mm Hg): 5 (04 Feb 2020 06:45) (-1 - 20)  CVP(cm H2O): --  CO: --  CI: --  PA: --  PA(mean): --  PCWP: --  SVR: --  SVRI: --  PVR: --  PVRI: --  CAPILLARY BLOOD GLUCOSE        CAPILLARY BLOOD GLUCOSE      POCT Blood Glucose.: 83 mg/dL (02 Feb 2020 11:18)    I&O's Summary    03 Feb 2020 07:01  -  04 Feb 2020 07:00  --------------------------------------------------------  IN: 881.9 mL / OUT: 4920 mL / NET: -4038.1 mL      Daily     Daily     PHYSICAL EXAMINATION:  General: WN/WD NAD  HEENT: PERRLA, EOMI, moist mucous membranes  Neurology: A&Ox3, nonfocal, TONY x 4  Respiratory: CTA B/L, normal respiratory effort, no wheezes, crackles, rales  CV: RRR, S1S2, no murmurs, rubs or gallops  Abdominal: Soft, NT, ND +BS, Last BM  Extremities: No edema, + peripheral pulses  Incisions:   Tubes:    LABS:  ABG - ( 04 Feb 2020 00:02 )  pH, Arterial: 7.43  pH, Blood: x     /  pCO2: 41    /  pO2: 154   / HCO3: 27    / Base Excess: 2.8   /  SaO2: 100                                     8.9    5.35  )-----------( 210      ( 04 Feb 2020 06:04 )             28.0     02-04    133<L>  |  92<L>  |  39<H>  ----------------------------<  168<H>  3.6   |  27  |  2.33<H>    Ca    9.2      04 Feb 2020 06:04  Phos  4.4     02-04  Mg     2.3     02-04    TPro  7.1  /  Alb  3.1<L>  /  TBili  0.8  /  DBili  x   /  AST  183<H>  /  ALT  97<H>  /  AlkPhos  136<H>  02-04    LIVER FUNCTIONS - ( 04 Feb 2020 06:04 )  Alb: 3.1 g/dL / Pro: 7.1 g/dL / ALK PHOS: 136 U/L / ALT: 97 U/L / AST: 183 U/L / GGT: x           PT/INR - ( 04 Feb 2020 06:04 )   PT: 12.5 sec;   INR: 1.08 ratio         PTT - ( 04 Feb 2020 06:04 )  PTT:32.7 sec            TELEMETRY:     EKG:     IMAGING: Gopal Banks, PGY1 MD  Internal Medicine Resident    PATIENT:  MARISOL COMBS  [id number]    CHIEF COMPLAINT:  Patient is a 89y old  Female who presents with a chief complaint of generalized weakness and body swelling (03 Feb 2020 14:59)      INTERVAL HISTORY/OVERNIGHT EVENTS:  -Patient's vasopressin was turned off around ~5am, and patient's SBP>100.  -no other acute events overnight.   -Has been net negative 4L, with 300-400cc/hr of urine.   -venous saturation is 72.  -has frequent PVCs in the morning, around 730am.     REVIEW OF SYSTEMS:    Constitutional:     [x] negative [ ] fevers [ ] chills [ ] weight loss [ ] weight gain  HEENT:                  [x] negative [ ] dry eyes [ ] eye irritation [ ] postnasal drip [ ] nasal congestion  CV:                         [x] negative  [ ] chest pain [ ] orthopnea [ ] palpitations [ ] murmur  Resp:                     [x] negative [ ] cough [ ] shortness of breath [ ] dyspnea [ ] wheezing [ ] sputum [ ] hemoptysis  GI:                          [x] negative [ ] nausea [ ] vomiting [ ] diarrhea [ ] constipation [ ] abd pain [ ] dysphagia   :                        [x] negative [ ] dysuria [ ] nocturia [ ] hematuria [ ] increased urinary frequency  Musculoskeletal: [ ] negative [x] body aches in the upper and lower extremities [ ] back pain [ ] myalgias [ ] arthralgias [ ] fracture  Skin:                       [x] negative [ ] rash [ ] itch  Neurological:        [x] negative [ ] headache [ ] dizziness [ ] syncope [ ] weakness [ ] numbness  Psychiatric:           [x] negative [ ] anxiety [ ] depression  Endocrine:            [x] negative [ ] diabetes [ ] thyroid problem  Heme/Lymph:      [x] negative [ ] anemia [ ] bleeding problem  Allergic/Immune: [x] negative [ ] itchy eyes [ ] nasal discharge [ ] hives [ ] angioedema    [x] All other systems negative  [ ] Unable to assess ROS because ________.    MEDICATIONS:  MEDICATIONS  (STANDING):  chlorhexidine 2% Cloths 1 Application(s) Topical <User Schedule>  dextrose 5%. 1000 milliLiter(s) (50 mL/Hr) IV Continuous <Continuous>  dextrose 50% Injectable 12.5 Gram(s) IV Push once  dextrose 50% Injectable 25 Gram(s) IV Push once  dextrose 50% Injectable 25 Gram(s) IV Push once  DOBUTamine Infusion 5 MICROgram(s)/kG/Min (5.445 mL/Hr) IV Continuous <Continuous>  heparin  Injectable 5000 Unit(s) SubCutaneous every 12 hours  levothyroxine Injectable 37 MICROGram(s) IV Push at bedtime  multivitamin/minerals 1 Tablet(s) Oral daily  pantoprazole    Tablet 40 milliGRAM(s) Oral before breakfast  potassium chloride    Tablet ER 40 milliEquivalent(s) Oral once    MEDICATIONS  (PRN):  dextrose 40% Gel 15 Gram(s) Oral once PRN Blood Glucose LESS THAN 70 milliGRAM(s)/deciliter  glucagon  Injectable 1 milliGRAM(s) IntraMuscular once PRN Glucose LESS THAN 70 milligrams/deciliter      ALLERGIES:  Allergies    penicillin (Hives)  penicillin (Unknown)    Intolerances        OBJECTIVE:  ICU Vital Signs Last 24 Hrs  T(C): 36.9 (04 Feb 2020 06:00), Max: 37.4 (03 Feb 2020 16:00)  T(F): 98.4 (04 Feb 2020 06:00), Max: 99.3 (03 Feb 2020 16:00)  HR: 68 (04 Feb 2020 06:45) (68 - 88)  BP: 136/60 (03 Feb 2020 08:15) (136/60 - 136/60)  BP(mean): 92 (03 Feb 2020 08:15) (92 - 92)  ABP: 108/42 (04 Feb 2020 06:45) (86/34 - 166/70)  ABP(mean): 66 (04 Feb 2020 06:45) (50 - 110)  RR: 11 (04 Feb 2020 06:45) (10 - 29)  SpO2: 100% (04 Feb 2020 06:45) (92% - 100%)      Adult Advanced Hemodynamics Last 24 Hrs  CVP(mm Hg): 5 (04 Feb 2020 06:45) (-1 - 20)  CVP(cm H2O): --  CO: --  CI: --  PA: --  PA(mean): --  PCWP: --  SVR: --  SVRI: --  PVR: --  PVRI: --  CAPILLARY BLOOD GLUCOSE        CAPILLARY BLOOD GLUCOSE      POCT Blood Glucose.: 83 mg/dL (02 Feb 2020 11:18)    I&O's Summary    03 Feb 2020 07:01  -  04 Feb 2020 07:00  --------------------------------------------------------  IN: 881.9 mL / OUT: 4920 mL / NET: -4038.1 mL      Daily     Daily     PHYSICAL EXAMINATION:  General: WN/WD NAD  HEENT: PERRLA, EOMI, moist mucous membranes  Neurology: A&Ox3, nonfocal, TONY x 4  Respiratory: CTA B/L, normal respiratory effort, no wheezes, + crackles, rales  CV: tricuspid regurg murmur. + JVD.   Abdominal: Soft, NT, ND +BS, Last BM  Extremities: +1 pitting edema, + peripheral pulses      LABS:  ABG - ( 04 Feb 2020 00:02 )  pH, Arterial: 7.43  pH, Blood: x     /  pCO2: 41    /  pO2: 154   / HCO3: 27    / Base Excess: 2.8   /  SaO2: 100                                     8.9    5.35  )-----------( 210      ( 04 Feb 2020 06:04 )             28.0     02-04    133<L>  |  92<L>  |  39<H>  ----------------------------<  168<H>  3.6   |  27  |  2.33<H>    Ca    9.2      04 Feb 2020 06:04  Phos  4.4     02-04  Mg     2.3     02-04    TPro  7.1  /  Alb  3.1<L>  /  TBili  0.8  /  DBili  x   /  AST  183<H>  /  ALT  97<H>  /  AlkPhos  136<H>  02-04    LIVER FUNCTIONS - ( 04 Feb 2020 06:04 )  Alb: 3.1 g/dL / Pro: 7.1 g/dL / ALK PHOS: 136 U/L / ALT: 97 U/L / AST: 183 U/L / GGT: x           PT/INR - ( 04 Feb 2020 06:04 )   PT: 12.5 sec;   INR: 1.08 ratio         PTT - ( 04 Feb 2020 06:04 )  PTT:32.7 sec            TELEMETRY:     EKG:     IMAGING:

## 2020-02-04 NOTE — PROGRESS NOTE ADULT - ATTENDING COMMENTS
EFRAIN: likely due to poor renal perfusion in the setting of shock  Serum creatinine noted to be normal in 2017. No values in 2018/2019. Admitted with serum creatinine of 1.4  Renal function slightly worse with excellent U/O. Overall improved  No longer on diuretics/Pressors  On dobutamine  Acidosis resolved. Transaminitis improving    Please check urine Protein/creatinine/myeloma    Renal sono with atrophic. 8.2 cm echogenic kidneys  will follow      Juancho Dickson MD  O: [phone]  C: [phone]

## 2020-02-05 LAB
% ALBUMIN: 45.3 % — SIGNIFICANT CHANGE UP
% ALPHA 1: 6 % — SIGNIFICANT CHANGE UP
% ALPHA 2: 8.7 % — SIGNIFICANT CHANGE UP
% BETA: 11.8 % — SIGNIFICANT CHANGE UP
% GAMMA: 28.2 % — SIGNIFICANT CHANGE UP
% M SPIKE: 4.6 % — SIGNIFICANT CHANGE UP
ALBUMIN SERPL ELPH-MCNC: 2.9 G/DL — LOW (ref 3.3–5)
ALBUMIN SERPL ELPH-MCNC: 2.9 G/DL — LOW (ref 3.6–5.5)
ALBUMIN SERPL ELPH-MCNC: 3 G/DL — LOW (ref 3.3–5)
ALBUMIN/GLOB SERPL ELPH: 0.9 RATIO — SIGNIFICANT CHANGE UP
ALP SERPL-CCNC: 117 U/L — SIGNIFICANT CHANGE UP (ref 40–120)
ALP SERPL-CCNC: 120 U/L — SIGNIFICANT CHANGE UP (ref 40–120)
ALPHA1 GLOB SERPL ELPH-MCNC: 0.4 G/DL — SIGNIFICANT CHANGE UP (ref 0.1–0.4)
ALPHA2 GLOB SERPL ELPH-MCNC: 0.5 G/DL — SIGNIFICANT CHANGE UP (ref 0.5–1)
ALT FLD-CCNC: 53 U/L — HIGH (ref 10–45)
ALT FLD-CCNC: 66 U/L — HIGH (ref 10–45)
ANION GAP SERPL CALC-SCNC: 14 MMOL/L — SIGNIFICANT CHANGE UP (ref 5–17)
ANION GAP SERPL CALC-SCNC: 18 MMOL/L — HIGH (ref 5–17)
AST SERPL-CCNC: 77 U/L — HIGH (ref 10–40)
AST SERPL-CCNC: 90 U/L — HIGH (ref 10–40)
B-GLOBULIN SERPL ELPH-MCNC: 0.7 G/DL — SIGNIFICANT CHANGE UP (ref 0.5–1)
BILIRUB SERPL-MCNC: 0.8 MG/DL — SIGNIFICANT CHANGE UP (ref 0.2–1.2)
BILIRUB SERPL-MCNC: 0.9 MG/DL — SIGNIFICANT CHANGE UP (ref 0.2–1.2)
BUN SERPL-MCNC: 39 MG/DL — HIGH (ref 7–23)
BUN SERPL-MCNC: 42 MG/DL — HIGH (ref 7–23)
CALCIUM SERPL-MCNC: 9.2 MG/DL — SIGNIFICANT CHANGE UP (ref 8.4–10.5)
CALCIUM SERPL-MCNC: 9.5 MG/DL — SIGNIFICANT CHANGE UP (ref 8.4–10.5)
CHLORIDE SERPL-SCNC: 88 MMOL/L — LOW (ref 96–108)
CHLORIDE SERPL-SCNC: 89 MMOL/L — LOW (ref 96–108)
CO2 SERPL-SCNC: 29 MMOL/L — SIGNIFICANT CHANGE UP (ref 22–31)
CO2 SERPL-SCNC: 31 MMOL/L — SIGNIFICANT CHANGE UP (ref 22–31)
CREAT ?TM UR-MCNC: 15 MG/DL — SIGNIFICANT CHANGE UP
CREAT SERPL-MCNC: 2.16 MG/DL — HIGH (ref 0.5–1.3)
CREAT SERPL-MCNC: 2.32 MG/DL — HIGH (ref 0.5–1.3)
GAMMA GLOBULIN: 1.8 G/DL — HIGH (ref 0.6–1.6)
GAS PNL BLDA: SIGNIFICANT CHANGE UP
GAS PNL BLDA: SIGNIFICANT CHANGE UP
GAS PNL BLDV: SIGNIFICANT CHANGE UP
GLUCOSE BLDC GLUCOMTR-MCNC: 115 MG/DL — HIGH (ref 70–99)
GLUCOSE BLDC GLUCOMTR-MCNC: 204 MG/DL — HIGH (ref 70–99)
GLUCOSE BLDC GLUCOMTR-MCNC: 232 MG/DL — HIGH (ref 70–99)
GLUCOSE BLDC GLUCOMTR-MCNC: 301 MG/DL — HIGH (ref 70–99)
GLUCOSE BLDC GLUCOMTR-MCNC: 330 MG/DL — HIGH (ref 70–99)
GLUCOSE SERPL-MCNC: 111 MG/DL — HIGH (ref 70–99)
GLUCOSE SERPL-MCNC: 184 MG/DL — HIGH (ref 70–99)
HCT VFR BLD CALC: 29.4 % — LOW (ref 34.5–45)
HCT VFR BLD CALC: 30.7 % — LOW (ref 34.5–45)
HGB BLD-MCNC: 9.5 G/DL — LOW (ref 11.5–15.5)
HGB BLD-MCNC: 9.8 G/DL — LOW (ref 11.5–15.5)
INTERPRETATION SERPL IFE-IMP: SIGNIFICANT CHANGE UP
KAPPA LC SER QL IFE: 13.03 MG/DL — HIGH (ref 0.33–1.94)
KAPPA/LAMBDA FREE LIGHT CHAIN RATIO, SERUM: 1.18 RATIO — SIGNIFICANT CHANGE UP (ref 0.26–1.65)
LAMBDA LC SER QL IFE: 11.05 MG/DL — HIGH (ref 0.57–2.63)
M-SPIKE: 0.3 G/DL — HIGH (ref 0–0)
MAGNESIUM SERPL-MCNC: 1.6 MG/DL — SIGNIFICANT CHANGE UP (ref 1.6–2.6)
MCHC RBC-ENTMCNC: 28.4 PG — SIGNIFICANT CHANGE UP (ref 27–34)
MCHC RBC-ENTMCNC: 28.7 PG — SIGNIFICANT CHANGE UP (ref 27–34)
MCHC RBC-ENTMCNC: 31.9 GM/DL — LOW (ref 32–36)
MCHC RBC-ENTMCNC: 32.3 GM/DL — SIGNIFICANT CHANGE UP (ref 32–36)
MCV RBC AUTO: 88 FL — SIGNIFICANT CHANGE UP (ref 80–100)
MCV RBC AUTO: 89.8 FL — SIGNIFICANT CHANGE UP (ref 80–100)
NRBC # BLD: 0 /100 WBCS — SIGNIFICANT CHANGE UP (ref 0–0)
NRBC # BLD: 0 /100 WBCS — SIGNIFICANT CHANGE UP (ref 0–0)
PHOSPHATE SERPL-MCNC: 3.6 MG/DL — SIGNIFICANT CHANGE UP (ref 2.5–4.5)
PLATELET # BLD AUTO: 217 K/UL — SIGNIFICANT CHANGE UP (ref 150–400)
PLATELET # BLD AUTO: 243 K/UL — SIGNIFICANT CHANGE UP (ref 150–400)
POTASSIUM SERPL-MCNC: 3.4 MMOL/L — LOW (ref 3.5–5.3)
POTASSIUM SERPL-MCNC: 3.5 MMOL/L — SIGNIFICANT CHANGE UP (ref 3.5–5.3)
POTASSIUM SERPL-SCNC: 3.4 MMOL/L — LOW (ref 3.5–5.3)
POTASSIUM SERPL-SCNC: 3.5 MMOL/L — SIGNIFICANT CHANGE UP (ref 3.5–5.3)
PROT ?TM UR-MCNC: 19 MG/DL — HIGH (ref 0–12)
PROT PATTERN SERPL ELPH-IMP: SIGNIFICANT CHANGE UP
PROT SERPL-MCNC: 6.4 G/DL — SIGNIFICANT CHANGE UP (ref 6–8.3)
PROT SERPL-MCNC: 7.1 G/DL — SIGNIFICANT CHANGE UP (ref 6–8.3)
PROT/CREAT UR-RTO: 1.3 RATIO — HIGH (ref 0–0.2)
RBC # BLD: 3.34 M/UL — LOW (ref 3.8–5.2)
RBC # BLD: 3.42 M/UL — LOW (ref 3.8–5.2)
RBC # FLD: 15.9 % — HIGH (ref 10.3–14.5)
RBC # FLD: 15.9 % — HIGH (ref 10.3–14.5)
SODIUM SERPL-SCNC: 134 MMOL/L — LOW (ref 135–145)
SODIUM SERPL-SCNC: 135 MMOL/L — SIGNIFICANT CHANGE UP (ref 135–145)
T4 AB SER-ACNC: 5.4 UG/DL — SIGNIFICANT CHANGE UP (ref 4.6–12)
TSH SERPL-MCNC: 16 UIU/ML — HIGH (ref 0.27–4.2)
WBC # BLD: 6.31 K/UL — SIGNIFICANT CHANGE UP (ref 3.8–10.5)
WBC # BLD: 6.74 K/UL — SIGNIFICANT CHANGE UP (ref 3.8–10.5)
WBC # FLD AUTO: 6.31 K/UL — SIGNIFICANT CHANGE UP (ref 3.8–10.5)
WBC # FLD AUTO: 6.74 K/UL — SIGNIFICANT CHANGE UP (ref 3.8–10.5)

## 2020-02-05 PROCEDURE — 99291 CRITICAL CARE FIRST HOUR: CPT

## 2020-02-05 PROCEDURE — 93010 ELECTROCARDIOGRAM REPORT: CPT

## 2020-02-05 PROCEDURE — 99232 SBSQ HOSP IP/OBS MODERATE 35: CPT | Mod: GC

## 2020-02-05 RX ORDER — POTASSIUM CHLORIDE 20 MEQ
20 PACKET (EA) ORAL ONCE
Refills: 0 | Status: COMPLETED | OUTPATIENT
Start: 2020-02-05 | End: 2020-02-05

## 2020-02-05 RX ORDER — POTASSIUM CHLORIDE 20 MEQ
20 PACKET (EA) ORAL ONCE
Refills: 0 | Status: DISCONTINUED | OUTPATIENT
Start: 2020-02-05 | End: 2020-02-05

## 2020-02-05 RX ORDER — GUAIFENESIN/DEXTROMETHORPHAN 600MG-30MG
5 TABLET, EXTENDED RELEASE 12 HR ORAL ONCE
Refills: 0 | Status: DISCONTINUED | OUTPATIENT
Start: 2020-02-05 | End: 2020-02-05

## 2020-02-05 RX ORDER — INSULIN GLARGINE 100 [IU]/ML
5 INJECTION, SOLUTION SUBCUTANEOUS AT BEDTIME
Refills: 0 | Status: DISCONTINUED | OUTPATIENT
Start: 2020-02-05 | End: 2020-02-07

## 2020-02-05 RX ORDER — MAGNESIUM SULFATE 500 MG/ML
2 VIAL (ML) INJECTION ONCE
Refills: 0 | Status: COMPLETED | OUTPATIENT
Start: 2020-02-05 | End: 2020-02-05

## 2020-02-05 RX ORDER — SODIUM CHLORIDE 9 MG/ML
1000 INJECTION INTRAMUSCULAR; INTRAVENOUS; SUBCUTANEOUS
Refills: 0 | Status: DISCONTINUED | OUTPATIENT
Start: 2020-02-05 | End: 2020-02-07

## 2020-02-05 RX ORDER — MIRTAZAPINE 45 MG/1
3.75 TABLET, ORALLY DISINTEGRATING ORAL AT BEDTIME
Refills: 0 | Status: DISCONTINUED | OUTPATIENT
Start: 2020-02-05 | End: 2020-02-25

## 2020-02-05 RX ORDER — SODIUM CHLORIDE 9 MG/ML
500 INJECTION INTRAMUSCULAR; INTRAVENOUS; SUBCUTANEOUS ONCE
Refills: 0 | Status: COMPLETED | OUTPATIENT
Start: 2020-02-05 | End: 2020-02-05

## 2020-02-05 RX ADMIN — INSULIN GLARGINE 5 UNIT(S): 100 INJECTION, SOLUTION SUBCUTANEOUS at 21:23

## 2020-02-05 RX ADMIN — MIRTAZAPINE 3.75 MILLIGRAM(S): 45 TABLET, ORALLY DISINTEGRATING ORAL at 21:22

## 2020-02-05 RX ADMIN — Medication 4: at 17:40

## 2020-02-05 RX ADMIN — Medication 81 MILLIGRAM(S): at 11:31

## 2020-02-05 RX ADMIN — SODIUM CHLORIDE 500 MILLILITER(S): 9 INJECTION INTRAMUSCULAR; INTRAVENOUS; SUBCUTANEOUS at 03:30

## 2020-02-05 RX ADMIN — Medication 2: at 11:48

## 2020-02-05 RX ADMIN — Medication 100 MILLIEQUIVALENT(S): at 23:42

## 2020-02-05 RX ADMIN — PANTOPRAZOLE SODIUM 40 MILLIGRAM(S): 20 TABLET, DELAYED RELEASE ORAL at 06:41

## 2020-02-05 RX ADMIN — ATORVASTATIN CALCIUM 80 MILLIGRAM(S): 80 TABLET, FILM COATED ORAL at 21:24

## 2020-02-05 RX ADMIN — Medication 37 MICROGRAM(S): at 21:23

## 2020-02-05 RX ADMIN — HEPARIN SODIUM 5000 UNIT(S): 5000 INJECTION INTRAVENOUS; SUBCUTANEOUS at 17:41

## 2020-02-05 RX ADMIN — Medication 200 MILLIGRAM(S): at 02:00

## 2020-02-05 RX ADMIN — Medication 2.72 MICROGRAM(S)/KG/MIN: at 00:31

## 2020-02-05 RX ADMIN — Medication 100 MILLIEQUIVALENT(S): at 06:40

## 2020-02-05 RX ADMIN — Medication 1 TABLET(S): at 11:31

## 2020-02-05 RX ADMIN — Medication 50 GRAM(S): at 21:37

## 2020-02-05 RX ADMIN — SODIUM CHLORIDE 50 MILLILITER(S): 9 INJECTION INTRAMUSCULAR; INTRAVENOUS; SUBCUTANEOUS at 11:00

## 2020-02-05 RX ADMIN — CLOPIDOGREL BISULFATE 75 MILLIGRAM(S): 75 TABLET, FILM COATED ORAL at 11:31

## 2020-02-05 RX ADMIN — Medication 20 MILLIGRAM(S): at 06:41

## 2020-02-05 RX ADMIN — HEPARIN SODIUM 5000 UNIT(S): 5000 INJECTION INTRAVENOUS; SUBCUTANEOUS at 06:40

## 2020-02-05 RX ADMIN — MONTELUKAST 10 MILLIGRAM(S): 4 TABLET, CHEWABLE ORAL at 17:41

## 2020-02-05 RX ADMIN — CHLORHEXIDINE GLUCONATE 1 APPLICATION(S): 213 SOLUTION TOPICAL at 06:41

## 2020-02-05 NOTE — PROGRESS NOTE ADULT - SUBJECTIVE AND OBJECTIVE BOX
Chief Complaint: Hypothyroidism    History: Patient now off pressors. Reports feeling better. Eating, better appetite. Improved edema. No other complaints.    MEDICATIONS  (STANDING):  aspirin enteric coated 81 milliGRAM(s) Oral daily  atorvastatin 80 milliGRAM(s) Oral at bedtime  chlorhexidine 2% Cloths 1 Application(s) Topical <User Schedule>  clopidogrel Tablet 75 milliGRAM(s) Oral daily  heparin  Injectable 5000 Unit(s) SubCutaneous every 12 hours  insulin lispro (HumaLOG) corrective regimen sliding scale   SubCutaneous three times a day before meals  insulin lispro (HumaLOG) corrective regimen sliding scale   SubCutaneous at bedtime  levothyroxine Injectable 37 MICROGram(s) IV Push at bedtime  montelukast 10 milliGRAM(s) Oral every 24 hours  multivitamin/minerals 1 Tablet(s) Oral daily  pantoprazole    Tablet 40 milliGRAM(s) Oral before breakfast  sodium chloride 0.9%. 1000 milliLiter(s) (50 mL/Hr) IV Continuous <Continuous>    MEDICATIONS  (PRN):  glucagon  Injectable 1 milliGRAM(s) IntraMuscular once PRN Glucose LESS THAN 70 milligrams/deciliter      Allergies    penicillin (Hives)  penicillin (Unknown)    Intolerances      PHYSICAL EXAM:  VITALS: T(C): 36.4 (02-05-20 @ 08:00)  T(F): 97.5 (02-05-20 @ 08:00), Max: 98.4 (02-04-20 @ 17:00)  HR: 88 (02-05-20 @ 14:00) (72 - 88)  BP: --  RR:  (10 - 29)  SpO2:  (90% - 100%)  Wt(kg): --  GENERAL: NAD, well-groomed, well-developed  HEENT:  Atraumatic, Normocephalic, moist mucous membranes   GI: Soft, nontender/ND, +bs  SKIN: Dry, intact, No rashes or lesions. no LE edema  PSYCH: AAO X 3, reactive affect    POCT Blood Glucose.: 204 mg/dL (02-05-20 @ 11:27)  POCT Blood Glucose.: 115 mg/dL (02-05-20 @ 08:20)  POCT Blood Glucose.: 158 mg/dL (02-04-20 @ 22:59)      02-05    135  |  88<L>  |  39<H>  ----------------------------<  111<H>  3.4<L>   |  29  |  2.16<H>    EGFR if : 23<L>  EGFR if non : 20<L>    Ca    9.2      02-05  Mg     2.3     02-04  Phos  4.4     02-04    TPro  6.4  /  Alb  2.9<L>  /  TBili  0.9  /  DBili  x   /  AST  90<H>  /  ALT  66<H>  /  AlkPhos  117  02-05          Thyroid Function Tests:  02-05 @ 08:53 TSH 16.00 FreeT4 -- T3 -- Anti TPO -- Anti Thyroglobulin Ab -- TSI --  02-02 @ 14:14 TSH -- FreeT4 -- T3 43 Anti TPO -- Anti Thyroglobulin Ab -- TSI --      Hemoglobin A1C, Whole Blood: 5.7 % <H> [4.0 - 5.6] (02-02-20 @ 09:20)

## 2020-02-05 NOTE — PROGRESS NOTE ADULT - SUBJECTIVE AND OBJECTIVE BOX
====================  CCU MIDNIGHT ROUNDS  ====================    MARISOL COMBS  [id number]  Patient is a 89y old  Female who presents with a chief complaint of generalized weakness and body swelling (2020 14:58)      ====================  SUMMARY:  89F with HTN, DM, hypothyroidism admitted after coming to the Emergency Department with generalized fatigue, hardly getting out of bed, which the daughter states is unusual for her mom. She also c/o shortness of breath and swelling of upper and lower extremities. She had gone to urgent care today for extreme fatigue and HIGUERA for the last 2 days where a CXR done was notable for b/l pleural effusions and RML consolidation. She was sent to the Emergency Department. Denies fever, chills, cough, orthopnea, chest pain. She ambulates with a cane.  She has been living with her daughter for about 2 weeks now, her son is on a visit to [place], most of her medical care is in [place] including cardiology. Pt's daughter states the patient has some history of "valve problem". Does not take any water pills/diuretic. Currently comfortably lying flat in bed on the floor. The daughter states her mom has never been diagnosed with dementia, but her memory has been failing recently. The patient also has been c/o frequent protrusion of uterus thru vagina   ====================        ====================  NEW EVENTS:  No acute events  ====================        ====================  VITALS (Last 12 hrs):  ====================    T(C): --  T(F): --  HR: 92 (20 @ 21:30) (76 - 98)  BP: 94/55 (20 @ 21:30) (94/55 - 108/58)  BP(mean): 68 (20 @ 21:30) (65 - 84)  ABP: 114/40 (20 @ 16:05) (98/30 - 128/54)  ABP(mean): 68 (20 @ 16:05) (56 - 82)  RR: 28 (20 @ 21:30) (16 - 31)  SpO2: 98% (20 @ 21:30) (80% - 100%)  Wt(kg): --  CVP(mm Hg): 8 (20 @ 21:30) (-9 - 8)  CVP(cm H2O): --  CO: --  CI: --  PA: --  PA(mean): --  PCWP: --  SVR: --  PVR: --    I&O's Summary    2020 07:01  -  2020 07:00  --------------------------------------------------------  IN: 1547.2 mL / OUT: 4250 mL / NET: -2702.8 mL    2020 07:  -  2020 22:32  --------------------------------------------------------  IN: 920 mL / OUT: 2200 mL / NET: -1280 mL            ====================  NEW LABS:  ====================                          9.8    6.74  )-----------( 243      ( 2020 20:19 )             30.7     02-05    134<L>  |  89<L>  |  42<H>  ----------------------------<  184<H>  3.5   |  31  |  2.32<H>    Ca    9.5      2020 20:19  Phos  3.6     02-05  Mg     1.6     02-05    TPro  7.1  /  Alb  3.0<L>  /  TBili  0.8  /  DBili  x   /  AST  77<H>  /  ALT  53<H>  /  AlkPhos  120  02-05    PT/INR - ( 2020 06:04 )   PT: 12.5 sec;   INR: 1.08 ratio         PTT - ( 2020 06:04 )  PTT:32.7 sec      ABG - ( 2020 15:26 )  pH, Arterial: 7.52  pH, Blood: x     /  pCO2: 42    /  pO2: 103   / HCO3: 34    / Base Excess: 10.4  /  SaO2: 99                    ====================  PLAN:  ====================  # ADHF, cardiogenic shock  - possibly due to BB toxicity s/p glucagon vs. hypothyroidism   - 2/2 TTE EF 40-45%, severe TR, RA enlargement. 2/4 rpt TTE EF 75% hyperdynamic LV, Mod MR, Severe RV enlargement.  - remains off pressors and off standing diuretics. Off  since this morning and remains HD stable.  - monitor Is and Os, weights, lytes  - cMRI once Cr stabilized   #Renal  - Currently in diuretic stage of EFRAIN. Creat 2.32, continue to monitor  - Currently net negative 1280.   - given 50 ml over ten hours for a total of 500mL of fluid over the day shift  - continue to monitor lytes, replete as needed  #Endo  - continue ISS and lantus 5U at bedtime  - F/U endo for PO levothyroxine dose  - Continue synthroid IV, TSH downtrending and T4 wnl.           Joseline Dobson PA-C  CCU/Cardiology

## 2020-02-05 NOTE — PROGRESS NOTE ADULT - ATTENDING COMMENTS
Agree with assessment and plan as above by Dr. Gill. Reviewed all pertinent labs, glucose values, and imaging studies. Modifications made as indicated above.     Harrison Paniagua D.O  [phone]

## 2020-02-05 NOTE — PROGRESS NOTE ADULT - PROBLEM SELECTOR PLAN 1
TSH 49, TT3 43, TT4 4.1 on LT4 50 mcg.  Suspecting decreased absorption as reason for hypothyroidism as patient is adherent. Hypotensive and edematous in the setting of HF exacerbation, severe pHTN, and oliguria.    -currently on levothyroxine 37mcg IV q daily. repeat TSG 16, TT4 5.4, improving. c/w current dose IV for another 1-2 days while inpatient and then switch to PO.

## 2020-02-05 NOTE — CHART NOTE - NSCHARTNOTEFT_GEN_A_CORE
Patient seen for malnutrition follow up.     Source: Comprehensive chart review, patient (noted to be Cantonese speaking, but able to discuss nutrition in English)    Chart reviewed, events noted. Per chart, pt is a "89F with HTN, DM, hypothyroidism admitted after coming to the Emergency Department with generalized fatigue bilaterally pleural effusions and severe anemia. Course complicated by ADHF and cardiogenic shock".     Diet : Soft, Consistent Carbohydrate, Low Sodium, Lactose Restricted    Patient reports fair appetite and intake. Reports has been drinking about half an Ensure per day. Declines need for other oral nutrition supplements at this time. Denies nausea/vomiting, diarrhea/constipation or other acute GI distress at this time. Per chart, last BM .     PO intake : 50-75%     Source for PO intake: RN, nursing flowsheet, patient     Enteral/Parenteral Nutrition: none    Daily Weight in k.2 (), Weight in k.3 (), Weight in k.8 () - Weight loss noted; however, question accuracy of weights as pt previously noted with fluid retention.     Pertinent Medications: MEDICATIONS  (STANDING):  aspirin enteric coated 81 milliGRAM(s) Oral daily  atorvastatin 80 milliGRAM(s) Oral at bedtime  chlorhexidine 2% Cloths 1 Application(s) Topical <User Schedule>  clopidogrel Tablet 75 milliGRAM(s) Oral daily  heparin  Injectable 5000 Unit(s) SubCutaneous every 12 hours  insulin lispro (HumaLOG) corrective regimen sliding scale   SubCutaneous three times a day before meals  insulin lispro (HumaLOG) corrective regimen sliding scale   SubCutaneous at bedtime  levothyroxine Injectable 37 MICROGram(s) IV Push at bedtime  montelukast 10 milliGRAM(s) Oral every 24 hours  multivitamin/minerals 1 Tablet(s) Oral daily  pantoprazole    Tablet 40 milliGRAM(s) Oral before breakfast  sodium chloride 0.9%. 1000 milliLiter(s) (50 mL/Hr) IV Continuous <Continuous>    MEDICATIONS  (PRN):  glucagon  Injectable 1 milliGRAM(s) IntraMuscular once PRN Glucose LESS THAN 70 milligrams/deciliter    Pertinent Labs:  @ 04:49: Na 135, BUN 39<H>, Cr 2.16<H>, <H>, K+ 3.4<L>, Phos --, Mg --, Alk Phos 117, ALT/SGPT 66<H>, AST/SGOT 90<H>, HbA1c --   @ 19:53: Na 131<L>, BUN 39<H>, Cr 2.30<H>, <H>, K+ 4.0, Phos --, Mg --, Alk Phos --, ALT/SGPT --, AST/SGOT --, HbA1c --   @ 15:50: Na 133<L>, BUN 40<H>, Cr 2.36<H>, <H>, K+ 4.0, Phos --, Mg --, Alk Phos 125<H>, ALT/SGPT 78<H>, AST/SGOT 127<H>, HbA1c --    Finger Sticks:  POCT Blood Glucose.: 204 mg/dL ( @ 11:27)  POCT Blood Glucose.: 115 mg/dL ( @ 08:20)  POCT Blood Glucose.: 158 mg/dL ( @ 22:59)      Skin per nursing documentation: free of pressure injuries  Edema per nursing documentation: none noted    Estimated Needs:   [x ] no change since previous assessment  [ ] recalculated:     Previous Nutrition Diagnoses: Severe Malnutrition, Underweight  Nutrition Diagnoses are: ongoing, addressed with oral nutrition supplements     New Nutrition Diagnosis: none      Interventions: Encouraged PO intake and reinforced importance of low sodium diet. Nutrition care plan achieved.     Recommend  1) Continue current diet order of Soft, Consistent Carbohydrate, Low Sodium, Lactose Restricted; defer texture/consistency to medical team  2) Continue Ensure Enlive x1 daily  3) Continue micronutrient supplementation (multivitamin)  4) Encourage PO intake and provide feeding assistance PRN   5) Reinforce heart failure nutrition therapy PRN and upon follow up    Monitoring and Evaluation:   - Continue to monitor nutritional intake, tolerance to diet prescription, weights, labs, skin integrity  - RD remains available upon request and will follow up per protocol    Raiza Ramirez RD CDN    Pager# [phone]

## 2020-02-05 NOTE — PROGRESS NOTE ADULT - PROBLEM SELECTOR PLAN 1
on dobutamine: has bilateral pleural effusions too : likely secondary to CHF: no pneumonia  2/5: she is doing much better: no SOB : no cough : she is alert and awake:

## 2020-02-05 NOTE — PROGRESS NOTE ADULT - SUBJECTIVE AND OBJECTIVE BOX
Patient is a 89y old  Female who presents with a chief complaint of generalized weakness and body swelling (05 Feb 2020 07:32)      Any change in ROS: Doing ok : no SOB   more alert and awake:     MEDICATIONS  (STANDING):  aspirin enteric coated 81 milliGRAM(s) Oral daily  atorvastatin 80 milliGRAM(s) Oral at bedtime  chlorhexidine 2% Cloths 1 Application(s) Topical <User Schedule>  clopidogrel Tablet 75 milliGRAM(s) Oral daily  heparin  Injectable 5000 Unit(s) SubCutaneous every 12 hours  hydrALAZINE 20 milliGRAM(s) Oral <User Schedule>  insulin lispro (HumaLOG) corrective regimen sliding scale   SubCutaneous three times a day before meals  insulin lispro (HumaLOG) corrective regimen sliding scale   SubCutaneous at bedtime  levothyroxine Injectable 37 MICROGram(s) IV Push at bedtime  montelukast 10 milliGRAM(s) Oral every 24 hours  multivitamin/minerals 1 Tablet(s) Oral daily  pantoprazole    Tablet 40 milliGRAM(s) Oral before breakfast    MEDICATIONS  (PRN):  glucagon  Injectable 1 milliGRAM(s) IntraMuscular once PRN Glucose LESS THAN 70 milligrams/deciliter    Vital Signs Last 24 Hrs  T(C): 36.4 (05 Feb 2020 08:00), Max: 36.9 (04 Feb 2020 12:00)  T(F): 97.5 (05 Feb 2020 08:00), Max: 98.4 (04 Feb 2020 12:00)  HR: 78 (05 Feb 2020 09:00) (72 - 84)  BP: --  BP(mean): --  RR: 20 (05 Feb 2020 09:00) (10 - 38)  SpO2: 98% (05 Feb 2020 09:00) (90% - 100%)    I&O's Summary    04 Feb 2020 07:01  -  05 Feb 2020 07:00  --------------------------------------------------------  IN: 1547.2 mL / OUT: 4250 mL / NET: -2702.8 mL    05 Feb 2020 07:01  -  05 Feb 2020 09:51  --------------------------------------------------------  IN: 120 mL / OUT: 650 mL / NET: -530 mL          Physical Exam:   GENERAL: NAD, well-groomed, well-developed  HEENT: KALPESH/   Atraumatic, Normocephalic  ENMT: No tonsillar erythema, exudates, or enlargement; Moist mucous membranes, Good dentition, No lesions  NECK: Supple, No JVD, Normal thyroid  CHEST/LUNG: Clear to auscultaion  CVS: Regular rate and rhythm; No murmurs, rubs, or gallops  GI: : Soft, Nontender, Nondistended; Bowel sounds present  NERVOUS SYSTEM:  Alert & Oriented X3  EXTREMITIESedema  LYMPH: No lymphadenopathy noted  SKIN: No rashes or lesions  ENDOCRINOLOGY: No Thyromegaly  PSYCH: Appropriate    Labs:  ABG - ( 05 Feb 2020 04:41 )  pH, Arterial: 7.52  pH, Blood: x     /  pCO2: 43    /  pO2: 77    / HCO3: 35    / Base Excess: 10.9  /  SaO2: 96              21, 21, 33, 21, 30, 31, 28, 21, 28, 28, 21, 21, RA, 21                            9.5    6.31  )-----------( 217      ( 05 Feb 2020 04:49 )             29.4                         9.0    5.99  )-----------( 219      ( 04 Feb 2020 19:53 )             28.0                         8.9    6.13  )-----------( 211      ( 04 Feb 2020 15:50 )             27.9                         8.9    5.35  )-----------( 210      ( 04 Feb 2020 06:04 )             28.0                         8.8    7.10  )-----------( 220      ( 03 Feb 2020 15:51 )             26.7                         8.9    7.48  )-----------( 215      ( 03 Feb 2020 06:11 )             27.4                         9.1    8.59  )-----------( 209      ( 02 Feb 2020 23:52 )             28.7                         9.7    9.60  )-----------( 210      ( 02 Feb 2020 15:47 )             31.4     02-05    135  |  88<L>  |  39<H>  ----------------------------<  111<H>  3.4<L>   |  29  |  2.16<H>  02-04    131<L>  |  90<L>  |  39<H>  ----------------------------<  158<H>  4.0   |  29  |  2.30<H>  02-04    133<L>  |  91<L>  |  40<H>  ----------------------------<  259<H>  4.0   |  30  |  2.36<H>  02-04    133<L>  |  92<L>  |  39<H>  ----------------------------<  168<H>  3.6   |  27  |  2.33<H>  02-04    134<L>  |  95<L>  |  40<H>  ----------------------------<  216<H>  3.9   |  25  |  2.30<H>  02-03    133<L>  |  98  |  38<H>  ----------------------------<  192<H>  4.4   |  23  |  2.08<H>  02-03    133<L>  |  99  |  34<H>  ----------------------------<  158<H>  4.4   |  19<L>  |  2.02<H>  02-02    134<L>  |  101  |  31<H>  ----------------------------<  162<H>  4.6   |  18<L>  |  1.88<H>  02-02    134<L>  |  102  |  32<H>  ----------------------------<  162<H>  5.1   |  17<L>  |  1.75<H>  02-02    130<L>  |  102  |  28<H>  ----------------------------<  123<H>  5.1   |  15<L>  |  1.68<H>    Ca    9.2      05 Feb 2020 04:49  Ca    8.9      04 Feb 2020 19:53  Ca    9.0      04 Feb 2020 15:50  Ca    9.2      04 Feb 2020 06:04  Ca    8.7      04 Feb 2020 00:45  Phos  4.4     02-04  Phos  4.7     02-04  Phos  5.0     02-03  Mg     2.3     02-04  Mg     2.3     02-04  Mg     2.4     02-03    TPro  6.4  /  Alb  2.9<L>  /  TBili  0.9  /  DBili  x   /  AST  90<H>  /  ALT  66<H>  /  AlkPhos  117  02-05  TPro  6.8  /  Alb  3.1<L>  /  TBili  0.6  /  DBili  x   /  AST  127<H>  /  ALT  78<H>  /  AlkPhos  125<H>  02-04  TPro  6.3  /  Alb  x   /  TBili  x   /  DBili  x   /  AST  x   /  ALT  x   /  AlkPhos  x   02-04  TPro  7.1  /  Alb  3.1<L>  /  TBili  0.8  /  DBili  x   /  AST  183<H>  /  ALT  97<H>  /  AlkPhos  136<H>  02-04  TPro  6.7  /  Alb  3.0<L>  /  TBili  0.7  /  DBili  x   /  AST  207<H>  /  ALT  101<H>  /  AlkPhos  131<H>  02-04  TPro  6.5  /  Alb  2.9<L>  /  TBili  0.8  /  DBili  x   /  AST  278<H>  /  ALT  115<H>  /  AlkPhos  137<H>  02-03    CAPILLARY BLOOD GLUCOSE      POCT Blood Glucose.: 115 mg/dL (05 Feb 2020 08:20)  POCT Blood Glucose.: 158 mg/dL (04 Feb 2020 22:59)      LIVER FUNCTIONS - ( 05 Feb 2020 04:49 )  Alb: 2.9 g/dL / Pro: 6.4 g/dL / ALK PHOS: 117 U/L / ALT: 66 U/L / AST: 90 U/L / GGT: x           PT/INR - ( 04 Feb 2020 06:04 )   PT: 12.5 sec;   INR: 1.08 ratio         PTT - ( 04 Feb 2020 06:04 )  PTT:32.7 sec    Lactate, Blood: 1.0 mmol/L (02-03 @ 15:51)  Lactate, Blood: 5.2 mmol/L (02-02 @ 12:09)  Lactate, Blood: 4.8 mmol/L (02-02 @ 04:20)  < from: Xray Chest 1 View- PORTABLE-Routine (02.03.20 @ 05:18) >  EXAM:  XR CHEST PORTABLE ROUTINE 1V                            PROCEDURE DATE:  02/03/2020            INTERPRETATION:  A single chest x-ray was obtained on February 3, 2020.    Indication: Hypoxemia.    Impression:    The heart is enlarged. Bilateral pleural effusion. Bibasilar pneumonia and/or atelectasis cannot be ruled out entirely. A central line is present on the right and the tip is in the superior vena cava. A left central line was removed. No pneumothorax. Calcified aortic knob. Degenerative changes of the thoracic spine.                    TATE CYR M.D., ATTENDING RADIOLOGIST  This document has been electronically signed. Feb  3 2020  9:20AM        < end of copied text >        RECENT CULTURES:  02-02 @ 08:21 .Blood Blood-Peripheral                No growth to date.          RESPIRATORY CULTURES:          Studies  Chest X-RAY  CT SCAN Chest   Venous Dopplers: LE:   CT Abdomen  Others

## 2020-02-05 NOTE — PROGRESS NOTE ADULT - ATTENDING COMMENTS
Breathing wis seems OK: pretty hypotensive but getting better: cont blood transfusion and wean off vasopressors as tolerated:  2/3: breathing wise OK: on room air and in no respiratory distress  2/5: doing better: no SOB : no wheezing: no cough :

## 2020-02-05 NOTE — PROGRESS NOTE ADULT - SUBJECTIVE AND OBJECTIVE BOX
Gopal Banks, PGY1 MD  Internal Medicine Resident    PATIENT:  MARISOL COMBS  [id number]    CHIEF COMPLAINT:  Patient is a 89y old  Female who presents with a chief complaint of generalized weakness and body swelling (2020 09:23)      INTERVAL HISTORY/OVERNIGHT EVENTS:  -Overnight, patient's CVP was around 2-3 and lactic acid was 2.8. At the same time, patient was autodiuresing around 250cc/hr. Patient received 500cc overnight. Subsequently, patient's lactate acid improved to 1.0.   -overnight venous saturation was stable at 64.   -patient was net negative 2.7L  -Patient was weaned off dobutamine overnight.     REVIEW OF SYSTEMS:    Constitutional:     [ ] negative [ ] fevers [ ] chills [ ] weight loss [ ] weight gain  HEENT:                  [ ] negative [ ] dry eyes [ ] eye irritation [ ] postnasal drip [ ] nasal congestion  CV:                         [ ] negative  [ ] chest pain [ ] orthopnea [ ] palpitations [ ] murmur  Resp:                     [ ] negative [ ] cough [ ] shortness of breath [ ] dyspnea [ ] wheezing [ ] sputum [ ] hemoptysis  GI:                          [ ] negative [ ] nausea [ ] vomiting [ ] diarrhea [ ] constipation [ ] abd pain [ ] dysphagia   :                        [ ] negative [ ] dysuria [ ] nocturia [ ] hematuria [ ] increased urinary frequency  Musculoskeletal: [ ] negative [ ] back pain [ ] myalgias [ ] arthralgias [ ] fracture  Skin:                       [ ] negative [ ] rash [ ] itch  Neurological:        [ ] negative [ ] headache [ ] dizziness [ ] syncope [ ] weakness [ ] numbness  Psychiatric:           [ ] negative [ ] anxiety [ ] depression  Endocrine:            [ ] negative [ ] diabetes [ ] thyroid problem  Heme/Lymph:      [ ] negative [ ] anemia [ ] bleeding problem  Allergic/Immune: [ ] negative [ ] itchy eyes [ ] nasal discharge [ ] hives [ ] angioedema    [ ] All other systems negative  [ ] Unable to assess ROS because ________.    MEDICATIONS:  MEDICATIONS  (STANDING):  aspirin enteric coated 81 milliGRAM(s) Oral daily  atorvastatin 80 milliGRAM(s) Oral at bedtime  chlorhexidine 2% Cloths 1 Application(s) Topical <User Schedule>  clopidogrel Tablet 75 milliGRAM(s) Oral daily  heparin  Injectable 5000 Unit(s) SubCutaneous every 12 hours  hydrALAZINE 20 milliGRAM(s) Oral <User Schedule>  insulin lispro (HumaLOG) corrective regimen sliding scale   SubCutaneous three times a day before meals  insulin lispro (HumaLOG) corrective regimen sliding scale   SubCutaneous at bedtime  levothyroxine Injectable 37 MICROGram(s) IV Push at bedtime  montelukast 10 milliGRAM(s) Oral every 24 hours  multivitamin/minerals 1 Tablet(s) Oral daily  pantoprazole    Tablet 40 milliGRAM(s) Oral before breakfast    MEDICATIONS  (PRN):  glucagon  Injectable 1 milliGRAM(s) IntraMuscular once PRN Glucose LESS THAN 70 milligrams/deciliter      ALLERGIES:  Allergies    penicillin (Hives)  penicillin (Unknown)    Intolerances        OBJECTIVE:  ICU Vital Signs Last 24 Hrs  T(C): 36.9 (2020 03:00), Max: 36.9 (2020 12:00)  T(F): 98.4 (2020 03:00), Max: 98.4 (2020 12:00)  HR: 72 (2020 06:30) (70 - 84)  BP: --  BP(mean): --  ABP: 116/42 (2020 06:30) (104/38 - 140/52)  ABP(mean): 70 (2020 06:30) (62 - 86)  RR: 16 (2020 06:30) (10 - 38)  SpO2: 100% (2020 06:30) (90% - 100%)      Adult Advanced Hemodynamics Last 24 Hrs  CVP(mm Hg): 0 (2020 06:30) (0 - 14)  CVP(cm H2O): --  CO: --  CI: --  PA: --  PA(mean): --  PCWP: --  SVR: --  SVRI: --  PVR: --  PVRI: --  CAPILLARY BLOOD GLUCOSE      POCT Blood Glucose.: 158 mg/dL (2020 22:59)    CAPILLARY BLOOD GLUCOSE      POCT Blood Glucose.: 158 mg/dL (2020 22:59)    I&O's Summary    2020 07:01  -  2020 07:00  --------------------------------------------------------  IN: 1547.2 mL / OUT: 4250 mL / NET: -2702.8 mL      Daily     Daily Weight in k.2 (2020 06:00)    PHYSICAL EXAMINATION:  General: WN/WD NAD  HEENT: PERRLA, EOMI, moist mucous membranes  Neurology: A&Ox3, nonfocal, TONY x 4  Respiratory: CTA B/L, normal respiratory effort, no wheezes, crackles, rales  CV: RRR, S1S2, no murmurs, rubs or gallops  Abdominal: Soft, NT, ND +BS, Last BM  Extremities: No edema, + peripheral pulses  Incisions:   Tubes:    LABS:  ABG - ( 2020 04:41 )  pH, Arterial: 7.52  pH, Blood: x     /  pCO2: 43    /  pO2: 77    / HCO3: 35    / Base Excess: 10.9  /  SaO2: 96                                      9.5    6.31  )-----------( 217      ( 2020 04:49 )             29.4     02-05    135  |  88<L>  |  39<H>  ----------------------------<  111<H>  3.4<L>   |  29  |  2.16<H>    Ca    9.2      2020 04:49  Phos  4.4     02-04  Mg     2.3     02-04    TPro  6.4  /  Alb  2.9<L>  /  TBili  0.9  /  DBili  x   /  AST  90<H>  /  ALT  66<H>  /  AlkPhos  117  02-05    LIVER FUNCTIONS - ( 2020 04:49 )  Alb: 2.9 g/dL / Pro: 6.4 g/dL / ALK PHOS: 117 U/L / ALT: 66 U/L / AST: 90 U/L / GGT: x           PT/INR - ( 2020 06:04 )   PT: 12.5 sec;   INR: 1.08 ratio         PTT - ( 2020 06:04 )  PTT:32.7 sec            TELEMETRY:     EKG:     IMAGING: Gopal Banks, PGY1 MD  Internal Medicine Resident    PATIENT:  MARISOL COMBS  [id number]    CHIEF COMPLAINT:  Patient is a 89y old  Female who presents with a chief complaint of generalized weakness and body swelling (2020 09:23)      INTERVAL HISTORY/OVERNIGHT EVENTS:  -Overnight, patient's CVP was around 2-3 and lactate at 2.8. At the same time, patient was autodiuresing around 250cc/hr. Patient received 500cc overnight. Subsequently, patient's lactate improved to 1.0.   -overnight venous saturation was stable at 64.   -patient was net negative 2.7L for 24 hours  -Patient was weaned off dobutamine overnight.     REVIEW OF SYSTEMS:    Constitutional:     [x] negative [ ] fevers [ ] chills [ ] weight loss [ ] weight gain  HEENT:                  [x] negative [ ] dry eyes [ ] eye irritation [ ] postnasal drip [ ] nasal congestion  CV:                         [x] negative  [ ] chest pain [ ] orthopnea [ ] palpitations [ ] murmur  Resp:                     [x] negative [ ] cough [ ] shortness of breath [ ] dyspnea [ ] wheezing [ ] sputum [ ] hemoptysis  GI:                          [x] negative [ ] nausea [ ] vomiting [ ] diarrhea [ ] constipation [ ] abd pain [ ] dysphagia   :                        [x] negative [ ] dysuria [ ] nocturia [ ] hematuria [ ] increased urinary frequency  Musculoskeletal: [x] negative [ ] back pain [ ] myalgias [ ] arthralgias [ ] fracture  Skin:                       [x] negative [ ] rash [ ] itch  Neurological:        [x] negative [ ] headache [ ] dizziness [ ] syncope [ ] weakness [ ] numbness  Psychiatric:           [x] negative [ ] anxiety [ ] depression  Endocrine:            [x] negative [ ] diabetes [ ] thyroid problem  Heme/Lymph:      [x] negative [ ] anemia [ ] bleeding problem  Allergic/Immune: [x] negative [ ] itchy eyes [ ] nasal discharge [ ] hives [ ] angioedema    [x] All other systems negative  [ ] Unable to assess ROS because ________.    MEDICATIONS:  MEDICATIONS  (STANDING):  aspirin enteric coated 81 milliGRAM(s) Oral daily  atorvastatin 80 milliGRAM(s) Oral at bedtime  chlorhexidine 2% Cloths 1 Application(s) Topical <User Schedule>  clopidogrel Tablet 75 milliGRAM(s) Oral daily  heparin  Injectable 5000 Unit(s) SubCutaneous every 12 hours  hydrALAZINE 20 milliGRAM(s) Oral <User Schedule>  insulin lispro (HumaLOG) corrective regimen sliding scale   SubCutaneous three times a day before meals  insulin lispro (HumaLOG) corrective regimen sliding scale   SubCutaneous at bedtime  levothyroxine Injectable 37 MICROGram(s) IV Push at bedtime  montelukast 10 milliGRAM(s) Oral every 24 hours  multivitamin/minerals 1 Tablet(s) Oral daily  pantoprazole    Tablet 40 milliGRAM(s) Oral before breakfast    MEDICATIONS  (PRN):  glucagon  Injectable 1 milliGRAM(s) IntraMuscular once PRN Glucose LESS THAN 70 milligrams/deciliter      ALLERGIES:  Allergies    penicillin (Hives)  penicillin (Unknown)    Intolerances        OBJECTIVE:  ICU Vital Signs Last 24 Hrs  T(C): 36.9 (2020 03:00), Max: 36.9 (2020 12:00)  T(F): 98.4 (2020 03:00), Max: 98.4 (2020 12:00)  HR: 72 (2020 06:30) (70 - 84)  BP: --  BP(mean): --  ABP: 116/42 (2020 06:30) (104/38 - 140/52)  ABP(mean): 70 (2020 06:30) (62 - 86)  RR: 16 (2020 06:30) (10 - 38)  SpO2: 100% (2020 06:30) (90% - 100%)      Adult Advanced Hemodynamics Last 24 Hrs  CVP(mm Hg): 0 (2020 06:30) (0 - 14)  CVP(cm H2O): --  CO: --  CI: --  PA: --  PA(mean): --  PCWP: --  SVR: --  SVRI: --  PVR: --  PVRI: --  CAPILLARY BLOOD GLUCOSE      POCT Blood Glucose.: 158 mg/dL (2020 22:59)    CAPILLARY BLOOD GLUCOSE      POCT Blood Glucose.: 158 mg/dL (2020 22:59)    I&O's Summary    2020 07:01  -  2020 07:00  --------------------------------------------------------  IN: 1547.2 mL / OUT: 4250 mL / NET: -2702.8 mL      Daily     Daily Weight in k.2 (2020 06:00)    PHYSICAL EXAMINATION:  General: WN/WD NAD  HEENT: PERRLA, EOMI, moist mucous membranes  Neurology: A&Ox3, nonfocal, TONY x 4  Respiratory: CTA B/L, normal respiratory effort, no wheezes, + crackles, rales  CV: tricuspid regurg murmur. + JVD.   Abdominal: Soft, NT, ND +BS, Last BM  Extremities: +1 pitting edema, + peripheral pulses    LABS:  ABG - ( 2020 04:41 )  pH, Arterial: 7.52  pH, Blood: x     /  pCO2: 43    /  pO2: 77    / HCO3: 35    / Base Excess: 10.9  /  SaO2: 96                                      9.5    6.31  )-----------( 217      ( 2020 04:49 )             29.4     02-05    135  |  88<L>  |  39<H>  ----------------------------<  111<H>  3.4<L>   |  29  |  2.16<H>    Ca    9.2      2020 04:49  Phos  4.4     02-04  Mg     2.3     02-04    TPro  6.4  /  Alb  2.9<L>  /  TBili  0.9  /  DBili  x   /  AST  90<H>  /  ALT  66<H>  /  AlkPhos  117  02-05    LIVER FUNCTIONS - ( 2020 04:49 )  Alb: 2.9 g/dL / Pro: 6.4 g/dL / ALK PHOS: 117 U/L / ALT: 66 U/L / AST: 90 U/L / GGT: x           PT/INR - ( 2020 06:04 )   PT: 12.5 sec;   INR: 1.08 ratio         PTT - ( 2020 06:04 )  PTT:32.7 sec            TELEMETRY:   no events    IMAGING:  < from: Transthoracic Echocardiogram (02.04.20 @ 12:59) >  Conclusions:  Hyperdynamic left ventricle.  Moderate mitral stenosis due to annular and leaflet  calcification.  Mild aortic stenosis.  Severe right ventricular enlargement with normal right  ventricular systolic function.  Moderate pulmonary hypertension.    < end of copied text >

## 2020-02-05 NOTE — PROGRESS NOTE ADULT - ATTENDING COMMENTS
Acute on chronic systolic heart failure.  Weaned off pressor support. Maintain MAP > 65 mmHg.  Inotrope support. Monitor hemodynamics via [place].

## 2020-02-06 DIAGNOSIS — D64.9 ANEMIA, UNSPECIFIED: ICD-10-CM

## 2020-02-06 LAB
ALBUMIN SERPL ELPH-MCNC: 3.1 G/DL — LOW (ref 3.3–5)
ALP SERPL-CCNC: 118 U/L — SIGNIFICANT CHANGE UP (ref 40–120)
ALT FLD-CCNC: 52 U/L — HIGH (ref 10–45)
ANION GAP SERPL CALC-SCNC: 12 MMOL/L — SIGNIFICANT CHANGE UP (ref 5–17)
APPEARANCE UR: CLEAR — SIGNIFICANT CHANGE UP
AST SERPL-CCNC: 72 U/L — HIGH (ref 10–40)
BACTERIA # UR AUTO: ABNORMAL
BILIRUB SERPL-MCNC: 1.1 MG/DL — SIGNIFICANT CHANGE UP (ref 0.2–1.2)
BILIRUB UR-MCNC: NEGATIVE — SIGNIFICANT CHANGE UP
BUN SERPL-MCNC: 41 MG/DL — HIGH (ref 7–23)
CALCIUM SERPL-MCNC: 9.7 MG/DL — SIGNIFICANT CHANGE UP (ref 8.4–10.5)
CHLORIDE SERPL-SCNC: 90 MMOL/L — LOW (ref 96–108)
CO2 SERPL-SCNC: 33 MMOL/L — HIGH (ref 22–31)
COLOR SPEC: SIGNIFICANT CHANGE UP
CREAT SERPL-MCNC: 2.1 MG/DL — HIGH (ref 0.5–1.3)
DIFF PNL FLD: ABNORMAL
EPI CELLS # UR: 1 /HPF — SIGNIFICANT CHANGE UP
GLUCOSE BLDC GLUCOMTR-MCNC: 105 MG/DL — HIGH (ref 70–99)
GLUCOSE BLDC GLUCOMTR-MCNC: 114 MG/DL — HIGH (ref 70–99)
GLUCOSE BLDC GLUCOMTR-MCNC: 189 MG/DL — HIGH (ref 70–99)
GLUCOSE BLDC GLUCOMTR-MCNC: 240 MG/DL — HIGH (ref 70–99)
GLUCOSE SERPL-MCNC: 94 MG/DL — SIGNIFICANT CHANGE UP (ref 70–99)
GLUCOSE UR QL: NEGATIVE — SIGNIFICANT CHANGE UP
HCT VFR BLD CALC: 30.3 % — LOW (ref 34.5–45)
HGB BLD-MCNC: 10 G/DL — LOW (ref 11.5–15.5)
HYALINE CASTS # UR AUTO: 1 /LPF — SIGNIFICANT CHANGE UP (ref 0–2)
KAPPA LC SER QL IFE: 13.36 MG/DL — HIGH (ref 0.33–1.94)
KAPPA/LAMBDA FREE LIGHT CHAIN RATIO, SERUM: 1.15 RATIO — SIGNIFICANT CHANGE UP (ref 0.26–1.65)
KETONES UR-MCNC: NEGATIVE — SIGNIFICANT CHANGE UP
LAMBDA LC SER QL IFE: 11.64 MG/DL — HIGH (ref 0.57–2.63)
LEUKOCYTE ESTERASE UR-ACNC: ABNORMAL
MAGNESIUM SERPL-MCNC: 2.8 MG/DL — HIGH (ref 1.6–2.6)
MCHC RBC-ENTMCNC: 28.9 PG — SIGNIFICANT CHANGE UP (ref 27–34)
MCHC RBC-ENTMCNC: 33 GM/DL — SIGNIFICANT CHANGE UP (ref 32–36)
MCV RBC AUTO: 87.6 FL — SIGNIFICANT CHANGE UP (ref 80–100)
NITRITE UR-MCNC: NEGATIVE — SIGNIFICANT CHANGE UP
NRBC # BLD: 0 /100 WBCS — SIGNIFICANT CHANGE UP (ref 0–0)
PH UR: 8 — SIGNIFICANT CHANGE UP (ref 5–8)
PHOSPHATE SERPL-MCNC: 3.3 MG/DL — SIGNIFICANT CHANGE UP (ref 2.5–4.5)
PLATELET # BLD AUTO: 236 K/UL — SIGNIFICANT CHANGE UP (ref 150–400)
POTASSIUM SERPL-MCNC: 4.5 MMOL/L — SIGNIFICANT CHANGE UP (ref 3.5–5.3)
POTASSIUM SERPL-SCNC: 4.5 MMOL/L — SIGNIFICANT CHANGE UP (ref 3.5–5.3)
PROT SERPL-MCNC: 7 G/DL — SIGNIFICANT CHANGE UP (ref 6–8.3)
PROT UR-MCNC: NEGATIVE — SIGNIFICANT CHANGE UP
RBC # BLD: 3.46 M/UL — LOW (ref 3.8–5.2)
RBC # FLD: 15.8 % — HIGH (ref 10.3–14.5)
RBC CASTS # UR COMP ASSIST: 6 /HPF — HIGH (ref 0–4)
SODIUM SERPL-SCNC: 135 MMOL/L — SIGNIFICANT CHANGE UP (ref 135–145)
SP GR SPEC: 1.01 — LOW (ref 1.01–1.02)
UROBILINOGEN FLD QL: NEGATIVE — SIGNIFICANT CHANGE UP
WBC # BLD: 7.35 K/UL — SIGNIFICANT CHANGE UP (ref 3.8–10.5)
WBC # FLD AUTO: 7.35 K/UL — SIGNIFICANT CHANGE UP (ref 3.8–10.5)
WBC UR QL: 34 /HPF — HIGH (ref 0–5)

## 2020-02-06 PROCEDURE — 99232 SBSQ HOSP IP/OBS MODERATE 35: CPT | Mod: GC

## 2020-02-06 PROCEDURE — 99233 SBSQ HOSP IP/OBS HIGH 50: CPT | Mod: GC

## 2020-02-06 PROCEDURE — 93010 ELECTROCARDIOGRAM REPORT: CPT

## 2020-02-06 RX ORDER — LANOLIN ALCOHOL/MO/W.PET/CERES
3 CREAM (GRAM) TOPICAL AT BEDTIME
Refills: 0 | Status: DISCONTINUED | OUTPATIENT
Start: 2020-02-06 | End: 2020-02-25

## 2020-02-06 RX ADMIN — ATORVASTATIN CALCIUM 80 MILLIGRAM(S): 80 TABLET, FILM COATED ORAL at 21:08

## 2020-02-06 RX ADMIN — INSULIN GLARGINE 5 UNIT(S): 100 INJECTION, SOLUTION SUBCUTANEOUS at 21:17

## 2020-02-06 RX ADMIN — MIRTAZAPINE 3.75 MILLIGRAM(S): 45 TABLET, ORALLY DISINTEGRATING ORAL at 21:08

## 2020-02-06 RX ADMIN — MONTELUKAST 10 MILLIGRAM(S): 4 TABLET, CHEWABLE ORAL at 17:06

## 2020-02-06 RX ADMIN — Medication 3 MILLIGRAM(S): at 21:08

## 2020-02-06 RX ADMIN — HEPARIN SODIUM 5000 UNIT(S): 5000 INJECTION INTRAVENOUS; SUBCUTANEOUS at 17:06

## 2020-02-06 RX ADMIN — Medication 81 MILLIGRAM(S): at 11:45

## 2020-02-06 RX ADMIN — CHLORHEXIDINE GLUCONATE 1 APPLICATION(S): 213 SOLUTION TOPICAL at 00:30

## 2020-02-06 RX ADMIN — Medication 37 MICROGRAM(S): at 21:33

## 2020-02-06 RX ADMIN — Medication 1: at 11:45

## 2020-02-06 RX ADMIN — Medication 100 MILLIEQUIVALENT(S): at 01:00

## 2020-02-06 RX ADMIN — Medication 1 TABLET(S): at 11:45

## 2020-02-06 RX ADMIN — CLOPIDOGREL BISULFATE 75 MILLIGRAM(S): 75 TABLET, FILM COATED ORAL at 11:45

## 2020-02-06 NOTE — PROGRESS NOTE ADULT - ASSESSMENT
89F with HTN, DM, hypothyroidism admitted after coming to the Emergency Department with generalized fatigue bilaterally pleural effusions and severe anemia. Course complicated by acute on chronic heart failure and cardiogenic shock. course also complicated by renal failure likely 2/2 to ATN, now improving.     Neuro  - no active issue at this time   - restarted home aspirin and plavix for TIA    Resp  -tolerating O2 2L. goal SpO2 >92%    Cards  #Acute on chronic heart failure with preserved EF  - monitor Is and Os, keep net negative. K>4, Mg>2. currently net negative 1L/day  - unknown etiology of heart failure and enlargement of RA/LV.   - repeat echocardiogram from 2/4: hyperdynamic LV, moderate MS, mild AS, severe RV enlargement with normal RV systolic function. moderate pulm HTN. EF 75%  - should receive RHC and LHC for further workup  - will get cardiac MRI once creatine returns to normal (now downtrending).     #Cardiogenic Shock  - likely due to beta blockers in the setting of existing severe HF  - received glucagon x3 and then briefly put on glucagon gtt in the CCU but started dry heaving  - off levophed, off vasopressin, SBP goal >90  - off bumex, net negative 2L/day  - HD from 2/5 5Am -> CO 3.91, CI 3.2, SVR 1350 with CVP of 7  - HD at ~9am 2/5 after dobutamine was turned off: CO 3.056 CI 2.567 SVR 1300  - hydralazine DC'd because SVR has been acceptable and BPs have been acceptable   - received 500cc yesterday, doing well now.     GI  -soft +consistent carb diet  - elevated LFTs likely 2/2 ADHF    ID  - no s/s of infection  - afebrile, negative RVP    Renal  #EFRAIN likely ATN 2/2 to cardiogenic shock  -creat 2.10, improved, unknown baseline   -making urine, now off bumex  -strict Is and Os   -replete electrolytes as needed  -monitor CMP   -nephro recs appreciated: f/u renal US, SPEP, SIFE, serum FLC, spot urine TP/CR    Heme  #Anemia  -likely multifactorial in nature, 2/2 to iron deficiency (given patient initially had failure to thrive) and also CKD  - s/p 2 units PRBC   - iron 28, TIBC 238 iron sat 11 ferritin 151.    - encourage oral intake  - Hb now improving to 10    Endo  #T2DM  - well controlled, hold off ISS    #Hypothyroidism  -c/w levothyroxine IV given bowel edema in the setting of ADHF  -will call endo to ask when to switch to oral levothyroxine     #DVT PPx  -Hep subQ 89F with HTN, DM, hypothyroidism admitted after coming to the Emergency Department with generalized fatigue bilaterally pleural effusions and severe anemia. Course complicated by acute on chronic heart failure and cardiogenic shock. course also complicated by renal failure likely 2/2 to ATN, now improving.     Neuro  - no active issue at this time   - restarted home aspirin and plavix for TIA    Resp  -tolerating O2 2L. goal SpO2 >92%    Cards  #Acute on chronic heart failure with preserved EF  - monitor Is and Os, keep net negative. K>4, Mg>2. currently net negative 1.3L/day  - unknown etiology of heart failure and enlargement of RA/LV.   - repeat echocardiogram from 2/4: hyperdynamic LV, moderate MS, mild AS, severe RV enlargement with normal RV systolic function. moderate pulm HTN. EF 75%  - outpatient Echo from 11/2019 obtained: shows LVEF of 58%, normal systolic function of LV. bi-atrial enlargement. aortic valve sclerosis with mild to mod AR, MV stenosis.  - should receive RHC and LHC in the future for further workup  - should get cardiac MRI once creatine returns to normal (now downtrending).   - Should also get LUIS F to assess valves and plan for possible intervention in the future when more optimized.     #Cardiogenic Shock  - likely due to beta blockers in the setting of existing severe HF  - received glucagon x3 and then briefly put on glucagon gtt in the CCU but started dry heaving  - off levophed, off vasopressin, SBP goal >90  - off bumex, net negative 1.3L/day  - HD from 2/5 5Am -> CO 3.91, CI 3.2, SVR 1350 with CVP of 7  - HD at ~9am 2/5 after dobutamine was turned off: CO 3.056 CI 2.567 SVR 1300  - hydralazine DC'd because SVR has been acceptable and patient has hyperdynamic LV and BPs have been acceptable   - received 500cc yesterday, doing well now.     GI  -soft +consistent carb diet  - elevated LFTs likely 2/2 ADHF    ID  - no s/s of infection  - afebrile, negative RVP    Renal  #EFRAIN likely ATN 2/2 to cardiogenic shock  -creat 2.10, improved, unknown baseline   -making urine, now off bumex  -strict Is and Os   -replete electrolytes as needed  -monitor CMP   -nephro recs appreciated: patient has M-spike on SPEP. will consult hematology once acute issues are resolved.     Heme  #Anemia  -likely multifactorial in nature, 2/2 to iron deficiency (given patient initially had failure to thrive) and also CKD  - s/p 2 units PRBC   - iron 28 (low), TIBC 238 iron sat 11(low) ferritin 151.    - encourage oral intake  - Hb now improving to 10. will continue to monitor for now, consider starting venofer 200 IV daily     Endo  #T2DM  - well controlled  - c/w sliding scale insulin    #Hypothyroidism  -c/w levothyroxine IV given bowel edema in the setting of ADHF  -will call endo to ask when to switch to oral levothyroxine     #DVT PPx  -Hep subQ

## 2020-02-06 NOTE — PROGRESS NOTE ADULT - PROBLEM SELECTOR PLAN 2
Pt. with low Hgb and iron deficiency. Recommend Venofer 200 mg IV x 5 days. Monitor Hgb.    [place]ano He  Nephrology Fellow  Cell: [phone] (from 8 am to 5 pm)  (After 5 pm or on weekends please page on-call fellow) Pt. with low Hgb and iron deficiency. Recommend Venofer 200 mg IV x 5 days or oral iron.  Monitor Hgb.    [place] He  Nephrology Fellow  Cell: [phone] (from 8 am to 5 pm)  (After 5 pm or on weekends please page on-call fellow)

## 2020-02-06 NOTE — PROGRESS NOTE ADULT - PROVIDER SPECIALTY LIST ADULT
CCU Contacted patient instructing her of MD result note below; verbalizes understanding.    Patient states she is currently taking 3 of her 100 mg tabs daily and will  the new 300 mg daily script once running out.    Ordered follow up lab work; patient aware, but will schedule lab appointment at a later time.

## 2020-02-06 NOTE — PROGRESS NOTE ADULT - SUBJECTIVE AND OBJECTIVE BOX
====================  CCU MIDNIGHT ROUNDS  ====================    JOHNNY COMBS  [id number]  Patient is a 89y old  Female who presents with a chief complaint of generalized weakness and body swelling (06 Feb 2020 13:45)      ====================  SUMMARY:  89F with HTN, DM, hypothyroidism admitted after coming to the Emergency Department with generalized fatigue, hardly getting out of bed, which the daughter states is unusual for her mom. She also c/o shortness of breath and swelling of upper and lower extremities. She had gone to urgent care today for extreme fatigue and HIGUERA for the last 2 days where a CXR done was notable for b/l pleural effusions and RML consolidation. She was sent to the Emergency Department. Denies fever, chills, cough, orthopnea, chest pain. She ambulates with a cane.  She has been living with her daughter for about 2 weeks now, her son is on a visit to [place], most of her medical care is in [place] including cardiology. Pt's daughter states the patient has some history of "valve problem". Does not take any water pills/diuretic. Currently comfortably lying flat in bed on the floor. The daughter states her mom has never been diagnosed with dementia, but her memory has been failing recently. The patient also has been c/o frequent protrusion of uterus thru vagina   Transferred to CCU for bradycardia and resp distress after receiving Labetalol 200mg.  ====================        ====================  NEW EVENTS:  NPO after midnight for LUIS F tomorrow    ====================        ====================  VITALS (Last 12 hrs):  ====================    T(C): 36.7 (02-06-20 @ 20:00), Max: 36.7 (02-06-20 @ 20:00)  T(F): 98.1 (02-06-20 @ 20:00), Max: 98.1 (02-06-20 @ 20:00)  HR: 84 (02-06-20 @ 22:00) (60 - 90)  BP: 100/53 (02-06-20 @ 22:00) (96/50 - 141/67)  BP(mean): 67 (02-06-20 @ 22:00) (51 - 102)  ABP: --  ABP(mean): --  RR: 21 (02-06-20 @ 22:00) (17 - 22)  SpO2: 100% (02-06-20 @ 22:00) (82% - 100%)  Wt(kg): --  CVP(mm Hg): --  CVP(cm H2O): --  CO: --  CI: --  PA: --  PA(mean): --  PCWP: --  SVR: --  PVR: --    I&O's Summary    05 Feb 2020 07:01  -  06 Feb 2020 07:00  --------------------------------------------------------  IN: 1560 mL / OUT: 2900 mL / NET: -1340 mL    06 Feb 2020 07:01  -  06 Feb 2020 22:35  --------------------------------------------------------  IN: 240 mL / OUT: 1300 mL / NET: -1060 mL            ====================  NEW LABS:  ====================                          10.0   7.35  )-----------( 236      ( 06 Feb 2020 04:16 )             30.3     02-06    135  |  90<L>  |  41<H>  ----------------------------<  94  4.5   |  33<H>  |  2.10<H>    Ca    9.7      06 Feb 2020 04:16  Phos  3.3     02-06  Mg     2.8     02-06    TPro  7.0  /  Alb  3.1<L>  /  TBili  1.1  /  DBili  x   /  AST  72<H>  /  ALT  52<H>  /  AlkPhos  118  02-06          ABG - ( 05 Feb 2020 15:26 )  pH, Arterial: 7.52  pH, Blood: x     /  pCO2: 42    /  pO2: 103   / HCO3: 34    / Base Excess: 10.4  /  SaO2: 99                    ====================  PLAN:  ====================  # ADHF, cardiogenic shock  - possibly due to BB toxicity s/p glucagon vs. hypothyroidism   - 2/2 TTE EF 40-45%, severe TR, RA enlargement. 2/4 rpt TTE EF 75% hyperdynamic LV, Mod MR, Severe RV enlargement.  - remains off pressors and inotropes for >24hrs. Off standing diuretics, appears euvolemic on exam. Remains HD stable.  - monitor I&Os, weights, lytes  - cMRI once Cr stabilized   - NPO after midnight for LUIS F 2/7  #Renal  - Currently in diuretic stage of EFRAIN. Creat downtrending- 2.1, continue to monitor  - Currently net negative 1060.   - U/A with + Leuk Esterase & many bacteria. Consider macrobid.   - continue to monitor lytes, replete as needed  #Endo  - continue ISS and lantus 5U at bedtime  - F/U endo for PO levothyroxine dose  - Continue synthroid IV, TSH downtrending and T4 wnl.       Joseline Dobson PA-C  CCU/Cardiology ====================  CCU MIDNIGHT ROUNDS  ====================    JOHNNY COMBS  [id number]  Patient is a 89y old  Female who presents with a chief complaint of generalized weakness and body swelling (06 Feb 2020 13:45)      ====================  SUMMARY:  89F with HTN, DM, hypothyroidism admitted after coming to the Emergency Department with generalized fatigue, hardly getting out of bed, which the daughter states is unusual for her mom. She also c/o shortness of breath and swelling of upper and lower extremities. She had gone to urgent care today for extreme fatigue and HIGUERA for the last 2 days where a CXR done was notable for b/l pleural effusions and RML consolidation. She was sent to the Emergency Department. Denies fever, chills, cough, orthopnea, chest pain. She ambulates with a cane.  She has been living with her daughter for about 2 weeks now, her son is on a visit to [place], most of her medical care is in [place] including cardiology. Pt's daughter states the patient has some history of "valve problem". Does not take any water pills/diuretic. Currently comfortably lying flat in bed on the floor. The daughter states her mom has never been diagnosed with dementia, but her memory has been failing recently. The patient also has been c/o frequent protrusion of uterus thru vagina   Transferred to CCU for bradycardia and resp distress after receiving Labetalol 200mg.  ====================        ====================  NEW EVENTS:  NPO after midnight for LUIS F tomorrow    ====================        ====================  VITALS (Last 12 hrs):  ====================    T(C): 36.7 (02-06-20 @ 20:00), Max: 36.7 (02-06-20 @ 20:00)  T(F): 98.1 (02-06-20 @ 20:00), Max: 98.1 (02-06-20 @ 20:00)  HR: 84 (02-06-20 @ 22:00) (60 - 90)  BP: 100/53 (02-06-20 @ 22:00) (96/50 - 141/67)  BP(mean): 67 (02-06-20 @ 22:00) (51 - 102)  ABP: --  ABP(mean): --  RR: 21 (02-06-20 @ 22:00) (17 - 22)  SpO2: 100% (02-06-20 @ 22:00) (82% - 100%)  Wt(kg): --  CVP(mm Hg): --  CVP(cm H2O): --  CO: --  CI: --  PA: --  PA(mean): --  PCWP: --  SVR: --  PVR: --    I&O's Summary    05 Feb 2020 07:01  -  06 Feb 2020 07:00  --------------------------------------------------------  IN: 1560 mL / OUT: 2900 mL / NET: -1340 mL    06 Feb 2020 07:01  -  06 Feb 2020 22:35  --------------------------------------------------------  IN: 240 mL / OUT: 1300 mL / NET: -1060 mL            ====================  NEW LABS:  ====================                          10.0   7.35  )-----------( 236      ( 06 Feb 2020 04:16 )             30.3     02-06    135  |  90<L>  |  41<H>  ----------------------------<  94  4.5   |  33<H>  |  2.10<H>    Ca    9.7      06 Feb 2020 04:16  Phos  3.3     02-06  Mg     2.8     02-06    TPro  7.0  /  Alb  3.1<L>  /  TBili  1.1  /  DBili  x   /  AST  72<H>  /  ALT  52<H>  /  AlkPhos  118  02-06          ABG - ( 05 Feb 2020 15:26 )  pH, Arterial: 7.52  pH, Blood: x     /  pCO2: 42    /  pO2: 103   / HCO3: 34    / Base Excess: 10.4  /  SaO2: 99                    ====================  PLAN:  ====================  # ADHF, cardiogenic shock  - possibly due to BB toxicity s/p glucagon vs. hypothyroidism   - 2/2 TTE EF 40-45%, severe TR, RA enlargement. 2/4 rpt TTE EF 75% hyperdynamic LV, Mod MR, Severe RV enlargement.  - remains off pressors and inotropes for >24hrs. Off standing diuretics, appears euvolemic on exam. Remains HD stable.  - monitor I&Os, weights, lytes  - cMRI once Cr stabilized   - NPO after midnight for LUIS F 2/7  #Renal  - Currently in diuretic stage of EFRAIN. Creat downtrending- 2.1, continue to monitor  - Currently net negative 1060.   - U/A with + Leuk Esterase & many bacteria. Will send culture, hold abx tx for now as asymptomatic   - continue to monitor lytes, replete as needed  #Endo  - continue ISS and lantus 5U at bedtime  - F/U endo for PO levothyroxine dose  - Continue synthroid IV, TSH downtrending and T4 wnl.       Joseline Dobson PA-C  CCU/Cardiology

## 2020-02-06 NOTE — PROGRESS NOTE ADULT - SUBJECTIVE AND OBJECTIVE BOX
Patient is a 89y old  Female who presents with a chief complaint of generalized weakness and body swelling (06 Feb 2020 09:20)      Any change in ROS: she is alert and awake and sitting on chair:     MEDICATIONS  (STANDING):  aspirin enteric coated 81 milliGRAM(s) Oral daily  atorvastatin 80 milliGRAM(s) Oral at bedtime  chlorhexidine 2% Cloths 1 Application(s) Topical <User Schedule>  clopidogrel Tablet 75 milliGRAM(s) Oral daily  heparin  Injectable 5000 Unit(s) SubCutaneous every 12 hours  insulin glargine Injectable (LANTUS) 5 Unit(s) SubCutaneous at bedtime  insulin lispro (HumaLOG) corrective regimen sliding scale   SubCutaneous three times a day before meals  insulin lispro (HumaLOG) corrective regimen sliding scale   SubCutaneous at bedtime  levothyroxine Injectable 37 MICROGram(s) IV Push at bedtime  mirtazapine 3.75 milliGRAM(s) Oral at bedtime  montelukast 10 milliGRAM(s) Oral every 24 hours  multivitamin/minerals 1 Tablet(s) Oral daily  pantoprazole    Tablet 40 milliGRAM(s) Oral before breakfast  sodium chloride 0.9%. 1000 milliLiter(s) (50 mL/Hr) IV Continuous <Continuous>    MEDICATIONS  (PRN):  glucagon  Injectable 1 milliGRAM(s) IntraMuscular once PRN Glucose LESS THAN 70 milligrams/deciliter    Vital Signs Last 24 Hrs  T(C): 36.4 (06 Feb 2020 08:00), Max: 36.7 (06 Feb 2020 03:00)  T(F): 97.6 (06 Feb 2020 08:00), Max: 98 (06 Feb 2020 03:00)  HR: 88 (06 Feb 2020 13:00) (74 - 98)  BP: 109/61 (06 Feb 2020 13:00) (94/55 - 124/59)  BP(mean): 84 (06 Feb 2020 13:00) (60 - 100)  RR: 22 (06 Feb 2020 13:00) (13 - 38)  SpO2: 98% (06 Feb 2020 13:00) (80% - 100%)    I&O's Summary    05 Feb 2020 07:01  -  06 Feb 2020 07:00  --------------------------------------------------------  IN: 1560 mL / OUT: 2900 mL / NET: -1340 mL    06 Feb 2020 07:01  -  06 Feb 2020 13:45  --------------------------------------------------------  IN: 120 mL / OUT: 500 mL / NET: -380 mL          Physical Exam:   GENERAL: NAD, well-groomed, well-developed  HEENT: KALPESH/   Atraumatic, Normocephalic  ENMT: No tonsillar erythema, exudates, or enlargement; Moist mucous membranes, Good dentition, No lesions  NECK: Supple, No JVD, Normal thyroid  CHEST/LUNG:decreased breath sounds at bases:   CVS: Regular rate and rhythm; No murmurs, rubs, or gallops  GI: : Soft, Nontender, Nondistended; Bowel sounds present  NERVOUS SYSTEM:  Alert & Oriented X3  EXTREMITIES:  2+ Peripheral Pulses, No clubbing, cyanosis, or edema  LYMPH: No lymphadenopathy noted  SKIN: No rashes or lesions  ENDOCRINOLOGY: No Thyromegaly  PSYCH: Appropriate    Labs:  ABG - ( 05 Feb 2020 15:26 )  pH, Arterial: 7.52  pH, Blood: x     /  pCO2: 42    /  pO2: 103   / HCO3: 34    / Base Excess: 10.4  /  SaO2: 99              35, 21, 21, 33, 21, 30, 31, 28, 21, 28, 28, 21, 21, RA, 21                            10.0   7.35  )-----------( 236      ( 06 Feb 2020 04:16 )             30.3                         9.8    6.74  )-----------( 243      ( 05 Feb 2020 20:19 )             30.7                         9.5    6.31  )-----------( 217      ( 05 Feb 2020 04:49 )             29.4                         9.0    5.99  )-----------( 219      ( 04 Feb 2020 19:53 )             28.0                         8.9    6.13  )-----------( 211      ( 04 Feb 2020 15:50 )             27.9                         8.9    5.35  )-----------( 210      ( 04 Feb 2020 06:04 )             28.0                         8.8    7.10  )-----------( 220      ( 03 Feb 2020 15:51 )             26.7                         8.9    7.48  )-----------( 215      ( 03 Feb 2020 06:11 )             27.4     02-06    135  |  90<L>  |  41<H>  ----------------------------<  94  4.5   |  33<H>  |  2.10<H>  02-05    134<L>  |  89<L>  |  42<H>  ----------------------------<  184<H>  3.5   |  31  |  2.32<H>  02-05    135  |  88<L>  |  39<H>  ----------------------------<  111<H>  3.4<L>   |  29  |  2.16<H>  02-04    131<L>  |  90<L>  |  39<H>  ----------------------------<  158<H>  4.0   |  29  |  2.30<H>  02-04    133<L>  |  91<L>  |  40<H>  ----------------------------<  259<H>  4.0   |  30  |  2.36<H>  02-04    133<L>  |  92<L>  |  39<H>  ----------------------------<  168<H>  3.6   |  27  |  2.33<H>  02-04    134<L>  |  95<L>  |  40<H>  ----------------------------<  216<H>  3.9   |  25  |  2.30<H>  02-03    133<L>  |  98  |  38<H>  ----------------------------<  192<H>  4.4   |  23  |  2.08<H>  02-03    133<L>  |  99  |  34<H>  ----------------------------<  158<H>  4.4   |  19<L>  |  2.02<H>  02-02    134<L>  |  101  |  31<H>  ----------------------------<  162<H>  4.6   |  18<L>  |  1.88<H>    Ca    9.7      06 Feb 2020 04:16  Ca    9.5      05 Feb 2020 20:19  Ca    9.2      05 Feb 2020 04:49  Ca    8.9      04 Feb 2020 19:53  Ca    9.0      04 Feb 2020 15:50  Phos  3.3     02-06  Phos  3.6     02-05  Mg     2.8     02-06  Mg     1.6     02-05    TPro  7.0  /  Alb  3.1<L>  /  TBili  1.1  /  DBili  x   /  AST  72<H>  /  ALT  52<H>  /  AlkPhos  118  02-06  TPro  7.1  /  Alb  3.0<L>  /  TBili  0.8  /  DBili  x   /  AST  77<H>  /  ALT  53<H>  /  AlkPhos  120  02-05  TPro  6.4  /  Alb  2.9<L>  /  TBili  0.9  /  DBili  x   /  AST  90<H>  /  ALT  66<H>  /  AlkPhos  117  02-05  TPro  6.8  /  Alb  3.1<L>  /  TBili  0.6  /  DBili  x   /  AST  127<H>  /  ALT  78<H>  /  AlkPhos  125<H>  02-04  TPro  6.3  /  Alb  2.9<L>  /  TBili  x   /  DBili  x   /  AST  x   /  ALT  x   /  AlkPhos  x   02-04  TPro  7.1  /  Alb  3.1<L>  /  TBili  0.8  /  DBili  x   /  AST  183<H>  /  ALT  97<H>  /  AlkPhos  136<H>  02-04    CAPILLARY BLOOD GLUCOSE      POCT Blood Glucose.: 189 mg/dL (06 Feb 2020 11:33)  POCT Blood Glucose.: 114 mg/dL (06 Feb 2020 08:10)  POCT Blood Glucose.: 232 mg/dL (05 Feb 2020 21:22)  POCT Blood Glucose.: 330 mg/dL (05 Feb 2020 17:16)  POCT Blood Glucose.: 301 mg/dL (05 Feb 2020 17:14)      LIVER FUNCTIONS - ( 06 Feb 2020 04:16 )  Alb: 3.1 g/dL / Pro: 7.0 g/dL / ALK PHOS: 118 U/L / ALT: 52 U/L / AST: 72 U/L / GGT: x               Lactate, Blood: 1.0 mmol/L (02-03 @ 15:51)        RECENT CULTURES:  02-02 @ 08:21 .Blood Blood-Peripheral     < from: Xray Chest 1 View- PORTABLE-Routine (02.03.20 @ 05:18) >    EXAM:  XR CHEST PORTABLE ROUTINE 1V                            PROCEDURE DATE:  02/03/2020            INTERPRETATION:  A single chest x-ray was obtained on February 3, 2020.    Indication: Hypoxemia.    Impression:    The heart is enlarged. Bilateral pleural effusion. Bibasilar pneumonia and/or atelectasis cannot be ruled out entirely. A central line is present on the right and the tip is in the superior vena cava. A left central line was removed. No pneumothorax. Calcified aortic knob. Degenerative changes of the thoracic spine.    < end of copied text >             No growth to date.          RESPIRATORY CULTURES:          Studies  Chest X-RAY  CT SCAN Chest   Venous Dopplers: LE:   CT Abdomen  Others

## 2020-02-06 NOTE — PROGRESS NOTE ADULT - PROBLEM SELECTOR PLAN 1
on dobutamine: has bilateral pleural effusions too : likely secondary to CHF: no pneumonia  2/5: she is doing much better: no SOB : no cough : she is alert and awake:  2/6: doing much better: ABG noted: with alkalosis and some co2 retention: no rep distress: echo noted: for further workup per car ds

## 2020-02-06 NOTE — PROGRESS NOTE ADULT - PROBLEM SELECTOR PLAN 4
hypotensive: on vasopressin  2/5: off pressors now: no SOB: alert and awake    2/6: off vasopressors: no SOB : BP reasonable

## 2020-02-06 NOTE — PROGRESS NOTE ADULT - ATTENDING COMMENTS
Patient is seen and examined with fellow, NP and the CCU house-staff. I agree with the history, physical and the assessment and plan.  plan for LUIS F to assess her valvular pathology  hold MRI until renal function is stable  euvolemic on physical exam Patient is seen and examined with fellow, NP and the CCU house-staff. I agree with the history, physical and the assessment and plan.  plan for LUIS F to assess her valvular pathology  hold MRI until renal function is stable  euvolemic on physical exam  asa and plavix resumed  repeat TTE results noted

## 2020-02-06 NOTE — PROGRESS NOTE ADULT - ATTENDING COMMENTS
Breathing wis seems OK: pretty hypotensive but getting better: cont blood transfusion and wean off vasopressors as tolerated:  2/3: breathing wise OK: on room air and in no respiratory distress  2/5: doing better: no SOB : no wheezing: no cough :  2/6: resp wise stable : repeat chst x-ray in 2-3 day s

## 2020-02-06 NOTE — PROGRESS NOTE ADULT - SUBJECTIVE AND OBJECTIVE BOX
Gopal Banks, PGY1 MD  Internal Medicine Resident    PATIENT:  MARISOL COMBS  [id number]    CHIEF COMPLAINT:  Patient is a 89y old  Female who presents with a chief complaint of generalized weakness and body swelling (2020 22:32)      INTERVAL HISTORY/OVERNIGHT EVENTS:      REVIEW OF SYSTEMS:    Constitutional:     [ ] negative [ ] fevers [ ] chills [ ] weight loss [ ] weight gain  HEENT:                  [ ] negative [ ] dry eyes [ ] eye irritation [ ] postnasal drip [ ] nasal congestion  CV:                         [ ] negative  [ ] chest pain [ ] orthopnea [ ] palpitations [ ] murmur  Resp:                     [ ] negative [ ] cough [ ] shortness of breath [ ] dyspnea [ ] wheezing [ ] sputum [ ] hemoptysis  GI:                          [ ] negative [ ] nausea [ ] vomiting [ ] diarrhea [ ] constipation [ ] abd pain [ ] dysphagia   :                        [ ] negative [ ] dysuria [ ] nocturia [ ] hematuria [ ] increased urinary frequency  Musculoskeletal: [ ] negative [ ] back pain [ ] myalgias [ ] arthralgias [ ] fracture  Skin:                       [ ] negative [ ] rash [ ] itch  Neurological:        [ ] negative [ ] headache [ ] dizziness [ ] syncope [ ] weakness [ ] numbness  Psychiatric:           [ ] negative [ ] anxiety [ ] depression  Endocrine:            [ ] negative [ ] diabetes [ ] thyroid problem  Heme/Lymph:      [ ] negative [ ] anemia [ ] bleeding problem  Allergic/Immune: [ ] negative [ ] itchy eyes [ ] nasal discharge [ ] hives [ ] angioedema    [ ] All other systems negative  [ ] Unable to assess ROS because ________.    MEDICATIONS:  MEDICATIONS  (STANDING):  aspirin enteric coated 81 milliGRAM(s) Oral daily  atorvastatin 80 milliGRAM(s) Oral at bedtime  chlorhexidine 2% Cloths 1 Application(s) Topical <User Schedule>  clopidogrel Tablet 75 milliGRAM(s) Oral daily  heparin  Injectable 5000 Unit(s) SubCutaneous every 12 hours  insulin glargine Injectable (LANTUS) 5 Unit(s) SubCutaneous at bedtime  insulin lispro (HumaLOG) corrective regimen sliding scale   SubCutaneous three times a day before meals  insulin lispro (HumaLOG) corrective regimen sliding scale   SubCutaneous at bedtime  levothyroxine Injectable 37 MICROGram(s) IV Push at bedtime  mirtazapine 3.75 milliGRAM(s) Oral at bedtime  montelukast 10 milliGRAM(s) Oral every 24 hours  multivitamin/minerals 1 Tablet(s) Oral daily  pantoprazole    Tablet 40 milliGRAM(s) Oral before breakfast  sodium chloride 0.9%. 1000 milliLiter(s) (50 mL/Hr) IV Continuous <Continuous>    MEDICATIONS  (PRN):  glucagon  Injectable 1 milliGRAM(s) IntraMuscular once PRN Glucose LESS THAN 70 milligrams/deciliter      ALLERGIES:  Allergies    penicillin (Hives)  penicillin (Unknown)    Intolerances        OBJECTIVE:  ICU Vital Signs Last 24 Hrs  T(C): 36.7 (2020 03:00), Max: 36.7 (2020 03:00)  T(F): 98 (2020 03:00), Max: 98 (2020 03:00)  HR: 78 (2020 07:00) (72 - 98)  BP: 111/56 (2020 07:00) (94/55 - 124/59)  BP(mean): 76 (2020 07:00) (60 - 89)  ABP: 114/40 (2020 16:05) (98/30 - 128/54)  ABP(mean): 68 (2020 16:05) (56 - 82)  RR: 13 (2020 07:00) (13 - 38)  SpO2: 98% (2020 04:00) (80% - 100%)      Adult Advanced Hemodynamics Last 24 Hrs  CVP(mm Hg): 0 (2020 03:00) (-9 - 8)  CVP(cm H2O): --  CO: --  CI: --  PA: --  PA(mean): --  PCWP: --  SVR: --  SVRI: --  PVR: --  PVRI: --  CAPILLARY BLOOD GLUCOSE      POCT Blood Glucose.: 232 mg/dL (2020 21:22)  POCT Blood Glucose.: 330 mg/dL (2020 17:16)  POCT Blood Glucose.: 301 mg/dL (2020 17:14)  POCT Blood Glucose.: 204 mg/dL (2020 11:27)  POCT Blood Glucose.: 115 mg/dL (2020 08:20)    CAPILLARY BLOOD GLUCOSE      POCT Blood Glucose.: 232 mg/dL (2020 21:22)    I&O's Summary    2020 07:01  -  2020 07:00  --------------------------------------------------------  IN: 1560 mL / OUT: 2900 mL / NET: -1340 mL      Daily     Daily Weight in k.3 (2020 06:30)    PHYSICAL EXAMINATION:  General: WN/WD NAD  HEENT: PERRLA, EOMI, moist mucous membranes  Neurology: A&Ox3, nonfocal, TONY x 4  Respiratory: CTA B/L, normal respiratory effort, no wheezes, crackles, rales  CV: RRR, S1S2, no murmurs, rubs or gallops  Abdominal: Soft, NT, ND +BS, Last BM  Extremities: No edema, + peripheral pulses  Incisions:   Tubes:    LABS:  ABG - ( 2020 15:26 )  pH, Arterial: 7.52  pH, Blood: x     /  pCO2: 42    /  pO2: 103   / HCO3: 34    / Base Excess: 10.4  /  SaO2: 99                                      10.0   7.35  )-----------( 236      ( 2020 04:16 )             30.3     02-06    135  |  90<L>  |  41<H>  ----------------------------<  94  4.5   |  33<H>  |  2.10<H>    Ca    9.7      2020 04:16  Phos  3.3     02-06  Mg     2.8     02-06    TPro  7.0  /  Alb  3.1<L>  /  TBili  1.1  /  DBili  x   /  AST  72<H>  /  ALT  52<H>  /  AlkPhos  118  02-06    LIVER FUNCTIONS - ( 2020 04:16 )  Alb: 3.1 g/dL / Pro: 7.0 g/dL / ALK PHOS: 118 U/L / ALT: 52 U/L / AST: 72 U/L / GGT: x           TELEMETRY:     EKG:     IMAGING: Gopal Banks, PGY1 MD  Internal Medicine Resident    PATIENT:  MARISOL COMBS  [id number]    CHIEF COMPLAINT:  Patient is a 89y old  Female who presents with a chief complaint of generalized weakness and body swelling (2020 22:32)      INTERVAL HISTORY/OVERNIGHT EVENTS:  -No acute events overnight. Patient continues to produce good urine and is net negative 1340. SBPs in the 110s. Creatinine improving to 2.10.    REVIEW OF SYSTEMS:    Constitutional:     [x] negative [ ] fevers [ ] chills [ ] weight loss [ ] weight gain  HEENT:                  [x] negative [ ] dry eyes [ ] eye irritation [ ] postnasal drip [ ] nasal congestion  CV:                         [x] negative  [ ] chest pain [ ] orthopnea [ ] palpitations [ ] murmur  Resp:                     [x] negative [ ] cough [ ] shortness of breath [ ] dyspnea [ ] wheezing [ ] sputum [ ] hemoptysis  GI:                          [x] negative [ ] nausea [ ] vomiting [ ] diarrhea [ ] constipation [ ] abd pain [ ] dysphagia   :                        [x] negative [ ] dysuria [ ] nocturia [ ] hematuria [ ] increased urinary frequency  Musculoskeletal: [x] negative [ ] back pain [ ] myalgias [ ] arthralgias [ ] fracture  Skin:                       [x] negative [ ] rash [ ] itch  Neurological:        [x] negative [ ] headache [ ] dizziness [ ] syncope [ ] weakness [ ] numbness  Psychiatric:           [x] negative [ ] anxiety [ ] depression  Endocrine:            [x] negative [ ] diabetes [ ] thyroid problem  Heme/Lymph:      [x] negative [ ] anemia [ ] bleeding problem  Allergic/Immune: [x] negative [ ] itchy eyes [ ] nasal discharge [ ] hives [ ] angioedema    [x] All other systems negative  [ ] Unable to assess ROS because ________.    MEDICATIONS:  MEDICATIONS  (STANDING):  aspirin enteric coated 81 milliGRAM(s) Oral daily  atorvastatin 80 milliGRAM(s) Oral at bedtime  chlorhexidine 2% Cloths 1 Application(s) Topical <User Schedule>  clopidogrel Tablet 75 milliGRAM(s) Oral daily  heparin  Injectable 5000 Unit(s) SubCutaneous every 12 hours  insulin glargine Injectable (LANTUS) 5 Unit(s) SubCutaneous at bedtime  insulin lispro (HumaLOG) corrective regimen sliding scale   SubCutaneous three times a day before meals  insulin lispro (HumaLOG) corrective regimen sliding scale   SubCutaneous at bedtime  levothyroxine Injectable 37 MICROGram(s) IV Push at bedtime  mirtazapine 3.75 milliGRAM(s) Oral at bedtime  montelukast 10 milliGRAM(s) Oral every 24 hours  multivitamin/minerals 1 Tablet(s) Oral daily  pantoprazole    Tablet 40 milliGRAM(s) Oral before breakfast  sodium chloride 0.9%. 1000 milliLiter(s) (50 mL/Hr) IV Continuous <Continuous>    MEDICATIONS  (PRN):  glucagon  Injectable 1 milliGRAM(s) IntraMuscular once PRN Glucose LESS THAN 70 milligrams/deciliter      ALLERGIES:  Allergies    penicillin (Hives)  penicillin (Unknown)    Intolerances        OBJECTIVE:  ICU Vital Signs Last 24 Hrs  T(C): 36.7 (2020 03:00), Max: 36.7 (2020 03:00)  T(F): 98 (2020 03:00), Max: 98 (2020 03:00)  HR: 78 (2020 07:00) (72 - 98)  BP: 111/56 (2020 07:00) (94/55 - 124/59)  BP(mean): 76 (2020 07:00) (60 - 89)  ABP: 114/40 (2020 16:05) (98/30 - 128/54)  ABP(mean): 68 (2020 16:05) (56 - 82)  RR: 13 (2020 07:00) (13 - 38)  SpO2: 98% (2020 04:00) (80% - 100%)      Adult Advanced Hemodynamics Last 24 Hrs  CVP(mm Hg): 0 (2020 03:00) (-9 - 8)  CVP(cm H2O): --  CO: --  CI: --  PA: --  PA(mean): --  PCWP: --  SVR: --  SVRI: --  PVR: --  PVRI: --  CAPILLARY BLOOD GLUCOSE      POCT Blood Glucose.: 232 mg/dL (2020 21:22)  POCT Blood Glucose.: 330 mg/dL (2020 17:16)  POCT Blood Glucose.: 301 mg/dL (2020 17:14)  POCT Blood Glucose.: 204 mg/dL (2020 11:27)  POCT Blood Glucose.: 115 mg/dL (2020 08:20)    CAPILLARY BLOOD GLUCOSE      POCT Blood Glucose.: 232 mg/dL (2020 21:22)    I&O's Summary    2020 07:01  -  2020 07:00  --------------------------------------------------------  IN: 1560 mL / OUT: 2900 mL / NET: -1340 mL      Daily     Daily Weight in k.3 (2020 06:30)    PHYSICAL EXAMINATION:  General: WN/WD NAD  HEENT: PERRLA, EOMI, moist mucous membranes  Neurology: A&Ox2, pleasant, nonfocal, TONY x 4  Respiratory: CTA B/L, normal respiratory effort, no wheezes, no crackles today, rales  CV: tricuspid regurg murmur. no JVD on exam today.   Abdominal: Soft, NT, ND +BS, Last BM  Extremities: no pitting edema, + peripheral pulses  Lines: only peripheral IVs      LABS:  ABG - ( 2020 15:26 )  pH, Arterial: 7.52  pH, Blood: x     /  pCO2: 42    /  pO2: 103   / HCO3: 34    / Base Excess: 10.4  /  SaO2: 99                                      10.0   7.35  )-----------( 236      ( 2020 04:16 )             30.3     02-06    135  |  90<L>  |  41<H>  ----------------------------<  94  4.5   |  33<H>  |  2.10<H>    Ca    9.7      2020 04:16  Phos  3.3     02-06  Mg     2.8     02-06    TPro  7.0  /  Alb  3.1<L>  /  TBili  1.1  /  DBili  x   /  AST  72<H>  /  ALT  52<H>  /  AlkPhos  118  02-06    LIVER FUNCTIONS - ( 2020 04:16 )  Alb: 3.1 g/dL / Pro: 7.0 g/dL / ALK PHOS: 118 U/L / ALT: 52 U/L / AST: 72 U/L / GGT: x           TELEMETRY:   no events  IMAGING:

## 2020-02-06 NOTE — PROGRESS NOTE ADULT - ATTENDING COMMENTS
EFRAIN: likely due to poor renal perfusion in the setting of shock  Serum creatinine noted to be normal in 2017. No values in 2018/2019. Admitted with serum creatinine of 1.4  Renal function improving with good urine output    No longer on diuretics/Pressors  Acidosis resolved. Transaminitis improving    Urine protein of 1.3 gms noted. SPEP/SIFE with IgG lambda. Heme evaluation when stable    will follow      Juancho Dickson MD  O:   C: [phone]

## 2020-02-07 DIAGNOSIS — E87.6 HYPOKALEMIA: ICD-10-CM

## 2020-02-07 LAB
ALBUMIN SERPL ELPH-MCNC: 3.2 G/DL — LOW (ref 3.3–5)
ALBUMIN SERPL ELPH-MCNC: 3.4 G/DL — SIGNIFICANT CHANGE UP (ref 3.3–5)
ALP SERPL-CCNC: 139 U/L — HIGH (ref 40–120)
ALP SERPL-CCNC: 148 U/L — HIGH (ref 40–120)
ALT FLD-CCNC: 49 U/L — HIGH (ref 10–45)
ALT FLD-CCNC: 53 U/L — HIGH (ref 10–45)
ANION GAP SERPL CALC-SCNC: 15 MMOL/L — SIGNIFICANT CHANGE UP (ref 5–17)
ANION GAP SERPL CALC-SCNC: 16 MMOL/L — SIGNIFICANT CHANGE UP (ref 5–17)
AST SERPL-CCNC: 107 U/L — HIGH (ref 10–40)
AST SERPL-CCNC: 91 U/L — HIGH (ref 10–40)
BILIRUB SERPL-MCNC: 1.2 MG/DL — SIGNIFICANT CHANGE UP (ref 0.2–1.2)
BILIRUB SERPL-MCNC: 1.4 MG/DL — HIGH (ref 0.2–1.2)
BUN SERPL-MCNC: 37 MG/DL — HIGH (ref 7–23)
BUN SERPL-MCNC: 38 MG/DL — HIGH (ref 7–23)
CALCIUM SERPL-MCNC: 10.1 MG/DL — SIGNIFICANT CHANGE UP (ref 8.4–10.5)
CALCIUM SERPL-MCNC: 10.3 MG/DL — SIGNIFICANT CHANGE UP (ref 8.4–10.5)
CHLORIDE SERPL-SCNC: 86 MMOL/L — LOW (ref 96–108)
CHLORIDE SERPL-SCNC: 86 MMOL/L — LOW (ref 96–108)
CO2 SERPL-SCNC: 33 MMOL/L — HIGH (ref 22–31)
CO2 SERPL-SCNC: 34 MMOL/L — HIGH (ref 22–31)
CREAT SERPL-MCNC: 1.97 MG/DL — HIGH (ref 0.5–1.3)
CREAT SERPL-MCNC: 1.97 MG/DL — HIGH (ref 0.5–1.3)
CULTURE RESULTS: SIGNIFICANT CHANGE UP
CULTURE RESULTS: SIGNIFICANT CHANGE UP
GLUCOSE BLDC GLUCOMTR-MCNC: 101 MG/DL — HIGH (ref 70–99)
GLUCOSE BLDC GLUCOMTR-MCNC: 197 MG/DL — HIGH (ref 70–99)
GLUCOSE BLDC GLUCOMTR-MCNC: 235 MG/DL — HIGH (ref 70–99)
GLUCOSE BLDC GLUCOMTR-MCNC: 47 MG/DL — LOW (ref 70–99)
GLUCOSE BLDC GLUCOMTR-MCNC: 47 MG/DL — LOW (ref 70–99)
GLUCOSE BLDC GLUCOMTR-MCNC: 74 MG/DL — SIGNIFICANT CHANGE UP (ref 70–99)
GLUCOSE SERPL-MCNC: 51 MG/DL — LOW (ref 70–99)
GLUCOSE SERPL-MCNC: 94 MG/DL — SIGNIFICANT CHANGE UP (ref 70–99)
HCT VFR BLD CALC: 31.1 % — LOW (ref 34.5–45)
HGB BLD-MCNC: 10 G/DL — LOW (ref 11.5–15.5)
INTERPRETATION SERPL IFE-IMP: SIGNIFICANT CHANGE UP
LACTATE SERPL-SCNC: 0.7 MMOL/L — SIGNIFICANT CHANGE UP (ref 0.7–2)
MAGNESIUM SERPL-MCNC: 2.1 MG/DL — SIGNIFICANT CHANGE UP (ref 1.6–2.6)
MAGNESIUM SERPL-MCNC: 2.1 MG/DL — SIGNIFICANT CHANGE UP (ref 1.6–2.6)
MCHC RBC-ENTMCNC: 28.2 PG — SIGNIFICANT CHANGE UP (ref 27–34)
MCHC RBC-ENTMCNC: 32.2 GM/DL — SIGNIFICANT CHANGE UP (ref 32–36)
MCV RBC AUTO: 87.6 FL — SIGNIFICANT CHANGE UP (ref 80–100)
NRBC # BLD: 0 /100 WBCS — SIGNIFICANT CHANGE UP (ref 0–0)
PHOSPHATE SERPL-MCNC: 3.9 MG/DL — SIGNIFICANT CHANGE UP (ref 2.5–4.5)
PHOSPHATE SERPL-MCNC: 3.9 MG/DL — SIGNIFICANT CHANGE UP (ref 2.5–4.5)
PLATELET # BLD AUTO: 227 K/UL — SIGNIFICANT CHANGE UP (ref 150–400)
POTASSIUM SERPL-MCNC: 3 MMOL/L — LOW (ref 3.5–5.3)
POTASSIUM SERPL-MCNC: 3.7 MMOL/L — SIGNIFICANT CHANGE UP (ref 3.5–5.3)
POTASSIUM SERPL-SCNC: 3 MMOL/L — LOW (ref 3.5–5.3)
POTASSIUM SERPL-SCNC: 3.7 MMOL/L — SIGNIFICANT CHANGE UP (ref 3.5–5.3)
PROT SERPL-MCNC: 7.7 G/DL — SIGNIFICANT CHANGE UP (ref 6–8.3)
PROT SERPL-MCNC: 7.8 G/DL — SIGNIFICANT CHANGE UP (ref 6–8.3)
RBC # BLD: 3.55 M/UL — LOW (ref 3.8–5.2)
RBC # FLD: 15.6 % — HIGH (ref 10.3–14.5)
SODIUM SERPL-SCNC: 134 MMOL/L — LOW (ref 135–145)
SODIUM SERPL-SCNC: 136 MMOL/L — SIGNIFICANT CHANGE UP (ref 135–145)
SPECIMEN SOURCE: SIGNIFICANT CHANGE UP
SPECIMEN SOURCE: SIGNIFICANT CHANGE UP
T4 FREE SERPL-MCNC: 1.2 NG/DL — SIGNIFICANT CHANGE UP (ref 0.9–1.8)
TSH SERPL-MCNC: 0.02 UIU/ML — LOW (ref 0.27–4.2)
WBC # BLD: 7.11 K/UL — SIGNIFICANT CHANGE UP (ref 3.8–10.5)
WBC # FLD AUTO: 7.11 K/UL — SIGNIFICANT CHANGE UP (ref 3.8–10.5)

## 2020-02-07 PROCEDURE — 99232 SBSQ HOSP IP/OBS MODERATE 35: CPT | Mod: GC

## 2020-02-07 PROCEDURE — 99291 CRITICAL CARE FIRST HOUR: CPT

## 2020-02-07 PROCEDURE — 93010 ELECTROCARDIOGRAM REPORT: CPT

## 2020-02-07 PROCEDURE — 99233 SBSQ HOSP IP/OBS HIGH 50: CPT | Mod: GC

## 2020-02-07 RX ORDER — POTASSIUM CHLORIDE 20 MEQ
40 PACKET (EA) ORAL EVERY 4 HOURS
Refills: 0 | Status: COMPLETED | OUTPATIENT
Start: 2020-02-07 | End: 2020-02-07

## 2020-02-07 RX ORDER — LEVOTHYROXINE SODIUM 125 MCG
50 TABLET ORAL DAILY
Refills: 0 | Status: DISCONTINUED | OUTPATIENT
Start: 2020-02-07 | End: 2020-02-25

## 2020-02-07 RX ORDER — POTASSIUM CHLORIDE 20 MEQ
20 PACKET (EA) ORAL
Refills: 0 | Status: DISCONTINUED | OUTPATIENT
Start: 2020-02-07 | End: 2020-02-07

## 2020-02-07 RX ADMIN — ATORVASTATIN CALCIUM 80 MILLIGRAM(S): 80 TABLET, FILM COATED ORAL at 21:27

## 2020-02-07 RX ADMIN — Medication 1 TABLET(S): at 12:41

## 2020-02-07 RX ADMIN — PANTOPRAZOLE SODIUM 40 MILLIGRAM(S): 20 TABLET, DELAYED RELEASE ORAL at 06:32

## 2020-02-07 RX ADMIN — CLOPIDOGREL BISULFATE 75 MILLIGRAM(S): 75 TABLET, FILM COATED ORAL at 12:40

## 2020-02-07 RX ADMIN — Medication 81 MILLIGRAM(S): at 12:39

## 2020-02-07 RX ADMIN — CHLORHEXIDINE GLUCONATE 1 APPLICATION(S): 213 SOLUTION TOPICAL at 06:32

## 2020-02-07 RX ADMIN — HEPARIN SODIUM 5000 UNIT(S): 5000 INJECTION INTRAVENOUS; SUBCUTANEOUS at 05:57

## 2020-02-07 RX ADMIN — Medication 3 MILLIGRAM(S): at 21:27

## 2020-02-07 RX ADMIN — HEPARIN SODIUM 5000 UNIT(S): 5000 INJECTION INTRAVENOUS; SUBCUTANEOUS at 17:24

## 2020-02-07 RX ADMIN — Medication 40 MILLIEQUIVALENT(S): at 05:57

## 2020-02-07 RX ADMIN — Medication 40 MILLIEQUIVALENT(S): at 12:41

## 2020-02-07 RX ADMIN — MONTELUKAST 10 MILLIGRAM(S): 4 TABLET, CHEWABLE ORAL at 17:26

## 2020-02-07 RX ADMIN — Medication 1: at 17:25

## 2020-02-07 RX ADMIN — MIRTAZAPINE 3.75 MILLIGRAM(S): 45 TABLET, ORALLY DISINTEGRATING ORAL at 21:26

## 2020-02-07 NOTE — PROGRESS NOTE ADULT - SUBJECTIVE AND OBJECTIVE BOX
[facility] DIVISION OF KIDNEY DISEASES AND HYPERTENSION -- FOLLOW UP NOTE  --------------------------------------------------------------------------------  HPI: 88 yo F with  HTN, DM, hypothyroidism admitted on 2/1/20 for generalized fatigue, found to be in cardiogenic shock, transferred to CCU for further management. Pt. started on diuretics. On admission Scr was 1.45, increased to 1.68 on 2/2/20. Scr elevated but improved to 1.97 this AM.     Pt. seen and examined at bedside this AM. Non-oliguric urine output in past 24 hours. States that she has LE tenderness. Otherwise denies CP, SOB, N/V/F/C.     PAST HISTORY  --------------------------------------------------------------------------------  No significant changes to PMH, PSH, FHx, SHx, unless otherwise noted    ALLERGIES & MEDICATIONS  --------------------------------------------------------------------------------  Allergies    penicillin (Hives)  penicillin (Unknown)    Intolerances    Standing Inpatient Medications  aspirin enteric coated 81 milliGRAM(s) Oral daily  atorvastatin 80 milliGRAM(s) Oral at bedtime  chlorhexidine 2% Cloths 1 Application(s) Topical <User Schedule>  clopidogrel Tablet 75 milliGRAM(s) Oral daily  heparin  Injectable 5000 Unit(s) SubCutaneous every 12 hours  insulin glargine Injectable (LANTUS) 5 Unit(s) SubCutaneous at bedtime  insulin lispro (HumaLOG) corrective regimen sliding scale   SubCutaneous three times a day before meals  insulin lispro (HumaLOG) corrective regimen sliding scale   SubCutaneous at bedtime  levothyroxine Injectable 37 MICROGram(s) IV Push at bedtime  melatonin 3 milliGRAM(s) Oral at bedtime  mirtazapine 3.75 milliGRAM(s) Oral at bedtime  montelukast 10 milliGRAM(s) Oral every 24 hours  multivitamin/minerals 1 Tablet(s) Oral daily  pantoprazole    Tablet 40 milliGRAM(s) Oral before breakfast  potassium chloride    Tablet ER 40 milliEquivalent(s) Oral every 4 hours  sodium chloride 0.9%. 1000 milliLiter(s) IV Continuous <Continuous>    REVIEW OF SYSTEMS  --------------------------------------------------------------------------------  Gen: + lethargy, + fatigue  Respiratory: No dyspnea  CV: No chest pain  GI: No abdominal pain  MSK: + LE tenderness  Neuro: No dizziness  Heme: No bleeding    All other systems were reviewed and are negative, except as noted.    VITALS/PHYSICAL EXAM  --------------------------------------------------------------------------------  T(C): 36.4 (02-07-20 @ 00:00), Max: 36.7 (02-06-20 @ 20:00)  HR: 78 (02-07-20 @ 06:00) (60 - 90)  BP: 105/71 (02-07-20 @ 06:00) (96/50 - 141/67)  RR: 16 (02-07-20 @ 06:00) (14 - 23)  SpO2: 96% (02-06-20 @ 23:00) (82% - 100%)  Wt(kg): --    02-06-20 @ 07:01 - 02-07-20 @ 07:00  --------------------------------------------------------  IN: 240 mL / OUT: 1500 mL / NET: -1260 mL    Physical Exam:  	Gen: NAD  	HEENT: Anicteric  	Pulm: Bibasilar crackles   	CV: RRR, S1S2; systolic murmur best heard at left sternal border   	Abd: +BS, soft, nontender/nondistended       	: No suprapubic tenderness  	MSK: Warm, b/l LE edema present (improved)  	Neuro: Awake, lethargic    LABS/STUDIES  --------------------------------------------------------------------------------              10.0   7.11  >-----------<  227      [02-07-20 @ 04:47]              31.1     134  |  86  |  38  ----------------------------<  51      [02-07-20 @ 04:47]  3.0   |  33  |  1.97        Ca     10.1     [02-07-20 @ 04:47]      Mg     2.1     [02-07-20 @ 04:47]      Phos  3.9     [02-07-20 @ 04:47]    Creatinine Trend:  SCr 1.97 [02-07 @ 04:47]  SCr 2.10 [02-06 @ 04:16]  SCr 2.32 [02-05 @ 20:19]  SCr 2.16 [02-05 @ 04:49]  SCr 2.30 [02-04 @ 19:53]    Urinalysis - [02-06-20 @ 18:02]      Color Light Yellow / Appearance Clear / SG 1.006 / pH 8.0      Gluc Negative / Ketone Negative  / Bili Negative / Urobili Negative       Blood Small / Protein Negative / Leuk Est Large / Nitrite Negative      RBC 6 / WBC 34 / Hyaline 1 / Gran  / Sq Epi  / Non Sq Epi 1 / Bacteria Many    Urine Creatinine 15      [02-05-20 @ 16:53]  Urine Protein 34      [02-05-20 @ 20:10]    Free Light Chains: kappa 13.36, lambda 11.64, ratio = 1.15      [02-05 @ 20:14]  Immunofixation Serum:   IgG Lambda Band Identified    Reference Range: None Detected      [02-04-20 @ 15:38]  SPEP Interpretation: Gamma-Migrating Paraprotein Identified      [02-04-20 @ 15:38]

## 2020-02-07 NOTE — PROGRESS NOTE ADULT - PROBLEM SELECTOR PLAN 3
Pt. with low serum potassium of 3.0 this AM. Recommend PO and IV repletion.      [place] He  Nephrology Fellow  Cell: [phone] (from 8 am to 5 pm)  (After 5 pm or on weekends please page on-call fellow)

## 2020-02-07 NOTE — PROGRESS NOTE ADULT - PROBLEM SELECTOR PLAN 1
TSH 49, TT3 43, TT4 4.1 on LT4 50 mcg.  Suspecting decreased absorption as reason for hypothyroidism as patient is adherent. Hypotensive and edematous in the setting of HF exacerbation, severe pHTN, and oliguria.    -currently on levothyroxine 37mcg IV q daily. repeat TSG 16, TT4 5.4, improving. Now most recent TSH 0,02Ft 1.2.   can dc Iv levothyroxine and restart oral levothyroxine 50mcg daily.   -Can repeat TFTs in 1 week TSH 49, TT3 43, TT4 4.1 on LT4 50 mcg.  Suspecting decreased absorption as reason for hypothyroidism as patient is adherent. Hypotensive and edematous in the setting of HF exacerbation, severe pHTN, and oliguria.    -currently on levothyroxine 37mcg IV q daily. repeat TSG 16, TT4 5.4, improving. Now most recent TSH 0.02 Ft4 1.2.   can dc Iv levothyroxine and restart oral levothyroxine 50mcg daily.   -Can repeat TFTs in 1 week

## 2020-02-07 NOTE — PROGRESS NOTE ADULT - ATTENDING COMMENTS
Shock of unclear etiology.  Delirium.  Appreciate Structural Heart team input.  Renal, pulmonary, endocrine, heme/onc consults appreciated.

## 2020-02-07 NOTE — PROGRESS NOTE ADULT - ATTENDING COMMENTS
Agree with assessment and plan as above by Dr. Gill. Reviewed all pertinent labs, glucose values, and imaging studies. Modifications made as indicated above.     Harrison Paniagua D.O  [phone]

## 2020-02-07 NOTE — PROGRESS NOTE ADULT - PROBLEM SELECTOR PLAN 4
hypotensive: on vasopressin  2/5: off pressors now: no SOB: alert and awake    2/6: off vasopressors: no SOB : BP reasonable  2/7: off pressors at this time: cont to monitor: off b blocklers

## 2020-02-07 NOTE — PROGRESS NOTE ADULT - ATTENDING COMMENTS
EFRAIN: likely due to poor renal perfusion in the setting of shock  Serum creatinine noted to be normal in 2017. No values in 2018/2019. Admitted with serum creatinine of 1.4  Renal function improving with good urine output    No longer on diuretics/Pressors  Acidosis resolved. Transaminitis improving    Urine protein of 1.3 gms noted. SPEP/SIFE with IgG lambda. Heme evaluation when stable    will follow      Juancho Dickson MD  O:   C: [phone]

## 2020-02-07 NOTE — PROGRESS NOTE ADULT - SUBJECTIVE AND OBJECTIVE BOX
Patient is a 89y old  Female who presents with a chief complaint of generalized weakness and body swelling (2020 08:11)      Any change in ROS:   Sleepy: no OSB: no  overnight resp events:   MEDICATIONS  (STANDING):  aspirin enteric coated 81 milliGRAM(s) Oral daily  atorvastatin 80 milliGRAM(s) Oral at bedtime  chlorhexidine 2% Cloths 1 Application(s) Topical <User Schedule>  clopidogrel Tablet 75 milliGRAM(s) Oral daily  heparin  Injectable 5000 Unit(s) SubCutaneous every 12 hours  insulin glargine Injectable (LANTUS) 5 Unit(s) SubCutaneous at bedtime  insulin lispro (HumaLOG) corrective regimen sliding scale   SubCutaneous three times a day before meals  insulin lispro (HumaLOG) corrective regimen sliding scale   SubCutaneous at bedtime  levothyroxine Injectable 37 MICROGram(s) IV Push at bedtime  melatonin 3 milliGRAM(s) Oral at bedtime  mirtazapine 3.75 milliGRAM(s) Oral at bedtime  montelukast 10 milliGRAM(s) Oral every 24 hours  multivitamin/minerals 1 Tablet(s) Oral daily  pantoprazole    Tablet 40 milliGRAM(s) Oral before breakfast  potassium chloride    Tablet ER 40 milliEquivalent(s) Oral every 4 hours  sodium chloride 0.9%. 1000 milliLiter(s) (50 mL/Hr) IV Continuous <Continuous>    MEDICATIONS  (PRN):  glucagon  Injectable 1 milliGRAM(s) IntraMuscular once PRN Glucose LESS THAN 70 milligrams/deciliter    Vital Signs Last 24 Hrs  T(C): 36.5 (2020 07:00), Max: 36.7 (2020 20:00)  T(F): 97.7 (2020 07:00), Max: 98.1 (2020 20:00)  HR: 82 (2020 11:00) (60 - 90)  BP: 109/57 (2020 11:00) (87/51 - 141/67)  BP(mean): 73 (2020 11:00) (51 - 102)  RR: 16 (2020 11:00) (14 - 23)  SpO2: 100% (2020 11:00) (82% - 100%)    I&O's Summary    2020 07:01  -  2020 07:00  --------------------------------------------------------  IN: 240 mL / OUT: 1500 mL / NET: -1260 mL    2020 07:01  -  2020 12:11  --------------------------------------------------------  IN: 0 mL / OUT: 200 mL / NET: -200 mL          Physical Exam:   GENERAL: NAD, well-groomed, well-developed  HEENT: KALPESH/   Atraumatic, Normocephalic  ENMT: No tonsillar erythema, exudates, or enlargement; Moist mucous membranes, Good dentition, No lesions  NECK: Supple, No JVD, Normal thyroid  CHEST/LUNG: Clear to auscultaion, ; No rales, rhonchi, wheezing, or rubs  CVS: decreased air entry bilateraly:   GI: : Soft, Nontender, Nondistended; Bowel sounds present  NERVOUS SYSTEM:  sleepy and not in reps dustress:  EXTREMITIES-  LYMPH: No lymphadenopathy noted  SKIN: No rashes or lesions  ENDOCRINOLOGY: No Thyromegaly  PSYCH: Appropriate    Labs:  ABG - ( 2020 15:26 )  pH, Arterial: 7.52  pH, Blood: x     /  pCO2: 42    /  pO2: 103   / HCO3: 34    / Base Excess: 10.4  /  SaO2: 99              35, 21, 21, 33, 21, 30, 31, 28, 21, 28, 28, 21, 21, RA, 21                            10.0   7.11  )-----------( 227      ( 2020 04:47 )             31.1                         10.0   7.35  )-----------( 236      ( 2020 04:16 )             30.3                         9.8    6.74  )-----------( 243      ( 2020 20:19 )             30.7                         9.5    6.31  )-----------( 217      ( 2020 04:49 )             29.4                         9.0    5.99  )-----------( 219      ( 2020 19:53 )             28.0                         8.9    6.13  )-----------( 211      ( 2020 15:50 )             27.9                         8.9    5.35  )-----------( 210      ( 2020 06:04 )             28.0                         8.8    7.10  )-----------( 220      ( 2020 15:51 )             26.7     02-07    136  |  86<L>  |  37<H>  ----------------------------<  94  3.7   |  34<H>  |  1.97<H>  02-07    134<L>  |  86<L>  |  38<H>  ----------------------------<  51<L>  3.0<L>   |  33<H>  |  1.97<H>  02-06    135  |  90<L>  |  41<H>  ----------------------------<  94  4.5   |  33<H>  |  2.10<H>  02-05    134<L>  |  89<L>  |  42<H>  ----------------------------<  184<H>  3.5   |  31  |  2.32<H>  02-05    135  |  88<L>  |  39<H>  ----------------------------<  111<H>  3.4<L>   |  29  |  2.16<H>  02-04    131<L>  |  90<L>  |  39<H>  ----------------------------<  158<H>  4.0   |  29  |  2.30<H>  02-04    133<L>  |  91<L>  |  40<H>  ----------------------------<  259<H>  4.0   |  30  |  2.36<H>  02-04    133<L>  |  92<L>  |  39<H>  ----------------------------<  168<H>  3.6   |  27  |  2.33<H>  02-04    134<L>  |  95<L>  |  40<H>  ----------------------------<  216<H>  3.9   |  25  |  2.30<H>  02-03    133<L>  |  98  |  38<H>  ----------------------------<  192<H>  4.4   |  23  |  2.08<H>    Ca    10.3      2020 08:51  Ca    10.1      2020 04:47  Ca    9.7      2020 04:16  Ca    9.5      2020 20:19  Phos  3.9     02-07  Phos  3.9     02-07  Phos  3.3     02-06  Phos  3.6     02-05  Mg     2.1     02-07  Mg     2.1     02-07  Mg     2.8     02-06  Mg     1.6     02-05    TPro  7.8  /  Alb  3.4  /  TBili  1.4<H>  /  DBili  x   /  AST  107<H>  /  ALT  53<H>  /  AlkPhos  148<H>  02-07  TPro  7.7  /  Alb  3.2<L>  /  TBili  1.2  /  DBili  x   /  AST  91<H>  /  ALT  49<H>  /  AlkPhos  139<H>  02-07  TPro  7.0  /  Alb  3.1<L>  /  TBili  1.1  /  DBili  x   /  AST  72<H>  /  ALT  52<H>  /  AlkPhos  118  02-06  TPro  7.1  /  Alb  3.0<L>  /  TBili  0.8  /  DBili  x   /  AST  77<H>  /  ALT  53<H>  /  AlkPhos  120  02-05  TPro  6.4  /  Alb  2.9<L>  /  TBili  0.9  /  DBili  x   /  AST  90<H>  /  ALT  66<H>  /  AlkPhos  117  02-05  TPro  6.8  /  Alb  3.1<L>  /  TBili  0.6  /  DBili  x   /  AST  127<H>  /  ALT  78<H>  /  AlkPhos  125<H>  -    CAPILLARY BLOOD GLUCOSE      POCT Blood Glucose.: 74 mg/dL (2020 11:27)  POCT Blood Glucose.: 101 mg/dL (2020 08:53)  POCT Blood Glucose.: 47 mg/dL (2020 08:19)  POCT Blood Glucose.: 47 mg/dL (2020 08:17)  POCT Blood Glucose.: 240 mg/dL (2020 21:05)  POCT Blood Glucose.: 105 mg/dL (2020 16:33)      LIVER FUNCTIONS - ( 2020 08:51 )  Alb: 3.4 g/dL / Pro: 7.8 g/dL / ALK PHOS: 148 U/L / ALT: 53 U/L / AST: 107 U/L / GGT: x             Urinalysis Basic - ( 2020 18:02 )    Color: Light Yellow / Appearance: Clear / S.006 / pH: x  Gluc: x / Ketone: Negative  / Bili: Negative / Urobili: Negative   Blood: x / Protein: Negative / Nitrite: Negative   Leuk Esterase: Large / RBC: 6 /hpf / WBC 34 /HPF   Sq Epi: x / Non Sq Epi: 1 /hpf / Bacteria: Many      Lactate, Blood: 0.7 mmol/L ( @ 04:47)  Lactate, Blood: 1.0 mmol/L ( @ 15:51)        RECENT CULTURES:   @ 08:21 .Blood Blood-Peripheral     < from: Xray Chest 1 View- PORTABLE-Routine (20 @ 05:18) >    EXAM:  XR CHEST PORTABLE ROUTINE 1V                            PROCEDURE DATE:  2020            INTERPRETATION:  A single chest x-ray was obtained on February 3, 2020.    Indication: Hypoxemia.    Impression:    The heart is enlarged. Bilateral pleural effusion. Bibasilar pneumonia and/or atelectasis cannot be ruled out entirely. A central line is present on the right and the tip is in the superior vena cava. A left central line was removed. No pneumothorax. Calcified aortic knob. Degenerative changes of the thoracic spine.                    TATE CYR M.D., ATTENDING RADIOLOGIST  This document has been electronically signed. Feb  3 2020  9:20AM              < end of copied text >             No growth at 5 days.          RESPIRATORY CULTURES:          Studies  Chest X-RAY  CT SCAN Chest   Venous Dopplers: LE:   CT Abdomen  Others

## 2020-02-07 NOTE — PROGRESS NOTE ADULT - SUBJECTIVE AND OBJECTIVE BOX
Chief Complaint: Hypothyroidism    History: Patient NPO today for possible LUIS F. Was hypoglycemic this AM to 40s. Last BG 70s and recommended 1/2 amp D50 for now while NPO to nurse at bedside. Patient w/o current complaints.    MEDICATIONS  (STANDING):  aspirin enteric coated 81 milliGRAM(s) Oral daily  atorvastatin 80 milliGRAM(s) Oral at bedtime  chlorhexidine 2% Cloths 1 Application(s) Topical <User Schedule>  clopidogrel Tablet 75 milliGRAM(s) Oral daily  heparin  Injectable 5000 Unit(s) SubCutaneous every 12 hours  insulin glargine Injectable (LANTUS) 5 Unit(s) SubCutaneous at bedtime  insulin lispro (HumaLOG) corrective regimen sliding scale   SubCutaneous three times a day before meals  insulin lispro (HumaLOG) corrective regimen sliding scale   SubCutaneous at bedtime  levothyroxine Injectable 37 MICROGram(s) IV Push at bedtime  melatonin 3 milliGRAM(s) Oral at bedtime  mirtazapine 3.75 milliGRAM(s) Oral at bedtime  montelukast 10 milliGRAM(s) Oral every 24 hours  multivitamin/minerals 1 Tablet(s) Oral daily  pantoprazole    Tablet 40 milliGRAM(s) Oral before breakfast  sodium chloride 0.9%. 1000 milliLiter(s) (50 mL/Hr) IV Continuous <Continuous>    MEDICATIONS  (PRN):  glucagon  Injectable 1 milliGRAM(s) IntraMuscular once PRN Glucose LESS THAN 70 milligrams/deciliter      Allergies    penicillin (Hives)  penicillin (Unknown)    Intolerances      Review of Systems:    ALL OTHER SYSTEMS REVIEWED AND NEGATIVE      PHYSICAL EXAM:  VITALS: T(C): 36.8 (02-07-20 @ 13:30)  T(F): 98.2 (02-07-20 @ 13:30), Max: 98.2 (02-07-20 @ 13:30)  HR: 86 (02-07-20 @ 14:00) (60 - 92)  BP: 100/60 (02-07-20 @ 14:00) (87/51 - 141/67)  RR:  (11 - 28)  SpO2:  (82% - 100%)  Wt(kg): --  GENERAL: NAD, well-groomed, well-developed  HEENT:  Atraumatic, Normocephalic, moist mucous membranes   GI: Soft, nontender/ND, +bs  SKIN: Dry, intact, No rashes or lesions. no LE edema  PSYCH: AAO X 3, reactive affect    POCT Blood Glucose.: 74 mg/dL (02-07-20 @ 11:27)  POCT Blood Glucose.: 101 mg/dL (02-07-20 @ 08:53)  POCT Blood Glucose.: 47 mg/dL (02-07-20 @ 08:19)  POCT Blood Glucose.: 47 mg/dL (02-07-20 @ 08:17)  POCT Blood Glucose.: 240 mg/dL (02-06-20 @ 21:05)  POCT Blood Glucose.: 105 mg/dL (02-06-20 @ 16:33)  POCT Blood Glucose.: 189 mg/dL (02-06-20 @ 11:33)  POCT Blood Glucose.: 114 mg/dL (02-06-20 @ 08:10)  POCT Blood Glucose.: 232 mg/dL (02-05-20 @ 21:22)  POCT Blood Glucose.: 330 mg/dL (02-05-20 @ 17:16)  POCT Blood Glucose.: 301 mg/dL (02-05-20 @ 17:14)  POCT Blood Glucose.: 204 mg/dL (02-05-20 @ 11:27)  POCT Blood Glucose.: 115 mg/dL (02-05-20 @ 08:20)  POCT Blood Glucose.: 158 mg/dL (02-04-20 @ 22:59)      02-07    136  |  86<L>  |  37<H>  ----------------------------<  94  3.7   |  34<H>  |  1.97<H>    EGFR if : 25<L>  EGFR if non : 22<L>    Ca    10.3      02-07  Mg     2.1     02-07  Phos  3.9     02-07    TPro  7.8  /  Alb  3.4  /  TBili  1.4<H>  /  DBili  x   /  AST  107<H>  /  ALT  53<H>  /  AlkPhos  148<H>  02-07          Thyroid Function Tests:  02-07 @ 08:08 TSH 0.02 FreeT4 1.2 T3 -- Anti TPO -- Anti Thyroglobulin Ab -- TSI --  02-05 @ 08:53 TSH 16.00 FreeT4 -- T3 -- Anti TPO -- Anti Thyroglobulin Ab -- TSI --      Hemoglobin A1C, Whole Blood: 5.7 % <H> [4.0 - 5.6] (02-02-20 @ 09:20)

## 2020-02-07 NOTE — CHART NOTE - NSCHARTNOTEFT_GEN_A_CORE
CCU Transfer Note    Transfer from: CCU    Transfer to: ( ) Medicine    (  ) Telemetry    (  ) RCU                               (  ) Palliative    (  ) Stroke Unit    (  ) MICU    (  ) __________________    Accepting Physician:    Signout given to:     HPI / CCU COURSE:    89F with HTN, DM, hypothyroidism admitted after coming to the Emergency Department with generalized fatigue, hardly getting out of bed, which the daughter states is unusual for her mom. She also c/o shortness of breath and swelling of upper and lower extremities. She had gone to urgent care today for extreme fatigue and HIGUERA for the last 2 days where a CXR done was notable for b/l pleural effusions and RML consolidation. She was sent to the Emergency Department. Denies fever, chills, cough, orthopnea, chest pain. She ambulates with a cane.  She has been living with her daughter for about 2 weeks now, her son is on a visit to [place], most of her medical care is in [place] including cardiology. Pt's daughter states the patient has some history of "valve problem". Does not take any water pills/diuretic. Currently comfortably lying flat in bed on the floor. The daughter states her mom has never been diagnosed with dementia, but her memory has been failing recently. The patient also has been c/o frequent protrusion of uterus thru vagina.    After admission, patient was given furosemide 40mg IV one time for bilateral pleural effusions. Patient was also started on labetalol 200 BID (SBPs were in the 170s). RRT was called for unresponsiveness. Patient was found to be have worsening pleural effusions and JVD and elevated lactate to 7.7. At that tinme, patients SBP was 60s and she was SOB. There was a concern for cardiogenic shock dude to intolerance of labetalol. Patient was started on levophed and dobutamine and started on Non-rebreather and transferred to the CCU for further management.     In the CCU, patient was started on dobutamine gtt and lasix gtt. Patient initially did not respond to lasix gtt and switched to bumex gtt. She initially did not respond and a shiley was placed for possible HD. However, patient responded well and started diuresing well. Patient's dobutamine and pressors was weaned off and patient continued to have good urine output. Patient's creatinine also started trending down. Repeat TTE showed hyperdynamic LV,  EF 75%, Moderate mitral stenosis due to annular and leaflet calcification, Mild aortic stenosis, Severe right ventricular enlargement with normal right ventricular systolic function and Moderate pulmonary hypertension. Patient creatinine also improved. Patient's levothyroxine was initially IV but eventually converted to oral levothyroxine. Patient also developed ICU delirium and started on melatonin 3mg at night and given frequent redirection. Patient was hemodynamically stable for transfer to the floors.     Vital Signs Last 24 Hrs  T(C): 36.8 (07 Feb 2020 13:30), Max: 36.8 (07 Feb 2020 13:30)  T(F): 98.2 (07 Feb 2020 13:30), Max: 98.2 (07 Feb 2020 13:30)  HR: 86 (07 Feb 2020 14:00) (60 - 92)  BP: 100/60 (07 Feb 2020 14:00) (87/51 - 141/67)  BP(mean): 78 (07 Feb 2020 14:00) (61 - 102)  RR: 18 (07 Feb 2020 14:00) (11 - 28)  SpO2: 100% (07 Feb 2020 14:00) (82% - 100%)    I&O's Summary    06 Feb 2020 07:01  -  07 Feb 2020 07:00  --------------------------------------------------------  IN: 240 mL / OUT: 1500 mL / NET: -1260 mL    07 Feb 2020 07:01  -  07 Feb 2020 17:21  --------------------------------------------------------  IN: 0 mL / OUT: 400 mL / NET: -400 mL        PHYSICAL EXAMINATION:  General: WN/WD NAD  HEENT: PERRLA, EOMI, moist mucous membranes  Neurology: A&Ox2-3, pleasant, nonfocal, TONY x 4  Respiratory: CTA B/L, normal respiratory effort, no wheezes, no crackles today, rales  CV: tricuspid regurg murmur. no JVD on exam today.   Abdominal: Soft, NT, ND +BS, Last BM  Extremities: no pitting edema, + peripheral pulses  Lines: only peripheral IVs    LABS:                               10.0   7.11  )-----------( 227      ( 07 Feb 2020 04:47 )             31.1       02-07    136  |  86<L>  |  37<H>  ----------------------------<  94  3.7   |  34<H>  |  1.97<H>    Ca    10.3      07 Feb 2020 08:51  Phos  3.9     02-07  Mg     2.1     02-07    TPro  7.8  /  Alb  3.4  /  TBili  1.4<H>  /  DBili  x   /  AST  107<H>  /  ALT  53<H>  /  AlkPhos  148<H>  02-07    ECG:    Telemetry:    Imaging:      ASSESSMENT & PLAN:   89F with HTN, DM, hypothyroidism admitted after coming to the Emergency Department with generalized fatigue bilaterally pleural effusions and severe anemia. Course complicated by acute on chronic heart failure and cardiogenic shock. course also complicated by renal failure likely 2/2 to ATN, now improving.     Neuro  - no active issue at this time   - restarted home aspirin and plavix for TIA  - worsening confusion likely 2/2 to icu delirium. c/w melatonin and remeron. frequent re-direction. f/u UCx given she has no urinary symptoms.     Resp  -no active issues    Cards  #Acute on chronic heart failure with preserved EF  - monitor Is and Os, keep net negative. K>4, Mg>2. currently net negative 1.3L/day  - unknown etiology of heart failure and enlargement of RA/LV.   - repeat echocardiogram from 2/4: hyperdynamic LV, moderate MS, mild AS, severe RV enlargement with normal RV systolic function. moderate pulm HTN. EF 75%  - outpatient Echo from 11/2019 obtained: shows LVEF of 58%, normal systolic function of LV. bi-atrial enlargement. aortic valve sclerosis with mild to mod AR, MV stenosis.  - should receive RHC and LHC in the future for further workup of her pulm hypertension  - should get cardiac MRI to assess for TTR amyloidosis once creatinine returns to normal (now downtrending).     #Cardiogenic Shock  - likely due to beta blockers in the setting of existing severe HF  - received glucagon x3 and then briefly put on glucagon gtt in the CCU but started dry heaving  - off all pressors and bumex gtt. SBP goal >90  - HD from 2/5 5Am -> CO 3.91, CI 3.2, SVR 1350 with CVP of 7  - HD at ~9am 2/5 after dobutamine was turned off: CO 3.056 CI 2.567 SVR 1300  - hydralazine DC'd because SVR has been acceptable and patient has hyperdynamic LV and BPs have been acceptable     GI  - soft +consistent carb diet  - elevated LFTs likely 2/2 ADHF    ID  - no s/s of infection  - afebrile, negative RVP  - UA had + large leuk esterase and - nitrates, many bacteria and 34 WBC. given she has no urinary symptoms, no white count, and no fevers, will await UCx before treating with antibiotics.     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 will consult hematology once acute issues are resolved.     Heme  #Anemia  -likely multifactorial in nature, 2/2 to iron deficiency (given patient initially had failure to thrive) and also CKD  - s/p 2 units PRBC   - iron 28 (low), TIBC 238 iron sat 11(low) ferritin 151.    - encourage oral intake  - Hb now improving to 10. will continue to monitor for now, may add oral iron if hb doesn't improve    Endo  #T2DM  - well controlled  - endo recs appreciated: DC'd 5 lantus. only on ISS now.     #Hypothyroidism  -initially on levothyroxine IV given bowel edema in the setting of ADHF  -endo recs appreciated: levothyroxine 50mg oral daily     #DVT PPx  -Hep subQ    FOR FOLLOW UP:  [ ] trend Creatinine once creatinine stabilized, consider getting cardiac MRI to assess for TTR cardiac amyloidosis  [ ] repeat TFTs in one week and f/u endo recs  [ ] f/u UCx and consider starting antibiotics if positive CCU Transfer Note    Transfer from: CCU    Transfer to: ( ) Medicine    (  ) Telemetry    (  ) RCU                               (  ) Palliative    (  ) Stroke Unit    (  ) MICU    (  ) __________________    Accepting Physician:    Signout given to:     HPI / CCU COURSE:    89F with HTN, DM, hypothyroidism admitted after coming to the Emergency Department with generalized fatigue, hardly getting out of bed, which the daughter states is unusual for her mom. She also c/o shortness of breath and swelling of upper and lower extremities. She had gone to urgent care today for extreme fatigue and HIGUERA for the last 2 days where a CXR done was notable for b/l pleural effusions and RML consolidation. She was sent to the Emergency Department. Denies fever, chills, cough, orthopnea, chest pain. She ambulates with a cane.  She has been living with her daughter for about 2 weeks now, her son is on a visit to [place], most of her medical care is in [place] including cardiology. Pt's daughter states the patient has some history of "valve problem". Does not take any water pills/diuretic. Currently comfortably lying flat in bed on the floor. The daughter states her mom has never been diagnosed with dementia, but her memory has been failing recently. The patient also has been c/o frequent protrusion of uterus thru vagina.    After admission, patient was given furosemide 40mg IV one time for bilateral pleural effusions. Patient was also started on labetalol 200 BID (SBPs were in the 170s). RRT was called for unresponsiveness. Patient was found to be have worsening pleural effusions and JVD and elevated lactate to 7.7. At that tinme, patients SBP was 60s and she was SOB. There was a concern for cardiogenic shock dude to intolerance of labetalol. Patient was started on levophed and dobutamine and started on Non-rebreather and transferred to the CCU for further management.     In the CCU, patient was started on dobutamine gtt and lasix gtt. Patient initially did not respond to lasix gtt and switched to bumex gtt. She initially did not respond and a shiley was placed for possible HD. However, patient responded well and started diuresing well. Patient's dobutamine and pressors was weaned off and patient continued to have good urine output. Patient's creatinine also started trending down. Repeat TTE showed hyperdynamic LV,  EF 75%, Moderate mitral stenosis due to annular and leaflet calcification, Mild aortic stenosis, Severe right ventricular enlargement with normal right ventricular systolic function and Moderate pulmonary hypertension. Patient creatinine also improved. Patient's levothyroxine was initially IV but eventually converted to oral levothyroxine. Patient also developed ICU delirium and started on melatonin 3mg at night and given frequent redirection. Patient was hemodynamically stable for transfer to the floors.     Vital Signs Last 24 Hrs  T(C): 36.8 (07 Feb 2020 13:30), Max: 36.8 (07 Feb 2020 13:30)  T(F): 98.2 (07 Feb 2020 13:30), Max: 98.2 (07 Feb 2020 13:30)  HR: 86 (07 Feb 2020 14:00) (60 - 92)  BP: 100/60 (07 Feb 2020 14:00) (87/51 - 141/67)  BP(mean): 78 (07 Feb 2020 14:00) (61 - 102)  RR: 18 (07 Feb 2020 14:00) (11 - 28)  SpO2: 100% (07 Feb 2020 14:00) (82% - 100%)    I&O's Summary    06 Feb 2020 07:01  -  07 Feb 2020 07:00  --------------------------------------------------------  IN: 240 mL / OUT: 1500 mL / NET: -1260 mL    07 Feb 2020 07:01  -  07 Feb 2020 17:21  --------------------------------------------------------  IN: 0 mL / OUT: 400 mL / NET: -400 mL        PHYSICAL EXAMINATION:  General: WN/WD NAD  HEENT: PERRLA, EOMI, moist mucous membranes  Neurology: A&Ox2-3, pleasant, nonfocal, TONY x 4  Respiratory: CTA B/L, normal respiratory effort, no wheezes, no crackles today, rales  CV: tricuspid regurg murmur. no JVD on exam today.   Abdominal: Soft, NT, ND +BS, Last BM  Extremities: no pitting edema, + peripheral pulses  Lines: only peripheral IVs    LABS:                               10.0   7.11  )-----------( 227      ( 07 Feb 2020 04:47 )             31.1       02-07    136  |  86<L>  |  37<H>  ----------------------------<  94  3.7   |  34<H>  |  1.97<H>    Ca    10.3      07 Feb 2020 08:51  Phos  3.9     02-07  Mg     2.1     02-07    TPro  7.8  /  Alb  3.4  /  TBili  1.4<H>  /  DBili  x   /  AST  107<H>  /  ALT  53<H>  /  AlkPhos  148<H>  02-07    ECG:    Telemetry:    Imaging:      ASSESSMENT & PLAN:   89F with HTN, DM, hypothyroidism admitted after coming to the Emergency Department with generalized fatigue bilaterally pleural effusions and severe anemia. Course complicated by acute on chronic heart failure and cardiogenic shock. course also complicated by renal failure likely 2/2 to ATN, now improving.     Neuro  - no active issue at this time   - restarted home aspirin and plavix for TIA  -Confusion improved today , most likely 2ry to icu delirium vs. dementia/sun-downing. Patient redirectable. Patient daughter states patient similar to baseline mental status currently.      Resp  -95 % on room air  - No active issues, denies SOB/dyspnea.    Cards  #Acute on chronic heart failure with preserved EF  - monitor Is and Os, keep net negative. K>4, Mg>2. currently net negative 1.3L/day  - unknown etiology of heart failure and enlargement of RA/LV.   - repeat echocardiogram from 2/4: hyperdynamic LV, moderate MS, mild AS, severe RV enlargement with normal RV systolic function. moderate pulm HTN. EF 75%  - outpatient Echo from 11/2019 obtained: shows LVEF of 58%, normal systolic function of LV. bi-atrial enlargement. aortic valve sclerosis with mild to mod AR, MV stenosis.  - No indication for Mitral valve intervention as per Structural Heart Team at this time.  - should receive RHC and LHC in the future for further workup of her pulm hypertension  - should get cardiac MRI to assess for TTR amyloidosis once creatinine returns to normal (now downtrending).     #Cardiogenic Shock  - likely due to beta blockers in the setting of existing severe HF  - received glucagon x3 and then briefly put on glucagon gtt in the CCU but started dry heaving  - off all pressors and bumex gtt. SBP goal >90  - HD from 2/5 5Am -> CO 3.91, CI 3.2, SVR 1350 with CVP of 7  - HD at ~9am 2/5 after dobutamine was turned off: CO 3.056 CI 2.567 SVR 1300  - hydralazine DC'd because SVR has been acceptable and patient has hyperdynamic LV and BPs have been acceptable     GI  - soft +consistent carb diet  - elevated LFTs likely 2/2 ADHF    ID  - no s/s of infection  - afebrile, negative RVP  - UA had + large leuk esterase and - nitrates, many bacteria and 34 WBC. given she has no urinary symptoms, no white count, and no fevers, will await UCx before treating with antibiotics.     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 will consult hematology once acute issues are resolved.   - f/u UCx, no abx given for +u/a as she has no urinary symptoms.     Heme  #Anemia  -likely multifactorial in nature, 2/2 to iron deficiency (given patient initially had failure to thrive) and also CKD  - s/p 2 units PRBC   - iron 28 (low), TIBC 238 iron sat 11(low) ferritin 151.    - encourage oral intake  - Hb now improving to 10. will continue to monitor for now, may add oral iron if hb doesn't improve    Endo  #T2DM  - well controlled  - endo recs appreciated: DC'd 5 lantus. only on ISS now.     #Hypothyroidism  -initially on levothyroxine IV given bowel edema in the setting of ADHF  -endo recs appreciated: levothyroxine 50mg oral daily     #DVT PPx  -Hep subQ    FOR FOLLOW UP:  [ ] trend Creatinine once creatinine stabilized, consider getting cardiac MRI to assess for TTR cardiac amyloidosis  [ ] repeat TFTs in one week and f/u endo recs  [ ] f/u UCx and consider starting antibiotics if positive CCU Transfer Note    Transfer from: CCU    Transfer to: 3 DSU    Accepting Physician:    Signout given to:     HPI / CCU COURSE:    89F with HTN, DM, hypothyroidism admitted after coming to the Emergency Department with generalized fatigue, hardly getting out of bed, which the daughter states is unusual for her mom. She also c/o shortness of breath and swelling of upper and lower extremities. She had gone to urgent care today for extreme fatigue and HIGUERA for the last 2 days where a CXR done was notable for b/l pleural effusions and RML consolidation. She was sent to the Emergency Department. Denies fever, chills, cough, orthopnea, chest pain. She ambulates with a cane.  She has been living with her daughter for about 2 weeks now, her son is on a visit to [place], most of her medical care is in [place] including cardiology. Pt's daughter states the patient has some history of "valve problem". Does not take any water pills/diuretic. Currently comfortably lying flat in bed on the floor. The daughter states her mom has never been diagnosed with dementia, but her memory has been failing recently. The patient also has been c/o frequent protrusion of uterus thru vagina.    After admission, patient was given furosemide 40mg IV one time for bilateral pleural effusions. Patient was also started on labetalol 200 BID (SBPs were in the 170s). RRT was called for unresponsiveness. Patient was found to be have worsening pleural effusions and JVD and elevated lactate to 7.7. At that tinme, patients SBP was 60s and she was SOB. There was a concern for cardiogenic shock dude to intolerance of labetalol. Patient was started on levophed and dobutamine and started on Non-rebreather and transferred to the CCU for further management.     In the CCU, patient was started on dobutamine gtt and lasix gtt. Patient initially did not respond to lasix gtt and switched to bumex gtt. She initially did not respond and a shiley was placed for possible HD. However, patient responded well and started diuresing well. Patient's dobutamine and pressors was weaned off and patient continued to have good urine output. Patient's creatinine also started trending down. Repeat TTE showed hyperdynamic LV,  EF 75%, Moderate mitral stenosis due to annular and leaflet calcification, Mild aortic stenosis, Severe right ventricular enlargement with normal right ventricular systolic function and Moderate pulmonary hypertension. Patient creatinine also improved. Patient's levothyroxine was initially IV but eventually converted to oral levothyroxine. Patient also developed ICU delirium and started on melatonin 3mg at night and given frequent redirection. Patient was hemodynamically stable for transfer to the floors.     Vital Signs Last 24 Hrs  T(C): 36.8 (07 Feb 2020 13:30), Max: 36.8 (07 Feb 2020 13:30)  T(F): 98.2 (07 Feb 2020 13:30), Max: 98.2 (07 Feb 2020 13:30)  HR: 86 (07 Feb 2020 14:00) (60 - 92)  BP: 100/60 (07 Feb 2020 14:00) (87/51 - 141/67)  BP(mean): 78 (07 Feb 2020 14:00) (61 - 102)  RR: 18 (07 Feb 2020 14:00) (11 - 28)  SpO2: 100% (07 Feb 2020 14:00) (82% - 100%)    I&O's Summary    06 Feb 2020 07:01  -  07 Feb 2020 07:00  --------------------------------------------------------  IN: 240 mL / OUT: 1500 mL / NET: -1260 mL    07 Feb 2020 07:01  -  07 Feb 2020 17:21  --------------------------------------------------------  IN: 0 mL / OUT: 400 mL / NET: -400 mL        PHYSICAL EXAMINATION:  General: WN/WD NAD  HEENT: PERRLA, EOMI, moist mucous membranes  Neurology: A&Ox2-3, pleasant, nonfocal, TONY x 4  Respiratory: CTA B/L, normal respiratory effort, no wheezes, no crackles today, rales  CV: tricuspid regurg murmur. no JVD on exam today.   Abdominal: Soft, NT, ND +BS, Last BM  Extremities: no pitting edema, + peripheral pulses  Lines: only peripheral IVs    LABS:                               10.0   7.11  )-----------( 227      ( 07 Feb 2020 04:47 )             31.1       02-07    136  |  86<L>  |  37<H>  ----------------------------<  94  3.7   |  34<H>  |  1.97<H>    Ca    10.3      07 Feb 2020 08:51  Phos  3.9     02-07  Mg     2.1     02-07    TPro  7.8  /  Alb  3.4  /  TBili  1.4<H>  /  DBili  x   /  AST  107<H>  /  ALT  53<H>  /  AlkPhos  148<H>  02-07    ECG:    Telemetry:    Imaging:      ASSESSMENT & PLAN:   89F with HTN, DM, hypothyroidism admitted after coming to the Emergency Department with generalized fatigue bilaterally pleural effusions and severe anemia. Course complicated by acute on chronic heart failure and cardiogenic shock. course also complicated by renal failure likely 2/2 to ATN, now improving.     Neuro  - no active issue at this time   - restarted home aspirin and plavix for TIA  -Confusion improved today , most likely 2ry to icu delirium vs. dementia/sun-downing. Patient redirectable. Patient daughter states patient similar to baseline mental status currently.      Resp  -95 % on room air  - No active issues, denies SOB/dyspnea.    Cards  #Acute on chronic heart failure with preserved EF  - monitor Is and Os, keep net negative. K>4, Mg>2. currently net negative 1.3L/day  - unknown etiology of heart failure and enlargement of RA/LV.   - repeat echocardiogram from 2/4: hyperdynamic LV, moderate MS, mild AS, severe RV enlargement with normal RV systolic function. moderate pulm HTN. EF 75%  - outpatient Echo from 11/2019 obtained: shows LVEF of 58%, normal systolic function of LV. bi-atrial enlargement. aortic valve sclerosis with mild to mod AR, MV stenosis.  - No indication for Mitral valve intervention as per Structural Heart Team at this time.  - should receive RHC and LHC in the future for further workup of her pulm hypertension  - should get cardiac MRI to assess for TTR amyloidosis once creatinine returns to normal (now downtrending).     #Cardiogenic Shock  - likely due to beta blockers in the setting of existing severe HF  - received glucagon x3 and then briefly put on glucagon gtt in the CCU but started dry heaving  - off all pressors and bumex gtt. SBP goal >90  - HD from 2/5 5Am -> CO 3.91, CI 3.2, SVR 1350 with CVP of 7  - HD at ~9am 2/5 after dobutamine was turned off: CO 3.056 CI 2.567 SVR 1300  - hydralazine DC'd because SVR has been acceptable and patient has hyperdynamic LV and BPs have been acceptable     GI  - soft +consistent carb diet  - elevated LFTs likely 2/2 ADHF    ID  - no s/s of infection  - afebrile, negative RVP  - UA had + large leuk esterase and - nitrates, many bacteria and 34 WBC. given she has no urinary symptoms, no white count, and no fevers, will await UCx before treating with antibiotics.     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 will consult hematology once acute issues are resolved.   - f/u UCx, no abx given for +u/a as she has no urinary symptoms.     Heme  #Anemia  -likely multifactorial in nature, 2/2 to iron deficiency (given patient initially had failure to thrive) and also CKD  - s/p 2 units PRBC   - iron 28 (low), TIBC 238 iron sat 11(low) ferritin 151.    - encourage oral intake  - Hb now improving to 10. will continue to monitor for now, may add oral iron if hb doesn't improve    Endo  #T2DM  - well controlled  - endo recs appreciated: DC'd 5 lantus. only on ISS now.     #Hypothyroidism  -initially on levothyroxine IV given bowel edema in the setting of ADHF  -endo recs appreciated: levothyroxine 50mg oral daily     #DVT PPx  -Hep subQ    FOR FOLLOW UP:  [ ] trend Creatinine once creatinine stabilized, consider getting cardiac MRI to assess for TTR cardiac amyloidosis  [ ] repeat TFTs in one week and f/u endo recs  [ ] f/u UCx and consider starting antibiotics if positive

## 2020-02-07 NOTE — CONSULT NOTE ADULT - SUBJECTIVE AND OBJECTIVE BOX
HPI:  89F with HTN, DM, hypothyroidism admitted after coming to the Emergency Department with generalized fatigue, hardly getting out of bed, She also c/o shortness of breath and swelling of upper and lower extremities. She had gone to urgent care for extreme fatigue and HIGUERA for 2 days where a CXR done was notable for b/l pleural effusions and RML consolidation. Course complicated by acute on chronic heart failure and cardiogenic shock. course also complicated by renal failure likely 2/2 to ATN, now improving. Hematology was consulted for anemia.           REVIEW OF SYSTEMS:  All other review of systems is negative unless indicated above.  Allergies    penicillin (Hives)  penicillin (Unknown)    Intolerances        PAST MEDICAL & SURGICAL HISTORY:  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  Diabetes  Hypothyroid  Hypertension  Hyperlipidemia  Hypothyroid  Osteoporosis  Gall stone  DM (diabetes mellitus)  History of gallstones  No significant past surgical history      FAMILY HISTORY:  No pertinent family history in first degree relatives  No pertinent family history in first degree relatives    VITAL SIGNS:  Vital Signs Last 24 Hrs  T(C): 36.8 (07 Feb 2020 13:30), Max: 36.8 (07 Feb 2020 13:30)  T(F): 98.2 (07 Feb 2020 13:30), Max: 98.2 (07 Feb 2020 13:30)  HR: 86 (07 Feb 2020 14:00) (60 - 92)  BP: 100/60 (07 Feb 2020 14:00) (87/51 - 141/67)  BP(mean): 78 (07 Feb 2020 14:00) (61 - 102)  RR: 18 (07 Feb 2020 14:00) (11 - 28)  SpO2: 100% (07 Feb 2020 14:00) (82% - 100%)      PHYSICAL EXAM:   General: WN/WD NAD  HEENT: PERRLA, EOMI, moist mucous membranes  Neurology: A&Ox2-3, pleasant, nonfocal, TONY x 4  Respiratory: CTA B/L, normal respiratory effort, no wheezes, no crackles today, rales  CV: tricuspid regurg murmur. no JVD on exam today.   Abdominal: Soft, NT, ND +BS, Last BM  Extremities: no pitting edema, + peripheral pulses                            10.0   7.11  )-----------( 227      ( 07 Feb 2020 04:47 )             31.1     02-07    136  |  86<L>  |  37<H>  ----------------------------<  94  3.7   |  34<H>  |  1.97<H>    Ca    10.3      07 Feb 2020 08:51  Phos  3.9     02-07  Mg     2.1     02-07    TPro  7.8  /  Alb  3.4  /  TBili  1.4<H>  /  DBili  x   /  AST  107<H>  /  ALT  53<H>  /  AlkPhos  148<H>  02-07      MEDICATIONS  (STANDING):  aspirin enteric coated 81 milliGRAM(s) Oral daily  atorvastatin 80 milliGRAM(s) Oral at bedtime  chlorhexidine 2% Cloths 1 Application(s) Topical <User Schedule>  clopidogrel Tablet 75 milliGRAM(s) Oral daily  heparin  Injectable 5000 Unit(s) SubCutaneous every 12 hours  insulin lispro (HumaLOG) corrective regimen sliding scale   SubCutaneous three times a day before meals  insulin lispro (HumaLOG) corrective regimen sliding scale   SubCutaneous at bedtime  levothyroxine 50 MICROGram(s) Oral daily  melatonin 3 milliGRAM(s) Oral at bedtime  mirtazapine 3.75 milliGRAM(s) Oral at bedtime  montelukast 10 milliGRAM(s) Oral every 24 hours  multivitamin/minerals 1 Tablet(s) Oral daily  pantoprazole    Tablet 40 milliGRAM(s) Oral before breakfast  sodium chloride 0.9%. 1000 milliLiter(s) (50 mL/Hr) IV Continuous <Continuous>

## 2020-02-07 NOTE — PROGRESS NOTE ADULT - SUBJECTIVE AND OBJECTIVE BOX
Gopal Banks, PGY1 MD  Internal Medicine Resident    PATIENT:  JOHNNY COMBS  [id number]    CHIEF COMPLAINT:  Patient is a 89y old  Female who presents with a chief complaint of generalized weakness and body swelling (2020 22:35)      INTERVAL HISTORY/OVERNIGHT EVENTS:  -no acute events overnight. Patient had a UA last night for worsening confusion (although likely to be icu delirium) which had large leuk esterase but negative nitrates, and had many bacteria. No abx was started because pt had no urinary symptoms but urine cultures were sent.  -Bp stable in the 100s  -Net negative 1.2L per day    REVIEW OF SYSTEMS:    Constitutional:     [ ] negative [ ] fevers [ ] chills [ ] weight loss [ ] weight gain  HEENT:                  [ ] negative [ ] dry eyes [ ] eye irritation [ ] postnasal drip [ ] nasal congestion  CV:                         [ ] negative  [ ] chest pain [ ] orthopnea [ ] palpitations [ ] murmur  Resp:                     [ ] negative [ ] cough [ ] shortness of breath [ ] dyspnea [ ] wheezing [ ] sputum [ ] hemoptysis  GI:                          [ ] negative [ ] nausea [ ] vomiting [ ] diarrhea [ ] constipation [ ] abd pain [ ] dysphagia   :                        [ ] negative [ ] dysuria [ ] nocturia [ ] hematuria [ ] increased urinary frequency  Musculoskeletal: [ ] negative [ ] back pain [ ] myalgias [ ] arthralgias [ ] fracture  Skin:                       [ ] negative [ ] rash [ ] itch  Neurological:        [ ] negative [ ] headache [ ] dizziness [ ] syncope [ ] weakness [ ] numbness  Psychiatric:           [ ] negative [ ] anxiety [ ] depression  Endocrine:            [ ] negative [ ] diabetes [ ] thyroid problem  Heme/Lymph:      [ ] negative [ ] anemia [ ] bleeding problem  Allergic/Immune: [ ] negative [ ] itchy eyes [ ] nasal discharge [ ] hives [ ] angioedema    [ ] All other systems negative  [ ] Unable to assess ROS because ________.    MEDICATIONS:  MEDICATIONS  (STANDING):  aspirin enteric coated 81 milliGRAM(s) Oral daily  atorvastatin 80 milliGRAM(s) Oral at bedtime  chlorhexidine 2% Cloths 1 Application(s) Topical <User Schedule>  clopidogrel Tablet 75 milliGRAM(s) Oral daily  heparin  Injectable 5000 Unit(s) SubCutaneous every 12 hours  insulin glargine Injectable (LANTUS) 5 Unit(s) SubCutaneous at bedtime  insulin lispro (HumaLOG) corrective regimen sliding scale   SubCutaneous three times a day before meals  insulin lispro (HumaLOG) corrective regimen sliding scale   SubCutaneous at bedtime  levothyroxine Injectable 37 MICROGram(s) IV Push at bedtime  melatonin 3 milliGRAM(s) Oral at bedtime  mirtazapine 3.75 milliGRAM(s) Oral at bedtime  montelukast 10 milliGRAM(s) Oral every 24 hours  multivitamin/minerals 1 Tablet(s) Oral daily  pantoprazole    Tablet 40 milliGRAM(s) Oral before breakfast  potassium chloride    Tablet ER 40 milliEquivalent(s) Oral every 4 hours  sodium chloride 0.9%. 1000 milliLiter(s) (50 mL/Hr) IV Continuous <Continuous>    MEDICATIONS  (PRN):  glucagon  Injectable 1 milliGRAM(s) IntraMuscular once PRN Glucose LESS THAN 70 milligrams/deciliter      ALLERGIES:  Allergies    penicillin (Hives)  penicillin (Unknown)    Intolerances        OBJECTIVE:  ICU Vital Signs Last 24 Hrs  T(C): 36.4 (2020 00:00), Max: 36.7 (2020 20:00)  T(F): 97.6 (2020 00:00), Max: 98.1 (2020 20:00)  HR: 78 (2020 06:00) (60 - 90)  BP: 105/71 (2020 06:00) (96/50 - 141/67)  BP(mean): 88 (2020 06:00) (51 - 102)  ABP: --  ABP(mean): --  RR: 16 (2020 06:00) (14 - 23)  SpO2: 96% (2020 23:00) (82% - 100%)      Adult Advanced Hemodynamics Last 24 Hrs  CVP(mm Hg): --  CVP(cm H2O): --  CO: --  CI: --  PA: --  PA(mean): --  PCWP: --  SVR: --  SVRI: --  PVR: --  PVRI: --  CAPILLARY BLOOD GLUCOSE      POCT Blood Glucose.: 240 mg/dL (2020 21:05)  POCT Blood Glucose.: 105 mg/dL (2020 16:33)  POCT Blood Glucose.: 189 mg/dL (2020 11:33)  POCT Blood Glucose.: 114 mg/dL (2020 08:10)    CAPILLARY BLOOD GLUCOSE      POCT Blood Glucose.: 240 mg/dL (2020 21:05)    I&O's Summary    2020 07:01  -  2020 07:00  --------------------------------------------------------  IN: 240 mL / OUT: 1500 mL / NET: -1260 mL      Daily     Daily Weight in k.3 (2020 05:00)    PHYSICAL EXAMINATION:  General: WN/WD NAD  HEENT: PERRLA, EOMI, moist mucous membranes  Neurology: A&Ox2, pleasant, nonfocal, TONY x 4  Respiratory: CTA B/L, normal respiratory effort, no wheezes, no crackles today, rales  CV: tricuspid regurg murmur. no JVD on exam today.   Abdominal: Soft, NT, ND +BS, Last BM  Extremities: no pitting edema, + peripheral pulses  Lines: only peripheral IVs        LABS:  ABG - ( 2020 15:26 )  pH, Arterial: 7.52  pH, Blood: x     /  pCO2: 42    /  pO2: 103   / HCO3: 34    / Base Excess: 10.4  /  SaO2: 99                                      10.0   7.11  )-----------( 227      ( 2020 04:47 )             31.1     02-07    134<L>  |  86<L>  |  38<H>  ----------------------------<  51<L>  3.0<L>   |  33<H>  |  1.97<H>    Ca    10.1      2020 04:47  Phos  3.9     02-07  Mg     2.1     02-07    TPro  7.7  /  Alb  3.2<L>  /  TBili  1.2  /  DBili  x   /  AST  91<H>  /  ALT  49<H>  /  AlkPhos  139<H>  02-07    LIVER FUNCTIONS - ( 2020 04:47 )  Alb: 3.2 g/dL / Pro: 7.7 g/dL / ALK PHOS: 139 U/L / ALT: 49 U/L / AST: 91 U/L / GGT: x                   Urinalysis Basic - ( 2020 18:02 )    Color: Light Yellow / Appearance: Clear / S.006 / pH: x  Gluc: x / Ketone: Negative  / Bili: Negative / Urobili: Negative   Blood: x / Protein: Negative / Nitrite: Negative   Leuk Esterase: Large / RBC: 6 /hpf / WBC 34 /HPF   Sq Epi: x / Non Sq Epi: 1 /hpf / Bacteria: Many        TELEMETRY:     EKG:     IMAGING: Gopal Banks, PGY1 MD  Internal Medicine Resident    PATIENT:  JOHNNY COMBS  [id number]    CHIEF COMPLAINT:  Patient is a 89y old  Female who presents with a chief complaint of generalized weakness and body swelling (2020 22:35)      INTERVAL HISTORY/OVERNIGHT EVENTS:  -no acute events overnight. Patient had a UA last night for worsening confusion (although likely to be icu delirium) which had large leuk esterase but negative nitrates, and had many bacteria. No abx was started because pt had no urinary symptoms but urine cultures were sent.  -Bp stable in the 100s  -Net negative 1.2L per day    REVIEW OF SYSTEMS:    Constitutional:     [x] negative [ ] fevers [ ] chills [ ] weight loss [ ] weight gain  HEENT:                  [x] negative [ ] dry eyes [ ] eye irritation [ ] postnasal drip [ ] nasal congestion  CV:                         [x] negative  [ ] chest pain [ ] orthopnea [ ] palpitations [ ] murmur  Resp:                     [x] negative [ ] cough [ ] shortness of breath [ ] dyspnea [ ] wheezing [ ] sputum [ ] hemoptysis  GI:                          [x] negative [ ] nausea [ ] vomiting [ ] diarrhea [ ] constipation [ ] abd pain [ ] dysphagia   :                        [x] negative [ ] dysuria [ ] nocturia [ ] hematuria [ ] increased urinary frequency  Musculoskeletal: [x] negative [ ] back pain [ ] myalgias [ ] arthralgias [ ] fracture  Skin:                       [x] negative [ ] rash [ ] itch  Neurological:        [x] negative [ ] headache [ ] dizziness [ ] syncope [ ] weakness [ ] numbness  Psychiatric:           [x] negative [ ] anxiety [ ] depression  Endocrine:            [x] negative [ ] diabetes [ ] thyroid problem  Heme/Lymph:      [x] negative [ ] anemia [ ] bleeding problem  Allergic/Immune: [x] negative [ ] itchy eyes [ ] nasal discharge [ ] hives [ ] angioedema    [x] All other systems negative  [ ] Unable to assess ROS because ________.    MEDICATIONS:  MEDICATIONS  (STANDING):  aspirin enteric coated 81 milliGRAM(s) Oral daily  atorvastatin 80 milliGRAM(s) Oral at bedtime  chlorhexidine 2% Cloths 1 Application(s) Topical <User Schedule>  clopidogrel Tablet 75 milliGRAM(s) Oral daily  heparin  Injectable 5000 Unit(s) SubCutaneous every 12 hours  insulin glargine Injectable (LANTUS) 5 Unit(s) SubCutaneous at bedtime  insulin lispro (HumaLOG) corrective regimen sliding scale   SubCutaneous three times a day before meals  insulin lispro (HumaLOG) corrective regimen sliding scale   SubCutaneous at bedtime  levothyroxine Injectable 37 MICROGram(s) IV Push at bedtime  melatonin 3 milliGRAM(s) Oral at bedtime  mirtazapine 3.75 milliGRAM(s) Oral at bedtime  montelukast 10 milliGRAM(s) Oral every 24 hours  multivitamin/minerals 1 Tablet(s) Oral daily  pantoprazole    Tablet 40 milliGRAM(s) Oral before breakfast  potassium chloride    Tablet ER 40 milliEquivalent(s) Oral every 4 hours  sodium chloride 0.9%. 1000 milliLiter(s) (50 mL/Hr) IV Continuous <Continuous>    MEDICATIONS  (PRN):  glucagon  Injectable 1 milliGRAM(s) IntraMuscular once PRN Glucose LESS THAN 70 milligrams/deciliter      ALLERGIES:  Allergies    penicillin (Hives)  penicillin (Unknown)    Intolerances        OBJECTIVE:  ICU Vital Signs Last 24 Hrs  T(C): 36.4 (2020 00:00), Max: 36.7 (2020 20:00)  T(F): 97.6 (2020 00:00), Max: 98.1 (2020 20:00)  HR: 78 (2020 06:00) (60 - 90)  BP: 105/71 (2020 06:00) (96/50 - 141/67)  BP(mean): 88 (2020 06:00) (51 - 102)  ABP: --  ABP(mean): --  RR: 16 (2020 06:00) (14 - 23)  SpO2: 96% (2020 23:00) (82% - 100%)      Adult Advanced Hemodynamics Last 24 Hrs  CVP(mm Hg): --  CVP(cm H2O): --  CO: --  CI: --  PA: --  PA(mean): --  PCWP: --  SVR: --  SVRI: --  PVR: --  PVRI: --  CAPILLARY BLOOD GLUCOSE      POCT Blood Glucose.: 240 mg/dL (2020 21:05)  POCT Blood Glucose.: 105 mg/dL (2020 16:33)  POCT Blood Glucose.: 189 mg/dL (2020 11:33)  POCT Blood Glucose.: 114 mg/dL (2020 08:10)    CAPILLARY BLOOD GLUCOSE      POCT Blood Glucose.: 240 mg/dL (2020 21:05)    I&O's Summary    2020 07:01  -  2020 07:00  --------------------------------------------------------  IN: 240 mL / OUT: 1500 mL / NET: -1260 mL      Daily     Daily Weight in k.3 (2020 05:00)    PHYSICAL EXAMINATION:  General: WN/WD NAD  HEENT: PERRLA, EOMI, moist mucous membranes  Neurology: A&Ox2-3, pleasant, nonfocal, TONY x 4  Respiratory: CTA B/L, normal respiratory effort, no wheezes, no crackles today, rales  CV: tricuspid regurg murmur. no JVD on exam today.   Abdominal: Soft, NT, ND +BS, Last BM  Extremities: no pitting edema, + peripheral pulses  Lines: only peripheral IVs        LABS:  ABG - ( 2020 15:26 )  pH, Arterial: 7.52  pH, Blood: x     /  pCO2: 42    /  pO2: 103   / HCO3: 34    / Base Excess: 10.4  /  SaO2: 99                                      10.0   7.11  )-----------( 227      ( 2020 04:47 )             31.1     02-07    134<L>  |  86<L>  |  38<H>  ----------------------------<  51<L>  3.0<L>   |  33<H>  |  1.97<H>    Ca    10.1      2020 04:47  Phos  3.9     02-07  Mg     2.1     02-07    TPro  7.7  /  Alb  3.2<L>  /  TBili  1.2  /  DBili  x   /  AST  91<H>  /  ALT  49<H>  /  AlkPhos  139<H>  02-07    LIVER FUNCTIONS - ( 2020 04:47 )  Alb: 3.2 g/dL / Pro: 7.7 g/dL / ALK PHOS: 139 U/L / ALT: 49 U/L / AST: 91 U/L / GGT: x                   Urinalysis Basic - ( 2020 18:02 )    Color: Light Yellow / Appearance: Clear / S.006 / pH: x  Gluc: x / Ketone: Negative  / Bili: Negative / Urobili: Negative   Blood: x / Protein: Negative / Nitrite: Negative   Leuk Esterase: Large / RBC: 6 /hpf / WBC 34 /HPF   Sq Epi: x / Non Sq Epi: 1 /hpf / Bacteria: Many        TELEMETRY:   no events    EKG:     IMAGING:  none

## 2020-02-07 NOTE — CHART NOTE - NSCHARTNOTEFT_GEN_A_CORE
JOHNNY COMBS  MRN-[id number]      Accepting Hospitalist:   ==========  HPI:  89F with HTN, DM, hypothyroidism admitted after coming to the Emergency Department with generalized fatigue, hardly getting out of bed, which the daughter states is unusual for her mom. She also c/o shortness of breath and swelling of upper and lower extremities. She had gone to urgent care today for extreme fatigue and HIGUERA for the last 2 days where a CXR done was notable for b/l pleural effusions and RML consolidation. She was sent to the Emergency Department. Denies fever, chills, cough, orthopnea, chest pain. She ambulates with a cane.  She has been living with her daughter for about 2 weeks now, her son is on a visit to [place], most of her medical care is in [place] including cardiology. Pt's daughter states the patient has some history of "valve problem". Does not take any water pills/diuretic. Currently comfortably lying flat in bed on the floor. The daughter states her mom has never been diagnosed with dementia, but her memory has been failing recently. The patient also has been c/o frequent protrusion of uterus thru vagina (2020 19:22)    ==========    INTERVAL HISTORY:  2/3: Transferred to CCU after RRT on floors  for respiratory distress requiring bipap and hypotension requiring pressors. clarify indication for clopidogrel (? if for prior CVA or TIA), go down to a  of 5, cardiac MR pending Cr impovement, TTE eval after weaning pressors  : decrease  to 2.5, recheck CO/CI after, goal sbp > 90 2/2 wide pulse pressure. MR still pending Cr improvement. Start hydralazine 10mg q8.  : LV fxn now hyperdynamic, send serum FLC, serum/urine immunofixation,   : LUIS F for MV once stable. f/u endo for levothyroxine dose, consult heme  : f/u BCx, LUIS F not preformed as no indication for moderate MR and now HD stable      SUBJECTIVE:  Patient denies fever, chills, chest pain, palpitations, sob, dyspnea, abd pain, n/v/d, dysuria, leg pain/swelling. States she does not have any complaints at this time and if "feeling better".        OBJECTIVE:     =============================  Vital Signs Last 24 Hrs  T(C): 37 (2020 20:28), Max: 37 (:28)  T(F): 98.6 (:28), Max: 98.6 (:28)  HR: 92 (2020 21:00) (72 - 96)  BP: 110/54 (2020 21:00) (87/51 - 121/60)  BP(mean): 77 (2020 21:00) (61 - 92)  RR: 17 (2020 21:00) ( - )  SpO2: 100% (2020 21:00) (96% - 100%)  ICU Vital Signs Last 24 Hrs  T(C): 37 (:28), Max: 37 (2020 20:28)  T(F): 98.6 (:28), Max: 98.6 (2020 20:28)  HR: 92 (2020 21:00) (72 - 96)  BP: 110/54 (2020 21:00) (87/51 - 121/60)  BP(mean): 77 (2020 21:00) (61 - 92)  ABP: --  ABP(mean): --  RR: 17 (2020 21:00) ()  SpO2: 100% (2020 21:00) (96% - 100%)    ==============================      ============================================  MEDICATIONS  (STANDING):  aspirin enteric coated 81 milliGRAM(s) Oral daily  atorvastatin 80 milliGRAM(s) Oral at bedtime  chlorhexidine 2% Cloths 1 Application(s) Topical <User Schedule>  clopidogrel Tablet 75 milliGRAM(s) Oral daily  heparin  Injectable 5000 Unit(s) SubCutaneous every 12 hours  insulin lispro (HumaLOG) corrective regimen sliding scale   SubCutaneous three times a day before meals  insulin lispro (HumaLOG) corrective regimen sliding scale   SubCutaneous at bedtime  levothyroxine 50 MICROGram(s) Oral daily  melatonin 3 milliGRAM(s) Oral at bedtime  mirtazapine 3.75 milliGRAM(s) Oral at bedtime  montelukast 10 milliGRAM(s) Oral every 24 hours  multivitamin/minerals 1 Tablet(s) Oral daily  pantoprazole    Tablet 40 milliGRAM(s) Oral before breakfast  sodium chloride 0.9%. 1000 milliLiter(s) (50 mL/Hr) IV Continuous <Continuous>    MEDICATIONS  (PRN):  glucagon  Injectable 1 milliGRAM(s) IntraMuscular once PRN Glucose LESS THAN 70 milligrams/deciliter    ============================================        ==========  TELEMETRY  No events  ==========    ==========  FOLLOW UP:  ==========  89F with HTN, DM, hypothyroidism admitted after coming to the Emergency Department with generalized fatigue bilaterally pleural effusions and severe anemia. Course complicated by acute on chronic heart failure and cardiogenic shock and EFRAIN likely 2/2 to ATN, now improving.     # Neuro  - no active issue at this time   - restarted home aspirin and plavix for TIA  - Confusion improved today , most likely 2ry to icu delirium vs. dementia/sun-downing. Patient redirectable. Patient daughter states patient similar to baseline mental status currently.   - c/w melatonin and remeron.     Resp  -95 % on room air  - No active issues, denies SOB/dyspnea.    Cards  #Acute on chronic heart failure with preserved EF  - monitor Is and Os, keep net even to negative.  - unknown etiology of heart failure and enlargement of RA/LV.   - repeat echocardiogram from : hyperdynamic LV, moderate MS, mild AS, severe RV enlargement with normal RV systolic function. moderate pulm HTN. EF 75%  - outpatient Echo from 2019 obtained: shows LVEF of 58%, normal systolic function of LV. bi-atrial enlargement. aortic valve sclerosis with mild to mod AR, MV stenosis.  - Consider RHC/LHC in the future for further workup  - should get cardiac MRI once creatine returns to baseline (now downtrending).   -No indication for Mitral valve intervention by Structural Heart Team at this time.      #Cardiogenic Shock  - likely due to beta blockers in the setting of existing severe HF  - received glucagon x3 and then briefly put on glucagon gtt in the CCU but started dry heaving  - off levophed, off vasopressin, SBP goal >90  - off bumex, net negative 1.3L/day  - HD from  5Am -> CO 3.91, CI 3.2, SVR 1350 with CVP of 7  - HD at ~9am  after dobutamine was turned off: CO 3.056 CI 2.567 SVR 1300  - hydralazine DC'd because SVR has been acceptable and patient has hyperdynamic LV and BPs have been acceptable     GI  - soft +consistent carb diet  - elevated LFTs likely 2/2 ADHF  - NPO for TTE for now    ID  - no s/s of infection  - afebrile, negative RVP  - UA had + large leuk esterase and - nitrates, many bacteria and 34 WBC. given she has no urinary symptoms, no white count, and no fevers, will await UCx before treating with antibiotics.     Renal  #EFRAIN likely ATN 2/2 to cardiogenic shock  -creat 2.10, improved, unknown baseline   -making urine, now off bumex  -strict Is and Os   -replete electrolytes as needed  -monitor CMP   -nephro recs appreciated: patient has M-spike on SPEP. will consult hematology once acute issues are resolved.     Heme  #Anemia  -likely multifactorial in nature, 2/2 to iron deficiency (given patient initially had failure to thrive) and also CKD  - s/p 2 units PRBC   - iron 28 (low), TIBC 238 iron sat 11(low) ferritin 151.    - encourage oral intake  - Hb now improving to 10. will continue to monitor for now, consider starting venofer 200 IV daily     Endo  #T2DM  - well controlled  - c/w sliding scale insulin +5 lantus at bedtime    #Hypothyroidism  -c/w levothyroxine IV given bowel edema in the setting of ADHF  -will call endo to ask when to switch to oral levothyroxine     #DVT PPx  -Hep subQ      RAUDEL PriceC

## 2020-02-07 NOTE — PROGRESS NOTE ADULT - PROBLEM SELECTOR PLAN 2
Pt. with low Hgb and iron deficiency. Recommend Venofer 200 mg IV x 5 days or oral iron.  Monitor Hgb.    [facility]  Nephrology Fellow  Cell: [phone] (from 8 am to 5 pm)  (After 5 pm or on weekends please page on-call fellow) Pt. with low Hgb and iron deficiency. Recommend Venofer 200 mg IV x 5 days or oral iron.  Monitor Hgb.

## 2020-02-07 NOTE — CONSULT NOTE ADULT - ASSESSMENT
89F with HTN, DM, hypothyroidism admitted after coming to the Emergency Department with generalized fatigue, hardly getting out of bed, She also c/o shortness of breath and swelling of upper and lower extremities. She had gone to urgent care for extreme fatigue and HIGUERA for 2 days where a CXR done was notable for b/l pleural effusions and RML consolidation. Course complicated by acute on chronic heart failure and cardiogenic shock. course also complicated by renal failure likely 2/2 to ATN, now improving. Hematology was consulted for anemia.     -s/p 2U PRBC  -Blood smear reviewed, rare target cells, elevated PLT, rare schistocyte per HPF (0-1), no abnormal looking leukocyte  -IgG lambda band in serum immunofixation,  Kappa/Lambda FLC wnl. 89F with HTN, DM, hypothyroidism admitted after coming to the Emergency Department with generalized fatigue, hardly getting out of bed, She also c/o shortness of breath and swelling of upper and lower extremities. She had gone to urgent care for extreme fatigue and HIGUERA for 2 days where a CXR done was notable for b/l pleural effusions and RML consolidation. Course complicated by acute on chronic heart failure and cardiogenic shock. course also complicated by renal failure likely 2/2 to ATN, now improving. Hematology was consulted for anemia.     -s/p 2U PRBC  -Blood smear reviewed, rare target cells, elevated PLT, rare schistocyte per HPF (0-1), no abnormal looking leukocyte  -Retic 3.2, B12 1804,Iron sat 11%,  IgG lambda band in serum immunofixation, M spike 0.3,  Kappa/Lambda FLC wnl.   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  -Patient can follow with hematology as outpatient.    Shanelle Wan PGY4  Heme/Onc fellow  [phone]

## 2020-02-07 NOTE — PROGRESS NOTE ADULT - ATTENDING COMMENTS
Breathing wis seems OK: pretty hypotensive but getting better: cont blood transfusion and wean off vasopressors as tolerated:  2/3: breathing wise OK: on room air and in no respiratory distress  2/5: doing better: no SOB : no wheezing: no cough :  2/6: resp wise stable : repeat chst x-ray in 2-3 day s  2/7: breathing wise OK: no SOB off oxygen :

## 2020-02-07 NOTE — PROGRESS NOTE ADULT - PROBLEM SELECTOR PLAN 1
on dobutamine: has bilateral pleural effusions too : likely secondary to CHF: no pneumonia  2/5: she is doing much better: no SOB : no cough : she is alert and awake:  2/6: doing much better: ABG noted: with alkalosis and some co2 retention: no rep distress: echo noted: for further workup per car ds  2/7: cont curetn rx: off diuretice today :? awaiting ramy/

## 2020-02-07 NOTE — PROGRESS NOTE ADULT - PROBLEM SELECTOR PLAN 2
hgb a1c 5.7 is more tightly controlled than necessary given age and metformin is also not recommended given her EFRAIN and lactic acidosis  -Was started on lantus 5 units and hypo this AM while NPO. Would dc the lantus (AM BG were in acceptable range) and monitor on low dose correctional scale.   -consider discharge without any DM medications.

## 2020-02-07 NOTE — CHART NOTE - NSCHARTNOTEFT_GEN_A_CORE
Called by CCU intern to see patient for Mitraclip evaluation.  Review of recent TTE shows only mild MR with moderate MS.  Indication for Mitraclip would be severe MR, and in the setting of moderate MS would likely be a questionable candidate anyway.  No indication for Mitral valve intervention at this time.    < from: Transthoracic Echocardiogram (02.04.20 @ 12:59) >    Dimensions:    Normal Values:  LA:            2.0 - 4.0 cm  Ao:            2.0 - 3.8 cm  SEPTUM:        0.6 - 1.2 cm  PWT:           0.6 - 1.1 cm  LVIDd:         3.0 - 5.6 cm  LVIDs:         1.8 - 4.0 cm  EF (Visual Estimate): 75 %  Doppler Peak Velocity (m/sec): TV=3.2  ------------------------------------------------------------------------  Observations:  Mitral Valve: Severe mitral annular and leaflet  calcification. Calcified mitral chordae.  Moderate mitral stenosis. Mean mitral gradient 5 mmHg at  70/min.  Mild mitral regurgitation.  Aortic Valve/Aorta: Mild aortic stenosis.  Peak velocities in the LVOT and aorta 0.8 and 1.8 m/s,  respectively.  LVOTd 1.8 cm.  Aortic valve area by continuity 1.1 cm2 = 0.95 cm2/m2.  Mild aortic regurgitation.  Normal aortic root size.  Left Atrium: Severe left atrial enlargement.  Left Ventricle: Normal left ventricular internal dimensions  andwall thicknesses.  Hyperdynamic left ventricle.  Estimated left atrial pressure is severely elevated. IVRT  70 msec.  Right Heart: Severe right atrial enlargement.  Severe right ventricular enlargement with normal right  ventricular systolic function.  Calcified tricuspid valve leaflets. Severe tricuspid  regurgitation.  Normal pulmonic valve.  Pericardium/Pleura: Small pericardial effusion.  Bilateral pleural effusions.  Hemodynamic: Estimated right atrial pressure is severely  elevated.  Moderate pulmonary hypertension. Estimated PASP 60 mmHg.  ------------------------------------------------------------------------  Conclusions:  Hyperdynamic left ventricle.  Moderate mitral stenosis due to annular and leaflet  calcification.  Mild aortic stenosis.  Severe right ventricular enlargement with normal right  ventricular systolic function.  Moderate pulmonary hypertension.  ------------------------------------------------------------------------  Confirmed on  2/4/2020 - 15:54:25 by Qamar James MD,  ANTHONY    < end of copied text >      DIOMEDES Prieto Called by CCU intern to see patient for Mitraclip evaluation.  Review of recent TTE shows only mild MR with moderate MS.  Indication for Mitraclip would be severe MR, and in the setting of moderate MS would likely be a questionable candidate anyway.  No indication for Mitral valve intervention by Structural Heart Team at this time.    < from: Transthoracic Echocardiogram (02.04.20 @ 12:59) >    Dimensions:    Normal Values:  LA:            2.0 - 4.0 cm  Ao:            2.0 - 3.8 cm  SEPTUM:        0.6 - 1.2 cm  PWT:           0.6 - 1.1 cm  LVIDd:         3.0 - 5.6 cm  LVIDs:         1.8 - 4.0 cm  EF (Visual Estimate): 75 %  Doppler Peak Velocity (m/sec): TV=3.2  ------------------------------------------------------------------------  Observations:  Mitral Valve: Severe mitral annular and leaflet  calcification. Calcified mitral chordae.  Moderate mitral stenosis. Mean mitral gradient 5 mmHg at  70/min.  Mild mitral regurgitation.  Aortic Valve/Aorta: Mild aortic stenosis.  Peak velocities in the LVOT and aorta 0.8 and 1.8 m/s,  respectively.  LVOTd 1.8 cm.  Aortic valve area by continuity 1.1 cm2 = 0.95 cm2/m2.  Mild aortic regurgitation.  Normal aortic root size.  Left Atrium: Severe left atrial enlargement.  Left Ventricle: Normal left ventricular internal dimensions  andwall thicknesses.  Hyperdynamic left ventricle.  Estimated left atrial pressure is severely elevated. IVRT  70 msec.  Right Heart: Severe right atrial enlargement.  Severe right ventricular enlargement with normal right  ventricular systolic function.  Calcified tricuspid valve leaflets. Severe tricuspid  regurgitation.  Normal pulmonic valve.  Pericardium/Pleura: Small pericardial effusion.  Bilateral pleural effusions.  Hemodynamic: Estimated right atrial pressure is severely  elevated.  Moderate pulmonary hypertension. Estimated PASP 60 mmHg.  ------------------------------------------------------------------------  Conclusions:  Hyperdynamic left ventricle.  Moderate mitral stenosis due to annular and leaflet  calcification.  Mild aortic stenosis.  Severe right ventricular enlargement with normal right  ventricular systolic function.  Moderate pulmonary hypertension.  ------------------------------------------------------------------------  Confirmed on  2/4/2020 - 15:54:25 by Qamar James MD,  ANTHONY    < end of copied text >      DIOMEDES Prieto

## 2020-02-07 NOTE — PROGRESS NOTE ADULT - PROBLEM SELECTOR PLAN 1
- Scr 1.45 on admission, continuously uptrended but now improving at 1.97 this AM.  - Patient with cardiogenic shock, now improved. Pt. now off IV vasopressors and dobutamine.  - Pt. now with improved UOP, nonoliguric likely to have resolving ATN.  - Electrolytes currently stable. No urgent indication for RRT/HD treatment currently.  - Monitor UOP and daily weights. Avoid volume depletion, NSAIDs, PPI's, ARB/ACE-I. Dose medications as per eGFR.  - Renal US with echogenic kidneys bilaterally.  - SPEP with migrating paraprotein identified, SIFE with IgG Lambda; consider heme/onc evaluation

## 2020-02-07 NOTE — PROGRESS NOTE ADULT - ASSESSMENT
89F with HTN, DM, hypothyroidism admitted after coming to the Emergency Department with generalized fatigue bilaterally pleural effusions and severe anemia. Course complicated by acute on chronic heart failure and cardiogenic shock. course also complicated by renal failure likely 2/2 to ATN, now improving.     Neuro  - no active issue at this time   - restarted home aspirin and plavix for TIA  - worsening confusion likely 2/2 to icu delirium. c/w melatonin and remeron. frequent re-direction. f/u UCx    Resp  -tolerating O2 2L. goal SpO2 >92%    Cards  #Acute on chronic heart failure with preserved EF  - monitor Is and Os, keep net negative. K>4, Mg>2. currently net negative 1.3L/day  - unknown etiology of heart failure and enlargement of RA/LV.   - repeat echocardiogram from 2/4: hyperdynamic LV, moderate MS, mild AS, severe RV enlargement with normal RV systolic function. moderate pulm HTN. EF 75%  - outpatient Echo from 11/2019 obtained: shows LVEF of 58%, normal systolic function of LV. bi-atrial enlargement. aortic valve sclerosis with mild to mod AR, MV stenosis.  - should receive RHC and LHC in the future for further workup  - should get cardiac MRI once creatine returns to normal (now downtrending).   - NPO for LUIS F to assess valvular pathology and plan for possible intervention in the future when more optimized.     #Cardiogenic Shock  - likely due to beta blockers in the setting of existing severe HF  - received glucagon x3 and then briefly put on glucagon gtt in the CCU but started dry heaving  - off levophed, off vasopressin, SBP goal >90  - off bumex, net negative 1.3L/day  - HD from 2/5 5Am -> CO 3.91, CI 3.2, SVR 1350 with CVP of 7  - HD at ~9am 2/5 after dobutamine was turned off: CO 3.056 CI 2.567 SVR 1300  - hydralazine DC'd because SVR has been acceptable and patient has hyperdynamic LV and BPs have been acceptable     GI  - soft +consistent carb diet  - elevated LFTs likely 2/2 ADHF  - NPO for TTE for now    ID  - no s/s of infection  - afebrile, negative RVP    Renal  #EFRAIN likely ATN 2/2 to cardiogenic shock  -creat 2.10, improved, unknown baseline   -making urine, now off bumex  -strict Is and Os   -replete electrolytes as needed  -monitor CMP   -nephro recs appreciated: patient has M-spike on SPEP. will consult hematology once acute issues are resolved.     Heme  #Anemia  -likely multifactorial in nature, 2/2 to iron deficiency (given patient initially had failure to thrive) and also CKD  - s/p 2 units PRBC   - iron 28 (low), TIBC 238 iron sat 11(low) ferritin 151.    - encourage oral intake  - Hb now improving to 10. will continue to monitor for now, consider starting venofer 200 IV daily     Endo  #T2DM  - well controlled  - c/w sliding scale insulin    #Hypothyroidism  -c/w levothyroxine IV given bowel edema in the setting of ADHF  -will call endo to ask when to switch to oral levothyroxine     #DVT PPx  -Hep subQ 89F with HTN, DM, hypothyroidism admitted after coming to the Emergency Department with generalized fatigue bilaterally pleural effusions and severe anemia. Course complicated by acute on chronic heart failure and cardiogenic shock. course also complicated by renal failure likely 2/2 to ATN, now improving.     Neuro  - no active issue at this time   - restarted home aspirin and plavix for TIA  - worsening confusion likely 2/2 to icu delirium. c/w melatonin and remeron. frequent re-direction. f/u UCx given she has no urinary symptoms.     Resp  -tolerating O2 2L. goal SpO2 >92%    Cards  #Acute on chronic heart failure with preserved EF  - monitor Is and Os, keep net negative. K>4, Mg>2. currently net negative 1.3L/day  - unknown etiology of heart failure and enlargement of RA/LV.   - repeat echocardiogram from 2/4: hyperdynamic LV, moderate MS, mild AS, severe RV enlargement with normal RV systolic function. moderate pulm HTN. EF 75%  - outpatient Echo from 11/2019 obtained: shows LVEF of 58%, normal systolic function of LV. bi-atrial enlargement. aortic valve sclerosis with mild to mod AR, MV stenosis.  - should receive RHC and LHC in the future for further workup  - should get cardiac MRI once creatine returns to normal (now downtrending).   - NPO for LUIS F to assess valvular pathology     #Cardiogenic Shock  - likely due to beta blockers in the setting of existing severe HF  - received glucagon x3 and then briefly put on glucagon gtt in the CCU but started dry heaving  - off levophed, off vasopressin, SBP goal >90  - off bumex, net negative 1.3L/day  - HD from 2/5 5Am -> CO 3.91, CI 3.2, SVR 1350 with CVP of 7  - HD at ~9am 2/5 after dobutamine was turned off: CO 3.056 CI 2.567 SVR 1300  - hydralazine DC'd because SVR has been acceptable and patient has hyperdynamic LV and BPs have been acceptable     GI  - soft +consistent carb diet  - elevated LFTs likely 2/2 ADHF  - NPO for TTE for now    ID  - no s/s of infection  - afebrile, negative RVP  - UA had + large leuk esterase and - nitrates, many bacteria and 34 WBC. given she has no urinary symptoms, no white count, and no fevers, will await UCx before treating with antibiotics.     Renal  #EFRAIN likely ATN 2/2 to cardiogenic shock  -creat 2.10, improved, unknown baseline   -making urine, now off bumex  -strict Is and Os   -replete electrolytes as needed  -monitor CMP   -nephro recs appreciated: patient has M-spike on SPEP. will consult hematology once acute issues are resolved.     Heme  #Anemia  -likely multifactorial in nature, 2/2 to iron deficiency (given patient initially had failure to thrive) and also CKD  - s/p 2 units PRBC   - iron 28 (low), TIBC 238 iron sat 11(low) ferritin 151.    - encourage oral intake  - Hb now improving to 10. will continue to monitor for now, consider starting venofer 200 IV daily     Endo  #T2DM  - well controlled  - c/w sliding scale insulin +5 lantus at bedtime    #Hypothyroidism  -c/w levothyroxine IV given bowel edema in the setting of ADHF  -will call endo to ask when to switch to oral levothyroxine     #DVT PPx  -Hep subQ

## 2020-02-08 LAB
ALBUMIN SERPL ELPH-MCNC: 3.3 G/DL — SIGNIFICANT CHANGE UP (ref 3.3–5)
ALP SERPL-CCNC: 204 U/L — HIGH (ref 40–120)
ALT FLD-CCNC: 65 U/L — HIGH (ref 10–45)
ANION GAP SERPL CALC-SCNC: 17 MMOL/L — SIGNIFICANT CHANGE UP (ref 5–17)
AST SERPL-CCNC: 160 U/L — HIGH (ref 10–40)
BILIRUB SERPL-MCNC: 1.5 MG/DL — HIGH (ref 0.2–1.2)
BUN SERPL-MCNC: 39 MG/DL — HIGH (ref 7–23)
CALCIUM SERPL-MCNC: 9.7 MG/DL — SIGNIFICANT CHANGE UP (ref 8.4–10.5)
CHLORIDE SERPL-SCNC: 88 MMOL/L — LOW (ref 96–108)
CO2 SERPL-SCNC: 30 MMOL/L — SIGNIFICANT CHANGE UP (ref 22–31)
CREAT SERPL-MCNC: 1.95 MG/DL — HIGH (ref 0.5–1.3)
GLUCOSE BLDC GLUCOMTR-MCNC: 109 MG/DL — HIGH (ref 70–99)
GLUCOSE BLDC GLUCOMTR-MCNC: 161 MG/DL — HIGH (ref 70–99)
GLUCOSE BLDC GLUCOMTR-MCNC: 184 MG/DL — HIGH (ref 70–99)
GLUCOSE BLDC GLUCOMTR-MCNC: 235 MG/DL — HIGH (ref 70–99)
GLUCOSE SERPL-MCNC: 120 MG/DL — HIGH (ref 70–99)
HCT VFR BLD CALC: 31.1 % — LOW (ref 34.5–45)
HGB BLD-MCNC: 9.9 G/DL — LOW (ref 11.5–15.5)
INTERPRETATION 24H UR IFE-IMP: SIGNIFICANT CHANGE UP
INTERPRETATION 24H UR IFE-IMP: SIGNIFICANT CHANGE UP
MAGNESIUM SERPL-MCNC: 1.9 MG/DL — SIGNIFICANT CHANGE UP (ref 1.6–2.6)
MCHC RBC-ENTMCNC: 28.6 PG — SIGNIFICANT CHANGE UP (ref 27–34)
MCHC RBC-ENTMCNC: 31.8 GM/DL — LOW (ref 32–36)
MCV RBC AUTO: 89.9 FL — SIGNIFICANT CHANGE UP (ref 80–100)
NRBC # BLD: 0 /100 WBCS — SIGNIFICANT CHANGE UP (ref 0–0)
PHOSPHATE SERPL-MCNC: 3 MG/DL — SIGNIFICANT CHANGE UP (ref 2.5–4.5)
PLATELET # BLD AUTO: 234 K/UL — SIGNIFICANT CHANGE UP (ref 150–400)
POTASSIUM SERPL-MCNC: 3.8 MMOL/L — SIGNIFICANT CHANGE UP (ref 3.5–5.3)
POTASSIUM SERPL-SCNC: 3.8 MMOL/L — SIGNIFICANT CHANGE UP (ref 3.5–5.3)
PROT SERPL-MCNC: 7.6 G/DL — SIGNIFICANT CHANGE UP (ref 6–8.3)
RBC # BLD: 3.46 M/UL — LOW (ref 3.8–5.2)
RBC # FLD: 15.9 % — HIGH (ref 10.3–14.5)
SODIUM SERPL-SCNC: 135 MMOL/L — SIGNIFICANT CHANGE UP (ref 135–145)
WBC # BLD: 5.28 K/UL — SIGNIFICANT CHANGE UP (ref 3.8–10.5)
WBC # FLD AUTO: 5.28 K/UL — SIGNIFICANT CHANGE UP (ref 3.8–10.5)

## 2020-02-08 PROCEDURE — 99232 SBSQ HOSP IP/OBS MODERATE 35: CPT | Mod: GC

## 2020-02-08 RX ADMIN — Medication 2: at 13:16

## 2020-02-08 RX ADMIN — Medication 81 MILLIGRAM(S): at 13:16

## 2020-02-08 RX ADMIN — CLOPIDOGREL BISULFATE 75 MILLIGRAM(S): 75 TABLET, FILM COATED ORAL at 13:16

## 2020-02-08 RX ADMIN — MONTELUKAST 10 MILLIGRAM(S): 4 TABLET, CHEWABLE ORAL at 17:13

## 2020-02-08 RX ADMIN — MIRTAZAPINE 3.75 MILLIGRAM(S): 45 TABLET, ORALLY DISINTEGRATING ORAL at 21:18

## 2020-02-08 RX ADMIN — Medication 1: at 18:21

## 2020-02-08 RX ADMIN — HEPARIN SODIUM 5000 UNIT(S): 5000 INJECTION INTRAVENOUS; SUBCUTANEOUS at 05:51

## 2020-02-08 RX ADMIN — PANTOPRAZOLE SODIUM 40 MILLIGRAM(S): 20 TABLET, DELAYED RELEASE ORAL at 05:51

## 2020-02-08 RX ADMIN — Medication 1 TABLET(S): at 13:16

## 2020-02-08 RX ADMIN — ATORVASTATIN CALCIUM 80 MILLIGRAM(S): 80 TABLET, FILM COATED ORAL at 21:18

## 2020-02-08 RX ADMIN — Medication 50 MICROGRAM(S): at 05:51

## 2020-02-08 RX ADMIN — HEPARIN SODIUM 5000 UNIT(S): 5000 INJECTION INTRAVENOUS; SUBCUTANEOUS at 17:12

## 2020-02-08 RX ADMIN — Medication 3 MILLIGRAM(S): at 21:18

## 2020-02-08 NOTE — PROGRESS NOTE ADULT - PROBLEM SELECTOR PLAN 2
appears much more euvolemic   off diuresis  will have cardiology attending follow up   continue to follow cardio recommendations

## 2020-02-08 NOTE — PROGRESS NOTE ADULT - PROBLEM SELECTOR PLAN 4
hypotensive: on vasopressin  2/5: off pressors now: no SOB: alert and awake    2/6: off vasopressors: no SOB : BP reasonable  2/7: off pressors at this time: cont to monitor: off b cora  2/8 normotensive

## 2020-02-08 NOTE — PROGRESS NOTE ADULT - PROBLEM SELECTOR PLAN 1
on dobutamine: has bilateral pleural effusions too : likely secondary to CHF: no pneumonia  2/5: she is doing much better: no SOB : no cough : she is alert and awake:  2/6: doing much better: ABG noted: with alkalosis and some co2 retention: no rep distress: echo noted: for further workup per car ds  2/7: cont curetn rx: off diuretice today :? awaiting ramy/  2/8 resolved

## 2020-02-08 NOTE — PROGRESS NOTE ADULT - ATTENDING COMMENTS
Patient seen and examined; agree with Cardiology Fellow assessment and plan.  --Patient transferred out of CCU with stable BP.  --Monitor closely for signs of volume overload; may require eventual diuretics.  --Structural Heart Team evaluation noted.  --Could consider eventual work-up for infiltrative cardiomyopathy including pyrophosphate scan.  --Monitor closely on Telemetry.  --Will follow.

## 2020-02-08 NOTE — PROGRESS NOTE ADULT - ASSESSMENT
END OF SHIFT NOTE: 
 
INTAKE/OUTPUT 
11/21 0701 - 11/22 0700 In: 7482 [P.O.:1440; I.V.:376] Out: 575 [Urine:575] Voiding: YES Catheter: NO 
Drain:   
 
 
 
 
 
Flatus: Patient does have flatus present. Stool:  1 occurrences. Characteristics: 
Stool Assessment Stool Color: Pineda Cassis Stool Appearance: Loose Stool Amount: Large Stool Source/Status: Rectum Emesis: 0 occurrences. Characteristics: VITAL SIGNS Patient Vitals for the past 12 hrs: 
 Temp Pulse Resp BP SpO2  
11/22/18 1515 98.1 °F (36.7 °C) 96 16 126/56 95 % 11/22/18 1431     96 % 11/22/18 1141 97.7 °F (36.5 °C) 62 16 123/57 97 % 11/22/18 0826     92 % 11/22/18 0712 96.9 °F (36.1 °C) 67 16 150/79 92 % Pain Assessment Pain Intensity 1: 6 (11/22/18 1326) Pain Location 1: Back Pain Intervention(s) 1: Medication (see MAR) Patient Stated Pain Goal: 0 Ambulating Yes Shift report given to oncoming nurse at the bedside.  
 
Blaise Rivers RN 
 
 
 88 yo F with a PMH significant for HTN, DM, hypothyroidism presenting with fatigue, found to have b/l effusions and anemia, course c/b cardiogenic shock(presumed to be 2/2 initiation of beta blocker) requiring CCU transfer and initiation of inotropes. Patient developed oliguric renal failure 2/2 ATN, which is now resolved. Of note, she is also being treated with IV synthroid for hypothyroidism.      #Valvular disease/Cardiomyopathy  Patient with mod-sev MS, sev TR, biatrial enlargement, enlarged RV and normal LVEF. Unclear if her cardiomyopathy is an infiltrative process ie amyloidosis leading to restrictive CM and valvular disease vs valvular disease(TR, MS) leading to biatrial enlargement and RV dysfunction.  - if her disease were 2/2 MS, beta blockade would be first line given need for increased time in diastole, however this would worsen her condition, if she were to have amyloid as she would need the heart rate to maintain her cardiac output given a fixed stroke volume. As a result, will defer beat blockade  - for now, continue to monitor off diuretics and continue to trend BMPs. Suspect she may require outpatient diuretics for symptom control  - BPs currently within normal limits, defer initiation of anti-HTN regimen at this time    Darren Humphrey MD  Cardiology Fellow  [phone]  All Cardiology service information can be found 24/7 on [email or web address], password: [email or web address]

## 2020-02-08 NOTE — PROGRESS NOTE ADULT - PROBLEM SELECTOR PLAN 2
monitor and control  2/5: stable  2/6';c ontrolled  2/7: hypoglycemic today : being monitored  2/8 keep euglycemic

## 2020-02-08 NOTE — PROGRESS NOTE ADULT - ASSESSMENT
89F with HTN, DM, hypothyroidism admitted after coming to the Emergency Department with generalized fatigue, hardly getting out of bed, diagnosed with generalized anasarca and bilaterally pleural effusions and severe anemia  transferred to CCU for hypotension and now transferred out to floor

## 2020-02-08 NOTE — PROGRESS NOTE ADULT - SUBJECTIVE AND OBJECTIVE BOX
Patient is a 89y old  Female who presents with a chief complaint of generalized weakness and body swelling (2020 08:04)    Any change in ROS: denies 10 points ROS. on room air     MEDICATIONS  (STANDING):  aspirin enteric coated 81 milliGRAM(s) Oral daily  atorvastatin 80 milliGRAM(s) Oral at bedtime  clopidogrel Tablet 75 milliGRAM(s) Oral daily  heparin  Injectable 5000 Unit(s) SubCutaneous every 12 hours  insulin lispro (HumaLOG) corrective regimen sliding scale   SubCutaneous three times a day before meals  insulin lispro (HumaLOG) corrective regimen sliding scale   SubCutaneous at bedtime  levothyroxine 50 MICROGram(s) Oral daily  melatonin 3 milliGRAM(s) Oral at bedtime  mirtazapine 3.75 milliGRAM(s) Oral at bedtime  montelukast 10 milliGRAM(s) Oral every 24 hours  multivitamin/minerals 1 Tablet(s) Oral daily  pantoprazole    Tablet 40 milliGRAM(s) Oral before breakfast    MEDICATIONS  (PRN):  glucagon  Injectable 1 milliGRAM(s) IntraMuscular once PRN Glucose LESS THAN 70 milligrams/deciliter    Vital Signs Last 24 Hrs  T(C): 36.6 (2020 04:19), Max: 37.1 (2020 02:10)  T(F): 97.9 (2020 04:19), Max: 98.7 (2020 02:10)  HR: 78 (2020 04:19) (78 - 96)  BP: 120/62 (2020 04:19) (87/51 - 120/62)  BP(mean): 69 (2020 01:00) (60 - 78)  RR: 19 (2020 04:19) (11 - 28)  SpO2: 97% (2020 04:19) (93% - 100%)    I&O's Summary    2020 07:01  -  2020 07:00  --------------------------------------------------------  IN: 600 mL / OUT: 700 mL / NET: -100 mL          Physical Exam:   GENERAL: frail elderly comfortable with no apparent distress.   HEENT: Head is normocephalic and atraumatic.    NECK: Supple with no elevated JVP.  LUNGS: Clear to auscultation without wheeze and rhonchi.  HEART: S1 and S2 present without murmur.  ABDOMEN: Soft, nontender, and nondistended.   EXTREMITIES: No edema or calf tenderness.  NEUROLOGIC: Grossly intact.    Labs:  35, 21, 21, 33, 21, 30, 31, 28, 21, 28, 28, 21, 21, RA, 21                            9.9    5.28  )-----------( 234      ( 2020 07:00 )             31.1                         10.0   7.11  )-----------( 227      ( 2020 04:47 )             31.1                         10.0   7.35  )-----------( 236      ( 2020 04:16 )             30.3                         9.8    6.74  )-----------( 243      ( 2020 20:19 )             30.7                         9.5    6.31  )-----------( 217      ( 2020 04:49 )             29.4                         9.0    5.99  )-----------( 219      ( 2020 19:53 )             28.0                         8.9    6.13  )-----------( 211      ( 2020 15:50 )             27.9     02-08    135  |  88<L>  |  39<H>  ----------------------------<  120<H>  3.8   |  30  |  1.95<H>  02-07    136  |  86<L>  |  37<H>  ----------------------------<  94  3.7   |  34<H>  |  1.97<H>  02-07    134<L>  |  86<L>  |  38<H>  ----------------------------<  51<L>  3.0<L>   |  33<H>  |  1.97<H>  02-06    135  |  90<L>  |  41<H>  ----------------------------<  94  4.5   |  33<H>  |  2.10<H>  02-05    134<L>  |  89<L>  |  42<H>  ----------------------------<  184<H>  3.5   |  31  |  2.32<H>  02-05    135  |  88<L>  |  39<H>  ----------------------------<  111<H>  3.4<L>   |  29  |  2.16<H>  02-04    131<L>  |  90<L>  |  39<H>  ----------------------------<  158<H>  4.0   |  29  |  2.30<H>  02-04    133<L>  |  91<L>  |  40<H>  ----------------------------<  259<H>  4.0   |  30  |  2.36<H>    Ca    9.7      2020 06:55  Ca    10.3      2020 08:51  Ca    10.1      2020 04:47  Phos  3.0     02-08  Phos  3.9     02-07  Phos  3.9     02-07  Mg     1.9     02-08  Mg     2.1     02-07  Mg     2.1     02-07    TPro  7.6  /  Alb  3.3  /  TBili  1.5<H>  /  DBili  x   /  AST  160<H>  /  ALT  65<H>  /  AlkPhos  204<H>  0208  TPro  7.8  /  Alb  3.4  /  TBili  1.4<H>  /  DBili  x   /  AST  107<H>  /  ALT  53<H>  /  AlkPhos  148<H>  02-07  TPro  7.7  /  Alb  3.2<L>  /  TBili  1.2  /  DBili  x   /  AST  91<H>  /  ALT  49<H>  /  AlkPhos  139<H>  02-  TPro  7.0  /  Alb  3.1<L>  /  TBili  1.1  /  DBili  x   /  AST  72<H>  /  ALT  52<H>  /  AlkPhos  118  02-06  TPro  7.1  /  Alb  3.0<L>  /  TBili  0.8  /  DBili  x   /  AST  77<H>  /  ALT  53<H>  /  AlkPhos  120  02-05  TPro  6.4  /  Alb  2.9<L>  /  TBili  0.9  /  DBili  x   /  AST  90<H>  /  ALT  66<H>  /  AlkPhos  117  02-05    CAPILLARY BLOOD GLUCOSE      POCT Blood Glucose.: 109 mg/dL (2020 09:02)  POCT Blood Glucose.: 235 mg/dL (2020 21:26)  POCT Blood Glucose.: 197 mg/dL (2020 17:01)  POCT Blood Glucose.: 74 mg/dL (2020 11:27)      LIVER FUNCTIONS - ( 2020 06:55 )  Alb: 3.3 g/dL / Pro: 7.6 g/dL / ALK PHOS: 204 U/L / ALT: 65 U/L / AST: 160 U/L / GGT: x             Urinalysis Basic - ( 2020 18:02 )    Color: Light Yellow / Appearance: Clear / S.006 / pH: x  Gluc: x / Ketone: Negative  / Bili: Negative / Urobili: Negative   Blood: x / Protein: Negative / Nitrite: Negative   Leuk Esterase: Large / RBC: 6 /hpf / WBC 34 /HPF   Sq Epi: x / Non Sq Epi: 1 /hpf / Bacteria: Many      Lactate, Blood: 0.7 mmol/L ( @ 04:47)      Studies  Chest X-RAY  < from: Xray Chest 1 View- PORTABLE-Routine (20 @ 05:18) >  EXAM:  XR CHEST PORTABLE ROUTINE 1V                            PROCEDURE DATE:  2020            INTERPRETATION:  A single chest x-ray was obtained on February 3, 2020.    Indication: Hypoxemia.    Impression:    The heart is enlarged. Bilateral pleural effusion. Bibasilar pneumonia and/or atelectasis cannot be ruled out entirely. A central line is present on the right and the tip is in the superior vena cava. A left central line was removed. No pneumothorax. Calcified aortic knob. Degenerative changes of the thoracic spine.          < end of copied text >    CT SCAN Chest   < from: CT Chest No Cont (20 @ 18:03) >    EXAM:  CT CHEST                            PROCEDURE DATE:  2020            INTERPRETATION:  CLINICAL INFORMATION: Shortness of breath    COMPARISON: Chest radiograph 2020.    PROCEDURE:   CT of the Chest was performed without intravenouscontrast.  Sagittal and coronal reformats were performed.      FINDINGS:    LUNGS, PLEURA, AND AIRWAYS: Moderate bilateral pleural effusions. Areas of adjacent compressive atelectasis and near complete collapse of the right middle lobe.     MEDIASTINUM AND RAVI: Calcified lymph node is present in the left hilum.    VESSELS: Calcifications of the thoracic aorta and coronary arteries.    HEART: Cardiomegaly. No pericardial effusion.    CHEST WALL AND LOWER NECK: Anasarca.    VISUALIZED UPPER ABDOMEN: Small perihepatic ascites. Atherosclerotic changes of the abdominal aorta.    BONES: Degenerative changes of the spine and chronic fracture of the posterior right 12th rib.    IMPRESSION:     Moderate bilateral pleural effusions.                     CATHI TAYLOR M.D., RADIOLOGY RESIDENT  This document has been electronically signed.  JENNIFER MCKENNA M.D., ATTENDING RADIOLOGIST  This document has been electronically signed. 2020  9:04AM        < end of copied text >    Venous Dopplers: LE: n/a   MRI Abdomen  < from: MRI Abdomen w/Cont (09.15.17 @ 14:15) >    EXAM:  MRI ABDOMEN WITH CONTRAST                            PROCEDURE DATE:  09/15/2017          INTERPRETATION:  Abdominal MR without and with IV contrast including MRCP    Indication: Acute cholecystitis and cholangitis.     Technique: Utilizing a 1.5 Deborah high-field magnet, multiplanar   multisequence MR images of the abdomen are acquired to without and with   IV contrast (6 cc Gadavist administered, 3.8 cc discarded), supplemented   by thin and thick slab MRCP images. Postcontrast images are acquired   dynamically.    Comparison: No prior abdominal MR is available for comparison.    Findings: Gallstones are seen in the gallbladder measuring up to 2.2 cm.   There is gallbladder wall thickening/edema with trace pericholecystic   fluid, suspicious for acute cholecystitis.    The common bile duct measures up to 10 mm in caliber which is mildly   dilated with smooth tapering distally. No evidence for a common bile duct   stone.    The main pancreatic duct is not dilated.    The liver, spleen, adrenals and kidneys appear unremarkable. There is a   nonspecific 4 mm cystic lesion in the pancreatic body; this may represent   an IPMN.    Trace perisplenic ascites.    No enlarged lymph node is identified.    There is a 5.0 cm cystic structure in the right adnexal region on coronal   HASTE images. This may represent a right ovarian cyst.    Impression: Cholelithiasis. Gallbladder wall thickening/edema with trace   pericholecystic fluid, suspicious for acute cholecystitis.    The commonbile duct measures up to 10 mm in caliber which is mildly   dilated with smooth tapering distally. No evidence for   choledocholithiasis.    Nonspecific 4 mm cystic lesion in the pancreas body; this may represent   an IPMN. If clinically indicated, follow-up abdominal MR may be pursued   in 6-12 months to ensure stability.    Trace ascites.    5.0 cm cystic structure in the right adnexal region; this may represent a   right ovarian cyst. If clinically indicated, pelvic ultrasound may be   pursuedfor further evaluation.    < end of copied text >    Others  < from: Transthoracic Echocardiogram (20 @ 12:59) >  Patient name: JOHNNY COMBS  YOB: 1930   Age: 89 (F)   MR#: [id number]  Study Date: 2020  Location: [place] [id number]: Dilcia Villafuerte RDCS  Study quality: Technically good  Referring Physician: Danielle Knight MD  Blood Pressure:116/42 mmHg  Height: 140 cm  Weight: 36 kg  BSA: 1.2 m2  ------------------------------------------------------------------------  PROCEDURE: Transthoracic echocardiogram with 2-D, M-Mode  and complete spectral and color flow Doppler.  INDICATION: Heart failure, unspecified (I50.9)  ------------------------------------------------------------------------  Dimensions:    Normal Values:  LA:            2.0 - 4.0 cm  Ao:            2.0 - 3.8 cm  SEPTUM:        0.6 - 1.2 cm  PWT:           0.6 - 1.1 cm  LVIDd:         3.0 - 5.6 cm  LVIDs:         1.8 - 4.0 cm  EF (Visual Estimate): 75 %  Doppler Peak Velocity (m/sec): TV=3.2  ------------------------------------------------------------------------  Observations:  Mitral Valve: Severe mitral annular and leaflet  calcification. Calcified mitral chordae.  Moderate mitral stenosis. Mean mitral gradient 5 mmHg at  70/min.  Mild mitral regurgitation.  Aortic Valve/Aorta: Mild aortic stenosis.  Peak velocities in the LVOT and aorta 0.8 and 1.8 m/s,  respectively.  LVOTd 1.8 cm.  Aortic valve area by continuity 1.1 cm2 = 0.95 cm2/m2.  Mild aortic regurgitation.  Normal aortic root size.  Left Atrium: Severe left atrial enlargement.  Left Ventricle: Normal left ventricular internal dimensions  andwall thicknesses.  Hyperdynamic left ventricle.  Estimated left atrial pressure is severely elevated. IVRT  70 msec.  Right Heart: Severe right atrial enlargement.  Severe right ventricular enlargement with normal right  ventricular systolic function.  Calcified tricuspid valve leaflets. Severe tricuspid  regurgitation.  Normal pulmonic valve.  Pericardium/Pleura: Small pericardial effusion.  Bilateral pleural effusions.  Hemodynamic: Estimated right atrial pressure is severely  elevated.  Moderate pulmonary hypertension. Estimated PASP 60 mmHg.  ------------------------------------------------------------------------  Conclusions:  Hyperdynamic left ventricle.  Moderate mitral stenosis due to annular and leaflet  calcification.  Mild aortic stenosis.  Severe right ventricular enlargement with normal right  ventricular systolic function.  Moderate pulmonary hypertension.  ------------------------------------------------------------------    < end of copied text >

## 2020-02-08 NOTE — PROGRESS NOTE ADULT - SUBJECTIVE AND OBJECTIVE BOX
Patient is a 89y old  Female who presents with a chief complaint of generalized weakness and body swelling (2020 09:29)  patient known to me, assigned this AM to assume care  chart reviewed and events thus far noted   transferred out of CCU    SUBJECTIVE / OVERNIGHT EVENTS: overnight events noted    ROS:  Resp: No cough no sputum production  CVS: No chest pain no palpitations no orthopnea  GI: no N/V/D  : no dysuria, no hematuria  Neuro: no weakness no paresthesias  Heme: No petechiae no easy bruising  Msk: No joint pain no swelling  Skin: No rash no itching        MEDICATIONS  (STANDING):  aspirin enteric coated 81 milliGRAM(s) Oral daily  atorvastatin 80 milliGRAM(s) Oral at bedtime  clopidogrel Tablet 75 milliGRAM(s) Oral daily  heparin  Injectable 5000 Unit(s) SubCutaneous every 12 hours  insulin lispro (HumaLOG) corrective regimen sliding scale   SubCutaneous three times a day before meals  insulin lispro (HumaLOG) corrective regimen sliding scale   SubCutaneous at bedtime  levothyroxine 50 MICROGram(s) Oral daily  melatonin 3 milliGRAM(s) Oral at bedtime  mirtazapine 3.75 milliGRAM(s) Oral at bedtime  montelukast 10 milliGRAM(s) Oral every 24 hours  multivitamin/minerals 1 Tablet(s) Oral daily  pantoprazole    Tablet 40 milliGRAM(s) Oral before breakfast    MEDICATIONS  (PRN):  glucagon  Injectable 1 milliGRAM(s) IntraMuscular once PRN Glucose LESS THAN 70 milligrams/deciliter        CAPILLARY BLOOD GLUCOSE      POCT Blood Glucose.: 235 mg/dL (2020 13:05)  POCT Blood Glucose.: 109 mg/dL (2020 09:02)  POCT Blood Glucose.: 235 mg/dL (2020 21:26)  POCT Blood Glucose.: 197 mg/dL (2020 17:01)    I&O's Summary    2020 07:  -  2020 07:00  --------------------------------------------------------  IN: 600 mL / OUT: 700 mL / NET: -100 mL    2020 07:01  -  2020 14:43  --------------------------------------------------------  IN: 240 mL / OUT: 200 mL / NET: 40 mL        Vital Signs Last 24 Hrs  T(C): 36.6 (2020 12:36), Max: 37.1 (2020 02:10)  T(F): 97.8 (2020 12:36), Max: 98.7 (2020 02:10)  HR: 73 (2020 12:36) (73 - 96)  BP: 122/68 (2020 12:36) (92/46 - 122/68)  BP(mean): 69 (2020 01:00) (60 - 77)  RR: 18 (2020 12:36) (13 - 28)  SpO2: 96% (2020 12:36) (93% - 100%)    PHYSICAL EXAM: vital signs as above  HEENT: KALPESH EOMI  Neck: Supple, no JVD, no thyromegaly  Lungs: decreased breath sounds at bases   CVS: S1 S2 soft ejection systolic murmur best heard at left sternal border   Abdomen: no tenderness, no organomegaly, BS present  Neuro: calm communicative moving all limbs   Skin: warm, dry  Ext: no cyanosis or clubbing, trace bilaterally edema  Msk: no joint swelling or deformities  Back: no CVA tenderness, no kyphosis/scoliosis    LABS:                        9.9    5.28  )-----------( 234      ( 2020 07:00 )             31.1     02-08    135  |  88<L>  |  39<H>  ----------------------------<  120<H>  3.8   |  30  |  1.95<H>    Ca    9.7      2020 06:55  Phos  3.0     02-08  Mg     1.9     02-08    TPro  7.6  /  Alb  3.3  /  TBili  1.5<H>  /  DBili  x   /  AST  160<H>  /  ALT  65<H>  /  AlkPhos  204<H>            Urinalysis Basic - ( 2020 18:02 )    Color: Light Yellow / Appearance: Clear / S.006 / pH: x  Gluc: x / Ketone: Negative  / Bili: Negative / Urobili: Negative   Blood: x / Protein: Negative / Nitrite: Negative   Leuk Esterase: Large / RBC: 6 /hpf / WBC 34 /HPF   Sq Epi: x / Non Sq Epi: 1 /hpf / Bacteria: Many          All consultant(s) notes reviewed and care discussed with other providers        Contact Number, Dr Moser [phone]

## 2020-02-08 NOTE — PROGRESS NOTE ADULT - SUBJECTIVE AND OBJECTIVE BOX
Patient seen and examined at bedside.    Overnight Events:     Review Of Systems: No chest pain, shortness of breath, or palpitations            Current Meds:  aspirin enteric coated 81 milliGRAM(s) Oral daily  atorvastatin 80 milliGRAM(s) Oral at bedtime  clopidogrel Tablet 75 milliGRAM(s) Oral daily  glucagon  Injectable 1 milliGRAM(s) IntraMuscular once PRN  heparin  Injectable 5000 Unit(s) SubCutaneous every 12 hours  insulin lispro (HumaLOG) corrective regimen sliding scale   SubCutaneous three times a day before meals  insulin lispro (HumaLOG) corrective regimen sliding scale   SubCutaneous at bedtime  levothyroxine 50 MICROGram(s) Oral daily  melatonin 3 milliGRAM(s) Oral at bedtime  mirtazapine 3.75 milliGRAM(s) Oral at bedtime  montelukast 10 milliGRAM(s) Oral every 24 hours  multivitamin/minerals 1 Tablet(s) Oral daily  pantoprazole    Tablet 40 milliGRAM(s) Oral before breakfast      Vitals:  T(F): 97.9 (02-08), Max: 98.7 (02-08)  HR: 78 (02-08) (78 - 96)  BP: 120/62 (02-08) (87/51 - 120/62)  RR: 19 (02-08)  SpO2: 97% (02-08)  I&O's Summary    07 Feb 2020 07:01  -  08 Feb 2020 07:00  --------------------------------------------------------  IN: 600 mL / OUT: 700 mL / NET: -100 mL        Physical Exam:  Appearance: No acute distress; well appearing  HEENT:  EOMI, sclera anicteric, Normal oral mucosa  Cardiovascular: RRR, S1, S2, no murmurs, rubs, or gallops; no edema; no JVD  Respiratory: Clear to auscultation bilaterally, no wheezes, rales, rhonchi  Gastrointestinal: soft, non-tender, non-distended with normal bowel sounds  Musculoskeletal: No clubbing; no joint deformity   Neurologic: Non-focal  Lymphatic: No lymphadenopathy  Psychiatry: AAOx3, mood & affect appropriate  Skin: No rashes, ecchymoses, or cyanosis                          9.9    5.28  )-----------( 234      ( 08 Feb 2020 07:00 )             31.1     02-08    135  |  88<L>  |  39<H>  ----------------------------<  120<H>  3.8   |  30  |  1.95<H>    Ca    9.7      08 Feb 2020 06:55  Phos  3.0     02-08  Mg     1.9     02-08    TPro  7.6  /  Alb  3.3  /  TBili  1.5<H>  /  DBili  x   /  AST  160<H>  /  ALT  65<H>  /  AlkPhos  204<H>  02-08      CARDIAC MARKERS ( 02 Feb 2020 04:20 )  40 ng/L / x     / x     / 83 U/L / x     / 3.7 ng/mL  CARDIAC MARKERS ( 02 Feb 2020 01:11 )  34 ng/L / x     / x     / x     / x     / x      CARDIAC MARKERS ( 01 Feb 2020 15:27 )  33 ng/L / x     / x     / x     / x     / x          Serum Pro-Brain Natriuretic Peptide: 7125 pg/mL (02-01 @ 15:27)      Echo:  2/4/20  Conclusions:  Hyperdynamic left ventricle.  Moderate mitral stenosis due to annular and leaflet calcification.  Mild aortic stenosis.  Severe right ventricular enlargement with normal right  ventricular systolic function.  Moderate pulmonary hypertension.      Interpretation of Telemetry: Patient seen and examined at bedside.    Overnight Events: NAEO, transferred out of CCU, no complaints.    Review Of Systems: No chest pain, shortness of breath, or palpitations            Current Meds:  aspirin enteric coated 81 milliGRAM(s) Oral daily  atorvastatin 80 milliGRAM(s) Oral at bedtime  clopidogrel Tablet 75 milliGRAM(s) Oral daily  glucagon  Injectable 1 milliGRAM(s) IntraMuscular once PRN  heparin  Injectable 5000 Unit(s) SubCutaneous every 12 hours  insulin lispro (HumaLOG) corrective regimen sliding scale SubCutaneous three times a day before meals  insulin lispro (HumaLOG) corrective regimen sliding scale SubCutaneous at bedtime  levothyroxine 50 MICROGram(s) Oral daily  melatonin 3 milliGRAM(s) Oral at bedtime  mirtazapine 3.75 milliGRAM(s) Oral at bedtime  montelukast 10 milliGRAM(s) Oral every 24 hours  multivitamin/minerals 1 Tablet(s) Oral daily  pantoprazole    Tablet 40 milliGRAM(s) Oral before breakfast      Vitals:  T(F): 97.9 (02-08), Max: 98.7 (02-08)  HR: 78 (02-08) (78 - 96)  BP: 120/62 (02-08) (87/51 - 120/62)  RR: 19 (02-08)  SpO2: 97% (02-08)  I&O's Summary    07 Feb 2020 07:01  -  08 Feb 2020 07:00  --------------------------------------------------------  IN: 600 mL / OUT: 700 mL / NET: -100 mL        Physical Exam:  Appearance: No acute distress; elderly  HEENT:  EOMI, sclera anicteric, Normal oral mucosa  Cardiovascular: RRR, S1, S2, systolic murmur LLSB  Respiratory: Clear to auscultation bilaterally, no wheezes, rales, rhonchi  Gastrointestinal: soft, non-tender, non-distended with normal bowel sounds  Musculoskeletal: No clubbing; no joint deformity   Neurologic: Non-focal  Lymphatic: No lymphadenopathy  Psychiatry: AAOx3, mood & affect appropriate  Skin: No rashes, ecchymoses, or cyanosis                          9.9    5.28  )-----------( 234      ( 08 Feb 2020 07:00 )             31.1     02-08    135  |  88<L>  |  39<H>  ----------------------------<  120<H>  3.8   |  30  |  1.95<H>    Ca    9.7      08 Feb 2020 06:55  Phos  3.0     02-08  Mg     1.9     02-08    TPro  7.6  /  Alb  3.3  /  TBili  1.5<H>  /  DBili  x   /  AST  160<H>  /  ALT  65<H>  /  AlkPhos  204<H>  02-08      CARDIAC MARKERS ( 02 Feb 2020 04:20 )  40 ng/L / x     / x     / 83 U/L / x     / 3.7 ng/mL  CARDIAC MARKERS ( 02 Feb 2020 01:11 )  34 ng/L / x     / x     / x     / x     / x      CARDIAC MARKERS ( 01 Feb 2020 15:27 )  33 ng/L / x     / x     / x     / x     / x          Serum Pro-Brain Natriuretic Peptide: 7125 pg/mL (02-01 @ 15:27)      Echo:  2/4/20  Conclusions:  Hyperdynamic left ventricle.  Moderate mitral stenosis due to annular and leaflet calcification.  Mild aortic stenosis.  Severe right ventricular enlargement with normal right  ventricular systolic function.  Moderate pulmonary hypertension.      Interpretation of Telemetry: sinus 70s-80s, PVCs, couplets

## 2020-02-09 LAB
-  AMIKACIN: SIGNIFICANT CHANGE UP
-  AMPICILLIN/SULBACTAM: SIGNIFICANT CHANGE UP
-  AMPICILLIN: SIGNIFICANT CHANGE UP
-  AZTREONAM: SIGNIFICANT CHANGE UP
-  CEFAZOLIN: SIGNIFICANT CHANGE UP
-  CEFEPIME: SIGNIFICANT CHANGE UP
-  CEFOXITIN: SIGNIFICANT CHANGE UP
-  CEFTRIAXONE: SIGNIFICANT CHANGE UP
-  CIPROFLOXACIN: SIGNIFICANT CHANGE UP
-  GENTAMICIN: SIGNIFICANT CHANGE UP
-  IMIPENEM: SIGNIFICANT CHANGE UP
-  LEVOFLOXACIN: SIGNIFICANT CHANGE UP
-  MEROPENEM: SIGNIFICANT CHANGE UP
-  NITROFURANTOIN: SIGNIFICANT CHANGE UP
-  PIPERACILLIN/TAZOBACTAM: SIGNIFICANT CHANGE UP
-  TIGECYCLINE: SIGNIFICANT CHANGE UP
-  TOBRAMYCIN: SIGNIFICANT CHANGE UP
-  TRIMETHOPRIM/SULFAMETHOXAZOLE: SIGNIFICANT CHANGE UP
ALBUMIN SERPL ELPH-MCNC: 2.9 G/DL — LOW (ref 3.3–5)
ALP SERPL-CCNC: 212 U/L — HIGH (ref 40–120)
ALT FLD-CCNC: 66 U/L — HIGH (ref 10–45)
AST SERPL-CCNC: 141 U/L — HIGH (ref 10–40)
BILIRUB SERPL-MCNC: 1.4 MG/DL — HIGH (ref 0.2–1.2)
BUN SERPL-MCNC: 38 MG/DL — HIGH (ref 7–23)
CALCIUM SERPL-MCNC: 9.5 MG/DL — SIGNIFICANT CHANGE UP (ref 8.4–10.5)
CHLORIDE SERPL-SCNC: 90 MMOL/L — LOW (ref 96–108)
CO2 SERPL-SCNC: 25 MMOL/L — SIGNIFICANT CHANGE UP (ref 22–31)
CREAT SERPL-MCNC: 1.72 MG/DL — HIGH (ref 0.5–1.3)
CULTURE RESULTS: SIGNIFICANT CHANGE UP
GLUCOSE BLDC GLUCOMTR-MCNC: 100 MG/DL — HIGH (ref 70–99)
GLUCOSE BLDC GLUCOMTR-MCNC: 115 MG/DL — HIGH (ref 70–99)
GLUCOSE BLDC GLUCOMTR-MCNC: 170 MG/DL — HIGH (ref 70–99)
GLUCOSE BLDC GLUCOMTR-MCNC: 268 MG/DL — HIGH (ref 70–99)
GLUCOSE SERPL-MCNC: 123 MG/DL — HIGH (ref 70–99)
HCT VFR BLD CALC: 31.1 % — LOW (ref 34.5–45)
HGB BLD-MCNC: 9.6 G/DL — LOW (ref 11.5–15.5)
MCHC RBC-ENTMCNC: 27.9 PG — SIGNIFICANT CHANGE UP (ref 27–34)
MCHC RBC-ENTMCNC: 30.9 GM/DL — LOW (ref 32–36)
MCV RBC AUTO: 90.4 FL — SIGNIFICANT CHANGE UP (ref 80–100)
METHOD TYPE: SIGNIFICANT CHANGE UP
NRBC # BLD: 0 /100 WBCS — SIGNIFICANT CHANGE UP (ref 0–0)
ORGANISM # SPEC MICROSCOPIC CNT: SIGNIFICANT CHANGE UP
ORGANISM # SPEC MICROSCOPIC CNT: SIGNIFICANT CHANGE UP
PLATELET # BLD AUTO: 207 K/UL — SIGNIFICANT CHANGE UP (ref 150–400)
POTASSIUM SERPL-MCNC: 4 MMOL/L — SIGNIFICANT CHANGE UP (ref 3.5–5.3)
POTASSIUM SERPL-SCNC: 4 MMOL/L — SIGNIFICANT CHANGE UP (ref 3.5–5.3)
PROT SERPL-MCNC: 7.1 G/DL — SIGNIFICANT CHANGE UP (ref 6–8.3)
RBC # BLD: 3.44 M/UL — LOW (ref 3.8–5.2)
RBC # FLD: 15.8 % — HIGH (ref 10.3–14.5)
SODIUM SERPL-SCNC: 131 MMOL/L — LOW (ref 135–145)
SPECIMEN SOURCE: SIGNIFICANT CHANGE UP
WBC # BLD: 4.76 K/UL — SIGNIFICANT CHANGE UP (ref 3.8–10.5)
WBC # FLD AUTO: 4.76 K/UL — SIGNIFICANT CHANGE UP (ref 3.8–10.5)

## 2020-02-09 PROCEDURE — 93010 ELECTROCARDIOGRAM REPORT: CPT

## 2020-02-09 PROCEDURE — 99232 SBSQ HOSP IP/OBS MODERATE 35: CPT | Mod: GC

## 2020-02-09 RX ADMIN — ATORVASTATIN CALCIUM 80 MILLIGRAM(S): 80 TABLET, FILM COATED ORAL at 21:42

## 2020-02-09 RX ADMIN — Medication 3: at 12:45

## 2020-02-09 RX ADMIN — Medication 50 MICROGRAM(S): at 05:33

## 2020-02-09 RX ADMIN — Medication 1 TABLET(S): at 09:15

## 2020-02-09 RX ADMIN — CLOPIDOGREL BISULFATE 75 MILLIGRAM(S): 75 TABLET, FILM COATED ORAL at 09:15

## 2020-02-09 RX ADMIN — MONTELUKAST 10 MILLIGRAM(S): 4 TABLET, CHEWABLE ORAL at 17:12

## 2020-02-09 RX ADMIN — Medication 81 MILLIGRAM(S): at 09:15

## 2020-02-09 RX ADMIN — PANTOPRAZOLE SODIUM 40 MILLIGRAM(S): 20 TABLET, DELAYED RELEASE ORAL at 05:33

## 2020-02-09 RX ADMIN — Medication 3 MILLIGRAM(S): at 21:42

## 2020-02-09 RX ADMIN — MIRTAZAPINE 3.75 MILLIGRAM(S): 45 TABLET, ORALLY DISINTEGRATING ORAL at 21:42

## 2020-02-09 RX ADMIN — HEPARIN SODIUM 5000 UNIT(S): 5000 INJECTION INTRAVENOUS; SUBCUTANEOUS at 17:12

## 2020-02-09 RX ADMIN — HEPARIN SODIUM 5000 UNIT(S): 5000 INJECTION INTRAVENOUS; SUBCUTANEOUS at 05:33

## 2020-02-09 RX ADMIN — Medication 1: at 17:30

## 2020-02-09 NOTE — PROGRESS NOTE ADULT - SUBJECTIVE AND OBJECTIVE BOX
Patient seen and examined at bedside.    Overnight Events: Pt awake and alert but not answering questions. Sometimes moaning yes or no.       REVIEW OF SYSTEMS:  Constitutional:     [ ] negative [ ] fevers [ ] chills [ ] weight loss [ ] weight gain  HEENT:                  [ ] negative [ ] dry eyes [ ] eye irritation [ ] postnasal drip [ ] nasal congestion  CV:                         [ ] negative  [ ] chest pain [ ] orthopnea [ ] palpitations [ ] murmur  Resp:                     [ ] negative [ ] cough [ ] shortness of breath [ ] dyspnea [ ] wheezing [ ] sputum [ ]hemoptysis  GI:                          [ ] negative [ ] nausea [ ] vomiting [ ] diarrhea [ ] constipation [ ] abd pain [ ] dysphagia   :                        [ ] negative [ ] dysuria [ ] nocturia [ ] hematuria [ ] increased urinary frequency  Musculoskeletal: [ ] negative [ ] back pain [ ] myalgias [ ] arthralgias [ ] fracture  Skin:                       [ ] negative [ ] rash [ ] itch  Neurological:        [ ] negative [ ] headache [ ] dizziness [ ] syncope [ ] weakness [ ] numbness  Psychiatric:           [ ] negative [ ] anxiety [ ] depression  Endocrine:            [ ] negative [ ] diabetes [ ] thyroid problem  Heme/Lymph:      [ ] negative [ ] anemia [ ] bleeding problem  Allergic/Immune: [ ] negative [ ] itchy eyes [ ] nasal discharge [ ] hives [ ] angioedema    [ ] All other systems negative  [x ] Unable to assess ROS due to mental status     Current Meds:  aspirin enteric coated 81 milliGRAM(s) Oral daily  atorvastatin 80 milliGRAM(s) Oral at bedtime  clopidogrel Tablet 75 milliGRAM(s) Oral daily  glucagon  Injectable 1 milliGRAM(s) IntraMuscular once PRN  heparin  Injectable 5000 Unit(s) SubCutaneous every 12 hours  insulin lispro (HumaLOG) corrective regimen sliding scale   SubCutaneous three times a day before meals  insulin lispro (HumaLOG) corrective regimen sliding scale   SubCutaneous at bedtime  levothyroxine 50 MICROGram(s) Oral daily  melatonin 3 milliGRAM(s) Oral at bedtime  mirtazapine 3.75 milliGRAM(s) Oral at bedtime  montelukast 10 milliGRAM(s) Oral every 24 hours  multivitamin/minerals 1 Tablet(s) Oral daily  pantoprazole    Tablet 40 milliGRAM(s) Oral before breakfast      PAST MEDICAL & SURGICAL HISTORY:  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  Diabetes  Hypothyroid  Hypertension  Hyperlipidemia  Hypothyroid  Osteoporosis  Gall stone  DM (diabetes mellitus)  History of gallstones  No significant past surgical history      Vitals:  T(F): 97.7 (02-09), Max: 99 (02-08)  HR: 87 (02-09) (73 - 92)  BP: 110/67 (02-09) (108/62 - 122/68)  RR: 18 (02-09)  SpO2: 100% (02-09)  I&O's Summary    08 Feb 2020 07:01  -  09 Feb 2020 07:00  --------------------------------------------------------  IN: 290 mL / OUT: 450 mL / NET: -160 mL        Physical Exam:  Appearance: No acute distress; well appearing  Eyes: PERRL, EOMI, pink conjunctiva  HENT: Normal oral mucosa  Cardiovascular: RRR, S1, S2, no murmurs, rubs, or gallops; no edema; no JVD  Respiratory: Clear to auscultation bilaterally  Gastrointestinal: soft, non-tender, non-distended with normal bowel sounds  Musculoskeletal: No clubbing; no joint deformity   Neurologic: Non-focal  Lymphatic: No lymphadenopathy  Psychiatry: AAOx3, mood & affect appropriate  Skin: No rashes, ecchymoses, or cyanosis                          9.6    4.76  )-----------( 207      ( 09 Feb 2020 05:55 )             31.1     02-09    131<L>  |  90<L>  |  38<H>  ----------------------------<  123<H>  4.0   |  25  |  1.72<H>    Ca    9.5      09 Feb 2020 05:55  Phos  3.0     02-08  Mg     1.9     02-08    TPro  7.1  /  Alb  2.9<L>  /  TBili  1.4<H>  /  DBili  x   /  AST  141<H>  /  ALT  66<H>  /  AlkPhos  212<H>  02-09        New ECG(s): Personally reviewed    Echo:  < from: Transthoracic Echocardiogram (02.04.20 @ 12:59) >  Hyperdynamic left ventricle.  Moderate mitral stenosis due to annular and leaflet  calcification.  Mild aortic stenosis.  Severe right ventricular enlargement with normal right  ventricular systolic function.  Moderate pulmonary hypertension.    < end of copied text >    Imaging:  < from: CT Chest No Cont (02.01.20 @ 18:03) >  Moderate bilateral pleural effusions.     < end of copied text >    Interpretation of Telemetry:

## 2020-02-09 NOTE — PROGRESS NOTE ADULT - PROBLEM SELECTOR PLAN 1
on dobutamine: has bilateral pleural effusions too : likely secondary to CHF: no pneumonia  2/5: she is doing much better: no SOB : no cough : she is alert and awake:  2/6: doing much better: ABG noted: with alkalosis and some co2 retention: no rep distress: echo noted: for further workup per car ds  2/7: cont curetn rx: off diuretice today :? awaiting ramy/  2/8 resolved  2/9 resolved. hyponatremia noted from morning lab. management defer to primary

## 2020-02-09 NOTE — PROGRESS NOTE ADULT - PROBLEM SELECTOR PLAN 6
monitor pre renal  2/6: renal following  2/7: per primary team  2/8 defer to primary  2/9 defer to primary

## 2020-02-09 NOTE — PROGRESS NOTE ADULT - PROBLEM SELECTOR PLAN 2
monitor and control  2/5: stable  2/6';c ontrolled  2/7: hypoglycemic today : being monitored  2/8 keep euglycemic  2/9 stable. keep euglycemic

## 2020-02-09 NOTE — PROGRESS NOTE ADULT - ATTENDING COMMENTS
Patient seen and examined; agree with Cardiology Fellow assessment and plan.  --Cr slowly improving off diuretics, although diuretics may play a role in future management given TTE findings.  --Consider work-up for infiltrative cardiomyopathy.  --BP well controlled.  --Will follow.

## 2020-02-09 NOTE — PROGRESS NOTE ADULT - ATTENDING COMMENTS
doing ok ; cards note noted; infiltrative CMP vs valvular heart disease: cont current rx: per cards: rpt chest x-ray in AM

## 2020-02-09 NOTE — PROGRESS NOTE ADULT - SUBJECTIVE AND OBJECTIVE BOX
Patient is a 89y old  Female who presents with a chief complaint of generalized weakness and body swelling (09 Feb 2020 08:54)    Any change in ROS: sleepy today. per primary RN. no event overnight. somewhat fluctuating alertness is baseline.     MEDICATIONS  (STANDING):  aspirin enteric coated 81 milliGRAM(s) Oral daily  atorvastatin 80 milliGRAM(s) Oral at bedtime  clopidogrel Tablet 75 milliGRAM(s) Oral daily  heparin  Injectable 5000 Unit(s) SubCutaneous every 12 hours  insulin lispro (HumaLOG) corrective regimen sliding scale   SubCutaneous three times a day before meals  insulin lispro (HumaLOG) corrective regimen sliding scale   SubCutaneous at bedtime  levothyroxine 50 MICROGram(s) Oral daily  melatonin 3 milliGRAM(s) Oral at bedtime  mirtazapine 3.75 milliGRAM(s) Oral at bedtime  montelukast 10 milliGRAM(s) Oral every 24 hours  multivitamin/minerals 1 Tablet(s) Oral daily  pantoprazole    Tablet 40 milliGRAM(s) Oral before breakfast    MEDICATIONS  (PRN):  glucagon  Injectable 1 milliGRAM(s) IntraMuscular once PRN Glucose LESS THAN 70 milligrams/deciliter    Vital Signs Last 24 Hrs  T(C): 36.5 (09 Feb 2020 04:59), Max: 37.2 (08 Feb 2020 20:10)  T(F): 97.7 (09 Feb 2020 04:59), Max: 99 (08 Feb 2020 20:10)  HR: 87 (09 Feb 2020 04:59) (73 - 92)  BP: 110/67 (09 Feb 2020 04:59) (108/62 - 122/68)  BP(mean): --  RR: 18 (09 Feb 2020 04:59) (18 - 18)  SpO2: 100% (09 Feb 2020 04:59) (96% - 100%)    I&O's Summary    08 Feb 2020 07:01  -  09 Feb 2020 07:00  --------------------------------------------------------  IN: 290 mL / OUT: 450 mL / NET: -160 mL          Physical Exam:   GENERAL: The patient comfortable with no apparent distress.   HEENT: Head is normocephalic and atraumatic.    NECK: Supple with no elevated JVP.  LUNGS: Clear to auscultation without wheeze and rhonchi.  HEART: S1 and S2 present without murmur.  ABDOMEN: Soft, nontender, and nondistended.   EXTREMITIES: No edema or calf tenderness.  NEUROLOGIC: Grossly intact.    Labs:  35, 21, 21, 33, 21, 30, 31, 28, 21, 28, 28, 21, 21, RA                            9.6    4.76  )-----------( 207      ( 09 Feb 2020 05:55 )             31.1                         9.9    5.28  )-----------( 234      ( 08 Feb 2020 07:00 )             31.1                         10.0   7.11  )-----------( 227      ( 07 Feb 2020 04:47 )             31.1                         10.0   7.35  )-----------( 236      ( 06 Feb 2020 04:16 )             30.3                         9.8    6.74  )-----------( 243      ( 05 Feb 2020 20:19 )             30.7     02-09    131<L>  |  90<L>  |  38<H>  ----------------------------<  123<H>  4.0   |  25  |  1.72<H>  02-08    135  |  88<L>  |  39<H>  ----------------------------<  120<H>  3.8   |  30  |  1.95<H>  02-07    136  |  86<L>  |  37<H>  ----------------------------<  94  3.7   |  34<H>  |  1.97<H>  02-07    134<L>  |  86<L>  |  38<H>  ----------------------------<  51<L>  3.0<L>   |  33<H>  |  1.97<H>  02-06    135  |  90<L>  |  41<H>  ----------------------------<  94  4.5   |  33<H>  |  2.10<H>  02-05    134<L>  |  89<L>  |  42<H>  ----------------------------<  184<H>  3.5   |  31  |  2.32<H>    Ca    9.5      09 Feb 2020 05:55  Ca    9.7      08 Feb 2020 06:55  Phos  3.0     02-08  Mg     1.9     02-08    TPro  7.1  /  Alb  2.9<L>  /  TBili  1.4<H>  /  DBili  x   /  AST  141<H>  /  ALT  66<H>  /  AlkPhos  212<H>  02-09  TPro  7.6  /  Alb  3.3  /  TBili  1.5<H>  /  DBili  x   /  AST  160<H>  /  ALT  65<H>  /  AlkPhos  204<H>  02-08  TPro  7.8  /  Alb  3.4  /  TBili  1.4<H>  /  DBili  x   /  AST  107<H>  /  ALT  53<H>  /  AlkPhos  148<H>  02-07  TPro  7.7  /  Alb  3.2<L>  /  TBili  1.2  /  DBili  x   /  AST  91<H>  /  ALT  49<H>  /  AlkPhos  139<H>  02-07  TPro  7.0  /  Alb  3.1<L>  /  TBili  1.1  /  DBili  x   /  AST  72<H>  /  ALT  52<H>  /  AlkPhos  118  02-06  TPro  7.1  /  Alb  3.0<L>  /  TBili  0.8  /  DBili  x   /  AST  77<H>  /  ALT  53<H>  /  AlkPhos  120  02-05    CAPILLARY BLOOD GLUCOSE      POCT Blood Glucose.: 100 mg/dL (09 Feb 2020 08:40)  POCT Blood Glucose.: 161 mg/dL (08 Feb 2020 21:40)  POCT Blood Glucose.: 184 mg/dL (08 Feb 2020 17:31)  POCT Blood Glucose.: 235 mg/dL (08 Feb 2020 13:05)      LIVER FUNCTIONS - ( 09 Feb 2020 05:55 )  Alb: 2.9 g/dL / Pro: 7.1 g/dL / ALK PHOS: 212 U/L / ALT: 66 U/L / AST: 141 U/L / GGT: x               Lactate, Blood: 0.7 mmol/L (02-07 @ 04:47)      Studies  Chest X-RAY  < from: Xray Chest 1 View- PORTABLE-Routine (02.03.20 @ 05:18) >  EXAM:  XR CHEST PORTABLE ROUTINE 1V                            PROCEDURE DATE:  02/03/2020            INTERPRETATION:  A single chest x-ray was obtained on February 3, 2020.    Indication: Hypoxemia.    Impression:    The heart is enlarged. Bilateral pleural effusion. Bibasilar pneumonia and/or atelectasis cannot be ruled out entirely. A central line is present on the right and the tip is in the superior vena cava. A left central line was removed. No pneumothorax. Calcified aortic knob. Degenerative changes of the thoracic spine.    < end of copied text >    CT SCAN Chest   < from: CT Chest No Cont (02.01.20 @ 18:03) >    EXAM:  CT CHEST                            PROCEDURE DATE:  02/01/2020            INTERPRETATION:  CLINICAL INFORMATION: Shortness of breath    COMPARISON: Chest radiograph 2/1/2020.    PROCEDURE:   CT of the Chest was performed without intravenouscontrast.  Sagittal and coronal reformats were performed.      FINDINGS:    LUNGS, PLEURA, AND AIRWAYS: Moderate bilateral pleural effusions. Areas of adjacent compressive atelectasis and near complete collapse of the right middle lobe.     MEDIASTINUM AND RAVI: Calcified lymph node is present in the left hilum.    VESSELS: Calcifications of the thoracic aorta and coronary arteries.    HEART: Cardiomegaly. No pericardial effusion.    CHEST WALL AND LOWER NECK: Anasarca.    VISUALIZED UPPER ABDOMEN: Small perihepatic ascites. Atherosclerotic changes of the abdominal aorta.    BONES: Degenerative changes of the spine and chronic fracture of the posterior right 12th rib.    IMPRESSION:     Moderate bilateral pleural effusions.           < end of copied text >    Venous Dopplers: LE: n/a   CT Abdomen  < from: MRI Abdomen w/Cont (09.15.17 @ 14:15) >  EXAM:  MRI ABDOMEN WITH CONTRAST                            PROCEDURE DATE:  09/15/2017          INTERPRETATION:  Abdominal MR without and with IV contrast including MRCP    Indication: Acute cholecystitis and cholangitis.     Technique: Utilizing a 1.5 Deborah high-field magnet, multiplanar   multisequence MR images of the abdomen are acquired to without and with   IV contrast (6 cc Gadavist administered, 3.8 cc discarded), supplemented   by thin and thick slab MRCP images. Postcontrast images are acquired   dynamically.    Comparison: No prior abdominal MR is available for comparison.    Findings: Gallstones are seen in the gallbladder measuring up to 2.2 cm.   There is gallbladder wall thickening/edema with trace pericholecystic   fluid, suspicious for acute cholecystitis.    The common bile duct measures up to 10 mm in caliber which is mildly   dilated with smooth tapering distally. No evidence for a common bile duct   stone.    The main pancreatic duct is not dilated.    The liver, spleen, adrenals and kidneys appear unremarkable. There is a   nonspecific 4 mm cystic lesion in the pancreatic body; this may represent   an IPMN.    Trace perisplenic ascites.    No enlarged lymph node is identified.    There is a 5.0 cm cystic structure in the right adnexal region on coronal   HASTE images. This may represent a right ovarian cyst.    Impression: Cholelithiasis. Gallbladder wall thickening/edema with trace   pericholecystic fluid, suspicious for acute cholecystitis.    The commonbile duct measures up to 10 mm in caliber which is mildly   dilated with smooth tapering distally. No evidence for   choledocholithiasis.    Nonspecific 4 mm cystic lesion in the pancreas body; this may represent   an IPMN. If clinically indicated, follow-up abdominal MR may be pursued   in 6-12 months to ensure stability.    Trace ascites.    5.0 cm cystic structure in the right adnexal region; this may represent a   right ovarian cyst. If clinically indicated, pelvic ultrasound may be   pursuedfor further evaluation.        < end of copied text >    Others  < from: Transthoracic Echocardiogram (02.04.20 @ 12:59) >    Patient name: JOHNNY COMBS  YOB: 1930   Age: 89 (F)   MR#: [id number]  Study Date: 2/4/2020  [id number]: Dilcia Villafuerte RDCS  Study quality: Technically good  Referring Physician: Danielle Knight MD  Blood Pressure:116/42 mmHg  Height: 140 cm  Weight: 36 kg  BSA: 1.2 m2  ------------------------------------------------------------------------  PROCEDURE: Transthoracic echocardiogram with 2-D, M-Mode  and complete spectral and color flow Doppler.  INDICATION: Heart failure, unspecified (I50.9)  ------------------------------------------------------------------------  Dimensions:    Normal Values:  LA:            2.0 - 4.0 cm  Ao:            2.0 - 3.8 cm  SEPTUM:        0.6 - 1.2 cm  PWT:           0.6 - 1.1 cm  LVIDd:         3.0 - 5.6 cm  LVIDs:         1.8 - 4.0 cm  EF (Visual Estimate): 75 %  Doppler Peak Velocity (m/sec): TV=3.2  ------------------------------------------------------------------------  Observations:  Mitral Valve: Severe mitral annular and leaflet  calcification. Calcified mitral chordae.  Moderate mitral stenosis. Mean mitral gradient 5 mmHg at  70/min.  Mild mitral regurgitation.  Aortic Valve/Aorta: Mild aortic stenosis.  Peak velocities in the LVOT and aorta 0.8 and 1.8 m/s,  respectively.  LVOTd 1.8 cm.  Aortic valve area by continuity 1.1 cm2 = 0.95 cm2/m2.  Mild aortic regurgitation.  Normal aortic root size.  Left Atrium: Severe left atrial enlargement.  Left Ventricle: Normal left ventricular internal dimensions  andwall thicknesses.  Hyperdynamic left ventricle.  Estimated left atrial pressure is severely elevated. IVRT  70 msec.  Right Heart: Severe right atrial enlargement.  Severe right ventricular enlargement with normal right  ventricular systolic function.  Calcified tricuspid valve leaflets. Severe tricuspid  regurgitation.  Normal pulmonic valve.  Pericardium/Pleura: Small pericardial effusion.  Bilateral pleural effusions.  Hemodynamic: Estimated right atrial pressure is severely  elevated.  Moderate pulmonary hypertension. Estimated PASP 60 mmHg.  ------------------------------------------------------------------------  Conclusions:  Hyperdynamic left ventricle.  Moderate mitral stenosis due to annular and leaflet  calcification.  Mild aortic stenosis.  Severe right ventricular enlargement with normal right  ventricular systolic function.  Moderate pulmonary hypertension.  ------------------------------------------------------------------------  Confirmed on  2/4/2020 - 15:54:25 by Qamar James MD, FASE  ------------------------------------------------------------------------    < end of copied text >

## 2020-02-09 NOTE — PROGRESS NOTE ADULT - SUBJECTIVE AND OBJECTIVE BOX
Patient is a 89y old  Female who presents with a chief complaint of generalized weakness and body swelling (09 Feb 2020 09:41)      SUBJECTIVE / OVERNIGHT EVENTS: overnight events noted    ROS:  Resp: No cough no sputum production  CVS: No chest pain no palpitations no orthopnea  GI: no N/V/D  : no dysuria, no hematuria  Neuro: no weakness no paresthesias  Heme: No petechiae no easy bruising  Msk: No joint pain no swelling  Skin: No rash no itching        MEDICATIONS  (STANDING):  aspirin enteric coated 81 milliGRAM(s) Oral daily  atorvastatin 80 milliGRAM(s) Oral at bedtime  clopidogrel Tablet 75 milliGRAM(s) Oral daily  heparin  Injectable 5000 Unit(s) SubCutaneous every 12 hours  insulin lispro (HumaLOG) corrective regimen sliding scale   SubCutaneous three times a day before meals  insulin lispro (HumaLOG) corrective regimen sliding scale   SubCutaneous at bedtime  levothyroxine 50 MICROGram(s) Oral daily  melatonin 3 milliGRAM(s) Oral at bedtime  mirtazapine 3.75 milliGRAM(s) Oral at bedtime  montelukast 10 milliGRAM(s) Oral every 24 hours  multivitamin/minerals 1 Tablet(s) Oral daily  pantoprazole    Tablet 40 milliGRAM(s) Oral before breakfast    MEDICATIONS  (PRN):  glucagon  Injectable 1 milliGRAM(s) IntraMuscular once PRN Glucose LESS THAN 70 milligrams/deciliter        CAPILLARY BLOOD GLUCOSE      POCT Blood Glucose.: 170 mg/dL (09 Feb 2020 17:24)  POCT Blood Glucose.: 268 mg/dL (09 Feb 2020 12:44)  POCT Blood Glucose.: 100 mg/dL (09 Feb 2020 08:40)  POCT Blood Glucose.: 161 mg/dL (08 Feb 2020 21:40)    I&O's Summary    08 Feb 2020 07:01  -  09 Feb 2020 07:00  --------------------------------------------------------  IN: 290 mL / OUT: 450 mL / NET: -160 mL    09 Feb 2020 07:01  -  09 Feb 2020 17:55  --------------------------------------------------------  IN: 330 mL / OUT: 200 mL / NET: 130 mL        Vital Signs Last 24 Hrs  T(C): 36.7 (09 Feb 2020 12:58), Max: 37.2 (08 Feb 2020 20:10)  T(F): 98.1 (09 Feb 2020 12:58), Max: 99 (08 Feb 2020 20:10)  HR: 88 (09 Feb 2020 12:58) (87 - 92)  BP: 110/69 (09 Feb 2020 12:58) (108/62 - 120/69)  BP(mean): --  RR: 18 (09 Feb 2020 12:58) (18 - 18)  SpO2: 96% (09 Feb 2020 12:58) (96% - 100%)    PHYSICAL EXAM: vital signs as above  HEENT: KALPESH EOMI  Neck: Supple, no JVD, no thyromegaly  Lungs: decreased breath sounds at bases   CVS: S1 S2 soft ejection systolic murmur best heard at left sternal border   Abdomen: no tenderness, no organomegaly, BS present  Neuro: calm communicative moving all limbs   Skin: warm, dry  Ext: no cyanosis or clubbing, trace bilaterally edema  Msk: no joint swelling or deformities  Back: no CVA tenderness, no kyphosis/scoliosis    LABS:                        9.6    4.76  )-----------( 207      ( 09 Feb 2020 05:55 )             31.1     02-09    131<L>  |  90<L>  |  38<H>  ----------------------------<  123<H>  4.0   |  25  |  1.72<H>    Ca    9.5      09 Feb 2020 05:55  Phos  3.0     02-08  Mg     1.9     02-08    TPro  7.1  /  Alb  2.9<L>  /  TBili  1.4<H>  /  DBili  x   /  AST  141<H>  /  ALT  66<H>  /  AlkPhos  212<H>  02-09                All consultant(s) notes reviewed and care discussed with other providers        Contact Number, Dr Moser [phone]

## 2020-02-09 NOTE — PROGRESS NOTE ADULT - ASSESSMENT
89 yo F with a PMH significant for HTN, DM, hypothyroidism presenting with fatigue, found to have b/l effusions and anemia, course c/b shock likely cardiogenic given low central sat (presumed to be 2/2 initiation of beta blocker). CCU transfer and initiation of inotropes improved shock. Patient developed oliguric renal failure 2/2 ATN as well which is now resolved. Of note, she is also being treated with IV synthroid for hypothyroidism.    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 Unclear if her cardiomyopathy is an infiltrative process (ie amyloidosis) leading to restrictive CM and valvular disease vs valvular disease(TR, MS) leading to biatrial enlargement and RV dysfunction.  - if her disease were 2/2 MS, beta blockade would be first line given need for increased time in diastole, however this would worsen her condition, if she were to have amyloid as she would need the heart rate to maintain her cardiac output given a fixed stroke volume. As a result, will defer beat blockade  - for now, continue to monitor off diuretics and continue to trend BMPs. Suspect she may require outpatient diuretics for symptom control  - BPs currently within normal limits, defer initiation of anti-HTN regimen at this time    Sarah Silverio MD  Cardiology Fellow - PGY 5  Text or Call: [phone]  For all New Consults and Questions:  [email or web address]   Login: [email or web address]

## 2020-02-09 NOTE — PROGRESS NOTE ADULT - PROBLEM SELECTOR PLAN 4
hypotensive: on vasopressin  2/5: off pressors now: no SOB: alert and awake    2/6: off vasopressors: no SOB : BP reasonable  2/7: off pressors at this time: cont to monitor: off b blocklers  2/8 normotensive  2/9 normotensive

## 2020-02-10 LAB
ANION GAP SERPL CALC-SCNC: 17 MMOL/L — SIGNIFICANT CHANGE UP (ref 5–17)
BUN SERPL-MCNC: 37 MG/DL — HIGH (ref 7–23)
CALCIUM SERPL-MCNC: 9.6 MG/DL — SIGNIFICANT CHANGE UP (ref 8.4–10.5)
CHLORIDE SERPL-SCNC: 93 MMOL/L — LOW (ref 96–108)
CO2 SERPL-SCNC: 21 MMOL/L — LOW (ref 22–31)
CREAT SERPL-MCNC: 1.6 MG/DL — HIGH (ref 0.5–1.3)
GLUCOSE BLDC GLUCOMTR-MCNC: 118 MG/DL — HIGH (ref 70–99)
GLUCOSE BLDC GLUCOMTR-MCNC: 209 MG/DL — HIGH (ref 70–99)
GLUCOSE BLDC GLUCOMTR-MCNC: 212 MG/DL — HIGH (ref 70–99)
GLUCOSE BLDC GLUCOMTR-MCNC: 223 MG/DL — HIGH (ref 70–99)
GLUCOSE SERPL-MCNC: 106 MG/DL — HIGH (ref 70–99)
POTASSIUM SERPL-MCNC: 4.2 MMOL/L — SIGNIFICANT CHANGE UP (ref 3.5–5.3)
POTASSIUM SERPL-SCNC: 4.2 MMOL/L — SIGNIFICANT CHANGE UP (ref 3.5–5.3)
SODIUM SERPL-SCNC: 131 MMOL/L — LOW (ref 135–145)

## 2020-02-10 PROCEDURE — 99233 SBSQ HOSP IP/OBS HIGH 50: CPT | Mod: GC

## 2020-02-10 PROCEDURE — 99232 SBSQ HOSP IP/OBS MODERATE 35: CPT | Mod: GC

## 2020-02-10 PROCEDURE — 71045 X-RAY EXAM CHEST 1 VIEW: CPT | Mod: 26

## 2020-02-10 RX ORDER — POLYETHYLENE GLYCOL 3350 17 G/17G
17 POWDER, FOR SOLUTION ORAL DAILY
Refills: 0 | Status: DISCONTINUED | OUTPATIENT
Start: 2020-02-10 | End: 2020-02-25

## 2020-02-10 RX ORDER — SENNA PLUS 8.6 MG/1
2 TABLET ORAL AT BEDTIME
Refills: 0 | Status: DISCONTINUED | OUTPATIENT
Start: 2020-02-10 | End: 2020-02-25

## 2020-02-10 RX ADMIN — MONTELUKAST 10 MILLIGRAM(S): 4 TABLET, CHEWABLE ORAL at 17:28

## 2020-02-10 RX ADMIN — Medication 1 TABLET(S): at 12:06

## 2020-02-10 RX ADMIN — Medication 2: at 13:01

## 2020-02-10 RX ADMIN — CLOPIDOGREL BISULFATE 75 MILLIGRAM(S): 75 TABLET, FILM COATED ORAL at 12:06

## 2020-02-10 RX ADMIN — HEPARIN SODIUM 5000 UNIT(S): 5000 INJECTION INTRAVENOUS; SUBCUTANEOUS at 17:28

## 2020-02-10 RX ADMIN — Medication 2: at 17:29

## 2020-02-10 RX ADMIN — Medication 81 MILLIGRAM(S): at 12:06

## 2020-02-10 RX ADMIN — PANTOPRAZOLE SODIUM 40 MILLIGRAM(S): 20 TABLET, DELAYED RELEASE ORAL at 06:07

## 2020-02-10 RX ADMIN — Medication 50 MICROGRAM(S): at 06:07

## 2020-02-10 RX ADMIN — ATORVASTATIN CALCIUM 80 MILLIGRAM(S): 80 TABLET, FILM COATED ORAL at 21:38

## 2020-02-10 RX ADMIN — Medication 3 MILLIGRAM(S): at 21:38

## 2020-02-10 RX ADMIN — HEPARIN SODIUM 5000 UNIT(S): 5000 INJECTION INTRAVENOUS; SUBCUTANEOUS at 06:07

## 2020-02-10 RX ADMIN — MIRTAZAPINE 3.75 MILLIGRAM(S): 45 TABLET, ORALLY DISINTEGRATING ORAL at 21:38

## 2020-02-10 NOTE — PROGRESS NOTE ADULT - ATTENDING COMMENTS
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

## 2020-02-10 NOTE — PROGRESS NOTE ADULT - PROBLEM SELECTOR PLAN 1
- Scr 1.45 on admission, continuously uptrended but now improving at 1.60 this AM.  - Patient with cardiogenic shock, now improved. Pt. now off IV vasopressors and dobutamine.  - Pt. now with improved UOP, now with resolving ATN.  - Electrolytes currently stable. No urgent indication for RRT/HD treatment currently.  - Monitor UOP and daily weights. Avoid volume depletion, NSAIDs, PPI's, ARB/ACE-I. Dose medications as per eGFR.  - Renal US with echogenic kidneys bilaterally.  - SPEP with migrating paraprotein identified, SIFE with IgG Lambda; consider heme/onc evaluation

## 2020-02-10 NOTE — PROGRESS NOTE ADULT - SUBJECTIVE AND OBJECTIVE BOX
Patient is a 89y old  Female who presents with a chief complaint of generalized weakness and body swelling (10 Feb 2020 14:04)      SUBJECTIVE / OVERNIGHT EVENTS: overnight events noted    ROS:  Resp: No cough no sputum production  CVS: No chest pain no palpitations no orthopnea  GI: no N/V/D  : no dysuria, no hematuria  Neuro: no weakness no paresthesias  Heme: No petechiae no easy bruising  Msk: No joint pain no swelling  Skin: No rash no itching        MEDICATIONS  (STANDING):  aspirin enteric coated 81 milliGRAM(s) Oral daily  atorvastatin 80 milliGRAM(s) Oral at bedtime  clopidogrel Tablet 75 milliGRAM(s) Oral daily  heparin  Injectable 5000 Unit(s) SubCutaneous every 12 hours  insulin lispro (HumaLOG) corrective regimen sliding scale   SubCutaneous three times a day before meals  insulin lispro (HumaLOG) corrective regimen sliding scale   SubCutaneous at bedtime  levothyroxine 50 MICROGram(s) Oral daily  melatonin 3 milliGRAM(s) Oral at bedtime  mirtazapine 3.75 milliGRAM(s) Oral at bedtime  montelukast 10 milliGRAM(s) Oral every 24 hours  multivitamin/minerals 1 Tablet(s) Oral daily  pantoprazole    Tablet 40 milliGRAM(s) Oral before breakfast  senna 2 Tablet(s) Oral at bedtime    MEDICATIONS  (PRN):  glucagon  Injectable 1 milliGRAM(s) IntraMuscular once PRN Glucose LESS THAN 70 milligrams/deciliter  polyethylene glycol 3350 17 Gram(s) Oral daily PRN Constipation        CAPILLARY BLOOD GLUCOSE      POCT Blood Glucose.: 223 mg/dL (10 Feb 2020 17:04)  POCT Blood Glucose.: 212 mg/dL (10 Feb 2020 12:40)  POCT Blood Glucose.: 118 mg/dL (10 Feb 2020 08:39)  POCT Blood Glucose.: 115 mg/dL (09 Feb 2020 21:42)  POCT Blood Glucose.: 170 mg/dL (09 Feb 2020 17:24)    I&O's Summary    09 Feb 2020 07:01  -  10 Feb 2020 07:00  --------------------------------------------------------  IN: 330 mL / OUT: 575 mL / NET: -245 mL    10 Feb 2020 07:01  -  10 Feb 2020 17:22  --------------------------------------------------------  IN: 560 mL / OUT: 300 mL / NET: 260 mL        Vital Signs Last 24 Hrs  T(C): 36.7 (10 Feb 2020 15:30), Max: 37.4 (09 Feb 2020 20:16)  T(F): 98.1 (10 Feb 2020 15:30), Max: 99.3 (09 Feb 2020 20:16)  HR: 82 (10 Feb 2020 15:30) (82 - 94)  BP: 114/65 (10 Feb 2020 15:30) (113/65 - 121/71)  BP(mean): --  RR: 16 (10 Feb 2020 15:30) (16 - 18)  SpO2: 97% (10 Feb 2020 15:30) (97% - 100%)    PHYSICAL EXAM: vital signs as above  HEENT: KALPESH EOMI  Neck: Supple, no JVD, no thyromegaly  Lungs: decreased breath sounds at bases   CVS: S1 S2 soft ejection systolic murmur best heard at left sternal border   Abdomen: no tenderness, no organomegaly, BS present  Neuro: non focal  Skin: warm, dry  Ext: no cyanosis or clubbing, trace bilaterally edema  Msk: no joint swelling or deformities  Back: no CVA testingnderness, no kyphosis/scoliosis    LABS:                        9.6    4.76  )-----------( 207      ( 09 Feb 2020 05:55 )             31.1     02-10    131<L>  |  93<L>  |  37<H>  ----------------------------<  106<H>  4.2   |  21<L>  |  1.60<H>    Ca    9.6      10 Feb 2020 06:56    TPro  7.1  /  Alb  2.9<L>  /  TBili  1.4<H>  /  DBili  x   /  AST  141<H>  /  ALT  66<H>  /  AlkPhos  212<H>  02-09                All consultant(s) notes reviewed and care discussed with other providers        Contact Number, Dr Moser [phone]

## 2020-02-10 NOTE — PROGRESS NOTE ADULT - ATTENDING COMMENTS
EFRAIN: likely due to poor renal perfusion in the setting of shock  Serum creatinine noted to be normal in 2017. No values in 2018/2019. Admitted with serum creatinine of 1.4  Renal function continues to improve  No longer on diuretics/Pressors  Urine protein of 1.3 gms noted. SPEP/SIFE with IgG lambda.  Please check a 24 hour urine for UPEP/IF  Consider Heme evaluation to r/o amyloid

## 2020-02-10 NOTE — PROGRESS NOTE ADULT - PROBLEM SELECTOR PLAN 2
monitor and control  2/5: stable  2/6';c ontrolled  2/7: hypoglycemic today : being monitored  2/8 keep euglycemic  2/9 stable. keep euglycemic  2/10: controlled

## 2020-02-10 NOTE — PROGRESS NOTE ADULT - PROBLEM SELECTOR PLAN 1
on dobutamine: has bilateral pleural effusions too : likely secondary to CHF: no pneumonia  2/5: she is doing much better: no SOB : no cough : she is alert and awake:  2/6: doing much better: ABG noted: with alkalosis and some co2 retention: no rep distress: echo noted: for further workup per car ds  2/7: cont curetn rx: off diuretice today :? awaiting ramy/  2/8 resolved  2/9 resolved. hyponatremia noted from morning lab. management defer to primary  2/10: improved off diuretics: rpt chest x-ray

## 2020-02-10 NOTE — PROGRESS NOTE ADULT - ATTENDING COMMENTS
doing ok ; cards note noted; infiltrative CMP vs valvular heart disease: cont current rx: per cards: rpt chest x-ray in AM  2/10: repeat chest x-ray is ordered:

## 2020-02-10 NOTE — PROGRESS NOTE ADULT - SUBJECTIVE AND OBJECTIVE BOX
Patient is a 89y old  Female who presents with a chief complaint of generalized weakness and body swelling (10 Feb 2020 10:34)      Any change in ROS: pt is alert amanda wake:     MEDICATIONS  (STANDING):  aspirin enteric coated 81 milliGRAM(s) Oral daily  atorvastatin 80 milliGRAM(s) Oral at bedtime  clopidogrel Tablet 75 milliGRAM(s) Oral daily  heparin  Injectable 5000 Unit(s) SubCutaneous every 12 hours  insulin lispro (HumaLOG) corrective regimen sliding scale   SubCutaneous three times a day before meals  insulin lispro (HumaLOG) corrective regimen sliding scale   SubCutaneous at bedtime  levothyroxine 50 MICROGram(s) Oral daily  melatonin 3 milliGRAM(s) Oral at bedtime  mirtazapine 3.75 milliGRAM(s) Oral at bedtime  montelukast 10 milliGRAM(s) Oral every 24 hours  multivitamin/minerals 1 Tablet(s) Oral daily  pantoprazole    Tablet 40 milliGRAM(s) Oral before breakfast  senna 2 Tablet(s) Oral at bedtime    MEDICATIONS  (PRN):  glucagon  Injectable 1 milliGRAM(s) IntraMuscular once PRN Glucose LESS THAN 70 milligrams/deciliter  polyethylene glycol 3350 17 Gram(s) Oral daily PRN Constipation    Vital Signs Last 24 Hrs  T(C): 36.6 (10 Feb 2020 12:25), Max: 37.4 (09 Feb 2020 20:16)  T(F): 97.9 (10 Feb 2020 12:25), Max: 99.3 (09 Feb 2020 20:16)  HR: 84 (10 Feb 2020 12:25) (84 - 94)  BP: 121/71 (10 Feb 2020 12:25) (113/65 - 121/71)  BP(mean): --  RR: 18 (10 Feb 2020 12:25) (18 - 18)  SpO2: 98% (10 Feb 2020 12:25) (98% - 100%)    I&O's Summary    09 Feb 2020 07:01  -  10 Feb 2020 07:00  --------------------------------------------------------  IN: 330 mL / OUT: 575 mL / NET: -245 mL    10 Feb 2020 07:01  -  10 Feb 2020 14:04  --------------------------------------------------------  IN: 320 mL / OUT: 300 mL / NET: 20 mL          Physical Exam:   GENERAL: NAD, well-groomed, well-developed  HEENT: KALPESH/   Atraumatic, Normocephalic  ENMT: No tonsillar erythema, exudates, or enlargement; Moist mucous membranes, Good dentition, No lesions  NECK: Supple, No JVD, Normal thyroid  CHEST/LUNG: Decreased air entry bilaterally:   CVS: Regular rate and rhythm; No murmurs, rubs, or gallops  GI: : Soft, Nontender, Nondistended; Bowel sounds present  NERVOUS SYSTEM:  Alert & Oriented X3  EXTREMITIES:  2+ Peripheral Pulses, No clubbing, cyanosis, or edema  LYMPH: No lymphadenopathy noted  SKIN: No rashes or lesions  ENDOCRINOLOGY: No Thyromegaly  PSYCH: Appropriate    Labs:  35, 21, 21, 33, 21, 30, 31, 28, 21                            9.6    4.76  )-----------( 207      ( 09 Feb 2020 05:55 )             31.1                         9.9    5.28  )-----------( 234      ( 08 Feb 2020 07:00 )             31.1                         10.0   7.11  )-----------( 227      ( 07 Feb 2020 04:47 )             31.1     02-10    131<L>  |  93<L>  |  37<H>  ----------------------------<  106<H>  4.2   |  21<L>  |  1.60<H>  02-09    131<L>  |  90<L>  |  38<H>  ----------------------------<  123<H>  4.0   |  25  |  1.72<H>  02-08    135  |  88<L>  |  39<H>  ----------------------------<  120<H>  3.8   |  30  |  1.95<H>  02-07    136  |  86<L>  |  37<H>  ----------------------------<  94  3.7   |  34<H>  |  1.97<H>  02-07    134<L>  |  86<L>  |  38<H>  ----------------------------<  51<L>  3.0<L>   |  33<H>  |  1.97<H>    Ca    9.6      10 Feb 2020 06:56  Ca    9.5      09 Feb 2020 05:55    TPro  7.1  /  Alb  2.9<L>  /  TBili  1.4<H>  /  DBili  x   /  AST  141<H>  /  ALT  66<H>  /  AlkPhos  212<H>  02-09  TPro  7.6  /  Alb  3.3  /  TBili  1.5<H>  /  DBili  x   /  AST  160<H>  /  ALT  65<H>  /  AlkPhos  204<H>  02-08  TPro  7.8  /  Alb  3.4  /  TBili  1.4<H>  /  DBili  x   /  AST  107<H>  /  ALT  53<H>  /  AlkPhos  148<H>  02-07  TPro  7.7  /  Alb  3.2<L>  /  TBili  1.2  /  DBili  x   /  AST  91<H>  /  ALT  49<H>  /  AlkPhos  139<H>  02-07    CAPILLARY BLOOD GLUCOSE      POCT Blood Glucose.: 212 mg/dL (10 Feb 2020 12:40)  POCT Blood Glucose.: 118 mg/dL (10 Feb 2020 08:39)  POCT Blood Glucose.: 115 mg/dL (09 Feb 2020 21:42)  POCT Blood Glucose.: 170 mg/dL (09 Feb 2020 17:24)      LIVER FUNCTIONS - ( 09 Feb 2020 05:55 )  Alb: 2.9 g/dL / Pro: 7.1 g/dL / ALK PHOS: 212 U/L / ALT: 66 U/L / AST: 141 U/L / GGT: x               Lactate, Blood: 0.7 mmol/L (02-07 @ 04:47)    < from: US Kidney and Bladder (02.04.20 @ 11:29) >    EXAM:  US KIDNEYS AND BLADDER                            PROCEDURE DATE:  02/04/2020            INTERPRETATION:  CLINICAL INFORMATION: ATN secondary to cardiogenic shock.    COMPARISON: Correlation is made with abdominal CT dated 9/15/2017.    TECHNIQUE: Portable Sonography of the kidneys and bladder.     FINDINGS:    Right kidney:  Atrophic, measuring 8.2 cm. No hydronephrosis. Increased cortical echogenicity.    Left kidney:  Atrophic, measuring 8.2 cm. No hydronephrosis. Increased cortical echogenicity.    Urinary bladder: Within normal limits.    Miscellaneous: Bilateral pleural effusions and trace ascites.    IMPRESSION:     Atrophic kidneys bilaterally with increased cortical echogenicity bilaterally, which may be secondary to medicalrenal disease.    Incidental note is made of bilateral pleural effusions and trace ascites.        < from: Xray Chest 1 View- PORTABLE-Routine (02.03.20 @ 05:18) >    EXAM:  XR CHEST PORTABLE ROUTINE 1V                            PROCEDURE DATE:  02/03/2020            INTERPRETATION:  A single chest x-ray was obtained on February 3, 2020.    Indication: Hypoxemia.    Impression:    The heart is enlarged. Bilateral pleural effusion. Bibasilar pneumonia and/or atelectasis cannot be ruled out entirely. A central line is present on the right and the tip is in the superior vena cava. A left central line was removed. No pneumothorax. Calcified aortic knob. Degenerative changes of the thoracic spine.                    TATE CYR M.D., ATTENDING RADIOLOGIST  This document has been electronically signed. Feb  3 2020  9:20AM              < end of copied text >                      MÓNICA WING M.D., ATTENDING RADIOLOGIST  This document has been electronically signed. Feb 4 2020 11:52AM        < end of copied text >      RECENT CULTURES:  02-07 @ 04:50 .Urine Clean Catch (Midstream)   LUKE      Klebsiella pneumoniae  Klebsiella pneumoniae     >100,000 CFU/ml Klebsiella pneumoniae          RESPIRATORY CULTURES:          Studies  Chest X-RAY  CT SCAN Chest   Venous Dopplers: LE:   CT Abdomen  Others

## 2020-02-10 NOTE — PROGRESS NOTE ADULT - SUBJECTIVE AND OBJECTIVE BOX
[facility] DIVISION OF KIDNEY DISEASES AND HYPERTENSION -- FOLLOW UP NOTE  --------------------------------------------------------------------------------  HPI: 88 yo F with  HTN, DM, hypothyroidism admitted on 2/1/20 for generalized fatigue, found to be in cardiogenic shock, transferred to CCU for further management. Pt. started on diuretics. On admission Scr was 1.45, increased to 1.68 on 2/2/20. Scr had worsened, but now improved to 1.60 this AM.    Pt. seen and examined at bedside this AM. States that she has LE tenderness. Otherwise denies CP, SOB, N/V/F/C.     PAST HISTORY  --------------------------------------------------------------------------------  No significant changes to PMH, PSH, FHx, SHx, unless otherwise noted    ALLERGIES & MEDICATIONS  --------------------------------------------------------------------------------  Allergies    penicillin (Hives)  penicillin (Unknown)    Intolerances    Standing Inpatient Medications  aspirin enteric coated 81 milliGRAM(s) Oral daily  atorvastatin 80 milliGRAM(s) Oral at bedtime  clopidogrel Tablet 75 milliGRAM(s) Oral daily  heparin  Injectable 5000 Unit(s) SubCutaneous every 12 hours  insulin lispro (HumaLOG) corrective regimen sliding scale   SubCutaneous three times a day before meals  insulin lispro (HumaLOG) corrective regimen sliding scale   SubCutaneous at bedtime  levothyroxine 50 MICROGram(s) Oral daily  melatonin 3 milliGRAM(s) Oral at bedtime  mirtazapine 3.75 milliGRAM(s) Oral at bedtime  montelukast 10 milliGRAM(s) Oral every 24 hours  multivitamin/minerals 1 Tablet(s) Oral daily  pantoprazole    Tablet 40 milliGRAM(s) Oral before breakfast  senna 2 Tablet(s) Oral at bedtime    REVIEW OF SYSTEMS  --------------------------------------------------------------------------------  Gen: + lethargy, + fatigue  Respiratory: No dyspnea  CV: No chest pain  GI: No abdominal pain  MSK: + LE tenderness  Neuro: No dizziness  Heme: No bleeding    All other systems were reviewed and are negative, except as noted.    VITALS/PHYSICAL EXAM  --------------------------------------------------------------------------------  T(C): 36.7 (02-10-20 @ 04:20), Max: 37.4 (02-09-20 @ 20:16)  HR: 87 (02-10-20 @ 04:20) (87 - 94)  BP: 113/65 (02-10-20 @ 04:20) (110/69 - 116/70)  RR: 18 (02-10-20 @ 04:20) (18 - 18)  SpO2: 100% (02-10-20 @ 04:20) (96% - 100%)  Wt(kg): --    02-09-20 @ 07:01  -  02-10-20 @ 07:00  --------------------------------------------------------  IN: 330 mL / OUT: 575 mL / NET: -245 mL    Physical Exam:  	Gen: NAD  	HEENT: Anicteric  	Pulm: Bibasilar crackles   	CV: RRR, S1S2; systolic murmur best heard at left sternal border   	Abd: +BS, soft, nontender/nondistended       	: No suprapubic tenderness  	MSK: Warm, b/l LE edema present (improved)  	Neuro: Awake, lethargic    LABS/STUDIES  --------------------------------------------------------------------------------              9.6    4.76  >-----------<  207      [02-09-20 @ 05:55]              31.1     131  |  93  |  37  ----------------------------<  106      [02-10-20 @ 06:56]  4.2   |  21  |  1.60        Ca     9.6     [02-10-20 @ 06:56]    Creatinine Trend:  SCr 1.60 [02-10 @ 06:56]  SCr 1.72 [02-09 @ 05:55]  SCr 1.95 [02-08 @ 06:55]  SCr 1.97 [02-07 @ 08:51]  SCr 1.97 [02-07 @ 04:47]    Urine Creatinine 15      [02-05-20 @ 16:53]  Urine Protein 34      [02-05-20 @ 20:10]    Immunofixation Serum:   IgG Lambda Band Identified    Reference Range: None Detected      [02-05-20 @ 20:14]  SPEP Interpretation: Gamma-Migrating Paraprotein Identified      [02-04-20 @ 15:38]

## 2020-02-10 NOTE — PROGRESS NOTE ADULT - SUBJECTIVE AND OBJECTIVE BOX
Patient seen and examined at bedside.    Overnight Events: No acute events overnight. Complains of constipation this AM    REVIEW OF SYSTEMS:  Constitutional:     [ ] negative [ ] fevers [ ] chills [ ] weight loss [ ] weight gain  HEENT:                  [ ] negative [ ] dry eyes [ ] eye irritation [ ] postnasal drip [ ] nasal congestion  CV:                         [ ] negative  [ ] chest pain [ ] orthopnea [ ] palpitations [ ] murmur  Resp:                     [ ] negative [ ] cough [ ] shortness of breath [ ] dyspnea [ ] wheezing [ ] sputum [ ]hemoptysis  GI:                          [ ] negative [ ] nausea [ ] vomiting [ ] diarrhea [x ] constipation [ ] abd pain [ ] dysphagia   :                        [ ] negative [ ] dysuria [ ] nocturia [ ] hematuria [ ] increased urinary frequency  Musculoskeletal: [ ] negative [ ] back pain [ ] myalgias [ ] arthralgias [ ] fracture  Skin:                       [ ] negative [ ] rash [ ] itch  Neurological:        [ ] negative [ ] headache [ ] dizziness [ ] syncope [ ] weakness [ ] numbness  Psychiatric:           [ ] negative [ ] anxiety [ ] depression  Endocrine:            [ ] negative [ ] diabetes [ ] thyroid problem  Heme/Lymph:      [ ] negative [ ] anemia [ ] bleeding problem  Allergic/Immune: [ ] negative [ ] itchy eyes [ ] nasal discharge [ ] hives [ ] angioedema    [x] All other systems negative  [ ] Unable to assess ROS due to mental status     Current Meds:  aspirin enteric coated 81 milliGRAM(s) Oral daily  atorvastatin 80 milliGRAM(s) Oral at bedtime  clopidogrel Tablet 75 milliGRAM(s) Oral daily  heparin  Injectable 5000 Unit(s) SubCutaneous every 12 hours  insulin lispro (HumaLOG) corrective regimen sliding scale   SubCutaneous three times a day before meals  insulin lispro (HumaLOG) corrective regimen sliding scale   SubCutaneous at bedtime  levothyroxine 50 MICROGram(s) Oral daily  melatonin 3 milliGRAM(s) Oral at bedtime  mirtazapine 3.75 milliGRAM(s) Oral at bedtime  montelukast 10 milliGRAM(s) Oral every 24 hours  multivitamin/minerals 1 Tablet(s) Oral daily  pantoprazole    Tablet 40 milliGRAM(s) Oral before breakfast  senna 2 Tablet(s) Oral at bedtime    MEDICATIONS  (PRN):  glucagon  Injectable 1 milliGRAM(s) IntraMuscular once PRN Glucose LESS THAN 70 milligrams/deciliter  polyethylene glycol 3350 17 Gram(s) Oral daily PRN Constipation        PAST MEDICAL & SURGICAL HISTORY:  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  Diabetes  Hypothyroid  Hypertension  Hyperlipidemia  Hypothyroid  Osteoporosis  Gall stone  DM (diabetes mellitus)  History of gallstones  No significant past surgical history      Vitals:  Vital Signs Last 24 Hrs  T(C): 36.7 (10 Feb 2020 04:20), Max: 37.4 (09 Feb 2020 20:16)  T(F): 98.1 (10 Feb 2020 04:20), Max: 99.3 (09 Feb 2020 20:16)  HR: 87 (10 Feb 2020 04:20) (87 - 94)  BP: 113/65 (10 Feb 2020 04:20) (110/69 - 116/70)  BP(mean): --  RR: 18 (10 Feb 2020 04:20) (18 - 18)  SpO2: 100% (10 Feb 2020 04:20) (96% - 100%)    I&O's Summary    09 Feb 2020 07:01  -  10 Feb 2020 07:00  --------------------------------------------------------  IN: 330 mL / OUT: 575 mL / NET: -245 mL    Physical Exam:  Appearance: No acute distress; well appearing  Eyes: PERRL, EOMI, pink conjunctiva  HENT: Normal oral mucosa  Cardiovascular: RRR, S1, S2, no murmurs, rubs, or gallops; no edema; no JVD  Respiratory: Clear to auscultation bilaterally  Gastrointestinal: soft, non-tender, non-distended with normal bowel sounds  Musculoskeletal: No clubbing; no joint deformity   Neurologic: Non-focal  Lymphatic: No lymphadenopathy  Psychiatry: AAOx3, mood & affect appropriate  Skin: No rashes, ecchymoses, or cyanosis                          9.6    4.76  )-----------( 207      ( 09 Feb 2020 05:55 )             31.1     02-10    131<L>  |  93<L>  |  37<H>  ----------------------------<  106<H>  4.2   |  21<L>  |  1.60<H>    Ca    9.6      10 Feb 2020 06:56    TPro  7.1  /  Alb  2.9<L>  /  TBili  1.4<H>  /  DBili  x   /  AST  141<H>  /  ALT  66<H>  /  AlkPhos  212<H>  02-09 02-09    131<L>  |  90<L>  |  38<H>  ----------------------------<  123<H>  4.0   |  25  |  1.72<H>    Ca    9.5      09 Feb 2020 05:55  Phos  3.0     02-08  Mg     1.9     02-08    TPro  7.1  /  Alb  2.9<L>  /  TBili  1.4<H>  /  DBili  x   /  AST  141<H>  /  ALT  66<H>  /  AlkPhos  212<H>  02-09        New ECG(s): Personally reviewed    Echo:  < from: Transthoracic Echocardiogram (02.04.20 @ 12:59) >  Hyperdynamic left ventricle.  Moderate mitral stenosis due to annular and leaflet  calcification.  Mild aortic stenosis.  Severe right ventricular enlargement with normal right  ventricular systolic function.  Moderate pulmonary hypertension.    < end of copied text >    Imaging:  < from: CT Chest No Cont (02.01.20 @ 18:03) >  Moderate bilateral pleural effusions.     < end of copied text >    Interpretation of Telemetry: Patient seen and examined at bedside.    Overnight Events: No acute events overnight. Complains of constipation this AM    REVIEW OF SYSTEMS:  Constitutional:     [ ] negative [ ] fevers [ ] chills [ ] weight loss [ ] weight gain  HEENT:                  [ ] negative [ ] dry eyes [ ] eye irritation [ ] postnasal drip [ ] nasal congestion  CV:                         [ ] negative  [ ] chest pain [ ] orthopnea [ ] palpitations [ ] murmur  Resp:                     [ ] negative [ ] cough [ ] shortness of breath [ ] dyspnea [ ] wheezing [ ] sputum [ ]hemoptysis  GI:                          [ ] negative [ ] nausea [ ] vomiting [ ] diarrhea [x ] constipation [ ] abd pain [ ] dysphagia   :                        [ ] negative [ ] dysuria [ ] nocturia [ ] hematuria [ ] increased urinary frequency  Musculoskeletal: [ ] negative [ ] back pain [ ] myalgias [ ] arthralgias [ ] fracture  Skin:                       [ ] negative [ ] rash [ ] itch  Neurological:        [ ] negative [ ] headache [ ] dizziness [ ] syncope [ ] weakness [ ] numbness  Psychiatric:           [ ] negative [ ] anxiety [ ] depression  Endocrine:            [ ] negative [ ] diabetes [ ] thyroid problem  Heme/Lymph:      [ ] negative [ ] anemia [ ] bleeding problem  Allergic/Immune: [ ] negative [ ] itchy eyes [ ] nasal discharge [ ] hives [ ] angioedema    [x] All other systems negative  [ ] Unable to assess ROS due to mental status     Current Meds:  aspirin enteric coated 81 milliGRAM(s) Oral daily  atorvastatin 80 milliGRAM(s) Oral at bedtime  clopidogrel Tablet 75 milliGRAM(s) Oral daily  heparin  Injectable 5000 Unit(s) SubCutaneous every 12 hours  insulin lispro (HumaLOG) corrective regimen sliding scale   SubCutaneous three times a day before meals  insulin lispro (HumaLOG) corrective regimen sliding scale   SubCutaneous at bedtime  levothyroxine 50 MICROGram(s) Oral daily  melatonin 3 milliGRAM(s) Oral at bedtime  mirtazapine 3.75 milliGRAM(s) Oral at bedtime  montelukast 10 milliGRAM(s) Oral every 24 hours  multivitamin/minerals 1 Tablet(s) Oral daily  pantoprazole    Tablet 40 milliGRAM(s) Oral before breakfast  senna 2 Tablet(s) Oral at bedtime    MEDICATIONS  (PRN):  glucagon  Injectable 1 milliGRAM(s) IntraMuscular once PRN Glucose LESS THAN 70 milligrams/deciliter  polyethylene glycol 3350 17 Gram(s) Oral daily PRN Constipation        PAST MEDICAL & SURGICAL HISTORY:  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  Diabetes  Hypothyroid  Hypertension  Hyperlipidemia  Hypothyroid  Osteoporosis  Gall stone  DM (diabetes mellitus)  History of gallstones  No significant past surgical history      Vitals:  Vital Signs Last 24 Hrs  T(C): 36.7 (10 Feb 2020 04:20), Max: 37.4 (09 Feb 2020 20:16)  T(F): 98.1 (10 Feb 2020 04:20), Max: 99.3 (09 Feb 2020 20:16)  HR: 87 (10 Feb 2020 04:20) (87 - 94)  BP: 113/65 (10 Feb 2020 04:20) (110/69 - 116/70)  BP(mean): --  RR: 18 (10 Feb 2020 04:20) (18 - 18)  SpO2: 100% (10 Feb 2020 04:20) (96% - 100%)    I&O's Summary    09 Feb 2020 07:01  -  10 Feb 2020 07:00  --------------------------------------------------------  IN: 330 mL / OUT: 575 mL / NET: -245 mL    Physical Exam:  Appearance: No acute distress; well appearing  Eyes: PERRL, EOMI, pink conjunctiva  HENT: Normal oral mucosa  Cardiovascular: RRR, S1, S2, no murmurs, rubs, or gallops; no edema; no JVD  Respiratory: Clear to auscultation bilaterally  Gastrointestinal: soft, non-tender, non-distended with normal bowel sounds  Musculoskeletal: No clubbing; no joint deformity   Neurologic: Non-focal  Lymphatic: No lymphadenopathy  Psychiatry: AAOx3, mood & affect appropriate  Skin: No rashes, ecchymoses, or cyanosis                          9.6    4.76  )-----------( 207      ( 09 Feb 2020 05:55 )             31.1     02-10    131<L>  |  93<L>  |  37<H>  ----------------------------<  106<H>  4.2   |  21<L>  |  1.60<H>    Ca    9.6      10 Feb 2020 06:56    TPro  7.1  /  Alb  2.9<L>  /  TBili  1.4<H>  /  DBili  x   /  AST  141<H>  /  ALT  66<H>  /  AlkPhos  212<H>  02-09 02-09    131<L>  |  90<L>  |  38<H>  ----------------------------<  123<H>  4.0   |  25  |  1.72<H>    Ca    9.5      09 Feb 2020 05:55  Phos  3.0     02-08  Mg     1.9     02-08    TPro  7.1  /  Alb  2.9<L>  /  TBili  1.4<H>  /  DBili  x   /  AST  141<H>  /  ALT  66<H>  /  AlkPhos  212<H>  02-09        New ECG(s): Personally reviewed    Echo:  < from: Transthoracic Echocardiogram (02.04.20 @ 12:59) >  Hyperdynamic left ventricle.  Moderate mitral stenosis due to annular and leaflet  calcification.  Mild aortic stenosis.  Severe right ventricular enlargement with normal right  ventricular systolic function.  Moderate pulmonary hypertension.    < end of copied text >    Imaging:  < from: CT Chest No Cont (02.01.20 @ 18:03) >  Moderate bilateral pleural effusions.     < end of copied text >    Interpretation of Telemetry: sinus rhtyhm

## 2020-02-10 NOTE — CHART NOTE - NSCHARTNOTEFT_GEN_A_CORE
Nutrition Follow Up Note  Patient seen for: Malnutrition follow up     Source:     Diet: Soft, Consistent Carbohydrate (no snacks), Low Sodium, Lactose Restricted (milk sugar intoler.), Ensure Enlive 1x daily     Patient reports:    Weights:   % Weight Change    Pertinent Medications:  Pertinent Labs:    Skin per nursing documentation:   Edema:    Estimated Needs:   [ ] no change since previous assessment  [ ] recalculated:     Previous Nutrition Diagnosis:   Nutrition Diagnosis is:    New Nutrition Diagnosis:  Related to:   As evidenced by:     Interventions:     Recommend  1)    Monitoring and Evaluation:     Continue to monitor Nutritional intake, Tolerance to diet prescription, weights, labs, skin integrity    RD remains available upon request and will follow up per protocol Nutrition Follow Up Note  Patient seen for: Malnutrition follow up     Source: Pt, electronic medical record     Diet: Soft, Consistent Carbohydrate (no snacks), Low Sodium, Lactose Restricted (milk sugar intoler.), Ensure Enlive 1x daily     Pt with minimal participation in interview, poor historian.    Patient reports:     Weights (pounds): Current (2/10) 74.2, previous RD assessment (2/5) 82, admission (2/1) 79.86   Weight loss noted. Pt previously noted with fluid retention, edema +1 resolved (2/5).   % Weight Change    Pertinent Medications: Humalog (sliding scale), Miralax, Senna, Synthroid, Multivitamin/Minerals, Protonix   Pertinent Labs: (2/10) Sodium 131, BUN 37,      Skin per nursing documentation: None    Edema: None     Estimated Needs:   [ ] no change since previous assessment  [ ] recalculated:     Previous Nutrition Diagnosis:   Nutrition Diagnosis is:    New Nutrition Diagnosis:  Related to:   As evidenced by:     Interventions:     Recommend  1)    Monitoring and Evaluation:     Continue to monitor Nutritional intake, Tolerance to diet prescription, weights, labs, skin integrity    RD remains available upon request and will follow up per protocol Nutrition Follow Up Note  Patient seen for: Malnutrition follow up     Source: Pt, CNA, electronic medical record     Pt is a 89 y/o F with HTN, DM, Hypothyroidism, CKD Stage III admitted after coming to the Emergency Department with generalized fatigue & hardly getting out of bed, diagnosed with generalized anasarca and bilaterally pleural effusions and severe anemia.     Diet: Soft, Consistent Carbohydrate (no snacks), Low Sodium, Lactose Restricted (milk sugar intoler.), Ensure Enlive 1x daily     Pt with minimal participation in interview, poor historian.  Pt reports no nausea, vomiting, diarrhea, or constipation at this time. Last BM noted (2/10).     Per PCA: Pt with good appetite and intake at this time, consuming ~75% of meals over the past few days. Pt consuming 1/2 Ensure Enlive with cereal, eggs, and a few sips of Lactaid milk this morning for breakfast (2/10).     Weights (pounds): Current (2/10) 74.2, previous RD assessment (2/5) 82, admission (2/1) 79.86   Weight loss noted. Pt previously noted with fluid retention, edema +1 resolved (2/5).   % Weight Change: 8% weight loss since admission     Pertinent Medications: Humalog (sliding scale), Miralax, Senna, Synthroid, Multivitamin/Minerals, Protonix   Pertinent Labs: (2/10) Sodium 131, BUN 37, Creatinine 1.60, Glucose 106, (2/10-2/8) Fingersticks 100-268, (2/2) A1C 5.7     Skin per nursing documentation: None    Edema: None     Estimated Needs:   [X] no change since previous assessment  [ ] recalculated:     Previous Nutrition Diagnosis: Severe Malnutrition, Underweight  Nutrition Diagnoses are: Ongoing, being addressed with oral diet & nutritional supplement, plan of care achieved      Recommend  1) Continue with current diet order: Consistent Carbohydrate (no snacks), Low Sodium, Lactose Restricted (milk sugar intoler.)  2) DC Ensure Enlive 1x daily, add Suplena 1x daily in setting of CKD Stage III   3) Pt not candidate for HF nutrition diet education at this time (poor historian)   4) Encourage PO intake and provide feeding assistance PRN    Monitoring and Evaluation:     Continue to monitor Nutritional intake, Tolerance to diet prescription, weights, labs, skin integrity, GI status     RD remains available upon request and will follow up per protocol  Norah Weiss, Dietetic Intern Pager# [phone]

## 2020-02-10 NOTE — PROGRESS NOTE ADULT - PROBLEM SELECTOR PLAN 2
Pt. with low Hgb and iron deficiency. Recommend Venofer 200 mg IV x 5 days or oral iron.  Monitor Hgb.    [place] He  Nephrology Fellow  Cell: [phone] (from 8 am to 5 pm)  (After 5 pm or on weekends please page on-call fellow)

## 2020-02-10 NOTE — PROGRESS NOTE ADULT - PROBLEM SELECTOR PLAN 4
hypotensive: on vasopressin  2/5: off pressors now: no SOB: alert and awake  2/10: bp is OK :     2/6: off vasopressors: no SOB : BP reasonable  2/7: off pressors at this time: cont to monitor: off b blocklers  2/8 normotensive  2/9 normotensive

## 2020-02-10 NOTE — PROGRESS NOTE ADULT - ASSESSMENT
89 yo F with a PMH significant for HTN, DM, hypothyroidism presenting with fatigue, found to have b/l effusions and anemia, course c/b shock likely cardiogenic given low central sat (presumed to be 2/2 initiation of beta blocker). CCU transfer and initiation of inotropes improved shock. Patient developed oliguric renal failure 2/2 ATN as well which is now resolved. Of note, she is also being treated with IV synthroid for hypothyroidism.    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 Unclear if her cardiomyopathy is an infiltrative process (ie amyloidosis) leading to restrictive CM and valvular disease vs valvular disease(TR, MS) leading to biatrial enlargement and RV dysfunction.  - if her disease were 2/2 MS, beta blockade would be first line given need for increased time in diastole, however this would worsen her condition, if she were to have amyloid as she would need the heart rate to maintain her cardiac output given a fixed stroke volume. As a result, will defer beta blockade  -For now, continue to monitor off diuretics and continue to trend BMPs. Suspect she may require outpatient diuretics for symptom control  -BPs currently within normal limits, defer initiation of anti-HTN regimen at this time    Shamar Godoy MD  Cardiology Fellow  All cardiology service information can be found 24/7 on [email or web address], password: [email or web address] 88 yo F with a PMH significant for HTN, DM, hypothyroidism presenting with fatigue, found to have b/l effusions and anemia, course c/b shock likely cardiogenic given low central sat (presumed to be 2/2 initiation of beta blocker). CCU transfer and initiation of inotropes improved shock. Patient developed oliguric renal failure 2/2 ATN as well which is now resolved. Of note, she is also being treated with IV synthroid for hypothyroidism.    #Valvular disease with possible concomitant infiltrative cardiomyopathy:  Patient with mod-sev MS, sev TR, biatrial enlargement, enlarged RV and normal LVEF. Unclear if her cardiomyopathy is an infiltrative process (ie amyloidosis) leading to restrictive CM and valvular disease vs valvular disease(TR, MS) leading to biatrial enlargement and RV dysfunction.  - if her disease were 2/2 MS, beta blockade would be first line given need for increased time in diastole, however this would worsen her condition, if she were to have amyloid as she would need the heart rate to maintain her cardiac output given a fixed stroke volume. As a result, will defer beta blockade  -For now, continue to monitor off diuretics and continue to trend BMPs. Suspect she may require outpatient diuretics for symptom control  -BPs currently within normal limits, defer initiation of anti-HTN regimen at this time    Shamar Godoy MD  Cardiology Fellow  All cardiology service information can be found 24/7 on [email or web address], password: [email or web address]

## 2020-02-11 PROBLEM — I10 ESSENTIAL (PRIMARY) HYPERTENSION: Chronic | Status: ACTIVE | Noted: 2020-02-01

## 2020-02-11 PROBLEM — E11.9 TYPE 2 DIABETES MELLITUS WITHOUT COMPLICATIONS: Chronic | Status: ACTIVE | Noted: 2020-02-01

## 2020-02-11 PROBLEM — E03.9 HYPOTHYROIDISM, UNSPECIFIED: Chronic | Status: ACTIVE | Noted: 2020-02-01

## 2020-02-11 PROBLEM — I63.9 CEREBRAL INFARCTION, UNSPECIFIED: Chronic | Status: ACTIVE | Noted: 2020-02-01

## 2020-02-11 LAB
ANION GAP SERPL CALC-SCNC: 13 MMOL/L — SIGNIFICANT CHANGE UP (ref 5–17)
BUN SERPL-MCNC: 38 MG/DL — HIGH (ref 7–23)
CALCIUM SERPL-MCNC: 9.6 MG/DL — SIGNIFICANT CHANGE UP (ref 8.4–10.5)
CHLORIDE SERPL-SCNC: 91 MMOL/L — LOW (ref 96–108)
CO2 SERPL-SCNC: 27 MMOL/L — SIGNIFICANT CHANGE UP (ref 22–31)
CREAT SERPL-MCNC: 1.52 MG/DL — HIGH (ref 0.5–1.3)
GLUCOSE BLDC GLUCOMTR-MCNC: 129 MG/DL — HIGH (ref 70–99)
GLUCOSE BLDC GLUCOMTR-MCNC: 168 MG/DL — HIGH (ref 70–99)
GLUCOSE BLDC GLUCOMTR-MCNC: 194 MG/DL — HIGH (ref 70–99)
GLUCOSE BLDC GLUCOMTR-MCNC: 257 MG/DL — HIGH (ref 70–99)
GLUCOSE SERPL-MCNC: 128 MG/DL — HIGH (ref 70–99)
HCT VFR BLD CALC: 31 % — LOW (ref 34.5–45)
HGB BLD-MCNC: 10 G/DL — LOW (ref 11.5–15.5)
MAGNESIUM SERPL-MCNC: 1.7 MG/DL — SIGNIFICANT CHANGE UP (ref 1.6–2.6)
MCHC RBC-ENTMCNC: 28.5 PG — SIGNIFICANT CHANGE UP (ref 27–34)
MCHC RBC-ENTMCNC: 32.3 GM/DL — SIGNIFICANT CHANGE UP (ref 32–36)
MCV RBC AUTO: 88.3 FL — SIGNIFICANT CHANGE UP (ref 80–100)
NRBC # BLD: 0 /100 WBCS — SIGNIFICANT CHANGE UP (ref 0–0)
OSMOLALITY UR: 408 MOS/KG — SIGNIFICANT CHANGE UP (ref 300–900)
PLATELET # BLD AUTO: 241 K/UL — SIGNIFICANT CHANGE UP (ref 150–400)
POTASSIUM SERPL-MCNC: 3.6 MMOL/L — SIGNIFICANT CHANGE UP (ref 3.5–5.3)
POTASSIUM SERPL-SCNC: 3.6 MMOL/L — SIGNIFICANT CHANGE UP (ref 3.5–5.3)
RBC # BLD: 3.51 M/UL — LOW (ref 3.8–5.2)
RBC # FLD: 16 % — HIGH (ref 10.3–14.5)
SODIUM SERPL-SCNC: 131 MMOL/L — LOW (ref 135–145)
WBC # BLD: 6.65 K/UL — SIGNIFICANT CHANGE UP (ref 3.8–10.5)
WBC # FLD AUTO: 6.65 K/UL — SIGNIFICANT CHANGE UP (ref 3.8–10.5)

## 2020-02-11 PROCEDURE — 99233 SBSQ HOSP IP/OBS HIGH 50: CPT | Mod: GC

## 2020-02-11 PROCEDURE — 99232 SBSQ HOSP IP/OBS MODERATE 35: CPT | Mod: GC

## 2020-02-11 RX ORDER — POTASSIUM CHLORIDE 20 MEQ
40 PACKET (EA) ORAL ONCE
Refills: 0 | Status: COMPLETED | OUTPATIENT
Start: 2020-02-11 | End: 2020-02-11

## 2020-02-11 RX ADMIN — Medication 1: at 17:55

## 2020-02-11 RX ADMIN — MONTELUKAST 10 MILLIGRAM(S): 4 TABLET, CHEWABLE ORAL at 17:55

## 2020-02-11 RX ADMIN — HEPARIN SODIUM 5000 UNIT(S): 5000 INJECTION INTRAVENOUS; SUBCUTANEOUS at 17:55

## 2020-02-11 RX ADMIN — Medication 3: at 14:18

## 2020-02-11 RX ADMIN — HEPARIN SODIUM 5000 UNIT(S): 5000 INJECTION INTRAVENOUS; SUBCUTANEOUS at 05:17

## 2020-02-11 RX ADMIN — Medication 3 MILLIGRAM(S): at 21:44

## 2020-02-11 RX ADMIN — Medication 50 MICROGRAM(S): at 05:17

## 2020-02-11 RX ADMIN — Medication 1 TABLET(S): at 17:55

## 2020-02-11 RX ADMIN — PANTOPRAZOLE SODIUM 40 MILLIGRAM(S): 20 TABLET, DELAYED RELEASE ORAL at 05:17

## 2020-02-11 RX ADMIN — Medication 40 MILLIEQUIVALENT(S): at 14:21

## 2020-02-11 RX ADMIN — ATORVASTATIN CALCIUM 80 MILLIGRAM(S): 80 TABLET, FILM COATED ORAL at 21:44

## 2020-02-11 RX ADMIN — MIRTAZAPINE 3.75 MILLIGRAM(S): 45 TABLET, ORALLY DISINTEGRATING ORAL at 21:44

## 2020-02-11 NOTE — PROGRESS NOTE ADULT - ATTENDING COMMENTS
doing ok ; cards note noted; infiltrative CMP vs valvular heart disease: cont current rx: per cards: rpt chest x-ray in AM  2/10: repeat chest x-ray is ordered:  2/11: resp wise pretty stable:

## 2020-02-11 NOTE — PROGRESS NOTE ADULT - PROBLEM SELECTOR PLAN 6
monitor pre renal  2/6: renal following  2/7: per primary team  2/8 defer to primary  2/9 defer to primary  2/11: stable

## 2020-02-11 NOTE — PROGRESS NOTE ADULT - PROBLEM SELECTOR PLAN 2
appears much more euvolemic   off diuresis  will have cardiology attending follow up   fat pad biopsy pending as well as nuclear scan per cardiology attending recommendations

## 2020-02-11 NOTE — PROGRESS NOTE ADULT - PROBLEM SELECTOR PLAN 1
on dobutamine: has bilateral pleural effusions too : likely secondary to CHF: no pneumonia  2/5: she is doing much better: no SOB : no cough : she is alert and awake:  2/6: doing much better: ABG noted: with alkalosis and some co2 retention: no rep distress: echo noted: for further workup per car ds  2/7: cont curetn rx: off diuretice today :? awaiting ramy/  2/8 resolved  2/9 resolved. hyponatremia noted from morning lab. management defer to primary  2/10: improved off diuretics: rpt chest x-ray  2/11:pleural effusion resolved: no SOB or resp symptoms: for fat pad biopsy on thursday

## 2020-02-11 NOTE — PROGRESS NOTE ADULT - SUBJECTIVE AND OBJECTIVE BOX
Patient is a 89y old  Female who presents with a chief complaint of generalized weakness and body swelling (11 Feb 2020 12:28)      Any change in ROS:   beter: now scheduled for fat pad biopsy  MEDICATIONS  (STANDING):  atorvastatin 80 milliGRAM(s) Oral at bedtime  heparin  Injectable 5000 Unit(s) SubCutaneous every 12 hours  insulin lispro (HumaLOG) corrective regimen sliding scale   SubCutaneous three times a day before meals  insulin lispro (HumaLOG) corrective regimen sliding scale   SubCutaneous at bedtime  levothyroxine 50 MICROGram(s) Oral daily  melatonin 3 milliGRAM(s) Oral at bedtime  mirtazapine 3.75 milliGRAM(s) Oral at bedtime  montelukast 10 milliGRAM(s) Oral every 24 hours  multivitamin/minerals 1 Tablet(s) Oral daily  pantoprazole    Tablet 40 milliGRAM(s) Oral before breakfast  potassium chloride    Tablet ER 40 milliEquivalent(s) Oral once  senna 2 Tablet(s) Oral at bedtime    MEDICATIONS  (PRN):  glucagon  Injectable 1 milliGRAM(s) IntraMuscular once PRN Glucose LESS THAN 70 milligrams/deciliter  polyethylene glycol 3350 17 Gram(s) Oral daily PRN Constipation    Vital Signs Last 24 Hrs  T(C): 36.6 (11 Feb 2020 12:34), Max: 37.3 (10 Feb 2020 20:32)  T(F): 97.9 (11 Feb 2020 12:34), Max: 99.1 (10 Feb 2020 20:32)  HR: 89 (11 Feb 2020 12:34) (80 - 90)  BP: 108/61 (11 Feb 2020 12:34) (104/61 - 114/65)  BP(mean): --  RR: 18 (11 Feb 2020 12:34) (16 - 18)  SpO2: 96% (11 Feb 2020 12:34) (96% - 99%)    I&O's Summary    10 Feb 2020 07:01  -  11 Feb 2020 07:00  --------------------------------------------------------  IN: 680 mL / OUT: 450 mL / NET: 230 mL          Physical Exam:   GENERAL: NAD, well-groomed, well-developed  HEENT: KALPESH/   Atraumatic, Normocephalic  ENMT: No tonsillar erythema, exudates, or enlargement; Moist mucous membranes, Good dentition, No lesions  NECK: Supple, No JVD, Normal thyroid  CHEST/LUNG: Clear to auscultaion, ; No rales, rhonchi, wheezing, or rubs  CVS: Regular rate and rhythm; No murmurs, rubs, or gallops  GI: : Soft, Nontender, Nondistended; Bowel sounds present  NERVOUS SYSTEM:  Alert & Oriented X3  EXTREMITIES:  2+ Peripheral Pulses, No clubbing, cyanosis, or edema  LYMPH: No lymphadenopathy noted  SKIN: No rashes or lesions  ENDOCRINOLOGY: No Thyromegaly  PSYCH: Appropriate    Labs:  35, 21, 21, 33, 21, 30                            10.0   6.65  )-----------( 241      ( 11 Feb 2020 05:30 )             31.0                         9.6    4.76  )-----------( 207      ( 09 Feb 2020 05:55 )             31.1                         9.9    5.28  )-----------( 234      ( 08 Feb 2020 07:00 )             31.1     02-11    131<L>  |  91<L>  |  38<H>  ----------------------------<  128<H>  3.6   |  27  |  1.52<H>  02-10    131<L>  |  93<L>  |  37<H>  ----------------------------<  106<H>  4.2   |  21<L>  |  1.60<H>  02-09    131<L>  |  90<L>  |  38<H>  ----------------------------<  123<H>  4.0   |  25  |  1.72<H>  02-08    135  |  88<L>  |  39<H>  ----------------------------<  120<H>  3.8   |  30  |  1.95<H>    Ca    9.6      11 Feb 2020 05:30  Ca    9.6      10 Feb 2020 06:56  Mg     1.7     02-11    TPro  7.1  /  Alb  2.9<L>  /  TBili  1.4<H>  /  DBili  x   /  AST  141<H>  /  ALT  66<H>  /  AlkPhos  212<H>  02-09  TPro  7.6  /  Alb  3.3  /  TBili  1.5<H>  /  DBili  x   /  AST  160<H>  /  ALT  65<H>  /  AlkPhos  204<H>  02-08    CAPILLARY BLOOD GLUCOSE      POCT Blood Glucose.: 257 mg/dL (11 Feb 2020 13:19)  POCT Blood Glucose.: 129 mg/dL (11 Feb 2020 08:37)  POCT Blood Glucose.: 209 mg/dL (10 Feb 2020 21:57)  POCT Blood Glucose.: 223 mg/dL (10 Feb 2020 17:04)        < from: Xray Chest 1 View- PORTABLE-Urgent (02.10.20 @ 15:57) >  EXAM:  XR CHEST PORTABLE URGENT 1V                            PROCEDURE DATE:  02/10/2020            INTERPRETATION:  CLINICAL INFORMATION: Patient with shortness of breath. Evaluate for pleural effusions.    EXAM: Single frontal radiograph of the chest.    COMPARISON: Chest radiograph from 2/3/2020.    FINDINGS:  Interval removal of right-sided central line.  No focal consolidation. Interval resolution of bilateral pleural effusions.  Calcification of the aortic knob. Heart size is well evaluated on this projection.  The visualized osseous and soft tissue structures demonstrate no acute pathology.    IMPRESSION:   Interval resolution of bilateral pleural effusions. No pneumothorax.                KEVEN MELENDEZ M.D., RADIOLOGY RESIDENT  Thisdocument has been electronically signed.  GHULAM MUJICA M.D., ATTENDING RADIOLOGIST  This document has been electronically signed. Feb 10 2020  4:41PM              < end of copied text >            RECENT CULTURES:  02-07 @ 04:50 .Urine Clean Catch (Midstream)   [facility]      Klebsiella pneumoniae  Klebsiella pneumoniae     >100,000 CFU/ml Klebsiella pneumoniae          RESPIRATORY CULTURES:          Studies  Chest X-RAY  CT SCAN Chest   Venous Dopplers: LE:   CT Abdomen  Others

## 2020-02-11 NOTE — PROGRESS NOTE ADULT - ASSESSMENT
89F with HTN, DM, hypothyroidism presented with worsening shortness of breath and swelling of upper and lower extremities. Found to be in acute on chronic heart failure/cardiogenic shock.  Now symptomatically improving. Hematology was consulted for anemia.     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   - continue to monitor, check cbc w/ diff daily   - agree with evaluation for underlying amyloid given cardiogenic shock; cardiology following and recommend nuclear scan to evaluate  - would also consider fat pad biopsy for further evaluation   - can be followed up as outpatient with [facility]     Neri Armstrong, PGY-5  Hematology-Oncology Fellow  [phone] ([place]) [phone] ([facility])

## 2020-02-11 NOTE — PROGRESS NOTE ADULT - PROBLEM SELECTOR PLAN 2
monitor and control  2/5: stable  2/6';c ontrolled  2/7: hypoglycemic today : being monitored  2/8 keep euglycemic  2/9 stable. keep euglycemic  2/10: controlled  2/11: controled

## 2020-02-11 NOTE — PROGRESS NOTE ADULT - ASSESSMENT
89F with HTN, DM, hypothyroidism admitted after coming to the Emergency Department with generalized fatigue, hardly getting out of bed, diagnosed with generalized anasarca and bilaterally pleural effusions and severe anemia  transferred to CCU for hypotension and now transferred out to floor, doing well

## 2020-02-11 NOTE — PROGRESS NOTE ADULT - SUBJECTIVE AND OBJECTIVE BOX
INTERVAL HPI/OVERNIGHT EVENTS:  No overnight events. Continues to feel better daily. Denies any dyspnea or leg swelling.     MEDICATIONS  (STANDING):  atorvastatin 80 milliGRAM(s) Oral at bedtime  heparin  Injectable 5000 Unit(s) SubCutaneous every 12 hours  insulin lispro (HumaLOG) corrective regimen sliding scale   SubCutaneous three times a day before meals  insulin lispro (HumaLOG) corrective regimen sliding scale   SubCutaneous at bedtime  levothyroxine 50 MICROGram(s) Oral daily  melatonin 3 milliGRAM(s) Oral at bedtime  mirtazapine 3.75 milliGRAM(s) Oral at bedtime  montelukast 10 milliGRAM(s) Oral every 24 hours  multivitamin/minerals 1 Tablet(s) Oral daily  pantoprazole    Tablet 40 milliGRAM(s) Oral before breakfast  senna 2 Tablet(s) Oral at bedtime    MEDICATIONS  (PRN):  glucagon  Injectable 1 milliGRAM(s) IntraMuscular once PRN Glucose LESS THAN 70 milligrams/deciliter  polyethylene glycol 3350 17 Gram(s) Oral daily PRN Constipation    Allergies    penicillin (Hives)  penicillin (Unknown)    Intolerances          VITAL SIGNS:  T(F): 97.6 (02-11-20 @ 04:37)  HR: 80 (02-11-20 @ 04:37)  BP: 104/61 (02-11-20 @ 04:37)  RR: 18 (02-11-20 @ 04:37)  SpO2: 97% (02-11-20 @ 04:37)  Wt(kg): --    PHYSICAL EXAM:    Constitutional: NAD, thin elderly woman appears comfortable   Eyes: EOMI, PERRLA  Neck: supple, no masses, no JVD  Respiratory: decreased breath sounds left base   Cardiovascular: RRR, systolic murmur noted   Gastrointestinal: +BS, soft, NTND, no hepatosplenomegaly  Extremities: no c/c/e  Neurological: AAOx3, nonfocal    LABS:                        10.0   6.65  )-----------( 241      ( 11 Feb 2020 05:30 )             31.0     02-11    131<L>  |  91<L>  |  38<H>  ----------------------------<  128<H>  3.6   |  27  |  1.52<H>    Ca    9.6      11 Feb 2020 05:30  Mg     1.7     02-11            RADIOLOGY & ADDITIONAL TESTS:  Studies reviewed. INTERVAL HPI/OVERNIGHT EVENTS:  No overnight events. Continues to feel better daily. Denies any dyspnea or leg swelling.     10 point ROS negative except as above.     MEDICATIONS  (STANDING):  atorvastatin 80 milliGRAM(s) Oral at bedtime  heparin  Injectable 5000 Unit(s) SubCutaneous every 12 hours  insulin lispro (HumaLOG) corrective regimen sliding scale   SubCutaneous three times a day before meals  insulin lispro (HumaLOG) corrective regimen sliding scale   SubCutaneous at bedtime  levothyroxine 50 MICROGram(s) Oral daily  melatonin 3 milliGRAM(s) Oral at bedtime  mirtazapine 3.75 milliGRAM(s) Oral at bedtime  montelukast 10 milliGRAM(s) Oral every 24 hours  multivitamin/minerals 1 Tablet(s) Oral daily  pantoprazole    Tablet 40 milliGRAM(s) Oral before breakfast  senna 2 Tablet(s) Oral at bedtime    MEDICATIONS  (PRN):  glucagon  Injectable 1 milliGRAM(s) IntraMuscular once PRN Glucose LESS THAN 70 milligrams/deciliter  polyethylene glycol 3350 17 Gram(s) Oral daily PRN Constipation    Allergies    penicillin (Hives)  penicillin (Unknown)    Intolerances          VITAL SIGNS:  T(F): 97.6 (02-11-20 @ 04:37)  HR: 80 (02-11-20 @ 04:37)  BP: 104/61 (02-11-20 @ 04:37)  RR: 18 (02-11-20 @ 04:37)  SpO2: 97% (02-11-20 @ 04:37)  Wt(kg): --    PHYSICAL EXAM:    Constitutional: NAD, thin elderly woman appears comfortable   Eyes: EOMI, PERRLA  Neck: supple, no masses, no JVD  Respiratory: decreased breath sounds left base   Cardiovascular: RRR, systolic murmur noted   Gastrointestinal: +BS, soft, NTND, no hepatosplenomegaly  Extremities: no c/c/e  Neurological: AAOx3, nonfocal    LABS:                        10.0   6.65  )-----------( 241      ( 11 Feb 2020 05:30 )             31.0     02-11    131<L>  |  91<L>  |  38<H>  ----------------------------<  128<H>  3.6   |  27  |  1.52<H>    Ca    9.6      11 Feb 2020 05:30  Mg     1.7     02-11            RADIOLOGY & ADDITIONAL TESTS:  Studies reviewed.

## 2020-02-11 NOTE — PROGRESS NOTE ADULT - ASSESSMENT
88 yo F with a PMH significant for HTN, DM, hypothyroidism presenting with fatigue, found to have b/l effusions and anemia, course c/b shock likely cardiogenic given low central sat (presumed to be 2/2 initiation of beta blocker). CCU transfer and initiation of inotropes improved shock. Patient developed oliguric renal failure 2/2 ATN as well which is now resolved. Of note, she is also being treated with IV synthroid for hypothyroidism.    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   Unclear if her cardiomyopathy is an infiltrative process (ie amyloidosis) leading to restrictive CM and valvular disease vs valvular disease(TR, MS) leading to biatrial enlargement and RV dysfunction.  - if her disease were 2/2 MS, beta blockade would be first line given need for increased time in diastole, however this would worsen her condition, if she were to have amyloid as she would need the heart rate to maintain her cardiac output given a fixed stroke volume. As a result, will defer beta blockade  Does not appear grossly overloaded on exam.   Please order NM technetium pyrophosphate scan to evaluate for amyloidosis.     Gene Berman  Cardiology Fellow  [phone]    All Cardiology service information can be found 24/7 on [email or web address], password: [email or web address]

## 2020-02-11 NOTE — PROGRESS NOTE ADULT - ATTENDING COMMENTS
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 Continues to improve, regain strength each day. 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

## 2020-02-11 NOTE — PROGRESS NOTE ADULT - SUBJECTIVE AND OBJECTIVE BOX
Patient is a 89y old  Female who presents with a chief complaint of generalized weakness and body swelling (11 Feb 2020 13:50)      SUBJECTIVE / OVERNIGHT EVENTS: overnight events noted    ROS:  Resp: No cough no sputum production  CVS: No chest pain no palpitations no orthopnea  GI: no N/V/D  : no dysuria, no hematuria  Neuro: no weakness no paresthesias  Heme: No petechiae no easy bruising  Msk: No joint pain no swelling  Skin: No rash no itching        MEDICATIONS  (STANDING):  atorvastatin 80 milliGRAM(s) Oral at bedtime  heparin  Injectable 5000 Unit(s) SubCutaneous every 12 hours  insulin lispro (HumaLOG) corrective regimen sliding scale   SubCutaneous three times a day before meals  insulin lispro (HumaLOG) corrective regimen sliding scale   SubCutaneous at bedtime  levothyroxine 50 MICROGram(s) Oral daily  melatonin 3 milliGRAM(s) Oral at bedtime  mirtazapine 3.75 milliGRAM(s) Oral at bedtime  montelukast 10 milliGRAM(s) Oral every 24 hours  multivitamin/minerals 1 Tablet(s) Oral daily  pantoprazole    Tablet 40 milliGRAM(s) Oral before breakfast  senna 2 Tablet(s) Oral at bedtime    MEDICATIONS  (PRN):  glucagon  Injectable 1 milliGRAM(s) IntraMuscular once PRN Glucose LESS THAN 70 milligrams/deciliter  polyethylene glycol 3350 17 Gram(s) Oral daily PRN Constipation        CAPILLARY BLOOD GLUCOSE      POCT Blood Glucose.: 168 mg/dL (11 Feb 2020 17:19)  POCT Blood Glucose.: 257 mg/dL (11 Feb 2020 13:19)  POCT Blood Glucose.: 129 mg/dL (11 Feb 2020 08:37)  POCT Blood Glucose.: 209 mg/dL (10 Feb 2020 21:57)    I&O's Summary    10 Feb 2020 07:01  -  11 Feb 2020 07:00  --------------------------------------------------------  IN: 680 mL / OUT: 450 mL / NET: 230 mL    11 Feb 2020 07:01  -  11 Feb 2020 18:55  --------------------------------------------------------  IN: 520 mL / OUT: 250 mL / NET: 270 mL        Vital Signs Last 24 Hrs  T(C): 36.6 (11 Feb 2020 12:34), Max: 37.3 (10 Feb 2020 20:32)  T(F): 97.9 (11 Feb 2020 12:34), Max: 99.1 (10 Feb 2020 20:32)  HR: 91 (11 Feb 2020 15:42) (80 - 91)  BP: 101/63 (11 Feb 2020 15:42) (101/63 - 112/76)  BP(mean): --  RR: 18 (11 Feb 2020 15:42) (17 - 18)  SpO2: 96% (11 Feb 2020 15:42) (96% - 99%)    PHYSICAL EXAM: vital signs as above  HEENT: KALPESH EOMI  Neck: Supple, no JVD, no thyromegaly  Lungs: decreased breath sounds at bases   CVS: S1 S2 soft ejection systolic murmur best heard at left sternal border   Abdomen: no tenderness, no organomegaly, BS present  Neuro: non focal  Skin: warm, dry  Ext: no cyanosis or clubbing, no edema  Msk: no joint swelling or deformities      LABS:                        10.0   6.65  )-----------( 241      ( 11 Feb 2020 05:30 )             31.0     02-11    131<L>  |  91<L>  |  38<H>  ----------------------------<  128<H>  3.6   |  27  |  1.52<H>    Ca    9.6      11 Feb 2020 05:30  Mg     1.7     02-11                  All consultant(s) notes reviewed and care discussed with other providers        Contact Number, Dr Moser [phone]

## 2020-02-11 NOTE — PROGRESS NOTE ADULT - ATTENDING COMMENTS
I agree with the fellow's note with the following addendums/exceptions. Briefly, 89F admitted for acute heart failure with cardiogenic shock. Work up to date has demonstrated a new IgG lambda M spike, likely MGUS. Given her presentation, will work up for cardiac amyloid. She will eventually need a bone marrow biopsy as well.   -appreciate excellent cardiac care  -f/u SPECT   -f/u fat pad biopsy results   -consider bone marrow biopsy now that patient is stable   -will discuss further therapy pending above    Shantel Marcum MD   Attending, Hematology/Oncology  [phone]

## 2020-02-11 NOTE — PROGRESS NOTE ADULT - SUBJECTIVE AND OBJECTIVE BOX
Patient seen and examined at bedside.    Overnight Events:   No overnight events.     REVIEW OF SYSTEMS:  Constitutional:     [x] negative [ ] fevers [ ] chills [ ] weight loss [ ] weight gain  HEENT:                  [x] negative [ ] dry eyes [ ] eye irritation [ ] postnasal drip [ ] nasal congestion  CV:                         [x] negative  [] chest pain [ ] orthopnea [ ] palpitations [ ] murmur  Resp:                     [x] negative [ ] cough [ ] shortness of breath [ ] dyspnea [ ] wheezing [ ] sputum [ ]hemoptysis  GI:                          [x] negative [ ] nausea [ ] vomiting [ ] diarrhea [ ] constipation [ ] abd pain [ ] dysphagia   :                        [x] negative [ ] dysuria [ ] nocturia [ ] hematuria [ ] increased urinary frequency  Musculoskeletal: [x] negative [ ] back pain [ ] myalgias [ ] arthralgias [ ] fracture  Skin:                       [x] negative [ ] rash [ ] itch  Neurological:        [x] negative [ ] headache [ ] dizziness [ ] syncope [ ] weakness [ ] numbness  Psychiatric:           [x] negative [ ] anxiety [ ] depression  Endocrine:            [x] negative [ ] diabetes [ ] thyroid problem  Heme/Lymph:      [x] negative [ ] anemia [ ] bleeding problem  Allergic/Immune: [x] negative [ ] itchy eyes [ ] nasal discharge [ ] hives [ ] angioedema    [x] All other systems negative  [ ] Unable to assess ROS due to    Current Meds:  aspirin enteric coated 81 milliGRAM(s) Oral daily  atorvastatin 80 milliGRAM(s) Oral at bedtime  clopidogrel Tablet 75 milliGRAM(s) Oral daily  glucagon  Injectable 1 milliGRAM(s) IntraMuscular once PRN  heparin  Injectable 5000 Unit(s) SubCutaneous every 12 hours  insulin lispro (HumaLOG) corrective regimen sliding scale   SubCutaneous three times a day before meals  insulin lispro (HumaLOG) corrective regimen sliding scale   SubCutaneous at bedtime  levothyroxine 50 MICROGram(s) Oral daily  melatonin 3 milliGRAM(s) Oral at bedtime  mirtazapine 3.75 milliGRAM(s) Oral at bedtime  montelukast 10 milliGRAM(s) Oral every 24 hours  multivitamin/minerals 1 Tablet(s) Oral daily  pantoprazole    Tablet 40 milliGRAM(s) Oral before breakfast  polyethylene glycol 3350 17 Gram(s) Oral daily PRN  senna 2 Tablet(s) Oral at bedtime      PAST MEDICAL & SURGICAL HISTORY:  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  Diabetes  Hypothyroid  Hypertension  Hyperlipidemia  Hypothyroid  Osteoporosis  Gall stone  DM (diabetes mellitus)  History of gallstones  No significant past surgical history      Vitals:  T(F): 97.6 (02-11), Max: 99.1 (02-10)  HR: 80 (02-11) (80 - 90)  BP: 104/61 (02-11) (104/61 - 121/71)  RR: 18 (02-11)  SpO2: 97% (02-11)  I&O's Summary    10 Feb 2020 07:01  -  11 Feb 2020 07:00  --------------------------------------------------------  IN: 680 mL / OUT: 450 mL / NET: 230 mL        Physical Exam:  Appearance: No acute distress; thin frail.   Eyes: PERRL, EOMI, pink conjunctiva  HENT: Normal oral mucosa  Cardiovascular: RRR, S1, S2, no murmurs, rubs, or gallops; no edema; no JVD  Respiratory: Clear to auscultation bilaterally  Gastrointestinal: soft, non-tender, non-distended with normal bowel sounds  Musculoskeletal: No clubbing; no joint deformity   Neurologic: Non-focal  Lymphatic: No lymphadenopathy  Psychiatry: AAOx3, mood & affect appropriate  Skin: No rashes, ecchymoses, or cyanosis                          10.0   6.65  )-----------( 241      ( 11 Feb 2020 05:30 )             31.0     02-11    131<L>  |  91<L>  |  38<H>  ----------------------------<  128<H>  3.6   |  27  |  1.52<H>    Ca    9.6      11 Feb 2020 05:30  Mg     1.7     02-11                    New ECG(s): Personally reviewed    Echo:  < from: Transthoracic Echocardiogram (02.04.20 @ 12:59) >  Conclusions:  Hyperdynamic left ventricle.  Moderate mitral stenosis due to annular and leaflet  calcification.  Mild aortic stenosis.  Severe right ventricular enlargement with normal right  ventricular systolic function.  Moderate pulmonary hypertension.    < end of copied text >    Stress Testing:     Cath:    Imaging:    Interpretation of Telemetry: Patient seen and examined at bedside.    Overnight Events:   No overnight events.     REVIEW OF SYSTEMS:  Constitutional:     [x] negative [ ] fevers [ ] chills [ ] weight loss [ ] weight gain  HEENT:                  [x] negative [ ] dry eyes [ ] eye irritation [ ] postnasal drip [ ] nasal congestion  CV:                         [x] negative  [] chest pain [ ] orthopnea [ ] palpitations [ ] murmur  Resp:                     [x] negative [ ] cough [ ] shortness of breath [ ] dyspnea [ ] wheezing [ ] sputum [ ]hemoptysis  GI:                          [x] negative [ ] nausea [ ] vomiting [ ] diarrhea [ ] constipation [ ] abd pain [ ] dysphagia   :                        [x] negative [ ] dysuria [ ] nocturia [ ] hematuria [ ] increased urinary frequency  Musculoskeletal: [x] negative [ ] back pain [ ] myalgias [ ] arthralgias [ ] fracture  Skin:                       [x] negative [ ] rash [ ] itch  Neurological:        [x] negative [ ] headache [ ] dizziness [ ] syncope [ ] weakness [ ] numbness  Psychiatric:           [x] negative [ ] anxiety [ ] depression  Endocrine:            [x] negative [ ] diabetes [ ] thyroid problem  Heme/Lymph:      [x] negative [ ] anemia [ ] bleeding problem  Allergic/Immune: [x] negative [ ] itchy eyes [ ] nasal discharge [ ] hives [ ] angioedema    [x] All other systems negative  [ ] Unable to assess ROS due to    Current Meds:  aspirin enteric coated 81 milliGRAM(s) Oral daily  atorvastatin 80 milliGRAM(s) Oral at bedtime  clopidogrel Tablet 75 milliGRAM(s) Oral daily  glucagon  Injectable 1 milliGRAM(s) IntraMuscular once PRN  heparin  Injectable 5000 Unit(s) SubCutaneous every 12 hours  insulin lispro (HumaLOG) corrective regimen sliding scale   SubCutaneous three times a day before meals  insulin lispro (HumaLOG) corrective regimen sliding scale   SubCutaneous at bedtime  levothyroxine 50 MICROGram(s) Oral daily  melatonin 3 milliGRAM(s) Oral at bedtime  mirtazapine 3.75 milliGRAM(s) Oral at bedtime  montelukast 10 milliGRAM(s) Oral every 24 hours  multivitamin/minerals 1 Tablet(s) Oral daily  pantoprazole    Tablet 40 milliGRAM(s) Oral before breakfast  polyethylene glycol 3350 17 Gram(s) Oral daily PRN  senna 2 Tablet(s) Oral at bedtime      PAST MEDICAL & SURGICAL HISTORY:  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  Diabetes  Hypothyroid  Hypertension  Hyperlipidemia  Hypothyroid  Osteoporosis  Gall stone  DM (diabetes mellitus)  History of gallstones  No significant past surgical history      Vitals:  T(F): 97.6 (02-11), Max: 99.1 (02-10)  HR: 80 (02-11) (80 - 90)  BP: 104/61 (02-11) (104/61 - 121/71)  RR: 18 (02-11)  SpO2: 97% (02-11)  I&O's Summary    10 Feb 2020 07:01  -  11 Feb 2020 07:00  --------------------------------------------------------  IN: 680 mL / OUT: 450 mL / NET: 230 mL        Physical Exam:  Appearance: No acute distress; thin frail.   Eyes: PERRL, EOMI, pink conjunctiva  HENT: Normal oral mucosa  Cardiovascular: RRR, S1, S2, no murmurs, rubs, or gallops; no edema; no JVD  Respiratory: Clear to auscultation bilaterally  Gastrointestinal: soft, non-tender, non-distended with normal bowel sounds  Musculoskeletal: No clubbing; no joint deformity   Neurologic: Non-focal  Lymphatic: No lymphadenopathy  Psychiatry: AAOx3, mood & affect appropriate  Skin: No rashes, ecchymoses, or cyanosis                          10.0   6.65  )-----------( 241      ( 11 Feb 2020 05:30 )             31.0     02-11    131<L>  |  91<L>  |  38<H>  ----------------------------<  128<H>  3.6   |  27  |  1.52<H>    Ca    9.6      11 Feb 2020 05:30  Mg     1.7     02-11                    New ECG(s): Personally reviewed    Echo:  < from: Transthoracic Echocardiogram (02.04.20 @ 12:59) >  Conclusions:  Hyperdynamic left ventricle.  Moderate mitral stenosis due to annular and leaflet  calcification.  Mild aortic stenosis.  Severe right ventricular enlargement with normal right  ventricular systolic function.  Moderate pulmonary hypertension.    < end of copied text >    Stress Testing:     Cath:    Imaging:    Interpretation of Telemetry:  Sinus  29 beats AIVR

## 2020-02-12 LAB
ANION GAP SERPL CALC-SCNC: 12 MMOL/L — SIGNIFICANT CHANGE UP (ref 5–17)
ANION GAP SERPL CALC-SCNC: 14 MMOL/L — SIGNIFICANT CHANGE UP (ref 5–17)
BASOPHILS # BLD AUTO: 0.01 K/UL — SIGNIFICANT CHANGE UP (ref 0–0.2)
BASOPHILS NFR BLD AUTO: 0.1 % — SIGNIFICANT CHANGE UP (ref 0–2)
BUN SERPL-MCNC: 36 MG/DL — HIGH (ref 7–23)
BUN SERPL-MCNC: 38 MG/DL — HIGH (ref 7–23)
CALCIUM SERPL-MCNC: 9.7 MG/DL — SIGNIFICANT CHANGE UP (ref 8.4–10.5)
CALCIUM SERPL-MCNC: 9.9 MG/DL — SIGNIFICANT CHANGE UP (ref 8.4–10.5)
CHLORIDE SERPL-SCNC: 89 MMOL/L — LOW (ref 96–108)
CHLORIDE SERPL-SCNC: 94 MMOL/L — LOW (ref 96–108)
CO2 SERPL-SCNC: 25 MMOL/L — SIGNIFICANT CHANGE UP (ref 22–31)
CO2 SERPL-SCNC: 26 MMOL/L — SIGNIFICANT CHANGE UP (ref 22–31)
CREAT SERPL-MCNC: 1.55 MG/DL — HIGH (ref 0.5–1.3)
CREAT SERPL-MCNC: 1.69 MG/DL — HIGH (ref 0.5–1.3)
EOSINOPHIL # BLD AUTO: 0.29 K/UL — SIGNIFICANT CHANGE UP (ref 0–0.5)
EOSINOPHIL NFR BLD AUTO: 3.5 % — SIGNIFICANT CHANGE UP (ref 0–6)
GLUCOSE BLDC GLUCOMTR-MCNC: 118 MG/DL — HIGH (ref 70–99)
GLUCOSE BLDC GLUCOMTR-MCNC: 216 MG/DL — HIGH (ref 70–99)
GLUCOSE BLDC GLUCOMTR-MCNC: 218 MG/DL — HIGH (ref 70–99)
GLUCOSE SERPL-MCNC: 278 MG/DL — HIGH (ref 70–99)
GLUCOSE SERPL-MCNC: 90 MG/DL — SIGNIFICANT CHANGE UP (ref 70–99)
HCT VFR BLD CALC: 30.5 % — LOW (ref 34.5–45)
HGB BLD-MCNC: 9.4 G/DL — LOW (ref 11.5–15.5)
IMM GRANULOCYTES NFR BLD AUTO: 0.8 % — SIGNIFICANT CHANGE UP (ref 0–1.5)
LYMPHOCYTES # BLD AUTO: 1.06 K/UL — SIGNIFICANT CHANGE UP (ref 1–3.3)
LYMPHOCYTES # BLD AUTO: 12.8 % — LOW (ref 13–44)
MCHC RBC-ENTMCNC: 28.1 PG — SIGNIFICANT CHANGE UP (ref 27–34)
MCHC RBC-ENTMCNC: 30.8 GM/DL — LOW (ref 32–36)
MCV RBC AUTO: 91 FL — SIGNIFICANT CHANGE UP (ref 80–100)
MONOCYTES # BLD AUTO: 0.79 K/UL — SIGNIFICANT CHANGE UP (ref 0–0.9)
MONOCYTES NFR BLD AUTO: 9.5 % — SIGNIFICANT CHANGE UP (ref 2–14)
NEUTROPHILS # BLD AUTO: 6.06 K/UL — SIGNIFICANT CHANGE UP (ref 1.8–7.4)
NEUTROPHILS NFR BLD AUTO: 73.3 % — SIGNIFICANT CHANGE UP (ref 43–77)
NRBC # BLD: 0 /100 WBCS — SIGNIFICANT CHANGE UP (ref 0–0)
PLATELET # BLD AUTO: 316 K/UL — SIGNIFICANT CHANGE UP (ref 150–400)
POTASSIUM SERPL-MCNC: 4.1 MMOL/L — SIGNIFICANT CHANGE UP (ref 3.5–5.3)
POTASSIUM SERPL-MCNC: 7.4 MMOL/L — CRITICAL HIGH (ref 3.5–5.3)
POTASSIUM SERPL-SCNC: 4.1 MMOL/L — SIGNIFICANT CHANGE UP (ref 3.5–5.3)
POTASSIUM SERPL-SCNC: 7.4 MMOL/L — CRITICAL HIGH (ref 3.5–5.3)
RBC # BLD: 3.35 M/UL — LOW (ref 3.8–5.2)
RBC # FLD: 16.8 % — HIGH (ref 10.3–14.5)
SODIUM SERPL-SCNC: 129 MMOL/L — LOW (ref 135–145)
SODIUM SERPL-SCNC: 131 MMOL/L — LOW (ref 135–145)
WBC # BLD: 8.28 K/UL — SIGNIFICANT CHANGE UP (ref 3.8–10.5)
WBC # FLD AUTO: 8.28 K/UL — SIGNIFICANT CHANGE UP (ref 3.8–10.5)

## 2020-02-12 PROCEDURE — 99233 SBSQ HOSP IP/OBS HIGH 50: CPT | Mod: GC

## 2020-02-12 PROCEDURE — 78830 RP LOCLZJ TUM SPECT W/CT 1: CPT | Mod: 26

## 2020-02-12 RX ADMIN — Medication 1 TABLET(S): at 17:19

## 2020-02-12 RX ADMIN — SENNA PLUS 2 TABLET(S): 8.6 TABLET ORAL at 21:06

## 2020-02-12 RX ADMIN — Medication 2: at 18:04

## 2020-02-12 RX ADMIN — Medication 3 MILLIGRAM(S): at 21:07

## 2020-02-12 RX ADMIN — HEPARIN SODIUM 5000 UNIT(S): 5000 INJECTION INTRAVENOUS; SUBCUTANEOUS at 05:12

## 2020-02-12 RX ADMIN — MONTELUKAST 10 MILLIGRAM(S): 4 TABLET, CHEWABLE ORAL at 17:19

## 2020-02-12 RX ADMIN — ATORVASTATIN CALCIUM 80 MILLIGRAM(S): 80 TABLET, FILM COATED ORAL at 21:07

## 2020-02-12 RX ADMIN — Medication 50 MICROGRAM(S): at 05:12

## 2020-02-12 RX ADMIN — PANTOPRAZOLE SODIUM 40 MILLIGRAM(S): 20 TABLET, DELAYED RELEASE ORAL at 05:12

## 2020-02-12 RX ADMIN — MIRTAZAPINE 3.75 MILLIGRAM(S): 45 TABLET, ORALLY DISINTEGRATING ORAL at 21:07

## 2020-02-12 NOTE — PROGRESS NOTE ADULT - ATTENDING COMMENTS
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 Continues to improve, regain strength each day. 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

## 2020-02-12 NOTE — CONSULT NOTE ADULT - ASSESSMENT
Derian is a very pleasant 89 Year-Old Lady with HTN, DM, hypothyroidism pesenting with fatigue, found to have b/l effusions and anemia, course c/b shock likely cardiogenic, with initiation of inotropes, oliguric renal failure 2/2 ATN.     - Will perform fat pad biopsy bedside tomorrow.   - No need for NPO status.   - Consent placed in chart.     Red Team Surgery Pager #[phone]

## 2020-02-12 NOTE — CONSULT NOTE ADULT - ATTENDING COMMENTS
I have seen this pleasant elderly  American lady in CCU. She has blood studies that indicate iron deficiency anemia; there may be a component of anemia of chronic disease. She does not eat much red meat and she was not eating chicken (white meat ) when seen in our visit. No history of gastrointestinal bleeding. She is considerably underweight although there is no history of recent weight loss. She is mildly hypoalbuminemia which reflects chronic disease; serum gma fraction is not consistent with a diagnosis of myeloma. She has a minimal value of  paraprotein which may be common in this decade of life
Patient seen and examined  Labs and imaging reviewed  Risks, benefits, alternatives discussed  Plan for abdominal fat pad biopsy Thursday morning at bedside
EFRAIN: likely due to poor renal perfusion in the setting of shock  Serum creatinine noted to be normal in 2017. No values in 2018/2019. Admitted with serum creatinine of 1.4  Now in shock on pressors, worsening transaminitis, lactic acidemia  Remains volume overloaded/oliguric despite diuresis  Will initiate CVVHDF  Please check urine Protein/creatinine/myeloma w/u Renal sono  will follow      Juancho Dickson MD  O: [phone]  C: [phone]
Breathing wis seems OK: pretty hypotensive but getting better: cont blood transfusion and wean off vasopressors as tolerated:
Pt with hypothyroidism, TSH 50, awaiting T3, FT4.  Given possibility of bowel edema, recommend temporarily switching to IV dosing as above, monitor TFTs and adjust dose as needed.    Pt also with T2DM, well controlled.  Hba1c may not be accurate given low hgb/hct.  Continue to monitor glucose with correctional humalog scale. Hold statin due to elevated liver enzymes  Regarding osteoporosis, hold alendronate while inpatient, may need alternate therapy at discharge given low eGFR.    Natividad Farmer MD    Endocrine team:   [phone] night/weekend  [phone] business hours

## 2020-02-12 NOTE — PROGRESS NOTE ADULT - SUBJECTIVE AND OBJECTIVE BOX
Patient is a 89y old  Female who presents with a chief complaint of generalized weakness and body swelling (12 Feb 2020 07:32)      SUBJECTIVE / OVERNIGHT EVENTS: overnight events noted    ROS:  Resp: No cough no sputum production  CVS: No chest pain no palpitations no orthopnea  GI: no N/V/D  : no dysuria, no hematuria  Neuro: no weakness no paresthesias  Heme: No petechiae no easy bruising  Msk: No joint pain no swelling  Skin: No rash no itching        MEDICATIONS  (STANDING):  atorvastatin 80 milliGRAM(s) Oral at bedtime  heparin  Injectable 5000 Unit(s) SubCutaneous every 12 hours  insulin lispro (HumaLOG) corrective regimen sliding scale   SubCutaneous three times a day before meals  insulin lispro (HumaLOG) corrective regimen sliding scale   SubCutaneous at bedtime  levothyroxine 50 MICROGram(s) Oral daily  melatonin 3 milliGRAM(s) Oral at bedtime  mirtazapine 3.75 milliGRAM(s) Oral at bedtime  montelukast 10 milliGRAM(s) Oral every 24 hours  multivitamin/minerals 1 Tablet(s) Oral daily  pantoprazole    Tablet 40 milliGRAM(s) Oral before breakfast  senna 2 Tablet(s) Oral at bedtime    MEDICATIONS  (PRN):  glucagon  Injectable 1 milliGRAM(s) IntraMuscular once PRN Glucose LESS THAN 70 milligrams/deciliter  polyethylene glycol 3350 17 Gram(s) Oral daily PRN Constipation        CAPILLARY BLOOD GLUCOSE      POCT Blood Glucose.: 118 mg/dL (12 Feb 2020 08:52)  POCT Blood Glucose.: 194 mg/dL (11 Feb 2020 21:40)  POCT Blood Glucose.: 168 mg/dL (11 Feb 2020 17:19)    I&O's Summary    11 Feb 2020 07:01  -  12 Feb 2020 07:00  --------------------------------------------------------  IN: 520 mL / OUT: 350 mL / NET: 170 mL    12 Feb 2020 07:01  -  12 Feb 2020 14:13  --------------------------------------------------------  IN: 240 mL / OUT: 0 mL / NET: 240 mL        Vital Signs Last 24 Hrs  T(C): 36.3 (12 Feb 2020 09:45), Max: 37.2 (11 Feb 2020 20:07)  T(F): 97.3 (12 Feb 2020 09:45), Max: 98.9 (11 Feb 2020 20:07)  HR: 82 (12 Feb 2020 09:45) (82 - 91)  BP: 100/52 (12 Feb 2020 05:09) (96/47 - 103/66)  BP(mean): --  RR: 18 (12 Feb 2020 09:45) (18 - 18)  SpO2: 98% (12 Feb 2020 09:45) (95% - 98%)    PHYSICAL EXAM: vital signs as above  HEENT: KALPESH EOMI  Neck: Supple, no JVD, no thyromegaly  Lungs: decreased breath sounds at bases   CVS: S1 S2 soft ejection systolic murmur best heard at left sternal border   Abdomen: no tenderness, no organomegaly, BS present  Neuro: non focal  Skin: warm, dry  Ext: no cyanosis or clubbing, no edema  Msk: no joint swelling or deformities    LABS:                        9.4    8.28  )-----------( 316      ( 12 Feb 2020 10:09 )             30.5     02-12    129<L>  |  89<L>  |  38<H>  ----------------------------<  90  7.4<HH>   |  26  |  1.69<H>    Ca    9.9      12 Feb 2020 10:09  Mg     1.7     02-11                  All consultant(s) notes reviewed and care discussed with other providers        Contact Number, Dr Moser [phone]

## 2020-02-12 NOTE — CONSULT NOTE ADULT - SUBJECTIVE AND OBJECTIVE BOX
[facility] Surgical Consultant Evaluation    HPI:  89F with HTN, DM, hypothyroidism admitted after coming to the Emergency Department with generalized fatigue, hardly getting out of bed, which the daughter states is unusual for her mom. She also c/o shortness of breath and swelling of upper and lower extremities. She had gone to urgent care today for extreme fatigue and HIGUERA for the last 2 days where a CXR done was notable for b/l pleural effusions and RML consolidation. She was sent to the Emergency Department. Denies fever, chills, cough, orthopnea, chest pain. She ambulates with a cane.  She has been living with her daughter for about 2 weeks now, her son is on a visit to [place], most of her medical care is in [place] including cardiology. Pt's daughter states the patient has some history of "valve problem". Does not take any water pills/diuretic. Currently comfortably lying flat in bed on the floor. The daughter states her mom has never been diagnosed with dementia, but her memory has been failing recently. The patient also has been c/o frequent protrusion of uterus thru vagina (01 Feb 2020 19:22)    Surgery consulted for obtaining fat pad biopsy to rule out amyloidosis.     PAST MEDICAL & SURGICAL HISTORY:  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  Diabetes  Hypothyroid  Hypertension  Hyperlipidemia  Hypothyroid  Osteoporosis  Gall stone  DM (diabetes mellitus)  History of gallstones  No significant past surgical history      MEDICATIONS  (STANDING):  atorvastatin 80 milliGRAM(s) Oral at bedtime  heparin  Injectable 5000 Unit(s) SubCutaneous every 12 hours  insulin lispro (HumaLOG) corrective regimen sliding scale   SubCutaneous three times a day before meals  insulin lispro (HumaLOG) corrective regimen sliding scale   SubCutaneous at bedtime  levothyroxine 50 MICROGram(s) Oral daily  melatonin 3 milliGRAM(s) Oral at bedtime  mirtazapine 3.75 milliGRAM(s) Oral at bedtime  montelukast 10 milliGRAM(s) Oral every 24 hours  multivitamin/minerals 1 Tablet(s) Oral daily  pantoprazole    Tablet 40 milliGRAM(s) Oral before breakfast  senna 2 Tablet(s) Oral at bedtime      ___________________________________________  REVIEW OF SYSTEMS:  As stated in History of Present Illness, otherwise non-contributory.   ___________________________________________  PHYSICAL EXAM:  Vital Signs Last 24 Hrs  T(C): 36.3 (12 Feb 2020 09:45), Max: 37.2 (11 Feb 2020 20:07)  T(F): 97.3 (12 Feb 2020 09:45), Max: 98.9 (11 Feb 2020 20:07)  HR: 82 (12 Feb 2020 09:45) (82 - 87)  BP: 100/52 (12 Feb 2020 05:09) (96/47 - 103/66)  BP(mean): --  RR: 18 (12 Feb 2020 09:45) (18 - 18)  SpO2: 98% (12 Feb 2020 09:45) (95% - 98%)CAPILLARY BLOOD GLUCOSE      POCT Blood Glucose.: 216 mg/dL (12 Feb 2020 17:01)    I&O's Detail    11 Feb 2020 07:01  -  12 Feb 2020 07:00  --------------------------------------------------------  IN:    Oral Fluid: 520 mL  Total IN: 520 mL    OUT:    Voided: 350 mL  Total OUT: 350 mL    Total NET: 170 mL      12 Feb 2020 07:01  -  12 Feb 2020 19:25  --------------------------------------------------------  IN:    Oral Fluid: 420 mL  Total IN: 420 mL    OUT:  Total OUT: 0 mL    Total NET: 420 mL          General: Alert and Oriented x3, No acute distress.  Respiratory: Breathing non-labored.  Abdomen: Soft, nontender, nondistended. No rebound, no guarding, No palpable organomegaly or masses.  Extremities: Moves all four.   ____________________________________________  LABS:  CBC Full  -  ( 12 Feb 2020 10:09 )  WBC Count : 8.28 K/uL  RBC Count : 3.35 M/uL  Hemoglobin : 9.4 g/dL  Hematocrit : 30.5 %  Platelet Count - Automated : 316 K/uL  Mean Cell Volume : 91.0 fl  Mean Cell Hemoglobin : 28.1 pg  Mean Cell Hemoglobin Concentration : 30.8 gm/dL  Auto Neutrophil # : 6.06 K/uL  Auto Lymphocyte # : 1.06 K/uL  Auto Monocyte # : 0.79 K/uL  Auto Eosinophil # : 0.29 K/uL  Auto Basophil # : 0.01 K/uL  Auto Neutrophil % : 73.3 %  Auto Lymphocyte % : 12.8 %  Auto Monocyte % : 9.5 %  Auto Eosinophil % : 3.5 %  Auto Basophil % : 0.1 %    02-12    131<L>  |  94<L>  |  36<H>  ----------------------------<  278<H>  4.1   |  25  |  1.55<H>    Ca    9.7      12 Feb 2020 15:33  Mg     1.7     02-11                ____________________________________________  RADIOLOGY: [facility] Surgical Consultant Evaluation    HPI:  89F with HTN, DM, hypothyroidism admitted after coming to the Emergency Department with generalized fatigue, hardly getting out of bed, which the daughter states is unusual for her mom. She also c/o shortness of breath and swelling of upper and lower extremities. She had gone to urgent care today for extreme fatigue and HIGUERA for the last 2 days where a CXR done was notable for b/l pleural effusions and RML consolidation. She was sent to the Emergency Department. Denies fever, chills, cough, orthopnea, chest pain. She ambulates with a cane.  She has been living with her daughter for about 2 weeks now, her son is on a visit to [place], most of her medical care is in [place] including cardiology. Pt's daughter states the patient has some history of "valve problem". Does not take any water pills/diuretic. Currently comfortably lying flat in bed on the floor. The daughter states her mom has never been diagnosed with dementia, but her memory has been failing recently. The patient also has been c/o frequent protrusion of uterus thru vagina (01 Feb 2020 19:22)    Surgery consulted for obtaining fat pad biopsy to rule out amyloidosis.     PAST MEDICAL & SURGICAL HISTORY:  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  Diabetes  Hypothyroid  Hypertension  Hyperlipidemia  Hypothyroid  Osteoporosis  Gall stone  DM (diabetes mellitus)  History of gallstones  No significant past surgical history      MEDICATIONS  (STANDING):  atorvastatin 80 milliGRAM(s) Oral at bedtime  heparin  Injectable 5000 Unit(s) SubCutaneous every 12 hours  insulin lispro (HumaLOG) corrective regimen sliding scale   SubCutaneous three times a day before meals  insulin lispro (HumaLOG) corrective regimen sliding scale   SubCutaneous at bedtime  levothyroxine 50 MICROGram(s) Oral daily  melatonin 3 milliGRAM(s) Oral at bedtime  mirtazapine 3.75 milliGRAM(s) Oral at bedtime  montelukast 10 milliGRAM(s) Oral every 24 hours  multivitamin/minerals 1 Tablet(s) Oral daily  pantoprazole    Tablet 40 milliGRAM(s) Oral before breakfast  senna 2 Tablet(s) Oral at bedtime      ___________________________________________  REVIEW OF SYSTEMS:  As stated in History of Present Illness, otherwise non-contributory.   ___________________________________________  PHYSICAL EXAM:  Vital Signs Last 24 Hrs  T(C): 36.3 (12 Feb 2020 09:45), Max: 37.2 (11 Feb 2020 20:07)  T(F): 97.3 (12 Feb 2020 09:45), Max: 98.9 (11 Feb 2020 20:07)  HR: 82 (12 Feb 2020 09:45) (82 - 87)  BP: 100/52 (12 Feb 2020 05:09) (96/47 - 103/66)  BP(mean): --  RR: 18 (12 Feb 2020 09:45) (18 - 18)  SpO2: 98% (12 Feb 2020 09:45) (95% - 98%)CAPILLARY BLOOD GLUCOSE      POCT Blood Glucose.: 216 mg/dL (12 Feb 2020 17:01)    I&O's Detail    11 Feb 2020 07:01  -  12 Feb 2020 07:00  --------------------------------------------------------  IN:    Oral Fluid: 520 mL  Total IN: 520 mL    OUT:    Voided: 350 mL  Total OUT: 350 mL    Total NET: 170 mL      12 Feb 2020 07:01  -  12 Feb 2020 19:25  --------------------------------------------------------  IN:    Oral Fluid: 420 mL  Total IN: 420 mL    OUT:  Total OUT: 0 mL    Total NET: 420 mL          General: Alert and Oriented x3, No acute distress.  Respiratory: Breathing non-labored.  Abdomen: Soft, nontender, nondistended. No rebound, no guarding, No palpable organomegaly or masses.  Extremities: Moves all four.   ____________________________________________  LABS:  CBC Full  -  ( 12 Feb 2020 10:09 )  WBC Count : 8.28 K/uL  RBC Count : 3.35 M/uL  Hemoglobin : 9.4 g/dL  Hematocrit : 30.5 %  Platelet Count - Automated : 316 K/uL  Mean Cell Volume : 91.0 fl  Mean Cell Hemoglobin : 28.1 pg  Mean Cell Hemoglobin Concentration : 30.8 gm/dL  Auto Neutrophil # : 6.06 K/uL  Auto Lymphocyte # : 1.06 K/uL  Auto Monocyte # : 0.79 K/uL  Auto Eosinophil # : 0.29 K/uL  Auto Basophil # : 0.01 K/uL  Auto Neutrophil % : 73.3 %  Auto Lymphocyte % : 12.8 %  Auto Monocyte % : 9.5 %  Auto Eosinophil % : 3.5 %  Auto Basophil % : 0.1 %    02-12    131<L>  |  94<L>  |  36<H>  ----------------------------<  278<H>  4.1   |  25  |  1.55<H>    Ca    9.7      12 Feb 2020 15:33  Mg     1.7     02-11                ____________________________________________  RADIOLOGY:  < from: NM Amyloidosis SPECT/CT, Single Area Single Day (02.12.20 @ 14:02) >  IMPRESSION: Normal cardiac amyloid imaging study; findings are not suggestive of transthyretin cardiac amyloidosis.     < end of copied text >

## 2020-02-12 NOTE — PROGRESS NOTE ADULT - ASSESSMENT
88 yo F with a PMH significant for HTN, DM, hypothyroidism presenting with fatigue, found to have b/l effusions and anemia, course c/b shock likely cardiogenic given low central sat (presumed to be 2/2 initiation of beta blocker). CCU transfer and initiation of inotropes improved shock. Patient developed oliguric renal failure 2/2 ATN as well which is now resolved. Of note, she is also being treated with IV synthroid for hypothyroidism.    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   Unclear if her cardiomyopathy is an infiltrative process (ie amyloidosis) leading to restrictive CM and valvular disease vs valvular disease(TR, MS) leading to biatrial enlargement and RV dysfunction.  -If her disease were 2/2 MS, beta blockade would be first line given need for increased time in diastole, however this would worsen her condition, if she were to have amyloid as she would need the heart rate to maintain her cardiac output given a fixed stroke volume. As a result, will defer beta blockade  -Does not appear grossly overloaded on exam.   -Follow up NM technetium pyrophosphate scan to evaluate for amyloidosis.     Shamar Godoy MD  Cardiology Fellow  All cardiology service information can be found 24/7 on [email or web address], password: [email or web address]

## 2020-02-12 NOTE — PROGRESS NOTE ADULT - SUBJECTIVE AND OBJECTIVE BOX
Patient seen and examined at bedside.    Overnight Events: No overnight events.     REVIEW OF SYSTEMS:  Constitutional:     [x] negative [ ] fevers [ ] chills [ ] weight loss [ ] weight gain  HEENT:                  [x] negative [ ] dry eyes [ ] eye irritation [ ] postnasal drip [ ] nasal congestion  CV:                         [x] negative  [] chest pain [ ] orthopnea [ ] palpitations [ ] murmur  Resp:                     [x] negative [ ] cough [ ] shortness of breath [ ] dyspnea [ ] wheezing [ ] sputum [ ]hemoptysis  GI:                          [x] negative [ ] nausea [ ] vomiting [ ] diarrhea [ ] constipation [ ] abd pain [ ] dysphagia   :                        [x] negative [ ] dysuria [ ] nocturia [ ] hematuria [ ] increased urinary frequency  Musculoskeletal: [x] negative [ ] back pain [ ] myalgias [ ] arthralgias [ ] fracture  Skin:                       [x] negative [ ] rash [ ] itch  Neurological:        [x] negative [ ] headache [ ] dizziness [ ] syncope [ ] weakness [ ] numbness  Psychiatric:           [x] negative [ ] anxiety [ ] depression  Endocrine:            [x] negative [ ] diabetes [ ] thyroid problem  Heme/Lymph:      [x] negative [ ] anemia [ ] bleeding problem  Allergic/Immune: [x] negative [ ] itchy eyes [ ] nasal discharge [ ] hives [ ] angioedema    [x] All other systems negative  [ ] Unable to assess ROS due to    MEDICATIONS  (STANDING):  atorvastatin 80 milliGRAM(s) Oral at bedtime  heparin  Injectable 5000 Unit(s) SubCutaneous every 12 hours  insulin lispro (HumaLOG) corrective regimen sliding scale   SubCutaneous three times a day before meals  insulin lispro (HumaLOG) corrective regimen sliding scale   SubCutaneous at bedtime  levothyroxine 50 MICROGram(s) Oral daily  melatonin 3 milliGRAM(s) Oral at bedtime  mirtazapine 3.75 milliGRAM(s) Oral at bedtime  montelukast 10 milliGRAM(s) Oral every 24 hours  multivitamin/minerals 1 Tablet(s) Oral daily  pantoprazole    Tablet 40 milliGRAM(s) Oral before breakfast  senna 2 Tablet(s) Oral at bedtime    PAST MEDICAL & SURGICAL HISTORY:  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  Diabetes  Hypothyroid  Hypertension  Hyperlipidemia  Hypothyroid  Osteoporosis  Gall stone  DM (diabetes mellitus)  History of gallstones  No significant past surgical history      Vitals:  Vital Signs Last 24 Hrs  T(C): 36.7 (12 Feb 2020 04:25), Max: 37.2 (11 Feb 2020 20:07)  T(F): 98 (12 Feb 2020 04:25), Max: 98.9 (11 Feb 2020 20:07)  HR: 85 (12 Feb 2020 04:25) (85 - 91)  BP: 100/52 (12 Feb 2020 05:09) (96/47 - 108/61)  BP(mean): --  RR: 18 (12 Feb 2020 04:25) (18 - 18)  SpO2: 96% (12 Feb 2020 04:25) (95% - 96%)    I&O's Summary    11 Feb 2020 07:01  -  12 Feb 2020 07:00  --------------------------------------------------------  IN: 520 mL / OUT: 350 mL / NET: 170 mL      Physical Exam:  Appearance: No acute distress; thin frail.   Eyes: PERRL, EOMI, pink conjunctiva  HENT: Normal oral mucosa  Cardiovascular: RRR, S1, S2, no murmurs, rubs, or gallops; no edema; no JVD  Respiratory: Clear to auscultation bilaterally  Gastrointestinal: soft, non-tender, non-distended with normal bowel sounds  Musculoskeletal: No clubbing; no joint deformity   Neurologic: Non-focal  Lymphatic: No lymphadenopathy  Psychiatry: AAOx3, mood & affect appropriate  Skin: No rashes, ecchymoses, or cyanosis                                   10.0   6.65  )-----------( 241      ( 11 Feb 2020 05:30 )             31.0       02-11    131<L>  |  91<L>  |  38<H>  ----------------------------<  128<H>  3.6   |  27  |  1.52<H>    Ca    9.6      11 Feb 2020 05:30  Mg     1.7     02-11      Echo:  < from: Transthoracic Echocardiogram (02.04.20 @ 12:59) >  Conclusions:  Hyperdynamic left ventricle.  Moderate mitral stenosis due to annular and leaflet  calcification.  Mild aortic stenosis.  Severe right ventricular enlargement with normal right  ventricular systolic function.  Moderate pulmonary hypertension.    < end of copied text >    Interpretation of Telemetry:  Sinus 80-90

## 2020-02-12 NOTE — PROGRESS NOTE ADULT - PROBLEM SELECTOR PLAN 2
doing well  cardiology attending follow up   work up in progress  fat pad biopsy pending recommendations

## 2020-02-12 NOTE — PROGRESS NOTE ADULT - ATTENDING COMMENTS
discussed with patient in detail, expresses understanding of treatment plans  discussed with daughter Ayana over the phone in detail

## 2020-02-13 ENCOUNTER — TRANSCRIPTION ENCOUNTER (OUTPATIENT)
Age: 85
End: 2020-02-13

## 2020-02-13 ENCOUNTER — RESULT REVIEW (OUTPATIENT)
Age: 85
End: 2020-02-13

## 2020-02-13 ENCOUNTER — APPOINTMENT (OUTPATIENT)
Dept: ULTRASOUND IMAGING | Facility: HOSPITAL | Age: 85
End: 2020-02-13

## 2020-02-13 LAB
GLUCOSE BLDC GLUCOMTR-MCNC: 121 MG/DL — HIGH (ref 70–99)
GLUCOSE BLDC GLUCOMTR-MCNC: 157 MG/DL — HIGH (ref 70–99)
GLUCOSE BLDC GLUCOMTR-MCNC: 220 MG/DL — HIGH (ref 70–99)
GLUCOSE BLDC GLUCOMTR-MCNC: 266 MG/DL — HIGH (ref 70–99)

## 2020-02-13 PROCEDURE — 88313 SPECIAL STAINS GROUP 2: CPT | Mod: 26

## 2020-02-13 PROCEDURE — 99232 SBSQ HOSP IP/OBS MODERATE 35: CPT | Mod: GC

## 2020-02-13 PROCEDURE — 88305 TISSUE EXAM BY PATHOLOGIST: CPT | Mod: 26

## 2020-02-13 RX ORDER — CLOPIDOGREL BISULFATE 75 MG/1
75 TABLET, FILM COATED ORAL DAILY
Refills: 0 | Status: DISCONTINUED | OUTPATIENT
Start: 2020-02-13 | End: 2020-02-16

## 2020-02-13 RX ORDER — VANCOMYCIN HCL 1 G
500 VIAL (EA) INTRAVENOUS ONCE
Refills: 0 | Status: COMPLETED | OUTPATIENT
Start: 2020-02-13 | End: 2020-02-13

## 2020-02-13 RX ORDER — ASPIRIN/CALCIUM CARB/MAGNESIUM 324 MG
81 TABLET ORAL DAILY
Refills: 0 | Status: DISCONTINUED | OUTPATIENT
Start: 2020-02-13 | End: 2020-02-16

## 2020-02-13 RX ADMIN — MONTELUKAST 10 MILLIGRAM(S): 4 TABLET, CHEWABLE ORAL at 17:56

## 2020-02-13 RX ADMIN — PANTOPRAZOLE SODIUM 40 MILLIGRAM(S): 20 TABLET, DELAYED RELEASE ORAL at 05:54

## 2020-02-13 RX ADMIN — ATORVASTATIN CALCIUM 80 MILLIGRAM(S): 80 TABLET, FILM COATED ORAL at 21:07

## 2020-02-13 RX ADMIN — Medication 1 TABLET(S): at 13:53

## 2020-02-13 RX ADMIN — Medication 100 MILLIGRAM(S): at 15:39

## 2020-02-13 RX ADMIN — Medication 2: at 12:55

## 2020-02-13 RX ADMIN — HEPARIN SODIUM 5000 UNIT(S): 5000 INJECTION INTRAVENOUS; SUBCUTANEOUS at 17:56

## 2020-02-13 RX ADMIN — POLYETHYLENE GLYCOL 3350 17 GRAM(S): 17 POWDER, FOR SOLUTION ORAL at 17:56

## 2020-02-13 RX ADMIN — CLOPIDOGREL BISULFATE 75 MILLIGRAM(S): 75 TABLET, FILM COATED ORAL at 18:00

## 2020-02-13 RX ADMIN — Medication 81 MILLIGRAM(S): at 18:00

## 2020-02-13 RX ADMIN — Medication 50 MICROGRAM(S): at 05:54

## 2020-02-13 RX ADMIN — SENNA PLUS 2 TABLET(S): 8.6 TABLET ORAL at 21:10

## 2020-02-13 RX ADMIN — Medication 3: at 17:55

## 2020-02-13 RX ADMIN — Medication 3 MILLIGRAM(S): at 21:07

## 2020-02-13 RX ADMIN — MIRTAZAPINE 3.75 MILLIGRAM(S): 45 TABLET, ORALLY DISINTEGRATING ORAL at 21:07

## 2020-02-13 NOTE — DISCHARGE NOTE PROVIDER - CARE PROVIDERS DIRECT ADDRESSES
,DirectAddress_Unknown,DirectAddress_Unknown,[email or web address]

## 2020-02-13 NOTE — DISCHARGE NOTE PROVIDER - HOSPITAL COURSE
89 yo F with a PMH significant for HTN, DM, hypothyroidism presenting with fatigue, found to have b/l effusions and anemia, course c/b shock likely cardiogenic given low central sat (presumed to be 2/2 initiation of beta blocker). Trasnferred to ccu  for initiation of inotropes improved shock. Patient developed oliguric renal failure 2/2 ATN as well which is now resolved.     ECHO with Valvular disease Mod-sev MS  no clinical manifestations, sev TR, RV enlargement, normal LVEF. Initially concerned for infiltrative disease. NM technetium pyrophosphate scan negative for TTR amyloidosis     Pt S/P fat pad Biopsy by surgery . Pt is being discharged to Rehab 88 yo F with a PMH significant for HTN, DM, hypothyroidism presenting with fatigue, found to have b/l effusions and anemia, course c/b shock likely cardiogenic given low central sat (presumed to be 2/2 initiation of beta blocker). Trasnferred to ccu  for initiation of inotropes improved shock. Patient developed oliguric renal failure 2/2 ATN as well which is now resolved.     ECHO with Valvular disease Mod-sev MS  no clinical manifestations, sev TR, RV enlargement, normal LVEF. Initially concerned for infiltrative disease. NM technetium pyrophosphate scan negative for TTR amyloidosis     Pt S/P fat pad Biopsy by surgery which was negative for amyloid. Pt is being discharged to Rehab in [place] close to family

## 2020-02-13 NOTE — PROGRESS NOTE ADULT - SUBJECTIVE AND OBJECTIVE BOX
Patient seen and examined at bedside.    Overnight Events: No overnight events. No complaints this morning    REVIEW OF SYSTEMS:  Constitutional:     [x] negative [ ] fevers [ ] chills [ ] weight loss [ ] weight gain  HEENT:                  [x] negative [ ] dry eyes [ ] eye irritation [ ] postnasal drip [ ] nasal congestion  CV:                         [x] negative  [] chest pain [ ] orthopnea [ ] palpitations [ ] murmur  Resp:                     [x] negative [ ] cough [ ] shortness of breath [ ] dyspnea [ ] wheezing [ ] sputum [ ]hemoptysis  GI:                          [x] negative [ ] nausea [ ] vomiting [ ] diarrhea [ ] constipation [ ] abd pain [ ] dysphagia   :                        [x] negative [ ] dysuria [ ] nocturia [ ] hematuria [ ] increased urinary frequency  Musculoskeletal: [x] negative [ ] back pain [ ] myalgias [ ] arthralgias [ ] fracture  Skin:                       [x] negative [ ] rash [ ] itch  Neurological:        [x] negative [ ] headache [ ] dizziness [ ] syncope [ ] weakness [ ] numbness  Psychiatric:           [x] negative [ ] anxiety [ ] depression  Endocrine:            [x] negative [ ] diabetes [ ] thyroid problem  Heme/Lymph:      [x] negative [ ] anemia [ ] bleeding problem  Allergic/Immune: [x] negative [ ] itchy eyes [ ] nasal discharge [ ] hives [ ] angioedema    [x] All other systems negative  [ ] Unable to assess ROS due to    MEDICATIONS  (STANDING):  atorvastatin 80 milliGRAM(s) Oral at bedtime  heparin  Injectable 5000 Unit(s) SubCutaneous every 12 hours  insulin lispro (HumaLOG) corrective regimen sliding scale   SubCutaneous three times a day before meals  insulin lispro (HumaLOG) corrective regimen sliding scale   SubCutaneous at bedtime  levothyroxine 50 MICROGram(s) Oral daily  melatonin 3 milliGRAM(s) Oral at bedtime  mirtazapine 3.75 milliGRAM(s) Oral at bedtime  montelukast 10 milliGRAM(s) Oral every 24 hours  multivitamin/minerals 1 Tablet(s) Oral daily  pantoprazole    Tablet 40 milliGRAM(s) Oral before breakfast  senna 2 Tablet(s) Oral at bedtime      PAST MEDICAL & SURGICAL HISTORY:  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  Diabetes  Hypothyroid  Hypertension  Hyperlipidemia  Hypothyroid  Osteoporosis  Gall stone  DM (diabetes mellitus)  History of gallstones  No significant past surgical history      Vitals:  Vital Signs Last 24 Hrs  T(C): 37 (13 Feb 2020 04:57), Max: 37.2 (12 Feb 2020 20:04)  T(F): 98.6 (13 Feb 2020 04:57), Max: 98.9 (12 Feb 2020 20:04)  HR: 85 (13 Feb 2020 04:57) (82 - 87)  BP: 97/58 (13 Feb 2020 04:57) (97/58 - 120/61)  BP(mean): --  RR: 18 (13 Feb 2020 04:57) (17 - 18)  SpO2: 96% (13 Feb 2020 04:57) (96% - 99%)    I&O's Summary    12 Feb 2020 07:01  -  13 Feb 2020 07:00  --------------------------------------------------------  IN: 480 mL / OUT: 400 mL / NET: 80 mL      Physical Exam:  Appearance: No acute distress; thin frail.   Eyes: PERRL, EOMI, pink conjunctiva  HENT: Normal oral mucosa  Cardiovascular: RRR, S1, S2, no murmurs, rubs, or gallops; no edema; no JVD  Respiratory: Clear to auscultation bilaterally  Gastrointestinal: soft, non-tender, non-distended with normal bowel sounds  Musculoskeletal: No clubbing; no joint deformity   Neurologic: Non-focal  Lymphatic: No lymphadenopathy  Psychiatry: AAOx3, mood & affect appropriate  Skin: No rashes, ecchymoses, or cyanosis                                              9.4    8.28  )-----------( 316      ( 12 Feb 2020 10:09 )             30.5     02-12    131<L>  |  94<L>  |  36<H>  ----------------------------<  278<H>  4.1   |  25  |  1.55<H>    Ca    9.7      12 Feb 2020 15:33    NM AMYLOIDOSES LOC SPEC CT                      IMPRESSION: Normal cardiac amyloid imaging study; findings are not suggestive of transthyretin cardiac amyloidosis.     Echo:  < from: Transthoracic Echocardiogram (02.04.20 @ 12:59) >  Conclusions:  Hyperdynamic left ventricle.  Moderate mitral stenosis due to annular and leaflet  calcification.  Mild aortic stenosis.  Severe right ventricular enlargement with normal right  ventricular systolic function.  Moderate pulmonary hypertension.    < end of copied text >    Interpretation of Telemetry:  Sinus 80-90

## 2020-02-13 NOTE — PROCEDURE NOTE - NSICDXPROCEDURE_GEN_ALL_CORE_FT
PROCEDURES:  Biopsy of subcutaneous soft tissue of flank 13-Feb-2020 11:50:30  Kelvin Mcintyre
PROCEDURES:  Placement of venous hemodialysis catheter 02-Feb-2020 19:28:33  Roxana Laird

## 2020-02-13 NOTE — PROGRESS NOTE ADULT - PROBLEM SELECTOR PLAN 1
scan negative for amyloidosis  s/p fat pad biopsy  no further work up per cardiology attending  will discharge to [facility]hab when bed available

## 2020-02-13 NOTE — CHART NOTE - NSCHARTNOTEFT_GEN_A_CORE
Patient seen and examined  Labs and imaging reviewed  Discussed with patient plan for bedside fat pad biopsy  Patient requesting that we wait until 11am to perform procedure    Will return at 11am for fat pad biopsy

## 2020-02-13 NOTE — PROCEDURE NOTE - NSINFORMCONSENT_GEN_A_CORE
Benefits, risks, and possible complications of procedure explained to patient/caregiver who verbalized understanding and gave verbal consent.
Benefits, risks, and possible complications of procedure explained to patient/caregiver who verbalized understanding and gave verbal consent.
Benefits, risks, and possible complications of procedure explained to patient/caregiver who verbalized understanding and gave written consent.
This was an emergent procedure.
Benefits, risks, and possible complications of procedure explained to patient/caregiver who verbalized understanding and gave written consent.

## 2020-02-13 NOTE — DISCHARGE NOTE PROVIDER - NSDCMRMEDTOKEN_GEN_ALL_CORE_FT
alendronate 70 mg oral tablet: 1 tab(s) orally once a week (Tuesday)  alendronate 70 mg oral tablet: 1 tab(s) orally once a week  Aspir 81 oral delayed release tablet: 1 tab(s) orally once a day  atorvastatin 80 mg oral tablet: 1 tab(s) orally once a day (at bedtime)  Calcium 600+D oral tablet: 1 tab(s) orally 2 times a day  Calcium 600m tab(s) orally once a day  Centrum Adults oral tablet: 1 tab(s) orally once a day  Centrum oral tablet: 1 tab(s) orally once a day  clopidogrel 75 mg oral tablet: 1 tab(s) orally once a day  Ecotrin Adult Low Strength 81 mg oral delayed release tablet: 1 tab(s) orally once a day  ibuprofen:   levothyroxine 50 mcg (0.05 mg) oral tablet: 1 tab(s) orally once a day  lisinopril 10 mg oral tablet: 1 tab(s) orally once a day  metFORMIN 1000 mg oral tablet: Take 0.5 (1/2) tablet 2 times daily  mirtazapine 7.5 mg oral tablet: Take 0.5 (1/2) tablet once daily  pantoprazole 40 mg oral delayed release tablet: 1 tab(s) orally 2 times a day  pantoprazole 40 mg oral delayed release tablet: 1 tab(s) orally once a day (before a meal)  Plavix 75 mg oral tablet: 1 tab(s) orally once a day  rosuvastatin 10 mg oral tablet: 2 tab(s) orally once a day  Singulair 10 mg oral tablet: 1 tab(s) orally once a day  sucralfate 1 g oral tablet: 1 tab(s) orally 4 times a day  Synthroid 25 mcg (0.025 mg) oral tablet: 1 tab(s) orally once a day alendronate 70 mg oral tablet: 1 tab(s) orally once a week  Aspir 81 oral delayed release tablet: 1 tab(s) orally once a day  atorvastatin 80 mg oral tablet: 1 tab(s) orally once a day (at bedtime)  Calcium 600+D oral tablet: 1 tab(s) orally 2 times a day  Centrum Adults oral tablet: 1 tab(s) orally once a day  clopidogrel 75 mg oral tablet: 1 tab(s) orally once a day  heparin 5000 units/0.5 mL injectable solution: 5000 unit(s) subcutaneous 2 times a day  levothyroxine 50 mcg (0.05 mg) oral tablet: 1 tab(s) orally once a day  metFORMIN 1000 mg oral tablet: Take 0.5 (1/2) tablet 2 times daily  mirtazapine 7.5 mg oral tablet: Take 0.5 (1/2) tablet once daily  pantoprazole 40 mg oral delayed release tablet: 1 tab(s) orally once a day (before a meal)  rosuvastatin 10 mg oral tablet: 2 tab(s) orally once a day  Singulair 10 mg oral tablet: 1 tab(s) orally once a day  sucralfate 1 g oral tablet: 1 tab(s) orally 4 times a day alendronate 70 mg oral tablet: 1 tab(s) orally once a week  Aspir 81 oral delayed release tablet: 1 tab(s) orally once a day  atorvastatin 80 mg oral tablet: 1 tab(s) orally once a day (at bedtime)  Calcium 600+D oral tablet: 1 tab(s) orally 2 times a day  Centrum Adults oral tablet: 1 tab(s) orally once a day  clopidogrel 75 mg oral tablet: 1 tab(s) orally once a day  heparin 5000 units/0.5 mL injectable solution: 5000 unit(s) subcutaneous 2 times a day  levothyroxine 50 mcg (0.05 mg) oral tablet: 1 tab(s) orally once a day  metFORMIN 1000 mg oral tablet: Take 0.5 (1/2) tablet 2 times daily  mirtazapine 7.5 mg oral tablet: Take 0.5 (1/2) tablet once daily  pantoprazole 40 mg oral delayed release tablet: 1 tab(s) orally once a day (before a meal)  Singulair 10 mg oral tablet: 1 tab(s) orally once a day  sucralfate 1 g oral tablet: 1 tab(s) orally 4 times a day

## 2020-02-13 NOTE — DISCHARGE NOTE PROVIDER - PROVIDER TOKENS
PROVIDER:[TOKEN:[[id number]]],FREE:[LAST:[Ricky],FIRST:[Pouja],PHONE:[(   )    -],FAX:[(   )    -],ADDRESS:[PMD]],PROVIDER:[TOKEN:[[id number]]]

## 2020-02-13 NOTE — DISCHARGE NOTE PROVIDER - CARE PROVIDER_API CALL
Russ Whitehead (DO)  Gastroenterology; Internal Medicine  [address] [place] [address]  Phone: [phone]  Fax: [phone]  Follow Up Time:     Dale García  PMD  Phone: (   )    -  Fax: (   )    -  Follow Up Time:     Lee Comer (MD)  Cardiovascular Disease; Internal Medicine; Nuclear Cardiology  [address] [facility], [address]  [place] [address]  Phone: [phone]  Fax: [phone]  Follow Up Time:

## 2020-02-13 NOTE — PROCEDURE NOTE - GENERAL PROCEDURE DETAILS
A small 2cm skin incision was made in the left lower quadrant of the abdomen. Abdominal fat was identified, and biopsied. Hemostasis achieved with 3-0 vicryl suture, and manual pressure. Skin closed with 4-0 monocryl suture.. Steristrips applied

## 2020-02-13 NOTE — PROCEDURE NOTE - NSPOSTCAREGUIDE_GEN_A_CORE
Verbal/written post procedure instructions were given to patient/caregiver
Care for catheter as per unit/ICU protocols
Verbal/written post procedure instructions were given to patient/caregiver

## 2020-02-13 NOTE — PROGRESS NOTE ADULT - ASSESSMENT
88 yo F with a PMH significant for HTN, DM, hypothyroidism presenting with fatigue, found to have b/l effusions and anemia, course c/b shock likely cardiogenic given low central sat (presumed to be 2/2 initiation of beta blocker). CCU transfer and initiation of inotropes improved shock. Patient developed oliguric renal failure 2/2 ATN as well which is now resolved. Of note, she is also being treated with IV synthroid for hypothyroidism.    #Valvular disease: Mod-sev MS, sev TR, RV enlargement, normal LVEF. Initially concerned for infiltrative disease. NM technetium pyrophosphate scan negative for TTR amyloidosis   -Does not appear grossly overloaded on exam.   -Breathing comfortably      Shamar Godoy MD  Cardiology Fellow  All cardiology service information can be found 24/7 on [email or web address], password: [employer] 89 yo F with a PMH significant for HTN, DM, hypothyroidism presenting with fatigue, found to have b/l effusions and anemia, course c/b shock likely cardiogenic given low central sat (presumed to be 2/2 initiation of beta blocker). CCU transfer and initiation of inotropes improved shock. Patient developed oliguric renal failure 2/2 ATN as well which is now resolved. Of note, she is also being treated with IV synthroid for hypothyroidism.    #Valvular disease: Mod-sev MS by echo, no clinical manifestations, sev TR, RV enlargement, normal LVEF. Initially concerned for infiltrative disease. NM technetium pyrophosphate scan negative for TTR amyloidosis   -Does not appear grossly overloaded on exam.   -Breathing comfortably      Shamar Godoy MD  Cardiology Fellow  All cardiology service information can be found 24/7 on [email or web address], password: [employer] 88 yo F with a PMH significant for HTN, DM, hypothyroidism presenting with fatigue, found to have b/l effusions and anemia, course c/b shock likely cardiogenic given low central sat (presumed to be 2/2 initiation of beta blocker). CCU transfer and initiation of inotropes improved shock. Patient developed oliguric renal failure 2/2 ATN as well which is now resolved. Of note, she is also being treated with IV synthroid for hypothyroidism.    #Valvular disease: Mod-sev MS by echo, no clinical manifestations, sev TR, RV enlargement, normal LVEF. Initially concerned for infiltrative disease. NM technetium pyrophosphate scan negative for TTR amyloidosis   -Does not appear grossly overloaded on exam.   -Breathing comfortably  -No further inpatient cardiac workup needed at this time    Shamar Godoy MD  Cardiology Fellow  All cardiology service information can be found 24/7 on [email or web address], password: [employer]

## 2020-02-13 NOTE — DISCHARGE NOTE PROVIDER - NSDCCPCAREPLAN_GEN_ALL_CORE_FT
PRINCIPAL DISCHARGE DIAGNOSIS  Diagnosis: Shortness of breath  Assessment and Plan of Treatment: PRINCIPAL DISCHARGE DIAGNOSIS  Diagnosis: Rectal bleeding  Assessment and Plan of Treatment: You had hemorrhoidal bleed  continue Anusol suppositories  Follow up with gastro enterology      SECONDARY DISCHARGE DIAGNOSES  Diagnosis: Hyponatremia  Assessment and Plan of Treatment: Hyponatremia resolved    Diagnosis: Valvular heart disease  Assessment and Plan of Treatment: Yopu have moderate -severe mitral stenosis and severe tricusoid regurgitation  Follow up with cardiology  continue current medications    Diagnosis: Cardiogenic shock  Assessment and Plan of Treatment: you were treated for cardiogenic shock in the CCU  it has since resolved    Diagnosis: Severe hypothyroidism  Assessment and Plan of Treatment: continue Synthroid  Have TSH checked in 6 weeks    Diagnosis: EFRAIN (acute kidney injury)  Assessment and Plan of Treatment: Your kideny function has stabilized  Follow up with PMD

## 2020-02-13 NOTE — PROGRESS NOTE ADULT - SUBJECTIVE AND OBJECTIVE BOX
Patient is a 89y old  Female who presents with a chief complaint of generalized weakness and body swelling (13 Feb 2020 07:29)      SUBJECTIVE / OVERNIGHT EVENTS: overnight events noted    ROS:  Resp: No cough no sputum production  CVS: No chest pain no palpitations no orthopnea  GI: no N/V/D  : no dysuria, no hematuria  Neuro: no weakness no paresthesias  Heme: No petechiae no easy bruising  Msk: No joint pain no swelling  Skin: No rash no itching        MEDICATIONS  (STANDING):  atorvastatin 80 milliGRAM(s) Oral at bedtime  heparin  Injectable 5000 Unit(s) SubCutaneous every 12 hours  insulin lispro (HumaLOG) corrective regimen sliding scale   SubCutaneous three times a day before meals  insulin lispro (HumaLOG) corrective regimen sliding scale   SubCutaneous at bedtime  levothyroxine 50 MICROGram(s) Oral daily  melatonin 3 milliGRAM(s) Oral at bedtime  mirtazapine 3.75 milliGRAM(s) Oral at bedtime  montelukast 10 milliGRAM(s) Oral every 24 hours  multivitamin/minerals 1 Tablet(s) Oral daily  pantoprazole    Tablet 40 milliGRAM(s) Oral before breakfast  senna 2 Tablet(s) Oral at bedtime  vancomycin  IVPB 500 milliGRAM(s) IV Intermittent once    MEDICATIONS  (PRN):  glucagon  Injectable 1 milliGRAM(s) IntraMuscular once PRN Glucose LESS THAN 70 milligrams/deciliter  polyethylene glycol 3350 17 Gram(s) Oral daily PRN Constipation        CAPILLARY BLOOD GLUCOSE      POCT Blood Glucose.: 220 mg/dL (13 Feb 2020 12:50)  POCT Blood Glucose.: 121 mg/dL (13 Feb 2020 09:07)  POCT Blood Glucose.: 218 mg/dL (12 Feb 2020 21:49)  POCT Blood Glucose.: 216 mg/dL (12 Feb 2020 17:01)    I&O's Summary    12 Feb 2020 07:01  -  13 Feb 2020 07:00  --------------------------------------------------------  IN: 540 mL / OUT: 400 mL / NET: 140 mL    13 Feb 2020 07:01  -  13 Feb 2020 14:08  --------------------------------------------------------  IN: 420 mL / OUT: 0 mL / NET: 420 mL        Vital Signs Last 24 Hrs  T(C): 37 (13 Feb 2020 04:57), Max: 37.2 (12 Feb 2020 20:04)  T(F): 98.6 (13 Feb 2020 04:57), Max: 98.9 (12 Feb 2020 20:04)  HR: 85 (13 Feb 2020 04:57) (85 - 87)  BP: 97/58 (13 Feb 2020 04:57) (97/58 - 120/61)  BP(mean): --  RR: 18 (13 Feb 2020 04:57) (17 - 18)  SpO2: 96% (13 Feb 2020 04:57) (96% - 99%)    PHYSICAL EXAM: vital signs as above  HEENT: KALPESH EOMI  Neck: Supple, no JVD, no thyromegaly  Lungs: decreased breath sounds at bases   CVS: S1 S2 soft ejection systolic murmur best heard at left sternal border   Abdomen: no tenderness, no organomegaly, BS present  Neuro: non focal  Skin: warm, dry  Ext: no cyanosis or clubbing, no edema  Msk: no joint swelling or deformities    LABS:                        9.4    8.28  )-----------( 316      ( 12 Feb 2020 10:09 )             30.5     02-12    131<L>  |  94<L>  |  36<H>  ----------------------------<  278<H>  4.1   |  25  |  1.55<H>    Ca    9.7      12 Feb 2020 15:33                  All consultant(s) notes reviewed and care discussed with other providers        Contact Number, Dr Moser [phone]

## 2020-02-13 NOTE — PROGRESS NOTE ADULT - ATTENDING COMMENTS
89 year old woman symptomatically markedly improved, possible infiltrative cardiomyopathy, amyloidosis effectively excluded by negative [id number] scan. Has responded well to current regimen and continues to improve, regaining strength each day. No further cardiac evaluation or changes in management recommended.

## 2020-02-14 DIAGNOSIS — E87.1 HYPO-OSMOLALITY AND HYPONATREMIA: ICD-10-CM

## 2020-02-14 LAB
ANION GAP SERPL CALC-SCNC: 11 MMOL/L — SIGNIFICANT CHANGE UP (ref 5–17)
BUN SERPL-MCNC: 36 MG/DL — HIGH (ref 7–23)
CALCIUM SERPL-MCNC: 9.7 MG/DL — SIGNIFICANT CHANGE UP (ref 8.4–10.5)
CHLORIDE SERPL-SCNC: 91 MMOL/L — LOW (ref 96–108)
CO2 SERPL-SCNC: 25 MMOL/L — SIGNIFICANT CHANGE UP (ref 22–31)
CREAT SERPL-MCNC: 1.45 MG/DL — HIGH (ref 0.5–1.3)
GLUCOSE BLDC GLUCOMTR-MCNC: 124 MG/DL — HIGH (ref 70–99)
GLUCOSE BLDC GLUCOMTR-MCNC: 147 MG/DL — HIGH (ref 70–99)
GLUCOSE BLDC GLUCOMTR-MCNC: 190 MG/DL — HIGH (ref 70–99)
GLUCOSE BLDC GLUCOMTR-MCNC: 250 MG/DL — HIGH (ref 70–99)
GLUCOSE SERPL-MCNC: 152 MG/DL — HIGH (ref 70–99)
HCT VFR BLD CALC: 26.5 % — LOW (ref 34.5–45)
HGB BLD-MCNC: 8.7 G/DL — LOW (ref 11.5–15.5)
MCHC RBC-ENTMCNC: 28.7 PG — SIGNIFICANT CHANGE UP (ref 27–34)
MCHC RBC-ENTMCNC: 32.8 GM/DL — SIGNIFICANT CHANGE UP (ref 32–36)
MCV RBC AUTO: 87.5 FL — SIGNIFICANT CHANGE UP (ref 80–100)
NRBC # BLD: 0 /100 WBCS — SIGNIFICANT CHANGE UP (ref 0–0)
PLATELET # BLD AUTO: 265 K/UL — SIGNIFICANT CHANGE UP (ref 150–400)
POTASSIUM SERPL-MCNC: 4.3 MMOL/L — SIGNIFICANT CHANGE UP (ref 3.5–5.3)
POTASSIUM SERPL-SCNC: 4.3 MMOL/L — SIGNIFICANT CHANGE UP (ref 3.5–5.3)
RBC # BLD: 3.03 M/UL — LOW (ref 3.8–5.2)
RBC # FLD: 16 % — HIGH (ref 10.3–14.5)
SODIUM SERPL-SCNC: 127 MMOL/L — LOW (ref 135–145)
T4 FREE SERPL-MCNC: 1.2 NG/DL — SIGNIFICANT CHANGE UP (ref 0.9–1.8)
TSH SERPL-MCNC: 14.4 UIU/ML — HIGH (ref 0.27–4.2)
WBC # BLD: 7.44 K/UL — SIGNIFICANT CHANGE UP (ref 3.8–10.5)
WBC # FLD AUTO: 7.44 K/UL — SIGNIFICANT CHANGE UP (ref 3.8–10.5)

## 2020-02-14 PROCEDURE — 99232 SBSQ HOSP IP/OBS MODERATE 35: CPT

## 2020-02-14 PROCEDURE — 99232 SBSQ HOSP IP/OBS MODERATE 35: CPT | Mod: GC

## 2020-02-14 RX ADMIN — MONTELUKAST 10 MILLIGRAM(S): 4 TABLET, CHEWABLE ORAL at 18:15

## 2020-02-14 RX ADMIN — Medication 1: at 18:15

## 2020-02-14 RX ADMIN — SENNA PLUS 2 TABLET(S): 8.6 TABLET ORAL at 21:21

## 2020-02-14 RX ADMIN — PANTOPRAZOLE SODIUM 40 MILLIGRAM(S): 20 TABLET, DELAYED RELEASE ORAL at 05:17

## 2020-02-14 RX ADMIN — Medication 3 MILLIGRAM(S): at 21:21

## 2020-02-14 RX ADMIN — Medication 1 TABLET(S): at 09:12

## 2020-02-14 RX ADMIN — HEPARIN SODIUM 5000 UNIT(S): 5000 INJECTION INTRAVENOUS; SUBCUTANEOUS at 18:15

## 2020-02-14 RX ADMIN — POLYETHYLENE GLYCOL 3350 17 GRAM(S): 17 POWDER, FOR SOLUTION ORAL at 18:14

## 2020-02-14 RX ADMIN — ATORVASTATIN CALCIUM 80 MILLIGRAM(S): 80 TABLET, FILM COATED ORAL at 21:21

## 2020-02-14 RX ADMIN — Medication 50 MICROGRAM(S): at 05:18

## 2020-02-14 RX ADMIN — HEPARIN SODIUM 5000 UNIT(S): 5000 INJECTION INTRAVENOUS; SUBCUTANEOUS at 05:17

## 2020-02-14 RX ADMIN — CLOPIDOGREL BISULFATE 75 MILLIGRAM(S): 75 TABLET, FILM COATED ORAL at 09:12

## 2020-02-14 RX ADMIN — MIRTAZAPINE 3.75 MILLIGRAM(S): 45 TABLET, ORALLY DISINTEGRATING ORAL at 21:21

## 2020-02-14 RX ADMIN — Medication 81 MILLIGRAM(S): at 09:12

## 2020-02-14 RX ADMIN — Medication 2: at 13:19

## 2020-02-14 NOTE — PROGRESS NOTE ADULT - PROBLEM SELECTOR PLAN 3
osteoporosis with no fractures in the past  -would continue to hold alendronate given EFRAIN.   GFR 21 osteoporosis with no fractures in the past  -would continue to hold alendronate given EFRAIN.   GFR 32  Follow up as outpatient

## 2020-02-14 NOTE — PROGRESS NOTE ADULT - ATTENDING COMMENTS
EFRAIN: likely due to poor renal perfusion in the setting of shock  Serum creatinine noted to be normal in 2017. No values in 2018/2019. Admitted with serum creatinine of 1.4    Now with resolution of EFRAIN and serum creatinine down to admission value of 1.4 mg/dl.   Urine protein of 1.3 gms noted. SPEP/SIFE with IgG lambda.    Await fat pad biopsy results, although it is not very sensitive  Consider Heme evaluation to r/o amyloid  Needs outpatient follow up with nephrology     Hyponatremia: possibly in the setting of poor solute intake. Also with a component of hypothyroidism, given the elevated TSH  Can start low dose salt tabs 1 gm daily. Encourage PO solute intake      Juancho Dickson MD  O: [phone]  C: [phone]

## 2020-02-14 NOTE — PROGRESS NOTE ADULT - SUBJECTIVE AND OBJECTIVE BOX
Chief Complaint:     History:    MEDICATIONS  (STANDING):  aspirin enteric coated 81 milliGRAM(s) Oral daily  atorvastatin 80 milliGRAM(s) Oral at bedtime  clopidogrel Tablet 75 milliGRAM(s) Oral daily  heparin  Injectable 5000 Unit(s) SubCutaneous every 12 hours  insulin lispro (HumaLOG) corrective regimen sliding scale   SubCutaneous three times a day before meals  insulin lispro (HumaLOG) corrective regimen sliding scale   SubCutaneous at bedtime  levothyroxine 50 MICROGram(s) Oral daily  melatonin 3 milliGRAM(s) Oral at bedtime  mirtazapine 3.75 milliGRAM(s) Oral at bedtime  montelukast 10 milliGRAM(s) Oral every 24 hours  multivitamin/minerals 1 Tablet(s) Oral daily  pantoprazole    Tablet 40 milliGRAM(s) Oral before breakfast  senna 2 Tablet(s) Oral at bedtime    MEDICATIONS  (PRN):  glucagon  Injectable 1 milliGRAM(s) IntraMuscular once PRN Glucose LESS THAN 70 milligrams/deciliter  polyethylene glycol 3350 17 Gram(s) Oral daily PRN Constipation      Allergies    penicillin (Hives)  penicillin (Unknown)    Intolerances      Review of Systems:  Constitutional: No fever  Eyes: No blurry vision  Neuro: No tremors  HEENT: No pain  Cardiovascular: No chest pain, palpitations  Respiratory: No SOB, no cough  GI: No nausea, vomiting, abdominal pain  : No dysuria  Skin: no rash  Psych: no depression  Endocrine: no polyuria, polydipsia  Hem/lymph: no swelling  Osteoporosis: no fractures    ALL OTHER SYSTEMS REVIEWED AND NEGATIVE    UNABLE TO OBTAIN    PHYSICAL EXAM:  VITALS: T(C): 37 (02-14-20 @ 04:33)  T(F): 98.6 (02-14-20 @ 04:33), Max: 98.6 (02-14-20 @ 04:33)  HR: 85 (02-14-20 @ 04:33) (85 - 92)  BP: 103/62 (02-14-20 @ 04:33) (103/62 - 116/73)  RR:  (18 - 18)  SpO2:  (96% - 98%)  Wt(kg): --  GENERAL: NAD, well-groomed, well-developed  EYES: No proptosis, no lid lag, anicteric  HEENT:  Atraumatic, Normocephalic, moist mucous membranes  THYROID: Normal size, no palpable nodules  RESPIRATORY: Clear to auscultation bilaterally; No rales, rhonchi, wheezing, or rubs  CARDIOVASCULAR: Regular rate and rhythm; No murmurs; no peripheral edema  GI: Soft, nontender, non distended, normal bowel sounds  SKIN: Dry, intact, No rashes or lesions  MUSCULOSKELETAL: Full range of motion, normal strength  NEURO: sensation intact, extraocular movements intact, no tremor, normal reflexes  PSYCH: Alert and oriented x 3, normal affect, normal mood  CUSHING'S SIGNS: no striae    POCT Blood Glucose.: 124 mg/dL (02-14-20 @ 08:34)  POCT Blood Glucose.: 157 mg/dL (02-13-20 @ 21:54)  POCT Blood Glucose.: 266 mg/dL (02-13-20 @ 17:22)  POCT Blood Glucose.: 220 mg/dL (02-13-20 @ 12:50)  POCT Blood Glucose.: 121 mg/dL (02-13-20 @ 09:07)  POCT Blood Glucose.: 218 mg/dL (02-12-20 @ 21:49)  POCT Blood Glucose.: 216 mg/dL (02-12-20 @ 17:01)  POCT Blood Glucose.: 118 mg/dL (02-12-20 @ 08:52)  POCT Blood Glucose.: 194 mg/dL (02-11-20 @ 21:40)  POCT Blood Glucose.: 168 mg/dL (02-11-20 @ 17:19)  POCT Blood Glucose.: 257 mg/dL (02-11-20 @ 13:19)      02-14    127<L>  |  91<L>  |  36<H>  ----------------------------<  152<H>  4.3   |  25  |  1.45<H>    EGFR if : 37<L>  EGFR if non : 32<L>    Ca    9.7      02-14            Thyroid Function Tests:  02-14 @ 08:38 TSH 14.40 FreeT4 1.2 T3 -- Anti TPO -- Anti Thyroglobulin Ab -- TSI --  02-07 @ 08:08 TSH 0.02 FreeT4 1.2 T3 -- Anti TPO -- Anti Thyroglobulin Ab -- TSI --      Hemoglobin A1C, Whole Blood: 5.7 % <H> [4.0 - 5.6] (02-02-20 @ 09:20) Chief Complaint: Hypothyroidism    History:    MEDICATIONS  (STANDING):  aspirin enteric coated 81 milliGRAM(s) Oral daily  atorvastatin 80 milliGRAM(s) Oral at bedtime  clopidogrel Tablet 75 milliGRAM(s) Oral daily  heparin  Injectable 5000 Unit(s) SubCutaneous every 12 hours  insulin lispro (HumaLOG) corrective regimen sliding scale   SubCutaneous three times a day before meals  insulin lispro (HumaLOG) corrective regimen sliding scale   SubCutaneous at bedtime  levothyroxine 50 MICROGram(s) Oral daily  melatonin 3 milliGRAM(s) Oral at bedtime  mirtazapine 3.75 milliGRAM(s) Oral at bedtime  montelukast 10 milliGRAM(s) Oral every 24 hours  multivitamin/minerals 1 Tablet(s) Oral daily  pantoprazole    Tablet 40 milliGRAM(s) Oral before breakfast  senna 2 Tablet(s) Oral at bedtime    MEDICATIONS  (PRN):  glucagon  Injectable 1 milliGRAM(s) IntraMuscular once PRN Glucose LESS THAN 70 milligrams/deciliter  polyethylene glycol 3350 17 Gram(s) Oral daily PRN Constipation      Allergies    penicillin (Hives)  penicillin (Unknown)    Intolerances      Review of Systems:  Constitutional: No fever  Eyes: No blurry vision  Neuro: No tremors  HEENT: No pain  Cardiovascular: No chest pain, palpitations  Respiratory: No SOB, no cough  GI: No nausea, vomiting, abdominal pain  : No dysuria  Skin: no rash  Psych: no depression  Endocrine: no polyuria, polydipsia  Hem/lymph: no swelling  Osteoporosis: no fractures    ALL OTHER SYSTEMS REVIEWED AND NEGATIVE    UNABLE TO OBTAIN    PHYSICAL EXAM:  VITALS: T(C): 37 (02-14-20 @ 04:33)  T(F): 98.6 (02-14-20 @ 04:33), Max: 98.6 (02-14-20 @ 04:33)  HR: 85 (02-14-20 @ 04:33) (85 - 92)  BP: 103/62 (02-14-20 @ 04:33) (103/62 - 116/73)  RR:  (18 - 18)  SpO2:  (96% - 98%)  Wt(kg): --  GENERAL: NAD, well-groomed, well-developed  EYES: No proptosis, no lid lag, anicteric  HEENT:  Atraumatic, Normocephalic, moist mucous membranes  THYROID: Normal size, no palpable nodules  RESPIRATORY: Clear to auscultation bilaterally; No rales, rhonchi, wheezing, or rubs  CARDIOVASCULAR: Regular rate and rhythm; No murmurs; no peripheral edema  GI: Soft, nontender, non distended, normal bowel sounds  SKIN: Dry, intact, No rashes or lesions  MUSCULOSKELETAL: Full range of motion, normal strength  NEURO: sensation intact, extraocular movements intact, no tremor, normal reflexes  PSYCH: Alert and oriented x 3, normal affect, normal mood  CUSHING'S SIGNS: no striae    POCT Blood Glucose.: 124 mg/dL (02-14-20 @ 08:34)  POCT Blood Glucose.: 157 mg/dL (02-13-20 @ 21:54)  POCT Blood Glucose.: 266 mg/dL (02-13-20 @ 17:22)  POCT Blood Glucose.: 220 mg/dL (02-13-20 @ 12:50)  POCT Blood Glucose.: 121 mg/dL (02-13-20 @ 09:07)  POCT Blood Glucose.: 218 mg/dL (02-12-20 @ 21:49)  POCT Blood Glucose.: 216 mg/dL (02-12-20 @ 17:01)  POCT Blood Glucose.: 118 mg/dL (02-12-20 @ 08:52)  POCT Blood Glucose.: 194 mg/dL (02-11-20 @ 21:40)  POCT Blood Glucose.: 168 mg/dL (02-11-20 @ 17:19)  POCT Blood Glucose.: 257 mg/dL (02-11-20 @ 13:19)      02-14    127<L>  |  91<L>  |  36<H>  ----------------------------<  152<H>  4.3   |  25  |  1.45<H>    EGFR if : 37<L>  EGFR if non : 32<L>    Ca    9.7      02-14            Thyroid Function Tests:  02-14 @ 08:38 TSH 14.40 FreeT4 1.2 T3 -- Anti TPO -- Anti Thyroglobulin Ab -- TSI --  02-07 @ 08:08 TSH 0.02 FreeT4 1.2 T3 -- Anti TPO -- Anti Thyroglobulin Ab -- TSI --      Hemoglobin A1C, Whole Blood: 5.7 % <H> [4.0 - 5.6] (02-02-20 @ 09:20) Chief Complaint: Hypothyroidism    History: Patient feels well. Denies cold intolerance, fatigue or constipation.     MEDICATIONS  (STANDING):  aspirin enteric coated 81 milliGRAM(s) Oral daily  atorvastatin 80 milliGRAM(s) Oral at bedtime  clopidogrel Tablet 75 milliGRAM(s) Oral daily  heparin  Injectable 5000 Unit(s) SubCutaneous every 12 hours  insulin lispro (HumaLOG) corrective regimen sliding scale   SubCutaneous three times a day before meals  insulin lispro (HumaLOG) corrective regimen sliding scale   SubCutaneous at bedtime  levothyroxine 50 MICROGram(s) Oral daily  melatonin 3 milliGRAM(s) Oral at bedtime  mirtazapine 3.75 milliGRAM(s) Oral at bedtime  montelukast 10 milliGRAM(s) Oral every 24 hours  multivitamin/minerals 1 Tablet(s) Oral daily  pantoprazole    Tablet 40 milliGRAM(s) Oral before breakfast  senna 2 Tablet(s) Oral at bedtime    MEDICATIONS  (PRN):  glucagon  Injectable 1 milliGRAM(s) IntraMuscular once PRN Glucose LESS THAN 70 milligrams/deciliter  polyethylene glycol 3350 17 Gram(s) Oral daily PRN Constipation      Allergies    penicillin (Hives)  penicillin (Unknown)    Intolerances      Review of Systems:  Constitutional: No fever  Eyes: No blurry vision  Neuro: No tremors  HEENT: No pain  Cardiovascular: No chest pain, palpitations  Respiratory: No SOB, no cough  GI: No nausea, vomiting, abdominal pain  : No dysuria  Skin: no rash  Psych: no depression  Endocrine: no polyuria, polydipsia      ALL OTHER SYSTEMS REVIEWED AND NEGATIVE        PHYSICAL EXAM:  VITALS: T(C): 37 (02-14-20 @ 04:33)  T(F): 98.6 (02-14-20 @ 04:33), Max: 98.6 (02-14-20 @ 04:33)  HR: 85 (02-14-20 @ 04:33) (85 - 92)  BP: 103/62 (02-14-20 @ 04:33) (103/62 - 116/73)  RR:  (18 - 18)  SpO2:  (96% - 98%)  Wt(kg): --  GENERAL: NAD, well-groomed, well-developed  EYES: No proptosis, no lid lag, anicteric  HEENT:  Atraumatic, Normocephalic, moist mucous membranes  RESPIRATORY: Clear to auscultation bilaterally; No rales, rhonchi, wheezing, or rubs  CARDIOVASCULAR: Regular rate and rhythm; No murmurs  GI: Soft, nontender, non distended, normal bowel sounds  SKIN: Dry, intact, No rashes or lesions  PSYCH: Alert and oriented x 3, normal affect, normal mood      POCT Blood Glucose.: 124 mg/dL (02-14-20 @ 08:34)  POCT Blood Glucose.: 157 mg/dL (02-13-20 @ 21:54)  POCT Blood Glucose.: 266 mg/dL (02-13-20 @ 17:22)  POCT Blood Glucose.: 220 mg/dL (02-13-20 @ 12:50)  POCT Blood Glucose.: 121 mg/dL (02-13-20 @ 09:07)  POCT Blood Glucose.: 218 mg/dL (02-12-20 @ 21:49)  POCT Blood Glucose.: 216 mg/dL (02-12-20 @ 17:01)  POCT Blood Glucose.: 118 mg/dL (02-12-20 @ 08:52)  POCT Blood Glucose.: 194 mg/dL (02-11-20 @ 21:40)  POCT Blood Glucose.: 168 mg/dL (02-11-20 @ 17:19)  POCT Blood Glucose.: 257 mg/dL (02-11-20 @ 13:19)      02-14    127<L>  |  91<L>  |  36<H>  ----------------------------<  152<H>  4.3   |  25  |  1.45<H>    EGFR if : 37<L>  EGFR if non : 32<L>    Ca    9.7      02-14            Thyroid Function Tests:  02-14 @ 08:38 TSH 14.40 FreeT4 1.2 T3 -- Anti TPO -- Anti Thyroglobulin Ab -- TSI --  02-07 @ 08:08 TSH 0.02 FreeT4 1.2 T3 -- Anti TPO -- Anti Thyroglobulin Ab -- TSI --      Hemoglobin A1C, Whole Blood: 5.7 % <H> [4.0 - 5.6] (02-02-20 @ 09:20)

## 2020-02-14 NOTE — CHART NOTE - NSCHARTNOTEFT_GEN_A_CORE
Nutrition Follow Up Note  Patient seen for: malnutrition follow up    Reason for Admission: generalized weakness and body swelling    Source: pt, chart    Diet : consistent carbohydrate, restricted fluid 1000ml, Soft, restricted lactose  Suplena 1 x daily    Patient reports: eating eggs even though she doesn't like them, drinking the Suplena   PO intake : >75%     Source for PO intake: pt          Daily Weight in k.8 (-14), Weight in k.6 (), Weight in k.8 (), Weight in k.6 (), Weight in k.7 (02-10), Weight in k.7 (), Weight in k.1 ()  % Weight Change: 6% since previous assess on 2/10    Pertinent Medications: MEDICATIONS  (STANDING):  aspirin enteric coated 81 milliGRAM(s) Oral daily  atorvastatin 80 milliGRAM(s) Oral at bedtime  clopidogrel Tablet 75 milliGRAM(s) Oral daily  heparin  Injectable 5000 Unit(s) SubCutaneous every 12 hours  insulin lispro (HumaLOG) corrective regimen sliding scale   SubCutaneous three times a day before meals  insulin lispro (HumaLOG) corrective regimen sliding scale   SubCutaneous at bedtime  levothyroxine 50 MICROGram(s) Oral daily  melatonin 3 milliGRAM(s) Oral at bedtime  mirtazapine 3.75 milliGRAM(s) Oral at bedtime  montelukast 10 milliGRAM(s) Oral every 24 hours  multivitamin/minerals 1 Tablet(s) Oral daily  pantoprazole    Tablet 40 milliGRAM(s) Oral before breakfast  senna 2 Tablet(s) Oral at bedtime    MEDICATIONS  (PRN):  glucagon  Injectable 1 milliGRAM(s) IntraMuscular once PRN Glucose LESS THAN 70 milligrams/deciliter  polyethylene glycol 3350 17 Gram(s) Oral daily PRN Constipation    Pertinent Labs:  @ 06:23: Na 127<L>, BUN 36<H>, Cr 1.45<H>, <H>, K+ 4.3    Finger Sticks:  POCT Blood Glucose.: 124 mg/dL ( @ 08:34)  POCT Blood Glucose.: 157 mg/dL ( @ 21:54)  POCT Blood Glucose.: 266 mg/dL ( @ 17:22)  POCT Blood Glucose.: 220 mg/dL ( @ 12:50)      Skin per nursing documentation: no pressure injury  Edema: none    Estimated Needs:   [ x] no change since previous assessment  [ ] recalculated:     Previous Nutrition Diagnosis: severe malnutrition  Nutrition Diagnosis is: plan of care is acheived        Recommend  1) Continue with current diet order: Consistent Carbohydrate (no snacks), Restricted Fluid 1000ml, Soft, Low Sodium, Lactose Restricted (milk sugar intoler.)  2) continue Suplena 1x daily in setting of CKD Stage III   3) Pt not candidate for HF nutrition diet education at this time (poor historian)   4) Encourage PO intake and provide feeding assistance PRN  5) continue multivitamin with minerals, monitor need to change to Nephro-Lavonne    Monitoring and Evaluation:     Continue to monitor Nutritional intake, Tolerance to diet prescription, weights, labs, skin integrity    RD remains available upon request and will follow up per protocol  Kira Smith MA, SHANITA, CDN #[phone]

## 2020-02-14 NOTE — PROGRESS NOTE ADULT - PROBLEM SELECTOR PLAN 2
continues to improve  will continue to monitor  low Na hyponatremia likely secondary to excessive water intake  will continue to monitor

## 2020-02-14 NOTE — PROGRESS NOTE ADULT - ATTENDING COMMENTS
Patient seen and examined  She is doing well  Denies any nausea, vomiting, fever or chills  She is tolerating PO intake    Her abdomen is soft, not tender and not distended  No erythema, no hematoma  Steri-strips in place    - Diet as tolerated  - No further surgical intervention required at this time  - Continue supportive care per primary team

## 2020-02-14 NOTE — PROGRESS NOTE ADULT - SUBJECTIVE AND OBJECTIVE BOX
Interval events: s/p abdominal fat pad biopsy at bedside, patient tolerated well    S: Patient seen and examined. Sleeping in bed. No complaints.    O: Vital Signs  T(C): 37 (02-14 @ 04:33), Max: 37 (02-14 @ 04:33)  HR: 85 (02-14 @ 04:33) (85 - 92)  BP: 103/62 (02-14 @ 04:33) (103/62 - 116/73)  RR: 18 (02-14 @ 04:33) (18 - 18)  SpO2: 96% (02-14 @ 04:33) (96% - 98%)  02-13-20 @ 07:01  -  02-14-20 @ 07:00  --------------------------------------------------------  IN: 540 mL / OUT: 500 mL / NET: 40 mL      General: alert and oriented, NAD  Resp: airway patent, respirations unlabored  CVS: regular rate and rhythm  Abdomen: soft, nontender, nondistended, LLQ steris c/d/i no surrounding erythema  Extremities: no edema  Skin: warm, dry, appropriate color                          8.7    7.44  )-----------( 265      ( 14 Feb 2020 06:27 )             26.5   02-14    127<L>  |  91<L>  |  36<H>  ----------------------------<  152<H>  4.3   |  25  |  1.45<H>    Ca    9.7      14 Feb 2020 06:23

## 2020-02-14 NOTE — PROGRESS NOTE ADULT - ASSESSMENT
Derian is a very pleasant 89 Year-Old Lady with HTN, DM, hypothyroidism pesenting with fatigue, found to have b/l effusions and anemia, course c/b shock likely cardiogenic, with initiation of inotropes, oliguric renal failure 2/2 ATN, 1d s/p abdominal fat pad biopsy performed at bedside. Doing well after procedure.    - Keep incision clean and dry. Do not submerge in water, may shower or sponge bathe. Do not remove steri strips, they will fall off on their own. No need for dressing once steri strips fall off.  - Rest of care per primary team  - Please call with further questions or concerns    Red Team Surgery Pager #[phone]

## 2020-02-14 NOTE — PROGRESS NOTE ADULT - PROBLEM SELECTOR PLAN 2
- Pt. with chronic hyponatremia during hospitalization. Serum sodium low this AM at 127.  - Pt. not eating well. Pt. likely with hyponatremia in the setting of poor solute intake.  - Recommend starting salt tablets 1 G BID.  - Monitor serum sodium daily.    [place] [facility]  Nephrology Fellow  Cell: [phone] (from 8 am to 5 pm)  (After 5 pm or on weekends please page on-call fellow) - Pt. with chronic hyponatremia during hospitalization. Serum sodium low this AM at 127.  - Pt. not eating well. Pt. likely with hyponatremia in the setting of poor solute intake.  - Recommend starting salt tablets 1 G QD  - Monitor serum sodium daily.    [place] He  Nephrology Fellow  Cell: [phone] (from 8 am to 5 pm)  (After 5 pm or on weekends please page on-call fellow)

## 2020-02-14 NOTE — PROGRESS NOTE ADULT - ATTENDING COMMENTS
Valerie Joyce (pager [phone])  On evenings and weekends, please call [phone] or page endocrine fellow on call.   Please note that this patient may be followed by different provider tomorrow. If no answer, contact endocrine fellow on call.

## 2020-02-14 NOTE — PROGRESS NOTE ADULT - PROBLEM SELECTOR PLAN 1
- Scr 1.45 on admission, continuously uptrended but now improved at 1.45 this AM.  - Patient with cardiogenic shock, now resolved.   - SPEP with migrating paraprotein identified, SIFE with IgG Lambda. Pt. with negative scan for amyloidosis. Had fat pad biopsy on 2/13/20.  - Monitor UOP and daily weights. Avoid volume depletion, NSAIDs, PPI's, ARB/ACE-I. Dose medications as per eGFR. - Scr 1.45 on admission, continuously uptrended but now improved at 1.45 this AM.  - Patient with cardiogenic shock, now resolved.   - SPEP with migrating paraprotein identified, SIFE with IgG Lambda. Pt. with negative scan for transthyretin amyloidosis. Had fat pad biopsy on 2/13/20.  - Monitor UOP and daily weights. Avoid volume depletion, NSAIDs, PPI's, ARB/ACE-I. Dose medications as per eGFR.

## 2020-02-14 NOTE — PROGRESS NOTE ADULT - SUBJECTIVE AND OBJECTIVE BOX
Patient is a 89y old  Female who presents with a chief complaint of generalized weakness and body swelling (14 Feb 2020 13:40)      SUBJECTIVE / OVERNIGHT EVENTS: overnight events noted    ROS:  Resp: No cough no sputum production  CVS: No chest pain no palpitations no orthopnea  GI: no N/V/D  : no dysuria, no hematuria  Neuro: no weakness no paresthesias  Heme: No petechiae no easy bruising  Msk: No joint pain no swelling  Skin: No rash no itching        MEDICATIONS  (STANDING):  aspirin enteric coated 81 milliGRAM(s) Oral daily  atorvastatin 80 milliGRAM(s) Oral at bedtime  clopidogrel Tablet 75 milliGRAM(s) Oral daily  heparin  Injectable 5000 Unit(s) SubCutaneous every 12 hours  insulin lispro (HumaLOG) corrective regimen sliding scale   SubCutaneous three times a day before meals  insulin lispro (HumaLOG) corrective regimen sliding scale   SubCutaneous at bedtime  levothyroxine 50 MICROGram(s) Oral daily  melatonin 3 milliGRAM(s) Oral at bedtime  mirtazapine 3.75 milliGRAM(s) Oral at bedtime  montelukast 10 milliGRAM(s) Oral every 24 hours  multivitamin/minerals 1 Tablet(s) Oral daily  pantoprazole    Tablet 40 milliGRAM(s) Oral before breakfast  senna 2 Tablet(s) Oral at bedtime    MEDICATIONS  (PRN):  glucagon  Injectable 1 milliGRAM(s) IntraMuscular once PRN Glucose LESS THAN 70 milligrams/deciliter  polyethylene glycol 3350 17 Gram(s) Oral daily PRN Constipation        CAPILLARY BLOOD GLUCOSE      POCT Blood Glucose.: 190 mg/dL (14 Feb 2020 17:29)  POCT Blood Glucose.: 250 mg/dL (14 Feb 2020 13:13)  POCT Blood Glucose.: 124 mg/dL (14 Feb 2020 08:34)  POCT Blood Glucose.: 157 mg/dL (13 Feb 2020 21:54)    I&O's Summary    13 Feb 2020 07:01  -  14 Feb 2020 07:00  --------------------------------------------------------  IN: 540 mL / OUT: 500 mL / NET: 40 mL    14 Feb 2020 07:01  -  14 Feb 2020 19:02  --------------------------------------------------------  IN: 420 mL / OUT: 0 mL / NET: 420 mL        Vital Signs Last 24 Hrs  T(C): 36.7 (14 Feb 2020 14:18), Max: 37 (14 Feb 2020 04:33)  T(F): 98 (14 Feb 2020 14:18), Max: 98.6 (14 Feb 2020 04:33)  HR: 79 (14 Feb 2020 14:18) (79 - 89)  BP: 123/70 (14 Feb 2020 14:18) (103/62 - 123/70)  BP(mean): --  RR: 18 (14 Feb 2020 14:18) (18 - 18)  SpO2: 96% (14 Feb 2020 14:18) (96% - 98%)    PHYSICAL EXAM: vital signs as above  HEENT: KALPESH EOMI  Neck: Supple, no JVD, no thyromegaly  Lungs: decreased breath sounds at bases   CVS: S1 S2 soft ejection systolic murmur best heard at left sternal border   Abdomen: no tenderness, no organomegaly, BS present  Neuro: non focal  Skin: warm, dry  Ext: no cyanosis or clubbing, no edema  Msk: no joint swelling or deformities    LABS:                        8.7    7.44  )-----------( 265      ( 14 Feb 2020 06:27 )             26.5     02-14    127<L>  |  91<L>  |  36<H>  ----------------------------<  152<H>  4.3   |  25  |  1.45<H>    Ca    9.7      14 Feb 2020 06:23                  All consultant(s) notes reviewed and care discussed with other providers        Contact Number, Dr Moser [phone]

## 2020-02-14 NOTE — PROGRESS NOTE ADULT - PROBLEM SELECTOR PLAN 2
hgb a1c 5.7 is more tightly controlled than necessary given age and metformin is also not recommended given her EFRAIN and lactic acidosis  -Was started on lantus 5 units and hypo this AM while NPO. Would dc the lantus (AM BG were in acceptable range) and monitor on low dose correctional scale.   -consider discharge without any DM medications. hgb a1c 5.7 is more tightly controlled than necessary given age and metformin is also not recommended given her EFRAIN and lactic acidosis  -Continue to monitor on low dose correctional scale.   -consider discharge without any DM medications.

## 2020-02-14 NOTE — PROGRESS NOTE ADULT - PROBLEM SELECTOR PLAN 1
TSH 49, TT3 43, TT4 4.1 on LT4 50 mcg.  Suspecting decreased absorption as reason for hypothyroidism as patient is adherent. Hypotensive and edematous in the setting of HF exacerbation, severe pHTN, and oliguria.    -currently on levothyroxine 37mcg IV q daily. repeat TSG 16, TT4 5.4, improving. Now most recent TSH 0.02 Ft4 1.2.   can dc Iv levothyroxine and restart oral levothyroxine 50mcg daily.   -Can repeat TFTs in 1 week On arrival TSH 49, TT3 43, TT4 4.1 on LT4 50 mcg.  Suspecting decreased absorption as reason for hypothyroidism as patient is adherent. Hypotensive and edematous in the setting of HF exacerbation, severe pHTN, and oliguria.    -started on levothyroxine 37mcg IV q daily. repeat TSG 16, TT4 5.4, improved.   TSH 0.02 Ft4 1.2 on 2/7- Patient restart oral levothyroxine 50mcg daily.   Repeat TSH 14.4 FT4 1.2-ok to continue LT4 50 mcg daily   TSH will take 6-8 weeks to normalize-would obtain repeat TSH and FT4 as outpatient in 4 weeks. On arrival TSH 49, TT3 43, TT4 4.1 on LT4 50 mcg.  Suspecting decreased absorption as reason for hypothyroidism as patient is adherent. Hypotensive and edematous in the setting of HF exacerbation, severe pHTN, and oliguria.    -started on levothyroxine 37mcg IV q daily. repeat TSG 16, TT4 5.4, improved.   TSH 0.02 Ft4 1.2 on 2/7- Patient restarted oral levothyroxine 50mcg daily.   Repeat TSH 14.4 FT4 1.2-ok to continue LT4 50 mcg daily   TSH will take 6-8 weeks to normalize-would obtain repeat TSH and FT4 as outpatient in 4 weeks.

## 2020-02-14 NOTE — PROGRESS NOTE ADULT - SUBJECTIVE AND OBJECTIVE BOX
[facility] DIVISION OF KIDNEY DISEASES AND HYPERTENSION -- FOLLOW UP NOTE  --------------------------------------------------------------------------------  HPI: 89 yo F with  HTN, DM, hypothyroidism admitted on 2/1/20 for generalized fatigue, found to be in cardiogenic shock, transferred to CCU for further management. Pt. was started on diuretics (now discontinued). On admission Scr was 1.45, increased to 1.68 on 2/2/20. Scr had worsened, but now improved to 1.45 this AM. Serum sodium noted to be low at 127. Pt. appears to eat very little with meals.    Pt. seen and examined at bedside this AM. Denies CP, SOB, N/V/F/C.   PAST HISTORY  --------------------------------------------------------------------------------  No significant changes to PMH, PSH, FHx, SHx, unless otherwise noted    ALLERGIES & MEDICATIONS  --------------------------------------------------------------------------------  Allergies    penicillin (Hives)  penicillin (Unknown)    Intolerances    Standing Inpatient Medications  aspirin enteric coated 81 milliGRAM(s) Oral daily  atorvastatin 80 milliGRAM(s) Oral at bedtime  clopidogrel Tablet 75 milliGRAM(s) Oral daily  heparin  Injectable 5000 Unit(s) SubCutaneous every 12 hours  insulin lispro (HumaLOG) corrective regimen sliding scale   SubCutaneous three times a day before meals  insulin lispro (HumaLOG) corrective regimen sliding scale   SubCutaneous at bedtime  levothyroxine 50 MICROGram(s) Oral daily  melatonin 3 milliGRAM(s) Oral at bedtime  mirtazapine 3.75 milliGRAM(s) Oral at bedtime  montelukast 10 milliGRAM(s) Oral every 24 hours  multivitamin/minerals 1 Tablet(s) Oral daily  pantoprazole    Tablet 40 milliGRAM(s) Oral before breakfast  senna 2 Tablet(s) Oral at bedtime    REVIEW OF SYSTEMS  --------------------------------------------------------------------------------  Gen: + lethargy  Respiratory: No dyspnea  CV: No chest pain  GI: No abdominal pain  MSK: No LE tenderness  Neuro: No dizziness  Heme: No bleeding    All other systems were reviewed and are negative, except as noted.  VITALS/PHYSICAL EXAM  --------------------------------------------------------------------------------  T(C): 37 (02-14-20 @ 04:33), Max: 37 (02-14-20 @ 04:33)  HR: 85 (02-14-20 @ 04:33) (85 - 92)  BP: 103/62 (02-14-20 @ 04:33) (103/62 - 116/73)  RR: 18 (02-14-20 @ 04:33) (18 - 18)  SpO2: 96% (02-14-20 @ 04:33) (96% - 98%)  Wt(kg): --    02-13-20 @ 07:01  -  02-14-20 @ 07:00  --------------------------------------------------------  IN: 540 mL / OUT: 500 mL / NET: 40 mL    02-14-20 @ 07:01  -  02-14-20 @ 13:41  --------------------------------------------------------  IN: 420 mL / OUT: 0 mL / NET: 420 mL    Physical Exam:  	Gen: NAD  	HEENT: Anicteric  	Pulm: decreased b/l breath sounds  	CV: RRR, S1S2; systolic murmur best heard at left sternal border   	Abd: +BS, soft, nontender/nondistended       	: No suprapubic tenderness  	MSK: Warm, no edema  	Neuro: Awake  LABS/STUDIES  --------------------------------------------------------------------------------              8.7    7.44  >-----------<  265      [02-14-20 @ 06:27]              26.5     127  |  91  |  36  ----------------------------<  152      [02-14-20 @ 06:23]  4.3   |  25  |  1.45        Ca     9.7     [02-14-20 @ 06:23]    Creatinine Trend:  SCr 1.45 [02-14 @ 06:23]  SCr 1.55 [02-12 @ 15:33]  SCr 1.69 [02-12 @ 10:09]  SCr 1.52 [02-11 @ 05:30]  SCr 1.60 [02-10 @ 06:56]

## 2020-02-15 LAB
ANION GAP SERPL CALC-SCNC: 10 MMOL/L — SIGNIFICANT CHANGE UP (ref 5–17)
BUN SERPL-MCNC: 40 MG/DL — HIGH (ref 7–23)
CALCIUM SERPL-MCNC: 9.5 MG/DL — SIGNIFICANT CHANGE UP (ref 8.4–10.5)
CHLORIDE SERPL-SCNC: 93 MMOL/L — LOW (ref 96–108)
CO2 SERPL-SCNC: 24 MMOL/L — SIGNIFICANT CHANGE UP (ref 22–31)
CREAT SERPL-MCNC: 1.58 MG/DL — HIGH (ref 0.5–1.3)
GLUCOSE BLDC GLUCOMTR-MCNC: 122 MG/DL — HIGH (ref 70–99)
GLUCOSE BLDC GLUCOMTR-MCNC: 169 MG/DL — HIGH (ref 70–99)
GLUCOSE BLDC GLUCOMTR-MCNC: 248 MG/DL — HIGH (ref 70–99)
GLUCOSE BLDC GLUCOMTR-MCNC: 97 MG/DL — SIGNIFICANT CHANGE UP (ref 70–99)
GLUCOSE SERPL-MCNC: 112 MG/DL — HIGH (ref 70–99)
OSMOLALITY UR: 436 MOS/KG — SIGNIFICANT CHANGE UP (ref 300–900)
POTASSIUM SERPL-MCNC: 4.6 MMOL/L — SIGNIFICANT CHANGE UP (ref 3.5–5.3)
POTASSIUM SERPL-SCNC: 4.6 MMOL/L — SIGNIFICANT CHANGE UP (ref 3.5–5.3)
SODIUM SERPL-SCNC: 127 MMOL/L — LOW (ref 135–145)

## 2020-02-15 RX ORDER — SODIUM CHLORIDE 9 MG/ML
1 INJECTION INTRAMUSCULAR; INTRAVENOUS; SUBCUTANEOUS DAILY
Refills: 0 | Status: DISCONTINUED | OUTPATIENT
Start: 2020-02-15 | End: 2020-02-15

## 2020-02-15 RX ORDER — PANTOPRAZOLE SODIUM 20 MG/1
40 TABLET, DELAYED RELEASE ORAL ONCE
Refills: 0 | Status: COMPLETED | OUTPATIENT
Start: 2020-02-15 | End: 2020-02-15

## 2020-02-15 RX ORDER — SODIUM CHLORIDE 9 MG/ML
1 INJECTION INTRAMUSCULAR; INTRAVENOUS; SUBCUTANEOUS EVERY 12 HOURS
Refills: 0 | Status: DISCONTINUED | OUTPATIENT
Start: 2020-02-15 | End: 2020-02-17

## 2020-02-15 RX ADMIN — CLOPIDOGREL BISULFATE 75 MILLIGRAM(S): 75 TABLET, FILM COATED ORAL at 10:16

## 2020-02-15 RX ADMIN — PANTOPRAZOLE SODIUM 40 MILLIGRAM(S): 20 TABLET, DELAYED RELEASE ORAL at 05:06

## 2020-02-15 RX ADMIN — HEPARIN SODIUM 5000 UNIT(S): 5000 INJECTION INTRAVENOUS; SUBCUTANEOUS at 05:06

## 2020-02-15 RX ADMIN — Medication 2: at 12:58

## 2020-02-15 RX ADMIN — Medication 81 MILLIGRAM(S): at 10:17

## 2020-02-15 RX ADMIN — HEPARIN SODIUM 5000 UNIT(S): 5000 INJECTION INTRAVENOUS; SUBCUTANEOUS at 17:37

## 2020-02-15 RX ADMIN — ATORVASTATIN CALCIUM 80 MILLIGRAM(S): 80 TABLET, FILM COATED ORAL at 23:07

## 2020-02-15 RX ADMIN — Medication 50 MICROGRAM(S): at 05:06

## 2020-02-15 RX ADMIN — MIRTAZAPINE 3.75 MILLIGRAM(S): 45 TABLET, ORALLY DISINTEGRATING ORAL at 23:07

## 2020-02-15 RX ADMIN — SENNA PLUS 2 TABLET(S): 8.6 TABLET ORAL at 23:08

## 2020-02-15 RX ADMIN — SODIUM CHLORIDE 1 GRAM(S): 9 INJECTION INTRAMUSCULAR; INTRAVENOUS; SUBCUTANEOUS at 10:16

## 2020-02-15 RX ADMIN — MONTELUKAST 10 MILLIGRAM(S): 4 TABLET, CHEWABLE ORAL at 17:46

## 2020-02-15 RX ADMIN — Medication 1 TABLET(S): at 10:17

## 2020-02-15 RX ADMIN — SODIUM CHLORIDE 1 GRAM(S): 9 INJECTION INTRAMUSCULAR; INTRAVENOUS; SUBCUTANEOUS at 17:37

## 2020-02-15 NOTE — PROGRESS NOTE ADULT - ATTENDING COMMENTS
doing ok ; cards note noted; infiltrative CMP vs valvular heart disease: cont current rx: per cards: rpt chest x-ray in AM  2/10: repeat chest x-ray is ordered:  2/11: resp wise pretty stable:  2/15: will sign off:

## 2020-02-15 NOTE — PROGRESS NOTE ADULT - PROBLEM SELECTOR PLAN 1
on dobutamine: has bilateral pleural effusions too : likely secondary to CHF: no pneumonia  2/5: she is doing much better: no SOB : no cough : she is alert and awake:  2/6: doing much better: ABG noted: with alkalosis and some co2 retention: no rep distress: echo noted: for further workup per car ds  2/7: cont curetn rx: off diuretice today :? awaiting ramy/  2/8 resolved  2/9 resolved. hyponatremia noted from morning lab. management defer to primary  2/10: improved off diuretics: rpt chest x-ray  2/11:pleural effusion resolved: no SOB or resp symptoms: for fat pad biopsy on thursday  2/15: doing ok : no SOB : pleural effusion resolved

## 2020-02-15 NOTE — PROGRESS NOTE ADULT - PROBLEM SELECTOR PLAN 2
monitor and control  2/5: stable  2/6';c ontrolled  2/7: hypoglycemic today : being monitored  2/8 keep euglycemic  2/9 stable. keep euglycemic  2/10: controlled  2/11: controled  2/15: controlled

## 2020-02-15 NOTE — PROGRESS NOTE ADULT - SUBJECTIVE AND OBJECTIVE BOX
Patient is a 89y old  Female who presents with a chief complaint of generalized weakness and body swelling (15 Feb 2020 11:54)      Any change in ROS: Pulmonary wise she is OK:     MEDICATIONS  (STANDING):  aspirin enteric coated 81 milliGRAM(s) Oral daily  atorvastatin 80 milliGRAM(s) Oral at bedtime  clopidogrel Tablet 75 milliGRAM(s) Oral daily  heparin  Injectable 5000 Unit(s) SubCutaneous every 12 hours  insulin lispro (HumaLOG) corrective regimen sliding scale   SubCutaneous three times a day before meals  insulin lispro (HumaLOG) corrective regimen sliding scale   SubCutaneous at bedtime  levothyroxine 50 MICROGram(s) Oral daily  melatonin 3 milliGRAM(s) Oral at bedtime  mirtazapine 3.75 milliGRAM(s) Oral at bedtime  montelukast 10 milliGRAM(s) Oral every 24 hours  multivitamin/minerals 1 Tablet(s) Oral daily  pantoprazole    Tablet 40 milliGRAM(s) Oral before breakfast  senna 2 Tablet(s) Oral at bedtime  sodium chloride 1 Gram(s) Oral every 12 hours    MEDICATIONS  (PRN):  glucagon  Injectable 1 milliGRAM(s) IntraMuscular once PRN Glucose LESS THAN 70 milligrams/deciliter  polyethylene glycol 3350 17 Gram(s) Oral daily PRN Constipation    Vital Signs Last 24 Hrs  T(C): 36.3 (15 Feb 2020 12:27), Max: 37.1 (14 Feb 2020 20:35)  T(F): 97.4 (15 Feb 2020 12:27), Max: 98.7 (14 Feb 2020 20:35)  HR: 87 (15 Feb 2020 12:27) (85 - 87)  BP: 121/72 (15 Feb 2020 12:27) (101/64 - 123/80)  BP(mean): --  RR: 17 (15 Feb 2020 12:27) (17 - 18)  SpO2: 100% (15 Feb 2020 12:27) (98% - 100%)    I&O's Summary    14 Feb 2020 07:01  -  15 Feb 2020 07:00  --------------------------------------------------------  IN: 1070 mL / OUT: 200 mL / NET: 870 mL    15 Feb 2020 07:01  -  15 Feb 2020 16:12  --------------------------------------------------------  IN: 360 mL / OUT: 300 mL / NET: 60 mL          Physical Exam:   GENERAL: NAD, well-groomed, well-developed  HEENT: KALPESH/   Atraumatic, Normocephalic  ENMT: No tonsillar erythema, exudates, or enlargement; Moist mucous membranes, Good dentition, No lesions  NECK: Supple, No JVD, Normal thyroid  CHEST/LUNG: Clear to auscultaion, ; No rales, rhonchi, wheezing, or rubs  CVS: Regular rate and rhythm; No murmurs, rubs, or gallops  GI: : Soft, Nontender, Nondistended; Bowel sounds present  NERVOUS SYSTEM:  Alert & Oriented X3, Good concentration; Motor Strength 5/5 B/L upper and lower extremities; DTRs 2+ intact and symmetric clubbing, cyanosis, or edema  LYMPH: No lymphadenopathy noted  SKIN: No rashes or lesions  ENDOCRINOLOGY: No Thyromegaly  PSYCH: Appropriate    Labs:                              8.7    7.44  )-----------( 265      ( 14 Feb 2020 06:27 )             26.5                         9.4    8.28  )-----------( 316      ( 12 Feb 2020 10:09 )             30.5     02-15    127<L>  |  93<L>  |  40<H>  ----------------------------<  112<H>  4.6   |  24  |  1.58<H>  02-14    127<L>  |  91<L>  |  36<H>  ----------------------------<  152<H>  4.3   |  25  |  1.45<H>  02-12    131<L>  |  94<L>  |  36<H>  ----------------------------<  278<H>  4.1   |  25  |  1.55<H>  02-12    129<L>  |  89<L>  |  38<H>  ----------------------------<  90  7.4<HH>   |  26  |  1.69<H>    Ca    9.5      15 Feb 2020 06:51  Ca    9.7      14 Feb 2020 06:23      CAPILLARY BLOOD GLUCOSE      POCT Blood Glucose.: 248 mg/dL (15 Feb 2020 12:40)  POCT Blood Glucose.: 97 mg/dL (15 Feb 2020 08:37)  POCT Blood Glucose.: 147 mg/dL (14 Feb 2020 21:18)  POCT Blood Glucose.: 190 mg/dL (14 Feb 2020 17:29)      < from: NM Amyloidosis SPECT/CT, Single Area Single Day (02.12.20 @ 14:02) >    EXAM:  NM AMYLOIDOS LOC SPEC CT SA SD                                PROCEDURE DATE:  02/12/2020          INTERPRETATION:  RADIOPHARMACEUTICAL: 15.5 mCi Tc-99m-pyrophosphate; i.v.    CLINICAL INFORMATION: 89 year old female with shortness of breathand hyperdynamic left ventricle on echocardiography; referred to evaluate for cardiac transthyretin amyloidosis.    TECHNIQUE: Approximately 1and 3 hours after intravenous administration of the above radiopharmaceutical static images of the chest in the anterior, posterior, RPO, JOCELYN, and both lateral projections were obtained. SPECT/CT of the chest also was obtained. A computer workstation was used to obtain composite images for anatomic correlation by precisely overlying the SPECT and CT images to generate a fused SPECT/CT image. The CT protocol was optimized for SPECT attenuation correction and to provide anatomic detail for localization of SPECT abnormalities. The CT protocol was not designed to produce and cannot replace state-of- the-artdiagnostic CT images with specific imaging protocols for different body parts and indications.        COMPARISON: No previous similar studies were available for comparison.    FINDINGS: The technical quality of the images was satisfactory.  There is no abnormal myocardial uptake identified.    Heart to Contralateral Chest Ratio (1 hr.): = 1.1; (normal: less than or equal to 1.0)    Myocardium to Bone (visual at 3 hrs): Grade: 0  (normal: < 2)    IMPRESSION: Normal cardiac amyloid imaging study; findings are not suggestive of transthyretin cardiac amyloidosis.             < from: Xray Chest 1 View- PORTABLE-Urgent (02.10.20 @ 15:57) >    EXAM:  XR CHEST PORTABLE URGENT 1V                            PROCEDURE DATE:  02/10/2020            INTERPRETATION:  CLINICAL INFORMATION: Patient with shortness of breath. Evaluate for pleural effusions.    EXAM: Single frontal radiograph of the chest.    COMPARISON: Chest radiograph from 2/3/2020.    FINDINGS:  Interval removal of right-sided central line.  No focal consolidation. Interval resolution of bilateral pleural effusions.  Calcification of the aortic knob. Heart size is well evaluated on this projection.  The visualized osseous and soft tissue structures demonstrate no acute pathology.    IMPRESSION:   Interval resolution of bilateral pleural effusions. No pneumothorax.                KEVEN MELENDEZ M.D., RADIOLOGY RESIDENT  Thisdocument has been electronically signed.  GHULAM MUJICA M.D., ATTENDING RADIOLOGIST  This document has been electronically signed. Feb 10 2020  4:41PM              < end of copied text >          CATHI PAIGE M.D., CHIEF OF NUCLEAR MEDICINE  This document has been electronically signed. Feb 12 2020  4:06PM              < end of copied text >              RECENT CULTURES:        RESPIRATORY CULTURES:          Studies  Chest X-RAY  CT SCAN Chest   Venous Dopplers: LE:   CT Abdomen  Others

## 2020-02-15 NOTE — PROGRESS NOTE ADULT - PROBLEM SELECTOR PLAN 2
continues to improve  will continue to monitor  low Na hyponatremia likely secondary to excessive water intake  send osm studies

## 2020-02-15 NOTE — PROGRESS NOTE ADULT - PROBLEM SELECTOR PLAN 4
hypotensive: on vasopressin  2/5: off pressors now: no SOB: alert and awake  2/10: bp is OK :     2/6: off vasopressors: no SOB : BP reasonable  2/7: off pressors at this time: cont to monitor: off b blocklers  2/8 normotensive  2/9 normotensive  2/15: controlled

## 2020-02-15 NOTE — PROGRESS NOTE ADULT - SUBJECTIVE AND OBJECTIVE BOX
Patient is a 89y old  Female who presents with a chief complaint of generalized weakness and body swelling (14 Feb 2020 19:02)      SUBJECTIVE / OVERNIGHT EVENTS: overnight events noted    ROS:  Resp: No cough no sputum production  CVS: No chest pain no palpitations no orthopnea  GI: no N/V/D  : no dysuria, no hematuria  Neuro: no weakness no paresthesias  Heme: No petechiae no easy bruising  Msk: No joint pain no swelling  Skin: No rash no itching        MEDICATIONS  (STANDING):  aspirin enteric coated 81 milliGRAM(s) Oral daily  atorvastatin 80 milliGRAM(s) Oral at bedtime  clopidogrel Tablet 75 milliGRAM(s) Oral daily  heparin  Injectable 5000 Unit(s) SubCutaneous every 12 hours  insulin lispro (HumaLOG) corrective regimen sliding scale   SubCutaneous three times a day before meals  insulin lispro (HumaLOG) corrective regimen sliding scale   SubCutaneous at bedtime  levothyroxine 50 MICROGram(s) Oral daily  melatonin 3 milliGRAM(s) Oral at bedtime  mirtazapine 3.75 milliGRAM(s) Oral at bedtime  montelukast 10 milliGRAM(s) Oral every 24 hours  multivitamin/minerals 1 Tablet(s) Oral daily  pantoprazole    Tablet 40 milliGRAM(s) Oral before breakfast  senna 2 Tablet(s) Oral at bedtime  sodium chloride 1 Gram(s) Oral daily    MEDICATIONS  (PRN):  glucagon  Injectable 1 milliGRAM(s) IntraMuscular once PRN Glucose LESS THAN 70 milligrams/deciliter  polyethylene glycol 3350 17 Gram(s) Oral daily PRN Constipation        CAPILLARY BLOOD GLUCOSE      POCT Blood Glucose.: 97 mg/dL (15 Feb 2020 08:37)  POCT Blood Glucose.: 147 mg/dL (14 Feb 2020 21:18)  POCT Blood Glucose.: 190 mg/dL (14 Feb 2020 17:29)  POCT Blood Glucose.: 250 mg/dL (14 Feb 2020 13:13)    I&O's Summary    14 Feb 2020 07:01  -  15 Feb 2020 07:00  --------------------------------------------------------  IN: 1070 mL / OUT: 200 mL / NET: 870 mL        Vital Signs Last 24 Hrs  T(C): 37 (15 Feb 2020 04:36), Max: 37.1 (14 Feb 2020 20:35)  T(F): 98.6 (15 Feb 2020 04:36), Max: 98.7 (14 Feb 2020 20:35)  HR: 86 (15 Feb 2020 04:36) (79 - 86)  BP: 101/64 (15 Feb 2020 04:36) (101/64 - 123/80)  BP(mean): --  RR: 18 (15 Feb 2020 04:36) (18 - 18)  SpO2: 98% (15 Feb 2020 04:36) (96% - 98%)    PHYSICAL EXAM: vital signs as above  HEENT: KALPESH EOMI  Neck: Supple, no JVD, no thyromegaly  Lungs: decreased breath sounds at bases   CVS: S1 S2 soft ejection systolic murmur best heard at left sternal border   Abdomen: no tenderness, no organomegaly, BS present  Neuro: non focal  Skin: warm, dry  Ext: no cyanosis or clubbing, no edema  Msk: no joint swelling or deformities    LABS:                        8.7    7.44  )-----------( 265      ( 14 Feb 2020 06:27 )             26.5     02-15    127<L>  |  93<L>  |  40<H>  ----------------------------<  112<H>  4.6   |  24  |  1.58<H>    Ca    9.5      15 Feb 2020 06:51                  All consultant(s) notes reviewed and care discussed with other providers        Contact Number, Dr Moser [phone]

## 2020-02-15 NOTE — PROGRESS NOTE ADULT - PROBLEM SELECTOR PLAN 6
monitor pre renal  2/6: renal following  2/7: per primary team  2/8 defer to primary  2/9 defer to primary  2/11: stable  2/15: still hyponatremic: defer to primary team

## 2020-02-16 DIAGNOSIS — K92.2 GASTROINTESTINAL HEMORRHAGE, UNSPECIFIED: ICD-10-CM

## 2020-02-16 LAB
ALBUMIN SERPL ELPH-MCNC: 2.8 G/DL — LOW (ref 3.3–5)
ALP SERPL-CCNC: 471 U/L — HIGH (ref 40–120)
ALT FLD-CCNC: 46 U/L — HIGH (ref 10–45)
ANION GAP SERPL CALC-SCNC: 14 MMOL/L — SIGNIFICANT CHANGE UP (ref 5–17)
ANION GAP SERPL CALC-SCNC: 14 MMOL/L — SIGNIFICANT CHANGE UP (ref 5–17)
APTT BLD: 133.2 SEC — CRITICAL HIGH (ref 27.5–36.3)
APTT BLD: 60 SEC — HIGH (ref 27.5–36.3)
APTT BLD: 66.5 SEC — HIGH (ref 27.5–36.3)
AST SERPL-CCNC: 73 U/L — HIGH (ref 10–40)
BASOPHILS # BLD AUTO: 0.05 K/UL — SIGNIFICANT CHANGE UP (ref 0–0.2)
BASOPHILS NFR BLD AUTO: 0.5 % — SIGNIFICANT CHANGE UP (ref 0–2)
BILIRUB DIRECT SERPL-MCNC: 0.8 MG/DL — HIGH (ref 0–0.2)
BILIRUB INDIRECT FLD-MCNC: 0.5 MG/DL — SIGNIFICANT CHANGE UP (ref 0.2–1)
BILIRUB SERPL-MCNC: 1.3 MG/DL — HIGH (ref 0.2–1.2)
BLD GP AB SCN SERPL QL: NEGATIVE — SIGNIFICANT CHANGE UP
BUN SERPL-MCNC: 42 MG/DL — HIGH (ref 7–23)
BUN SERPL-MCNC: 42 MG/DL — HIGH (ref 7–23)
CALCIUM SERPL-MCNC: 9.2 MG/DL — SIGNIFICANT CHANGE UP (ref 8.4–10.5)
CALCIUM SERPL-MCNC: 9.3 MG/DL — SIGNIFICANT CHANGE UP (ref 8.4–10.5)
CHLORIDE SERPL-SCNC: 94 MMOL/L — LOW (ref 96–108)
CHLORIDE SERPL-SCNC: 98 MMOL/L — SIGNIFICANT CHANGE UP (ref 96–108)
CO2 SERPL-SCNC: 23 MMOL/L — SIGNIFICANT CHANGE UP (ref 22–31)
CO2 SERPL-SCNC: 23 MMOL/L — SIGNIFICANT CHANGE UP (ref 22–31)
CREAT SERPL-MCNC: 1.67 MG/DL — HIGH (ref 0.5–1.3)
CREAT SERPL-MCNC: 1.69 MG/DL — HIGH (ref 0.5–1.3)
D DIMER BLD IA.RAPID-MCNC: 915 NG/ML DDU — HIGH
EOSINOPHIL # BLD AUTO: 0.22 K/UL — SIGNIFICANT CHANGE UP (ref 0–0.5)
EOSINOPHIL NFR BLD AUTO: 2.1 % — SIGNIFICANT CHANGE UP (ref 0–6)
FIBRINOGEN PPP-MCNC: 515 MG/DL — HIGH (ref 350–510)
GLUCOSE BLDC GLUCOMTR-MCNC: 102 MG/DL — HIGH (ref 70–99)
GLUCOSE BLDC GLUCOMTR-MCNC: 162 MG/DL — HIGH (ref 70–99)
GLUCOSE BLDC GLUCOMTR-MCNC: 244 MG/DL — HIGH (ref 70–99)
GLUCOSE BLDC GLUCOMTR-MCNC: 99 MG/DL — SIGNIFICANT CHANGE UP (ref 70–99)
GLUCOSE SERPL-MCNC: 106 MG/DL — HIGH (ref 70–99)
GLUCOSE SERPL-MCNC: 126 MG/DL — HIGH (ref 70–99)
HCT VFR BLD CALC: 28.1 % — LOW (ref 34.5–45)
HCT VFR BLD CALC: 28.3 % — LOW (ref 34.5–45)
HGB BLD-MCNC: 9.2 G/DL — LOW (ref 11.5–15.5)
HGB BLD-MCNC: 9.2 G/DL — LOW (ref 11.5–15.5)
IMM GRANULOCYTES NFR BLD AUTO: 0.9 % — SIGNIFICANT CHANGE UP (ref 0–1.5)
INR BLD: 1.05 RATIO — SIGNIFICANT CHANGE UP (ref 0.88–1.16)
INR BLD: 1.06 RATIO — SIGNIFICANT CHANGE UP (ref 0.88–1.16)
LYMPHOCYTES # BLD AUTO: 0.79 K/UL — LOW (ref 1–3.3)
LYMPHOCYTES # BLD AUTO: 7.5 % — LOW (ref 13–44)
MCHC RBC-ENTMCNC: 28.2 PG — SIGNIFICANT CHANGE UP (ref 27–34)
MCHC RBC-ENTMCNC: 28.5 PG — SIGNIFICANT CHANGE UP (ref 27–34)
MCHC RBC-ENTMCNC: 32.5 GM/DL — SIGNIFICANT CHANGE UP (ref 32–36)
MCHC RBC-ENTMCNC: 32.7 GM/DL — SIGNIFICANT CHANGE UP (ref 32–36)
MCV RBC AUTO: 86.8 FL — SIGNIFICANT CHANGE UP (ref 80–100)
MCV RBC AUTO: 87 FL — SIGNIFICANT CHANGE UP (ref 80–100)
MONOCYTES # BLD AUTO: 0.48 K/UL — SIGNIFICANT CHANGE UP (ref 0–0.9)
MONOCYTES NFR BLD AUTO: 4.5 % — SIGNIFICANT CHANGE UP (ref 2–14)
NEUTROPHILS # BLD AUTO: 8.94 K/UL — HIGH (ref 1.8–7.4)
NEUTROPHILS NFR BLD AUTO: 84.5 % — HIGH (ref 43–77)
NRBC # BLD: 0 /100 WBCS — SIGNIFICANT CHANGE UP (ref 0–0)
NRBC # BLD: 0 /100 WBCS — SIGNIFICANT CHANGE UP (ref 0–0)
OSMOLALITY SERPL: 293 MOSMOL/KG — SIGNIFICANT CHANGE UP (ref 280–301)
PLATELET # BLD AUTO: 321 K/UL — SIGNIFICANT CHANGE UP (ref 150–400)
PLATELET # BLD AUTO: 322 K/UL — SIGNIFICANT CHANGE UP (ref 150–400)
POTASSIUM SERPL-MCNC: 4 MMOL/L — SIGNIFICANT CHANGE UP (ref 3.5–5.3)
POTASSIUM SERPL-MCNC: 4.2 MMOL/L — SIGNIFICANT CHANGE UP (ref 3.5–5.3)
POTASSIUM SERPL-SCNC: 4 MMOL/L — SIGNIFICANT CHANGE UP (ref 3.5–5.3)
POTASSIUM SERPL-SCNC: 4.2 MMOL/L — SIGNIFICANT CHANGE UP (ref 3.5–5.3)
PROT SERPL-MCNC: 7.4 G/DL — SIGNIFICANT CHANGE UP (ref 6–8.3)
PROTHROM AB SERPL-ACNC: 12 SEC — SIGNIFICANT CHANGE UP (ref 10–12.9)
PROTHROM AB SERPL-ACNC: 12.1 SEC — SIGNIFICANT CHANGE UP (ref 10–12.9)
RBC # BLD: 3.23 M/UL — LOW (ref 3.8–5.2)
RBC # BLD: 3.26 M/UL — LOW (ref 3.8–5.2)
RBC # FLD: 16.1 % — HIGH (ref 10.3–14.5)
RBC # FLD: 16.3 % — HIGH (ref 10.3–14.5)
RH IG SCN BLD-IMP: POSITIVE — SIGNIFICANT CHANGE UP
SODIUM SERPL-SCNC: 131 MMOL/L — LOW (ref 135–145)
SODIUM SERPL-SCNC: 135 MMOL/L — SIGNIFICANT CHANGE UP (ref 135–145)
WBC # BLD: 10.57 K/UL — HIGH (ref 3.8–10.5)
WBC # BLD: 7.12 K/UL — SIGNIFICANT CHANGE UP (ref 3.8–10.5)
WBC # FLD AUTO: 10.57 K/UL — HIGH (ref 3.8–10.5)
WBC # FLD AUTO: 7.12 K/UL — SIGNIFICANT CHANGE UP (ref 3.8–10.5)

## 2020-02-16 PROCEDURE — 99232 SBSQ HOSP IP/OBS MODERATE 35: CPT

## 2020-02-16 RX ORDER — PANTOPRAZOLE SODIUM 20 MG/1
40 TABLET, DELAYED RELEASE ORAL
Refills: 0 | Status: DISCONTINUED | OUTPATIENT
Start: 2020-02-16 | End: 2020-02-25

## 2020-02-16 RX ORDER — CLOPIDOGREL BISULFATE 75 MG/1
75 TABLET, FILM COATED ORAL DAILY
Refills: 0 | Status: DISCONTINUED | OUTPATIENT
Start: 2020-02-16 | End: 2020-02-18

## 2020-02-16 RX ORDER — ASPIRIN/CALCIUM CARB/MAGNESIUM 324 MG
81 TABLET ORAL DAILY
Refills: 0 | Status: DISCONTINUED | OUTPATIENT
Start: 2020-02-16 | End: 2020-02-18

## 2020-02-16 RX ORDER — PANTOPRAZOLE SODIUM 20 MG/1
40 TABLET, DELAYED RELEASE ORAL EVERY 12 HOURS
Refills: 0 | Status: DISCONTINUED | OUTPATIENT
Start: 2020-02-16 | End: 2020-02-16

## 2020-02-16 RX ORDER — HYDROCORTISONE 1 %
1 OINTMENT (GRAM) TOPICAL
Refills: 0 | Status: DISCONTINUED | OUTPATIENT
Start: 2020-02-16 | End: 2020-02-25

## 2020-02-16 RX ADMIN — Medication 50 MICROGRAM(S): at 06:25

## 2020-02-16 RX ADMIN — CLOPIDOGREL BISULFATE 75 MILLIGRAM(S): 75 TABLET, FILM COATED ORAL at 13:48

## 2020-02-16 RX ADMIN — SENNA PLUS 2 TABLET(S): 8.6 TABLET ORAL at 22:33

## 2020-02-16 RX ADMIN — MONTELUKAST 10 MILLIGRAM(S): 4 TABLET, CHEWABLE ORAL at 18:03

## 2020-02-16 RX ADMIN — MIRTAZAPINE 3.75 MILLIGRAM(S): 45 TABLET, ORALLY DISINTEGRATING ORAL at 22:32

## 2020-02-16 RX ADMIN — Medication 3 MILLIGRAM(S): at 22:32

## 2020-02-16 RX ADMIN — ATORVASTATIN CALCIUM 80 MILLIGRAM(S): 80 TABLET, FILM COATED ORAL at 22:32

## 2020-02-16 RX ADMIN — PANTOPRAZOLE SODIUM 40 MILLIGRAM(S): 20 TABLET, DELAYED RELEASE ORAL at 17:19

## 2020-02-16 RX ADMIN — Medication 2: at 17:20

## 2020-02-16 RX ADMIN — Medication 1 TABLET(S): at 11:56

## 2020-02-16 RX ADMIN — PANTOPRAZOLE SODIUM 40 MILLIGRAM(S): 20 TABLET, DELAYED RELEASE ORAL at 01:05

## 2020-02-16 RX ADMIN — Medication 1 SUPPOSITORY(S): at 17:18

## 2020-02-16 RX ADMIN — PANTOPRAZOLE SODIUM 40 MILLIGRAM(S): 20 TABLET, DELAYED RELEASE ORAL at 06:25

## 2020-02-16 RX ADMIN — SODIUM CHLORIDE 1 GRAM(S): 9 INJECTION INTRAMUSCULAR; INTRAVENOUS; SUBCUTANEOUS at 17:19

## 2020-02-16 RX ADMIN — SODIUM CHLORIDE 1 GRAM(S): 9 INJECTION INTRAMUSCULAR; INTRAVENOUS; SUBCUTANEOUS at 06:25

## 2020-02-16 RX ADMIN — Medication 81 MILLIGRAM(S): at 11:56

## 2020-02-16 NOTE — CONSULT NOTE ADULT - SUBJECTIVE AND OBJECTIVE BOX
Chief Complaint:  Patient is a 89y old  Female who presents with a chief complaint of generalized weakness and body swelling (15 Feb 2020 16:12)      HPI: 89F with HTN, DM, hypothyroidism admitted after coming to the Emergency Department with generalized fatigue, hardly getting out of bed, which the daughter states is unusual for her mom. She also c/o shortness of breath and swelling of upper and lower extremities. She had gone to urgent care today for extreme fatigue and HIGUERA for the last 2 days where a CXR done was notable for b/l pleural effusions and RML consolidation. She was sent to the Emergency Department. Denies fever, chills, cough, orthopnea, chest pain. She ambulates with a cane.  She has been living with her daughter for about 2 weeks now, her son is on a visit to [place], most of her medical care is in [place] including cardiology. Pt's daughter states the patient has some history of "valve problem". Does not take any water pills/diuretic. Currently comfortably lying flat in bed on the floor. The daughter states her mom has never been diagnosed with dementia, but her memory has been failing recently.     GI consulted for BRBPR.    Allergies:  penicillin (Hives)  penicillin (Unknown)      Medications:  aspirin enteric coated 81 milliGRAM(s) Oral daily  atorvastatin 80 milliGRAM(s) Oral at bedtime  glucagon  Injectable 1 milliGRAM(s) IntraMuscular once PRN  hydrocortisone hemorrhoidal Suppository 1 Suppository(s) Rectal two times a day  insulin lispro (HumaLOG) corrective regimen sliding scale   SubCutaneous three times a day before meals  insulin lispro (HumaLOG) corrective regimen sliding scale   SubCutaneous at bedtime  levothyroxine 50 MICROGram(s) Oral daily  melatonin 3 milliGRAM(s) Oral at bedtime  mirtazapine 3.75 milliGRAM(s) Oral at bedtime  montelukast 10 milliGRAM(s) Oral every 24 hours  multivitamin/minerals 1 Tablet(s) Oral daily  pantoprazole  Injectable 40 milliGRAM(s) IV Push every 12 hours  polyethylene glycol 3350 17 Gram(s) Oral daily PRN  senna 2 Tablet(s) Oral at bedtime  sodium chloride 1 Gram(s) Oral every 12 hours      PMHX/PSHX:  Mini stroke  CAD in native artery  Diabetes  Hypothyroid  Hypertension  Hyperlipidemia  Hypothyroid  Osteoporosis  Gall stone  DM (diabetes mellitus)  History of gallstones  No significant past surgical history  No significant past surgical history      Family history:  No pertinent family history in first degree relatives  No pertinent family history in first degree relatives      Social History:     ROS:     General:  No wt loss, fevers, chills, night sweats, fatigue,   Eyes:  Good vision, no reported pain  ENT:  No sore throat, pain, runny nose, dysphagia  CV:  No pain, palpitations, hypo/hypertension  Resp:  No dyspnea, cough, tachypnea, wheezing  GI:  No pain, No nausea, No vomiting, No diarrhea, No constipation, No weight loss, No fever, No pruritis, No rectal bleeding, No tarry stools, No dysphagia,  :  No pain, bleeding, incontinence, nocturia  Muscle:  No pain, weakness  Neuro:  No weakness, tingling, memory problems  Psych:  No fatigue, insomnia, mood problems, depression  Endocrine:  No polyuria, polydipsia, cold/heat intolerance  Heme:  No petechiae, ecchymosis, easy bruisability  Skin:  No rash, tattoos, scars, edema      PHYSICAL EXAM:   Vital Signs:  Vital Signs Last 24 Hrs  T(C): 36.5 (2020 04:56), Max: 36.5 (2020 04:56)  T(F): 97.7 (2020 04:56), Max: 97.7 (2020 04:56)  HR: 91 (2020 04:56) (83 - 91)  BP: 111/66 (2020 04:56) (111/66 - 132/72)  BP(mean): --  RR: 18 (2020 04:56) (17 - 18)  SpO2: 98% (2020 04:56) (96% - 100%)  Daily     Daily Weight in k.8 (2020 04:56)    GENERAL:  Appears stated age, well-groomed, well-nourished, no distress  HEENT:  NC/AT,  conjunctivae clear and pink, no thyromegaly, nodules, adenopathy, no JVD, sclera -anicteric  CHEST:  Full & symmetric excursion, no increased effort, breath sounds clear  HEART:  Regular rhythm, S1, S2, no murmur/rub/S3/S4, no abdominal bruit, no edema  ABDOMEN:  Soft, non-tender, non-distended, normoactive bowel sounds,  no masses ,no hepato-splenomegaly, no signs of chronic liver disease  EXTEREMITIES:  no cyanosis,clubbing or edema  SKIN:  No rash/erythema/ecchymoses/petechiae/wounds/abscess/warm/dry  NEURO:  Alert, oriented, no asterixis, no tremor, no encephalopathy    LABS:                        9.2    10.57 )-----------( 321      ( 2020 06:38 )             28.3     02-16    135  |  98  |  42<H>  ----------------------------<  106<H>  4.2   |  23  |  1.69<H>    Ca    9.3      2020 06:35    TPro  7.4  /  Alb  2.8<L>  /  TBili  1.3<H>  /  DBili  0.8<H>  /  AST  73<H>  /  ALT  46<H>  /  AlkPhos  471<H>  02-16    LIVER FUNCTIONS - ( 2020 00:59 )  Alb: 2.8 g/dL / Pro: 7.4 g/dL / ALK PHOS: 471 U/L / ALT: 46 U/L / AST: 73 U/L / GGT: x           PT/INR - ( 2020 06:38 )   PT: 12.0 sec;   INR: 1.05 ratio         PTT - ( 2020 06:38 )  PTT:66.5 sec        Imaging: Chief Complaint:  Patient is a 89y old  Female who presents with a chief complaint of generalized weakness and body swelling (15 Feb 2020 16:12)      HPI: 89F with HTN, DM, hypothyroidism admitted after coming to the Emergency Department with generalized fatigue, hardly getting out of bed, which the daughter states is unusual for her mom. She also c/o shortness of breath and swelling of upper and lower extremities. She had gone to urgent care today for extreme fatigue and HIGUERA for the last 2 days where a CXR done was notable for b/l pleural effusions and RML consolidation. She was sent to the Emergency Department. Denies fever, chills, cough, orthopnea, chest pain. She ambulates with a cane.  She has been living with her daughter for about 2 weeks now, her son is on a visit to [place], most of her medical care is in [place] including cardiology. Pt's daughter states the patient has some history of "valve problem". Does not take any water pills/diuretic. Currently comfortably lying flat in bed on the floor. The daughter states her mom has never been diagnosed with dementia, but her memory has been failing recently.     GI consulted for BRBPR. As per patient's daughter, Ayana, patient has no history of GIB. She had EGD/colonoscopy done "years ago" @ unknown location that were reportedly normal. She is unaware if patient takes NSAIDS regularly. Upon evaluation, patient resting pleasantly in bed, in NAD. She denies fever, chills, dizziness, fatigue, SOB, n/v abd pain.    Allergies:  penicillin (Hives)  penicillin (Unknown)      Medications:  aspirin enteric coated 81 milliGRAM(s) Oral daily  atorvastatin 80 milliGRAM(s) Oral at bedtime  glucagon  Injectable 1 milliGRAM(s) IntraMuscular once PRN  hydrocortisone hemorrhoidal Suppository 1 Suppository(s) Rectal two times a day  insulin lispro (HumaLOG) corrective regimen sliding scale   SubCutaneous three times a day before meals  insulin lispro (HumaLOG) corrective regimen sliding scale   SubCutaneous at bedtime  levothyroxine 50 MICROGram(s) Oral daily  melatonin 3 milliGRAM(s) Oral at bedtime  mirtazapine 3.75 milliGRAM(s) Oral at bedtime  montelukast 10 milliGRAM(s) Oral every 24 hours  multivitamin/minerals 1 Tablet(s) Oral daily  pantoprazole  Injectable 40 milliGRAM(s) IV Push every 12 hours  polyethylene glycol 3350 17 Gram(s) Oral daily PRN  senna 2 Tablet(s) Oral at bedtime  sodium chloride 1 Gram(s) Oral every 12 hours      PMHX/PSHX:  Mini stroke  CAD in native artery  Diabetes  Hypothyroid  Hypertension  Hyperlipidemia  Hypothyroid  Osteoporosis  Gall stone  DM (diabetes mellitus)  History of gallstones  No significant past surgical history  No significant past surgical history      Family history:  No pertinent family history in first degree relatives  No pertinent family history in first degree relatives      Social History:     ROS:     General:  No wt loss, fevers, chills, night sweats, fatigue,   Eyes:  Good vision, no reported pain  ENT:  No sore throat, pain, runny nose, dysphagia  CV:  No pain, palpitations, hypo/hypertension  Resp:  No dyspnea, cough, tachypnea, wheezing  GI:  No pain, No nausea, No vomiting, No diarrhea, No constipation, No weight loss, No fever, No pruritis, No rectal bleeding, No tarry stools, No dysphagia,  :  No pain, bleeding, incontinence, nocturia  Muscle:  No pain, weakness  Neuro:  No weakness, tingling, memory problems  Psych:  No fatigue, insomnia, mood problems, depression  Endocrine:  No polyuria, polydipsia, cold/heat intolerance  Heme:  No petechiae, ecchymosis, easy bruisability  Skin:  No rash, tattoos, scars, edema      PHYSICAL EXAM:   Vital Signs:  Vital Signs Last 24 Hrs  T(C): 36.5 (2020 04:56), Max: 36.5 (2020 04:56)  T(F): 97.7 (2020 04:56), Max: 97.7 (2020 04:56)  HR: 91 (2020 04:56) (83 - 91)  BP: 111/66 (2020 04:56) (111/66 - 132/72)  BP(mean): --  RR: 18 (2020 04:56) (17 - 18)  SpO2: 98% (2020 04:56) (96% - 100%)  Daily     Daily Weight in k.8 (2020 04:56)    GENERAL:  resting pleasantly in bed, NAD   HEENT:  NC/AT  CHEST: breathing comfortably on RA   HEART:  Regular rhythm, S1, S2  ABDOMEN:  Soft, non-tender, non-distended, normoactive bowel sounds  EXTEREMITIES:  no cyanosis,clubbing or edema  SKIN:  No rash/erythema/ecchymoses/petechiae/wounds/abscess/warm/dry  NEURO:  awake, alert, responding appropriately     LABS:                        9.2    10.57 )-----------( 321      ( 2020 06:38 )             28.3     02-16    135  |  98  |  42<H>  ----------------------------<  106<H>  4.2   |  23  |  1.69<H>    Ca    9.3      2020 06:35    TPro  7.4  /  Alb  2.8<L>  /  TBili  1.3<H>  /  DBili  0.8<H>  /  AST  73<H>  /  ALT  46<H>  /  AlkPhos  471<H>  02-16    LIVER FUNCTIONS - ( 2020 00:59 )  Alb: 2.8 g/dL / Pro: 7.4 g/dL / ALK PHOS: 471 U/L / ALT: 46 U/L / AST: 73 U/L / GGT: x           PT/INR - ( 2020 06:38 )   PT: 12.0 sec;   INR: 1.05 ratio         PTT - ( 2020 06:38 )  PTT:66.5 sec        Imaging:

## 2020-02-16 NOTE — PROGRESS NOTE ADULT - PROBLEM SELECTOR PLAN 1
likely secondary to hemorrhoids as per GI  hemoglobin stable  will continue to monitor   advised to continue diet and anticoagulation  discussed with family over the phone in detail

## 2020-02-16 NOTE — PROGRESS NOTE ADULT - SUBJECTIVE AND OBJECTIVE BOX
Patient is a 89y old  Female who presents with a chief complaint of generalized weakness and body swelling (16 Feb 2020 12:04)      SUBJECTIVE / OVERNIGHT EVENTS: overnight events noted    ROS:  Resp: No cough no sputum production  CVS: No chest pain no palpitations no orthopnea  GI: no N/V/D  : no dysuria, no hematuria  Neuro: no weakness no paresthesias  Heme: No petechiae no easy bruising  Msk: No joint pain no swelling  Skin: No rash no itching        MEDICATIONS  (STANDING):  aspirin enteric coated 81 milliGRAM(s) Oral daily  atorvastatin 80 milliGRAM(s) Oral at bedtime  hydrocortisone hemorrhoidal Suppository 1 Suppository(s) Rectal two times a day  insulin lispro (HumaLOG) corrective regimen sliding scale   SubCutaneous three times a day before meals  insulin lispro (HumaLOG) corrective regimen sliding scale   SubCutaneous at bedtime  levothyroxine 50 MICROGram(s) Oral daily  melatonin 3 milliGRAM(s) Oral at bedtime  mirtazapine 3.75 milliGRAM(s) Oral at bedtime  montelukast 10 milliGRAM(s) Oral every 24 hours  multivitamin/minerals 1 Tablet(s) Oral daily  pantoprazole  Injectable 40 milliGRAM(s) IV Push every 12 hours  senna 2 Tablet(s) Oral at bedtime  sodium chloride 1 Gram(s) Oral every 12 hours    MEDICATIONS  (PRN):  glucagon  Injectable 1 milliGRAM(s) IntraMuscular once PRN Glucose LESS THAN 70 milligrams/deciliter  polyethylene glycol 3350 17 Gram(s) Oral daily PRN Constipation        CAPILLARY BLOOD GLUCOSE      POCT Blood Glucose.: 99 mg/dL (16 Feb 2020 09:02)  POCT Blood Glucose.: 169 mg/dL (15 Feb 2020 21:14)  POCT Blood Glucose.: 122 mg/dL (15 Feb 2020 17:22)  POCT Blood Glucose.: 248 mg/dL (15 Feb 2020 12:40)    I&O's Summary    15 Feb 2020 07:01  -  16 Feb 2020 07:00  --------------------------------------------------------  IN: 770 mL / OUT: 1050 mL / NET: -280 mL        Vital Signs Last 24 Hrs  T(C): 36.5 (16 Feb 2020 04:56), Max: 36.5 (16 Feb 2020 04:56)  T(F): 97.7 (16 Feb 2020 04:56), Max: 97.7 (16 Feb 2020 04:56)  HR: 91 (16 Feb 2020 04:56) (83 - 91)  BP: 111/66 (16 Feb 2020 04:56) (111/66 - 132/72)  BP(mean): --  RR: 18 (16 Feb 2020 04:56) (17 - 18)  SpO2: 98% (16 Feb 2020 04:56) (96% - 100%)    PHYSICAL EXAM: vital signs as above  HEENT: KALPESH EOMI  Neck: Supple, no JVD, no thyromegaly  Lungs: decreased breath sounds at bases   CVS: S1 S2 soft ejection systolic murmur best heard at left sternal border   Abdomen: no tenderness, no organomegaly, BS present  Neuro: non focal  Skin: warm, dry  Ext: no cyanosis or clubbing, no edema  Msk: no joint swelling or deformities    LABS:                        9.2    10.57 )-----------( 321      ( 16 Feb 2020 06:38 )             28.3     02-16    135  |  98  |  42<H>  ----------------------------<  106<H>  4.2   |  23  |  1.69<H>    Ca    9.3      16 Feb 2020 06:35    TPro  7.4  /  Alb  2.8<L>  /  TBili  1.3<H>  /  DBili  0.8<H>  /  AST  73<H>  /  ALT  46<H>  /  AlkPhos  471<H>  02-16    PT/INR - ( 16 Feb 2020 06:38 )   PT: 12.0 sec;   INR: 1.05 ratio         PTT - ( 16 Feb 2020 06:38 )  PTT:66.5 sec            All consultant(s) notes reviewed and care discussed with other providers        Contact Number, Dr Moser [phone]

## 2020-02-16 NOTE — CONSULT NOTE ADULT - ASSESSMENT
Rectal bleeding    - suspect hemorrhoidal bleed vs diverticular bleed  - start anusol suppository BID  - monitor cbc, transfuse prn  - ok to resume A/C   - diet as tolerated  - will follow Rectal bleeding    - suspect hemorrhoidal bleed > diverticular bleed  - Hgb stable at 9.2 and hemodynamically stable   - start anusol suppository BID  - monitor cbc, transfuse prn  - no GI objection to resume a/c  - no role for endoscopic evaluation at present  - diet as tolerated  - will follow

## 2020-02-16 NOTE — CHART NOTE - NSCHARTNOTEFT_GEN_A_CORE
[facility]    Overnight event: patient with rectal bleeding, AC was held, patient NPO.    Dr Moser discussed with me to resume ASA and diet. Patient seen and examined at bedside, no S/S of active bleeding, CBC, VS stable.                          9.2    10.57 )-----------( 321      ( 16 Feb 2020 06:38 )             28.3   Vital Signs Last 24 Hrs  T(C): 36.5 (16 Feb 2020 04:56), Max: 36.5 (16 Feb 2020 04:56)  T(F): 97.7 (16 Feb 2020 04:56), Max: 97.7 (16 Feb 2020 04:56)  HR: 91 (16 Feb 2020 04:56) (83 - 91)  BP: 111/66 (16 Feb 2020 04:56) (111/66 - 132/72)  BP(mean): --  RR: 18 (16 Feb 2020 04:56) (17 - 18)  SpO2: 98% (16 Feb 2020 04:56) (96% - 100%)      Janine Rivera AGACNP-c [facility] COM[facility]    Overnight event: patient with rectal bleeding, AC was held, patient NPO.    Dr Moser discussed with me to resume ASA and diet. Patient seen and examined at bedside, no S/S of active bleeding, CBC, VS stable.                          9.2    10.57 )-----------( 321      ( 16 Feb 2020 06:38 )             28.3   Vital Signs Last 24 Hrs  T(C): 36.5 (16 Feb 2020 04:56), Max: 36.5 (16 Feb 2020 04:56)  T(F): 97.7 (16 Feb 2020 04:56), Max: 97.7 (16 Feb 2020 04:56)  HR: 91 (16 Feb 2020 04:56) (83 - 91)  BP: 111/66 (16 Feb 2020 04:56) (111/66 - 132/72)  BP(mean): --  RR: 18 (16 Feb 2020 04:56) (17 - 18)  SpO2: 98% (16 Feb 2020 04:56) (96% - 100%)      ***addendum****  Ok to restart AC as per GI and Dr Moser  AC resumed - ASA & Plavix    April Meyer AGACNP-c

## 2020-02-16 NOTE — PROVIDER CONTACT NOTE (CRITICAL VALUE NOTIFICATION) - ASSESSMENT
Patient A/O X 4 denies abdominal discomforts , denies Dizziness, had 1 episode of blood clot from rectum

## 2020-02-16 NOTE — CHART NOTE - NSCHARTNOTEFT_GEN_A_CORE
Chart/Event Note  [facility] 3DSU 343 W1  MARISOL COMBS, 89y, Female  [id number]    Reason for Notification:   Notified by RN that the above patient had a bloody BM.     Events/History of Present Illness  Went to bedside with nursing staff to evaluate patient. Patient awake and alert as per her baseline, in no distress with no stated complaints. RN notes that while    .chart     Review of Systems:  Positive: Weakness. Fatigue.   Negative: headaches, dizziness, presyncope, vision changes, throat pain, chest pain, palpitations, shortness of breath, wheezing, abdominal pain, nausea, vomiting, constipation, diarrhea, or musculoskeletal pain.     T(C): 36.4 (02-15-20 @ 23:47), Max: 37 (02-15-20 @ 04:36)  HR: 88 (02-15-20 @ 23:47) (86 - 88)  BP: 132/72 (02-15-20 @ 23:47) (101/64 - 132/72)  RR: 18 (02-15-20 @ 23:47) (17 - 18)  SpO2: 99% (02-15-20 @ 23:47) (98% - 100%)                 Physical Examination:  GENERAL: No acute distress noted during examination. Patient is well-groomed and developed.  NERVOUS SYSTEM:  Alert & oriented X3 with appropriate concentration. Motor strength is 5/5 in both bilateral upper and lower extremities. No focal or lateralizing neurologic deficits noted.   HEAD:  Atraumatic and normocephalic.  EYES: EOMI/PERRLA. Conjunctiva and sclera clear  ENMT: No tonsillar erythema, exudates, lesions, or enlargement. Moist mucous membranes with good dentition.   NECK: Supple with no JVD.   CHEST: Clear to ascultation bilaterally. No rales, rhonchi, wheezing, or rubs heard. Symmetrical/bilateral chest wall rise.   HEART: Regular rate and rhythm with no murmurs, rubs, or gallops.  ABDOMEN: Soft, nontender, and nondistended.   EXTREMITIES:  2+ Peripheral Pulses without clubbing, cyanosis, or edema.  LYMPH: No lymphadenopathy noted.  SKIN: No rashes or lesions seen.     Medical Decision Making/Assessment/Plan:  89y-year-old Female with a past medical history of ______ , admitted for _____ , now with _____ .     - Diagnosis:      1)   2)   3)   4) Will endorse the above diagnostics and findings to the day medicine team for review and follow up. Will additionally sign out to day medicine teams to follow with relevant specialties.     Jay Bustamante NP  Department of Medicine   # Chart/Event Note  [facility] 3DSU 343 W1  MARISOL COMBS, 89y, Female  [id number]    Reason for Notification:   Notified by RN that the above patient had a bloody BM.     Events/History of Present Illness  Went to bedside with nursing staff to evaluate patient. Patient awake and alert as per her baseline, in no distress with no stated complaints. RN notes that while patient was being cleaned/changed, nursing staff noted that patient had expelled a moderate sized blood clot from her rectum. Blood clot appeared to be maroon in color. Passing of clot was not associated with any stool/BM. Patient denied any recent rectal/abdominal pain. During ROS patient endorsed mild weakness, but notes that this is chronic. Rectal examination performed with female RN as chaperone, bright red blood noted to be on pad (patient was just changed/cleaned prior to assessment). No hemhrroids   Patient's chart reviewed. No previous episodes of GI bleeding noted. No previous endoscopy/colonoscopy record. On DAPT with Aspirin and Plavix, as well as DVT prophylaxis with SubQ Heparin.      Review of Systems:  Positive: Weakness. Fatigue.   Negative: headaches, dizziness, presyncope, vision changes, throat pain, chest pain, palpitations, shortness of breath, wheezing, abdominal pain, nausea, vomiting, constipation, diarrhea, or musculoskeletal pain.     T(C): 36.4 (02-15-20 @ 23:47), Max: 37 (02-15-20 @ 04:36)  HR: 88 (02-15-20 @ 23:47) (86 - 88)  BP: 132/72 (02-15-20 @ 23:47) (101/64 - 132/72)  RR: 18 (02-15-20 @ 23:47) (17 - 18)  SpO2: 99% (02-15-20 @ 23:47) (98% - 100%)                         9.2    7.12  )-----------( 322      ( 16 Feb 2020 00:59 )             28.1        Physical Examination:  GENERAL: No acute distress noted during examination. Patient is well-groomed and developed.  NERVOUS SYSTEM:  Alert & oriented X3 with appropriate concentration. Motor strength is 5/5 in both bilateral upper and lower extremities. No focal or lateralizing neurologic deficits noted.   HEAD:  Atraumatic and normocephalic.  EYES: EOMI/PERRLA. Conjunctiva and sclera clear  ENMT: No tonsillar erythema, exudates, lesions, or enlargement. Moist mucous membranes with good dentition.   NECK: Supple with no JVD.   CHEST: Clear to ascultation bilaterally. No rales, rhonchi, wheezing, or rubs heard. Symmetrical/bilateral chest wall rise.   HEART: Regular rate and rhythm with no murmurs, rubs, or gallops.  ABDOMEN: Soft, nontender, and nondistended.   EXTREMITIES:  2+ Peripheral Pulses without clubbing, cyanosis, or edema.  LYMPH: No lymphadenopathy noted.  SKIN: No rashes or lesions seen.     Medical Decision Making/Assessment/Plan:  89y-year-old Female with a past medical history of ______ , admitted for _____ , now with _____ .     - Diagnosis:      1)   2)   3)   4) Will endorse the above diagnostics and findings to the day medicine team for review and follow up. Will additionally sign out to day medicine teams to follow with relevant specialties.     Jay Bustamante NP  Department of Medicine   # Chart/Event Note  [facility] 3DSU 343 W1  MARISOL COMBS, 89y, Female  [id number]    Reason for Notification:   Notified by RN that the above patient had a bloody BM.     Events/History of Present Illness  Went to bedside with nursing staff to evaluate patient. Patient awake and alert as per her baseline, in no distress with no stated complaints. RN notes that while patient was being cleaned/changed, nursing staff noted that patient had expelled a moderate sized blood clot from her rectum. Blood clot appeared to be maroon in color. Passing of clot was not associated with any stool/BM. Patient denied any recent rectal/abdominal pain. During ROS patient endorsed mild weakness, but notes that this is chronic. Rectal examination performed with female RN as chaperone, bright red blood noted to be on pad (patient was just changed/cleaned prior to assessment). No external hemorrhoids or anal fissures noted. During exam patient expelled another small sized maroon blood clot. No vaginal/urinary bleeding noted or reported.   Patient's chart reviewed. No previous episodes of GI bleeding noted. No previous endoscopy/colonoscopy record. On DAPT with Aspirin and Plavix, as well as DVT prophylaxis with SubQ Heparin.      Review of Systems:  Positive: Weakness. Fatigue.   Negative: headaches, dizziness, presyncope, vision changes, throat pain, chest pain, palpitations, shortness of breath, wheezing, abdominal pain, nausea, vomiting, constipation, diarrhea, or musculoskeletal pain.     T(C): 36.4 (02-15-20 @ 23:47), Max: 37 (02-15-20 @ 04:36)  HR: 88 (02-15-20 @ 23:47) (86 - 88)  BP: 132/72 (02-15-20 @ 23:47) (101/64 - 132/72)  RR: 18 (02-15-20 @ 23:47) (17 - 18)  SpO2: 99% (02-15-20 @ 23:47) (98% - 100%)                         9.2    7.12  )-----------( 322      ( 16 Feb 2020 00:59 )             28.1        Physical Examination:  GENERAL: No acute distress noted during examination.   NERVOUS SYSTEM:  Alert & oriented X3 with appropriate concentration.  HEAD: Atraumatic and normocephalic.  EYES: EOMI/PERRLA. Conjunctiva and sclera clear  ENMT: Moist mucous membranes.  NECK: Supple with no JVD.   CHEST: Clear to ascultation bilaterally. No rales, rhonchi, wheezing, or rubs heard. Symmetrical/bilateral chest wall rise.   HEART: Regular rate and rhythm with no murmurs, rubs, or gallops.  ABDOMEN: Soft, nontender, and nondistended.   EXTREMITIES:  2+ Peripheral Pulses without clubbing, cyanosis, or edema.  LYMPH: No lymphadenopathy noted.  SKIN: No rashes or lesions seen.     Medical Decision Making/Assessment/Plan:  89y-year-old Female with a past medical history of ______ , admitted for _____ , now with _____ .     - Diagnosis:      1)   2)   3)   4) Will endorse the above diagnostics and findings to the day medicine team for review and follow up. Will additionally sign out to day medicine teams to follow with relevant specialties.     Jay Bustamante NP  Department of Medicine   # Chart/Event Note  [facility] 3DSU 343 W1  MARISOL COMBS, 89y, Female  [id number]    Reason for Notification:   Notified by RN that the above patient had a bloody BM.     Events/History of Present Illness  Went to bedside with nursing staff to evaluate patient. Patient awake and alert as per her baseline, in no distress with no stated complaints. RN notes that while patient was being cleaned/changed, nursing staff noted that patient had expelled a moderate sized blood clot from her rectum. Blood clot appeared to be maroon in color. Passing of clot was not associated with any stool/BM. Patient denied any recent rectal/abdominal pain. During ROS patient endorsed mild weakness, but notes that this is chronic. Rectal examination performed with female RN as chaperone, bright red blood noted to be on pad (patient was just changed/cleaned prior to assessment). No external hemorrhoids or anal fissures noted. During exam patient expelled another small sized maroon blood clot. No vaginal/urinary bleeding noted or reported.   Patient's chart reviewed. No previous episodes of GI bleeding noted. No previous endoscopy/colonoscopy record. On DAPT with Aspirin and Plavix, as well as DVT prophylaxis with SubQ Heparin. Vitals     Review of Systems:  Positive: Weakness. Fatigue.   Negative: headaches, dizziness, presyncope, vision changes, throat pain, chest pain, palpitations, shortness of breath, wheezing, abdominal pain, nausea, vomiting, constipation, diarrhea, or musculoskeletal pain.     T(C): 36.4 (02-15-20 @ 23:47), Max: 37 (02-15-20 @ 04:36)  HR: 88 (02-15-20 @ 23:47) (86 - 88)  BP: 132/72 (02-15-20 @ 23:47) (101/64 - 132/72)  RR: 18 (02-15-20 @ 23:47) (17 - 18)  SpO2: 99% (02-15-20 @ 23:47) (98% - 100%)                         9.2    7.12  )-----------( 322      ( 16 Feb 2020 00:59 )             28.1        Physical Examination:  GENERAL: No acute distress noted during examination.   NERVOUS SYSTEM:  Alert & oriented X3 with appropriate concentration.  HEAD: Atraumatic and normocephalic.  EYES: EOMI/PERRLA. Conjunctiva and sclera clear  ENMT: Moist mucous membranes.  NECK: Supple with no JVD.   CHEST: Clear to ascultation bilaterally. No rales, rhonchi, wheezing, or rubs heard. Symmetrical/bilateral chest wall rise.   HEART: Regular rate and rhythm. Normotensive.   ABDOMEN: Soft, nontender, and nondistended.   /RECTAL: As per HPI above.     Medical Decision Making/Assessment/Plan:  89y-year-old Female with a past medical history of ______ , admitted for _____ , now with _____ .     - Diagnosis:      1)   2)   3)   4) Will endorse the above diagnostics and findings to the day medicine team for review and follow up. Will additionally sign out to day medicine teams to follow with relevant specialties.     Jay Bustamante NP  Department of Medicine   # Chart/Event Note  [facility] 3DSU 343 W1  MARISOL COMBS, 89y, Female  [id number]    Reason for Notification:   Notified by RN that the above patient had a bloody BM.     Events/History of Present Illness  Went to bedside with nursing staff to evaluate patient. Patient awake and alert as per her baseline, in no distress with no stated complaints. RN notes that while patient was being cleaned/changed, nursing staff noted that patient had expelled a moderate sized blood clot from her rectum. Blood clot appeared to be maroon in color. Passing of clot was not associated with any stool/BM. Patient denied any recent rectal/abdominal pain. During ROS patient endorsed mild weakness, but notes that this is chronic. Rectal examination performed with female RN as chaperone, bright red blood noted to be on pad (patient was just changed/cleaned prior to assessment). No external hemorrhoids or anal fissures noted. During exam patient expelled another small sized maroon blood clot. No vaginal/urinary bleeding noted or reported.   Patient's chart reviewed. No previous episodes of GI bleeding noted. No previous endoscopy/colonoscopy record. On DAPT with Aspirin and Plavix, as well as DVT prophylaxis with SubQ Heparin. Vitals stable. Has had anemia across inpatient stay.     Review of Systems:  Positive: Weakness. Fatigue.   Negative: headaches, dizziness, presyncope, vision changes, throat pain, chest pain, palpitations, shortness of breath, wheezing, abdominal pain, nausea, vomiting, constipation, diarrhea, or musculoskeletal pain.     T(C): 36.4 (02-15-20 @ 23:47), Max: 37 (02-15-20 @ 04:36)  HR: 88 (02-15-20 @ 23:47) (86 - 88)  BP: 132/72 (02-15-20 @ 23:47) (101/64 - 132/72)  RR: 18 (02-15-20 @ 23:47) (17 - 18)  SpO2: 99% (02-15-20 @ 23:47) (98% - 100%)                         9.2    7.12  )-----------( 322      ( 16 Feb 2020 00:59 )             28.1        Physical Examination:  GENERAL: No acute distress noted during examination.   NERVOUS SYSTEM:  Alert & oriented X3 with appropriate concentration.  HEAD: Atraumatic and normocephalic.  EYES: EOMI/PERRLA. Conjunctiva and sclera clear  ENMT: Moist mucous membranes.  NECK: Supple with no JVD.   CHEST: Clear to ascultation bilaterally. Symmetrical/bilateral chest wall rise.   HEART: Regular rate and rhythm. Normotensive.   ABDOMEN: Soft, nontender, and nondistended.   /RECTAL: As per HPI above.     Medical Decision Making/Assessment/Plan:  89y-year-old Female with a past medical history of hypertension, DM, and hypothyroidism, admitted for cardiogenic shock, now with rectal bleeding.     - Diagnosis: Rectal Bleeding   New rectal bleeding, dark coloration and clotted appearance of blood raises suspicion for an upper GI bleed, though cannot exclude lower. Doesn't appear to be bleeding hemorrhoids or anal fissure. Is currently hemodynamically stable. On active antiplatelet therapy with Aspirin and Plavix, as well as DVT prophylaxis with SubQ Heparin. Will obtain stat diagnostics, give IV Pantoprazole, and closely monitor.      1) Stat CBC, coagulation panel, Type and Screen, and BMP.   2) Pantoprazole IVP stat, and started on 40 mg IVP BID for now. Will modify as per GI recommendations.   3) Due to patient's risk factors, should patient have a worsening of condition, continued blood loss, drop in H&H, or hypotension, will consult MICU/RRT.   3) Discussed above with internal medicine attending Dr. EDWARD Moser, who agreed with outlined plan of care. Day medicine team to consult GI. Dr. Moser to follow in am.   4) Will endorse the above diagnostics and findings to the day medicine team for review and follow up. Will additionally sign out to day medicine teams to follow with relevant specialties.     Jay Bustamante NP  Department of Medicine   # Chart/Event Note  [facility] [address]DSU 343 W1  MARISOL COMBS, 89y, Female  [id number]    Reason for Notification:   Notified by RN that the above patient had a bloody BM.     Events/History of Present Illness  Went to bedside with nursing staff to evaluate patient. Patient awake and alert as per her baseline, in no distress with no stated complaints. RN notes that while patient was being cleaned/changed, nursing staff noted that patient had expelled a moderate sized blood clot from her rectum. Blood clot appeared to be maroon in color. Passing of clot was not associated with any stool/BM. Patient denied any recent rectal/abdominal pain. During ROS patient endorsed mild weakness, but notes that this is chronic. Rectal examination performed with female RN as chaperone, bright red blood noted to be on pad (patient was just changed/cleaned prior to assessment). No external hemorrhoids or anal fissures noted. During exam patient expelled another small sized maroon blood clot. No vaginal/urinary bleeding noted or reported.   Patient's chart reviewed. No previous episodes of GI bleeding noted. No previous endoscopy/colonoscopy record. On DAPT with Aspirin and Plavix, as well as DVT prophylaxis with SubQ Heparin. Vitals stable. Has had anemia across inpatient stay.     Review of Systems:  Positive: Weakness. Fatigue.   Negative: headaches, dizziness, presyncope, vision changes, throat pain, chest pain, palpitations, shortness of breath, wheezing, abdominal pain, nausea, vomiting, constipation, diarrhea, or musculoskeletal pain.     T(C): 36.4 (02-15-20 @ 23:47), Max: 37 (02-15-20 @ 04:36)  HR: 88 (02-15-20 @ 23:47) (86 - 88)  BP: 132/72 (02-15-20 @ 23:47) (101/64 - 132/72)  RR: 18 (02-15-20 @ 23:47) (17 - 18)  SpO2: 99% (02-15-20 @ 23:47) (98% - 100%)                         9.2    7.12  )-----------( 322      ( 16 Feb 2020 00:59 )             28.1        Physical Examination:  GENERAL: No acute distress noted during examination.   NERVOUS SYSTEM:  Alert & oriented X3 with appropriate concentration.  HEAD: Atraumatic and normocephalic.  EYES: EOMI/PERRLA. Conjunctiva and sclera clear  ENMT: Moist mucous membranes.  NECK: Supple with no JVD.   CHEST: Clear to ascultation bilaterally. Symmetrical/bilateral chest wall rise.   HEART: Regular rate and rhythm. Normotensive.   ABDOMEN: Soft, nontender, and nondistended.   /RECTAL: As per HPI above.     Medical Decision Making/Assessment/Plan:  89y-year-old Female with a past medical history of hypertension, DM, and hypothyroidism, admitted for cardiogenic shock, now with rectal bleeding.     - Diagnosis: Rectal Bleeding   New rectal bleeding, dark coloration and clotted appearance of blood raises suspicion for an upper GI bleed, though cannot exclude lower. Doesn't appear to be bleeding hemorrhoids or anal fissure. Is currently hemodynamically stable. On active antiplatelet therapy with Aspirin and Plavix, as well as DVT prophylaxis with SubQ Heparin. Will obtain stat diagnostics, give IV Pantoprazole, and closely monitor.      1) Stat CBC, coagulation panel, Type and Screen, and BMP.   2) Pantoprazole IVP stat, and started on 40 mg IVP BID for now. Will modify as per GI recommendations.   3) Due to patient's risk factors, should patient have a worsening of condition, continued blood loss, drop in H&H, or hypotension, will immediately consult MICU/RRT.   4) Will make NPO for now.   5) Aspirin, Plavix, and SubQ Heparin held. Day medicine team to follow with Cardiology.   6) Discussed above with internal medicine attending Dr. EDWARD Moser, who agreed with outlined plan of care. Day medicine team to consult GI. Dr. Moser to follow in am.   7) Will endorse the above diagnostics and findings to the day medicine team for review and follow up. Will additionally sign out to day medicine teams to follow with relevant specialties.     Jay Bustamante, ELZBIETA  Department of Medicine   #[id number]    Addendum 0200: No change in patient's clinical condition. Vitals stable. No further episodes of rectal bleeding. CBC remains stable, up to 9.2/28.1 from 8.7/26.5. Of note, aPTT returned elevated to 133.2. Only on SubQ Heparin, last dose 2/15 1700. PT/INR and platelets within normal limits. Previous coagulation panels including aPTT were also within normal limits. Uncertain exact etiology of isolated elevation to aPTT. Will repeat aPTT to ensure accuracy, as well as add on LFTs, D Dimer, and Fibrogen. Seen by house Hematology on this admission, will review findings with them. Chart/Event Note  [facility] 3DSU 343 W1  MARISOL COMBS, 89y, Female  [id number]    Reason for Notification:   Notified by RN that the above patient had a bloody BM.     Events/History of Present Illness  Went to bedside with nursing staff to evaluate patient. Patient awake and alert as per her baseline, in no distress with no stated complaints. RN notes that while patient was being cleaned/changed, nursing staff noted that patient had expelled a moderate sized blood clot from her rectum. Blood clot appeared to be maroon in color. Passing of clot was not associated with any stool/BM. Patient denied any recent rectal/abdominal pain. During ROS patient endorsed mild weakness, but notes that this is chronic. Rectal examination performed with female RN as chaperone, bright red blood noted to be on pad (patient was just changed/cleaned prior to assessment). No external hemorrhoids or anal fissures noted. During exam patient expelled another small sized maroon blood clot. No vaginal/urinary bleeding noted or reported.   Patient's chart reviewed. No previous episodes of GI bleeding noted. No previous endoscopy/colonoscopy record. On DAPT with Aspirin and Plavix, as well as DVT prophylaxis with SubQ Heparin. Vitals stable. Has had anemia across inpatient stay.     Review of Systems:  Positive: Weakness. Fatigue.   Negative: headaches, dizziness, presyncope, vision changes, throat pain, chest pain, palpitations, shortness of breath, wheezing, abdominal pain, nausea, vomiting, constipation, diarrhea, or musculoskeletal pain.     T(C): 36.4 (02-15-20 @ 23:47), Max: 37 (02-15-20 @ 04:36)  HR: 88 (02-15-20 @ 23:47) (86 - 88)  BP: 132/72 (02-15-20 @ 23:47) (101/64 - 132/72)  RR: 18 (02-15-20 @ 23:47) (17 - 18)  SpO2: 99% (02-15-20 @ 23:47) (98% - 100%)                         9.2    7.12  )-----------( 322      ( 16 Feb 2020 00:59 )             28.1        Physical Examination:  GENERAL: No acute distress noted during examination.   NERVOUS SYSTEM:  Alert & oriented X3 with appropriate concentration.  HEAD: Atraumatic and normocephalic.  EYES: EOMI/PERRLA. Conjunctiva and sclera clear  ENMT: Moist mucous membranes.  NECK: Supple with no JVD.   CHEST: Clear to ascultation bilaterally. Symmetrical/bilateral chest wall rise.   HEART: Regular rate and rhythm. Normotensive.   ABDOMEN: Soft, nontender, and nondistended.   /RECTAL: As per HPI above.     Medical Decision Making/Assessment/Plan:  89y-year-old Female with a past medical history of hypertension, DM, and hypothyroidism, admitted for cardiogenic shock, now with rectal bleeding.     - Diagnosis: Rectal Bleeding   New rectal bleeding, dark coloration and clotted appearance of blood raises suspicion for an upper GI bleed, though cannot exclude lower. Doesn't appear to be bleeding hemorrhoids or anal fissure. Is currently hemodynamically stable. On active antiplatelet therapy with Aspirin and Plavix, as well as DVT prophylaxis with SubQ Heparin. Will obtain stat diagnostics, give IV Pantoprazole, and closely monitor.      1) Stat CBC, coagulation panel, Type and Screen, and BMP.   2) Pantoprazole IVP stat, and started on 40 mg IVP BID for now. Will modify as per GI recommendations.   3) Due to patient's risk factors, should patient have a worsening of condition, continued blood loss, drop in H&H, or hypotension, will immediately consult MICU/RRT.   4) Will make NPO for now.   5) Aspirin, Plavix, and SubQ Heparin held. Day medicine team to follow with Cardiology.   6) Discussed above with internal medicine attending Dr. EDWARD Moser, who agreed with outlined plan of care. Day medicine team to consult GI. Dr. Moser to follow in am.   7) Will endorse the above diagnostics and findings to the day medicine team for review and follow up. Will additionally sign out to day medicine teams to follow with relevant specialties.     Jay Bustamante, ELZBIETA  Department of Medicine   #[id number]    Addendum 0200: No change in patient's clinical condition. Vitals stable. No further episodes of rectal bleeding. CBC remains stable, up to 9.2/28.1 from 8.7/26.5. Of note, aPTT returned elevated to 133.2. Only on SubQ Heparin, last dose 2/15 1700. PT/INR and platelets within normal limits. Previous coagulation panels including aPTT were also within normal limits. Uncertain exact etiology of isolated elevation to aPTT. Will repeat aPTT to ensure accuracy, as well as add on LFTs, D Dimer, and Fibrogen. Seen by house Hematology on this admission, will review findings with them.    Addendum 0220: Repeat labs show aPTT still elevated, but significantly lower than before at 60.0. Fibrogen high normal at 515, which creates a mixed picture. Since previous assessment, patient with small amount of BRBPR again not associated with stool. During this episode the blood was bright red and without clots. Vitals remain stable and patient remains asymptomatic. Repeat labs in for 0600, will obtain CBC with differential and slide review, as well as repeat coagulation panel. Should patient have continued bleeding will start transfusion with PRBC. Chart/Event Note  [facility] 3DSU 343 W1  MARISOL COMBS, 89y, Female  [id number]    Reason for Notification:   Notified by RN that the above patient had a bloody BM.     Events/History of Present Illness  Went to bedside with nursing staff to evaluate patient. Patient awake and alert as per her baseline, in no distress with no stated complaints. RN notes that while patient was being cleaned/changed, nursing staff noted that patient had expelled a moderate sized blood clot from her rectum. Blood clot appeared to be maroon in color. Passing of clot was not associated with any stool/BM. Patient denied any recent rectal/abdominal pain. During ROS patient endorsed mild weakness, but notes that this is chronic. Rectal examination performed with female RN as chaperone, bright red blood noted to be on pad (patient was just changed/cleaned prior to assessment). No external hemorrhoids or anal fissures noted. During exam patient expelled another small sized maroon blood clot. No vaginal/urinary bleeding noted or reported.   Patient's chart reviewed. No previous episodes of GI bleeding noted. No previous endoscopy/colonoscopy record. On DAPT with Aspirin and Plavix, as well as DVT prophylaxis with SubQ Heparin. Vitals stable. Has had anemia across inpatient stay.     Review of Systems:  Positive: Weakness. Fatigue.   Negative: headaches, dizziness, presyncope, vision changes, throat pain, chest pain, palpitations, shortness of breath, wheezing, abdominal pain, nausea, vomiting, constipation, diarrhea, or musculoskeletal pain.     T(C): 36.4 (02-15-20 @ 23:47), Max: 37 (02-15-20 @ 04:36)  HR: 88 (02-15-20 @ 23:47) (86 - 88)  BP: 132/72 (02-15-20 @ 23:47) (101/64 - 132/72)  RR: 18 (02-15-20 @ 23:47) (17 - 18)  SpO2: 99% (02-15-20 @ 23:47) (98% - 100%)                         9.2    7.12  )-----------( 322      ( 16 Feb 2020 00:59 )             28.1        Physical Examination:  GENERAL: No acute distress noted during examination.   NERVOUS SYSTEM:  Alert & oriented X3 with appropriate concentration.  HEAD: Atraumatic and normocephalic.  EYES: EOMI/PERRLA. Conjunctiva and sclera clear  ENMT: Moist mucous membranes.  NECK: Supple with no JVD.   CHEST: Clear to ascultation bilaterally. Symmetrical/bilateral chest wall rise.   HEART: Regular rate and rhythm. Normotensive.   ABDOMEN: Soft, nontender, and nondistended.   /RECTAL: As per HPI above.     Medical Decision Making/Assessment/Plan:  89y-year-old Female with a past medical history of hypertension, DM, and hypothyroidism, admitted for cardiogenic shock, now with rectal bleeding.     - Diagnosis: Rectal Bleeding   New rectal bleeding, dark coloration and clotted appearance of blood raises suspicion for an upper GI bleed, though cannot exclude lower. Doesn't appear to be bleeding hemorrhoids or anal fissure. Is currently hemodynamically stable. On active antiplatelet therapy with Aspirin and Plavix, as well as DVT prophylaxis with SubQ Heparin. Will obtain stat diagnostics, give IV Pantoprazole, and closely monitor.      1) Stat CBC, coagulation panel, Type and Screen, and BMP. Discussed with nursing to obtain largest bore IV access.   2) Pantoprazole IVP stat, and started on 40 mg IVP BID for now. Will modify as per GI recommendations.   3) Due to patient's risk factors, should patient have a worsening of condition, continued blood loss, drop in H&H, or hypotension, will immediately consult MICU/RRT.   4) Will make NPO for now.   5) Aspirin, Plavix, and SubQ Heparin held. Day medicine team to follow with Cardiology.   6) Discussed above with internal medicine attending Dr. EDWARD Moser, who agreed with outlined plan of care. Day medicine team to consult GI. Dr. Moser to follow in am.   7) Will endorse the above diagnostics and findings to the day medicine team for review and follow up. Will additionally sign out to day medicine teams to follow with relevant specialties.     Jay Bustamante NP  Department of Medicine   #[id number]    Addendum 0200: No change in patient's clinical condition. Vitals stable. No further episodes of rectal bleeding. CBC remains stable, up to 9.2/28.1 from 8.7/26.5. Of note, aPTT returned elevated to 133.2. Only on SubQ Heparin, last dose 2/15 1700. PT/INR and platelets within normal limits. Previous coagulation panels including aPTT were also within normal limits. Uncertain exact etiology of isolated elevation to aPTT. Will repeat aPTT to ensure accuracy, as well as add on LFTs, D Dimer, and Fibrogen. Seen by house Hematology on this admission, will review findings with them.    Addendum 0220: Repeat labs show aPTT still elevated, but significantly lower than before at 60.0. Fibrogen high normal at 515, which creates a mixed picture. Since previous assessment, patient with small amount of BRBPR again not associated with stool. During this episode the blood was bright red and without clots. Vitals remain stable and patient remains asymptomatic. Repeat labs in for 0600, will obtain CBC with differential and slide review, as well as repeat coagulation panel. Should patient have continued bleeding will start transfusion with PRBC. Chart/Event Note  [facility] 3DSU 343 W1  MARISOL COMBS, 89y, Female  [id number]    Reason for Notification:   Notified by RN that the above patient had a bloody BM.     Events/History of Present Illness  Went to bedside with nursing staff to evaluate patient. Patient awake and alert as per her baseline, in no distress with no stated complaints. RN notes that while patient was being cleaned/changed, nursing staff noted that patient had expelled a moderate sized blood clot from her rectum. Blood clot appeared to be maroon in color. Passing of clot was not associated with any stool/BM. Patient denied any recent rectal/abdominal pain. During ROS patient endorsed mild weakness, but notes that this is chronic. Rectal examination performed with female RN as chaperone, bright red blood noted to be on pad (patient was just changed/cleaned prior to assessment). No external hemorrhoids or anal fissures noted. During exam patient expelled another small sized maroon blood clot. No vaginal/urinary bleeding noted or reported.   Patient's chart reviewed. No previous episodes of GI bleeding noted. No previous endoscopy/colonoscopy record. On DAPT with Aspirin and Plavix, as well as DVT prophylaxis with SubQ Heparin. Vitals stable. Has had anemia across inpatient stay.     Review of Systems:  Positive: Weakness. Fatigue.   Negative: headaches, dizziness, presyncope, vision changes, throat pain, chest pain, palpitations, shortness of breath, wheezing, abdominal pain, nausea, vomiting, constipation, diarrhea, or musculoskeletal pain.     T(C): 36.4 (02-15-20 @ 23:47), Max: 37 (02-15-20 @ 04:36)  HR: 88 (02-15-20 @ 23:47) (86 - 88)  BP: 132/72 (02-15-20 @ 23:47) (101/64 - 132/72)  RR: 18 (02-15-20 @ 23:47) (17 - 18)  SpO2: 99% (02-15-20 @ 23:47) (98% - 100%)                         9.2    7.12  )-----------( 322      ( 16 Feb 2020 00:59 )             28.1        Physical Examination:  GENERAL: No acute distress noted during examination.   NERVOUS SYSTEM:  Alert & oriented X3 with appropriate concentration.  HEAD: Atraumatic and normocephalic.  EYES: EOMI/PERRLA. Conjunctiva and sclera clear  ENMT: Moist mucous membranes.  NECK: Supple with no JVD.   CHEST: Clear to ascultation bilaterally. Symmetrical/bilateral chest wall rise.   HEART: Regular rate and rhythm. Normotensive.   ABDOMEN: Soft, nontender, and nondistended.   /RECTAL: As per HPI above.     Medical Decision Making/Assessment/Plan:  89y-year-old Female with a past medical history of hypertension, DM, and hypothyroidism, admitted for cardiogenic shock, now with rectal bleeding.     - Diagnosis: Rectal Bleeding   New rectal bleeding, dark coloration and clotted appearance of blood raises suspicion for an upper GI bleed, though cannot exclude lower. Doesn't appear to be external hemorrhoids or anal fissure. Is currently hemodynamically stable. On active antiplatelet therapy with Aspirin and Plavix, as well as DVT prophylaxis with SubQ Heparin. Will obtain stat diagnostics, give IV Pantoprazole, and closely monitor.      1) Stat CBC, coagulation panel, Type and Screen, and BMP. Discussed with nursing to obtain largest bore IV access.   2) Pantoprazole IVP stat, and started on 40 mg IVP BID for now. Will modify as per GI recommendations.   3) Due to patient's risk factors, should patient have a worsening of condition, continued blood loss, drop in H&H, or hypotension, will immediately consult MICU/RRT.   4) Will make NPO for now.   5) Aspirin, Plavix, and SubQ Heparin held. Day medicine team to follow with Cardiology.   6) Discussed above with internal medicine attending Dr. EDWARD Moser, who agreed with outlined plan of care. Day medicine team to consult GI. Dr. Moser to follow in am.   7) Will endorse the above diagnostics and findings to the day medicine team for review and follow up. Will additionally sign out to day medicine teams to follow with relevant specialties.     Jay Bustamante NP  Department of Medicine   #[id number]    Addendum 0200: No change in patient's clinical condition. Vitals stable. No further episodes of rectal bleeding. CBC remains stable, up to 9.2/28.1 from 8.7/26.5. Of note, aPTT returned elevated to 133.2. Only on SubQ Heparin, last dose 2/15 1700. PT/INR and platelets within normal limits. Previous coagulation panels including aPTT were also within normal limits. Uncertain exact etiology of isolated elevation to aPTT. Will repeat aPTT to ensure accuracy, as well as add on LFTs, D Dimer, and Fibrogen. Seen by house Hematology on this admission, will review findings with them.    Addendum 0220: Repeat labs show aPTT still elevated, but significantly lower than before at 60.0. Fibrogen high normal at 515, which creates a mixed picture. Since previous assessment, patient with small amount of BRBPR again not associated with stool. During this episode the blood was bright red and without clots. Vitals remain stable and patient remains asymptomatic. Repeat labs in for 0600, will obtain CBC with differential and slide review, as well as repeat coagulation panel. Should patient have continued bleeding will start transfusion with PRBC.

## 2020-02-16 NOTE — PROVIDER CONTACT NOTE (OTHER) - ACTION/TREATMENT ORDERED:
seen and evaluated Patient @ bedside , rectal exam performed obtained blood clot ,stat CBC  CMP ,PT/PTT, T&S ordered close monitoring fpr bleeding

## 2020-02-16 NOTE — PROVIDER CONTACT NOTE (OTHER) - ASSESSMENT
Patient A/O X 4 denies abdominal discomforts , denies Dizziness SOB  B/P132/72 HR 88 RR 18 temp 97.5 POX 99% RA

## 2020-02-17 LAB
ANION GAP SERPL CALC-SCNC: 13 MMOL/L — SIGNIFICANT CHANGE UP (ref 5–17)
APTT BLD: 33.8 SEC — SIGNIFICANT CHANGE UP (ref 27.5–36.3)
BUN SERPL-MCNC: 43 MG/DL — HIGH (ref 7–23)
CALCIUM SERPL-MCNC: 8.8 MG/DL — SIGNIFICANT CHANGE UP (ref 8.4–10.5)
CHLORIDE SERPL-SCNC: 96 MMOL/L — SIGNIFICANT CHANGE UP (ref 96–108)
CO2 SERPL-SCNC: 22 MMOL/L — SIGNIFICANT CHANGE UP (ref 22–31)
CREAT SERPL-MCNC: 1.82 MG/DL — HIGH (ref 0.5–1.3)
GLUCOSE BLDC GLUCOMTR-MCNC: 170 MG/DL — HIGH (ref 70–99)
GLUCOSE BLDC GLUCOMTR-MCNC: 191 MG/DL — HIGH (ref 70–99)
GLUCOSE BLDC GLUCOMTR-MCNC: 213 MG/DL — HIGH (ref 70–99)
GLUCOSE BLDC GLUCOMTR-MCNC: 93 MG/DL — SIGNIFICANT CHANGE UP (ref 70–99)
GLUCOSE SERPL-MCNC: 99 MG/DL — SIGNIFICANT CHANGE UP (ref 70–99)
HCT VFR BLD CALC: 28.8 % — LOW (ref 34.5–45)
HGB BLD-MCNC: 8.9 G/DL — LOW (ref 11.5–15.5)
INR BLD: 0.97 RATIO — SIGNIFICANT CHANGE UP (ref 0.88–1.16)
MCHC RBC-ENTMCNC: 27.2 PG — SIGNIFICANT CHANGE UP (ref 27–34)
MCHC RBC-ENTMCNC: 30.9 GM/DL — LOW (ref 32–36)
MCV RBC AUTO: 88.1 FL — SIGNIFICANT CHANGE UP (ref 80–100)
NRBC # BLD: 0 /100 WBCS — SIGNIFICANT CHANGE UP (ref 0–0)
PLATELET # BLD AUTO: 303 K/UL — SIGNIFICANT CHANGE UP (ref 150–400)
POTASSIUM SERPL-MCNC: 4.6 MMOL/L — SIGNIFICANT CHANGE UP (ref 3.5–5.3)
POTASSIUM SERPL-SCNC: 4.6 MMOL/L — SIGNIFICANT CHANGE UP (ref 3.5–5.3)
PROTHROM AB SERPL-ACNC: 11.2 SEC — SIGNIFICANT CHANGE UP (ref 10–12.9)
RBC # BLD: 3.27 M/UL — LOW (ref 3.8–5.2)
RBC # FLD: 16.5 % — HIGH (ref 10.3–14.5)
SODIUM SERPL-SCNC: 131 MMOL/L — LOW (ref 135–145)
WBC # BLD: 7.84 K/UL — SIGNIFICANT CHANGE UP (ref 3.8–10.5)
WBC # FLD AUTO: 7.84 K/UL — SIGNIFICANT CHANGE UP (ref 3.8–10.5)

## 2020-02-17 PROCEDURE — 99232 SBSQ HOSP IP/OBS MODERATE 35: CPT

## 2020-02-17 RX ADMIN — SENNA PLUS 2 TABLET(S): 8.6 TABLET ORAL at 21:07

## 2020-02-17 RX ADMIN — Medication 81 MILLIGRAM(S): at 11:20

## 2020-02-17 RX ADMIN — Medication 1 SUPPOSITORY(S): at 05:34

## 2020-02-17 RX ADMIN — Medication 2: at 13:01

## 2020-02-17 RX ADMIN — Medication 50 MICROGRAM(S): at 05:34

## 2020-02-17 RX ADMIN — SODIUM CHLORIDE 1 GRAM(S): 9 INJECTION INTRAMUSCULAR; INTRAVENOUS; SUBCUTANEOUS at 05:34

## 2020-02-17 RX ADMIN — CLOPIDOGREL BISULFATE 75 MILLIGRAM(S): 75 TABLET, FILM COATED ORAL at 11:20

## 2020-02-17 RX ADMIN — Medication 1: at 17:36

## 2020-02-17 RX ADMIN — PANTOPRAZOLE SODIUM 40 MILLIGRAM(S): 20 TABLET, DELAYED RELEASE ORAL at 06:10

## 2020-02-17 RX ADMIN — ATORVASTATIN CALCIUM 80 MILLIGRAM(S): 80 TABLET, FILM COATED ORAL at 21:08

## 2020-02-17 RX ADMIN — Medication 1 TABLET(S): at 11:20

## 2020-02-17 RX ADMIN — Medication 3 MILLIGRAM(S): at 21:08

## 2020-02-17 RX ADMIN — Medication 1 SUPPOSITORY(S): at 17:26

## 2020-02-17 RX ADMIN — MONTELUKAST 10 MILLIGRAM(S): 4 TABLET, CHEWABLE ORAL at 17:31

## 2020-02-17 RX ADMIN — MIRTAZAPINE 3.75 MILLIGRAM(S): 45 TABLET, ORALLY DISINTEGRATING ORAL at 21:07

## 2020-02-17 NOTE — PROGRESS NOTE ADULT - PROBLEM SELECTOR PLAN 3
osteoporosis with no fractures in the past  -would continue to hold alendronate given decreased gfr, consider alternate agents as outpatient

## 2020-02-17 NOTE — PROGRESS NOTE ADULT - PROBLEM SELECTOR PLAN 2
- Patient has had good fasting glucose control with variable postprandial glucose values, no consistent patterns  -Continue to monitor on low dose correctional scale.   -consider discharge on oral agents vs monitoring without tx

## 2020-02-17 NOTE — PROGRESS NOTE ADULT - SUBJECTIVE AND OBJECTIVE BOX
INTERVAL HPI/OVERNIGHT EVENTS:    patient seen and examined at bedside earlier this morning  overnight events noted  tolerating PO but with minimal appetite. Does not care for the food  no bms today per RN  denies n/v abd pain  labs noted; H/H stable      MEDICATIONS  (STANDING):  aspirin enteric coated 81 milliGRAM(s) Oral daily  atorvastatin 80 milliGRAM(s) Oral at bedtime  clopidogrel Tablet 75 milliGRAM(s) Oral daily  hydrocortisone hemorrhoidal Suppository 1 Suppository(s) Rectal two times a day  insulin lispro (HumaLOG) corrective regimen sliding scale   SubCutaneous three times a day before meals  insulin lispro (HumaLOG) corrective regimen sliding scale   SubCutaneous at bedtime  levothyroxine 50 MICROGram(s) Oral daily  melatonin 3 milliGRAM(s) Oral at bedtime  mirtazapine 3.75 milliGRAM(s) Oral at bedtime  montelukast 10 milliGRAM(s) Oral every 24 hours  multivitamin/minerals 1 Tablet(s) Oral daily  pantoprazole    Tablet 40 milliGRAM(s) Oral before breakfast  senna 2 Tablet(s) Oral at bedtime  sodium chloride 1 Gram(s) Oral every 12 hours    MEDICATIONS  (PRN):  glucagon  Injectable 1 milliGRAM(s) IntraMuscular once PRN Glucose LESS THAN 70 milligrams/deciliter  polyethylene glycol 3350 17 Gram(s) Oral daily PRN Constipation      Allergies    penicillin (Hives)  penicillin (Unknown)    Intolerances        Review of Systems:    General:  No wt loss, fevers, chills, night sweats,fatigue,   Eyes:  Good vision, no reported pain  ENT:  No sore throat, pain, runny nose, dysphagia  CV:  No pain, palpitatioins, hypo/hypertension  Resp:  No dyspnea, cough, tachypnea, wheezing  GI:  No pain, No nausea, No vomiting, No diarrhea, No constipatiion, No weight loss, No fever, No pruritis, No rectal bleeding, No tarry stools, No dysphagia,  :  No pain, bleeding, incontinence, nocturia  Muscle:  No pain, weakness  Neuro:  No weakness, tingling, memory problems  Psych:  No fatigue, insomnia, mood problems, depression  Endocrine:  No polyuria, polydypsia, cold/heat intolerance  Heme:  No petechiae, ecchymosis, easy bruisability  Skin:  No rash, tattoos, scars, edema      Vital Signs Last 24 Hrs  T(C): 36.7 (17 Feb 2020 04:27), Max: 36.8 (16 Feb 2020 13:05)  T(F): 98 (17 Feb 2020 04:27), Max: 98.3 (16 Feb 2020 13:05)  HR: 84 (17 Feb 2020 04:27) (84 - 89)  BP: 107/63 (17 Feb 2020 04:27) (102/59 - 108/61)  BP(mean): --  RR: 18 (17 Feb 2020 04:27) (18 - 18)  SpO2: 100% (17 Feb 2020 04:27) (98% - 100%)    PHYSICAL EXAM:    Constitutional: resting comfortably in bed, frail, NAD   HEENT: ncat   Neck: No LAD, supple  Respiratory: breathing comfortably on RA   Cardiovascular: S1 and S2, RRR  Gastrointestinal: BS+, soft, NT/ND  Extremities: No peripheral edema, neg clubing, cyanosis  Vascular: 2+ peripheral pulses  Neurological: awake, alert, responding appropriately         LABS:                        8.9    7.84  )-----------( 303      ( 17 Feb 2020 06:06 )             28.8     02-17    131<L>  |  96  |  43<H>  ----------------------------<  99  4.6   |  22  |  1.82<H>    Ca    8.8      17 Feb 2020 06:06    TPro  7.4  /  Alb  2.8<L>  /  TBili  1.3<H>  /  DBili  0.8<H>  /  AST  73<H>  /  ALT  46<H>  /  AlkPhos  471<H>  02-16    PT/INR - ( 17 Feb 2020 06:06 )   PT: 11.2 sec;   INR: 0.97 ratio         PTT - ( 17 Feb 2020 06:06 )  PTT:33.8 sec      RADIOLOGY & ADDITIONAL TESTS:

## 2020-02-17 NOTE — PROGRESS NOTE ADULT - ASSESSMENT
89 year old woman with HTN, DM, hypothyroidism admitted to CCU with HF exacerbation and hypotension.  Consulted for hypothyroidism with TSh 49, c/w severe hypothyroidism

## 2020-02-17 NOTE — PROGRESS NOTE ADULT - ASSESSMENT
Rectal bleeding    - suspect hemorrhoidal bleed > diverticular bleed  - Hgb stable and patient is hemodynamically stable   - cont anusol suppository BID  - monitor cbc closley while on A/C, transfuse prn  - no GI objection to resume a/c  - no role for endoscopic evaluation at present  - diet as tolerated  - will follow

## 2020-02-17 NOTE — PROGRESS NOTE ADULT - PROBLEM SELECTOR PLAN 2
scan negative for amyloidosis  s/p fat pad biopsy path pending   no further work up per cardiology attending  will discharge to [facility] when bed available

## 2020-02-17 NOTE — PROGRESS NOTE ADULT - ATTENDING COMMENTS
Natividad Farmer MD  on 2/17/20: pager   Other times:  Diabetes team: [phone] business hours  [phone] night/weekend

## 2020-02-17 NOTE — PROGRESS NOTE ADULT - SUBJECTIVE AND OBJECTIVE BOX
Patient is a 89y old  Female who presents with a chief complaint of generalized weakness and body swelling (17 Feb 2020 12:49)      SUBJECTIVE / OVERNIGHT EVENTS: overnight events noted    ROS:  Resp: No cough no sputum production  CVS: No chest pain no palpitations no orthopnea  GI: no N/V/D  : no dysuria, no hematuria  Neuro: no weakness no paresthesias  Heme: No petechiae no easy bruising  Msk: No joint pain no swelling  Skin: No rash no itching        MEDICATIONS  (STANDING):  aspirin enteric coated 81 milliGRAM(s) Oral daily  atorvastatin 80 milliGRAM(s) Oral at bedtime  clopidogrel Tablet 75 milliGRAM(s) Oral daily  hydrocortisone hemorrhoidal Suppository 1 Suppository(s) Rectal two times a day  insulin lispro (HumaLOG) corrective regimen sliding scale   SubCutaneous three times a day before meals  insulin lispro (HumaLOG) corrective regimen sliding scale   SubCutaneous at bedtime  levothyroxine 50 MICROGram(s) Oral daily  melatonin 3 milliGRAM(s) Oral at bedtime  mirtazapine 3.75 milliGRAM(s) Oral at bedtime  montelukast 10 milliGRAM(s) Oral every 24 hours  multivitamin/minerals 1 Tablet(s) Oral daily  pantoprazole    Tablet 40 milliGRAM(s) Oral before breakfast  senna 2 Tablet(s) Oral at bedtime  sodium chloride 1 Gram(s) Oral every 12 hours    MEDICATIONS  (PRN):  glucagon  Injectable 1 milliGRAM(s) IntraMuscular once PRN Glucose LESS THAN 70 milligrams/deciliter  polyethylene glycol 3350 17 Gram(s) Oral daily PRN Constipation        CAPILLARY BLOOD GLUCOSE      POCT Blood Glucose.: 213 mg/dL (17 Feb 2020 12:22)  POCT Blood Glucose.: 93 mg/dL (17 Feb 2020 08:20)  POCT Blood Glucose.: 162 mg/dL (16 Feb 2020 21:20)  POCT Blood Glucose.: 244 mg/dL (16 Feb 2020 17:15)    I&O's Summary    16 Feb 2020 07:01  -  17 Feb 2020 07:00  --------------------------------------------------------  IN: 660 mL / OUT: 200 mL / NET: 460 mL    17 Feb 2020 07:01  -  17 Feb 2020 16:55  --------------------------------------------------------  IN: 480 mL / OUT: 0 mL / NET: 480 mL        Vital Signs Last 24 Hrs  T(C): 36.6 (17 Feb 2020 11:43), Max: 36.7 (17 Feb 2020 04:27)  T(F): 97.9 (17 Feb 2020 11:43), Max: 98 (17 Feb 2020 04:27)  HR: 83 (17 Feb 2020 11:43) (83 - 89)  BP: 102/54 (17 Feb 2020 11:43) (102/54 - 107/63)  BP(mean): --  RR: 18 (17 Feb 2020 11:43) (18 - 18)  SpO2: 98% (17 Feb 2020 11:43) (98% - 100%)    PHYSICAL EXAM: vital signs as above  HEENT: KALPESH EOMI  Neck: Supple, no JVD, no thyromegaly  Lungs: decreased breath sounds at bases   CVS: S1 S2 soft ejection systolic murmur best heard at left sternal border   Abdomen: no tenderness, no organomegaly, BS present  Neuro: non focal  Skin: warm, dry  Ext: no cyanosis or clubbing, no edema  Msk: no joint swelling or deformities    LABS:                        8.9    7.84  )-----------( 303      ( 17 Feb 2020 06:06 )             28.8     02-17    131<L>  |  96  |  43<H>  ----------------------------<  99  4.6   |  22  |  1.82<H>    Ca    8.8      17 Feb 2020 06:06    TPro  7.4  /  Alb  2.8<L>  /  TBili  1.3<H>  /  DBili  0.8<H>  /  AST  73<H>  /  ALT  46<H>  /  AlkPhos  471<H>  02-16    PT/INR - ( 17 Feb 2020 06:06 )   PT: 11.2 sec;   INR: 0.97 ratio         PTT - ( 17 Feb 2020 06:06 )  PTT:33.8 sec            All consultant(s) notes reviewed and care discussed with other providers        Contact Number, Dr Moser [phone]

## 2020-02-17 NOTE — PROGRESS NOTE ADULT - PROBLEM SELECTOR PLAN 1
On arrival TSH 49, TT3 43, TT4 4.1 on LT4 50 mcg.  Pt was treated with IV levothyroxine, then switched back to oral levothyroxine 50 micrograms daily.     Ft4 was 1.2 on 2/7- Patient restarted oral levothyroxine 50mcg daily.   Repeat TSH 14.4 FT4 1.2  Continue current dose of levothyroxine,   TSH will take 6-8 weeks to normalize-would obtain repeat TSH and FT4 as outpatient in ~4 weeks.

## 2020-02-17 NOTE — PROGRESS NOTE ADULT - SUBJECTIVE AND OBJECTIVE BOX
CC: diabetes and hypothyroidism    Interval History: Pt reports overall decreased appetite, no nausea or vomiting    MEDICATIONS  (STANDING):  aspirin enteric coated 81 milliGRAM(s) Oral daily  atorvastatin 80 milliGRAM(s) Oral at bedtime  clopidogrel Tablet 75 milliGRAM(s) Oral daily  hydrocortisone hemorrhoidal Suppository 1 Suppository(s) Rectal two times a day  insulin lispro (HumaLOG) corrective regimen sliding scale   SubCutaneous three times a day before meals  insulin lispro (HumaLOG) corrective regimen sliding scale   SubCutaneous at bedtime  levothyroxine 50 MICROGram(s) Oral daily  melatonin 3 milliGRAM(s) Oral at bedtime  mirtazapine 3.75 milliGRAM(s) Oral at bedtime  montelukast 10 milliGRAM(s) Oral every 24 hours  multivitamin/minerals 1 Tablet(s) Oral daily  pantoprazole    Tablet 40 milliGRAM(s) Oral before breakfast  senna 2 Tablet(s) Oral at bedtime  sodium chloride 1 Gram(s) Oral every 12 hours    MEDICATIONS  (PRN):  glucagon  Injectable 1 milliGRAM(s) IntraMuscular once PRN Glucose LESS THAN 70 milligrams/deciliter  polyethylene glycol 3350 17 Gram(s) Oral daily PRN Constipation      Allergies    penicillin (Hives)  penicillin (Unknown)    Intolerances          PHYSICAL EXAM:  VITALS: T(C): 36.6 (02-17-20 @ 11:43)  T(F): 97.9 (02-17-20 @ 11:43), Max: 98.3 (02-16-20 @ 13:05)  HR: 83 (02-17-20 @ 11:43) (83 - 89)  BP: 102/54 (02-17-20 @ 11:43) (102/54 - 108/61)  RR:  (18 - 18)  SpO2:  (98% - 100%)  GENERAL:Lying in bed in NAD,   EYES: No proptosis,  HEENT:  Atraumatic, Normocephalic,   RESPIRATORY: Clear to auscultation bilaterally  CARDIOVASCULAR: Regular rhythm; No murmurs; no peripheral edema  GI: Soft, nontender, non distended, normal bowel sounds        POCT Blood Glucose.: 213 mg/dL (02-17-20 @ 12:22)  POCT Blood Glucose.: 93 mg/dL (02-17-20 @ 08:20)  POCT Blood Glucose.: 162 mg/dL (02-16-20 @ 21:20)  POCT Blood Glucose.: 244 mg/dL (02-16-20 @ 17:15)  POCT Blood Glucose.: 102 mg/dL (02-16-20 @ 12:40)  POCT Blood Glucose.: 99 mg/dL (02-16-20 @ 09:02)  POCT Blood Glucose.: 169 mg/dL (02-15-20 @ 21:14)  POCT Blood Glucose.: 122 mg/dL (02-15-20 @ 17:22)  POCT Blood Glucose.: 248 mg/dL (02-15-20 @ 12:40)  POCT Blood Glucose.: 97 mg/dL (02-15-20 @ 08:37)  POCT Blood Glucose.: 147 mg/dL (02-14-20 @ 21:18)  POCT Blood Glucose.: 190 mg/dL (02-14-20 @ 17:29)  POCT Blood Glucose.: 250 mg/dL (02-14-20 @ 13:13)        02-17    131<L>  |  96  |  43<H>  ----------------------------<  99  4.6   |  22  |  1.82<H>    EGFR if : 28<L>  EGFR if non : 24<L>    Ca    8.8      02-17    TPro  7.4  /  Alb  2.8<L>  /  TBili  1.3<H>  /  DBili  0.8<H>  /  AST  73<H>  /  ALT  46<H>  /  AlkPhos  471<H>  02-16        Hemoglobin A1C, Whole Blood: 5.7 % <H> [4.0 - 5.6] (02-02-20 @ 09:20)

## 2020-02-17 NOTE — PROGRESS NOTE ADULT - ATTENDING COMMENTS
discussed with patient's son at the bedside in detail   Gyn evaluation for occasional uterine prolapse

## 2020-02-18 DIAGNOSIS — N81.4 UTEROVAGINAL PROLAPSE, UNSPECIFIED: ICD-10-CM

## 2020-02-18 LAB
ANION GAP SERPL CALC-SCNC: 14 MMOL/L — SIGNIFICANT CHANGE UP (ref 5–17)
BLD GP AB SCN SERPL QL: NEGATIVE — SIGNIFICANT CHANGE UP
BUN SERPL-MCNC: 42 MG/DL — HIGH (ref 7–23)
CALCIUM SERPL-MCNC: 8.8 MG/DL — SIGNIFICANT CHANGE UP (ref 8.4–10.5)
CHLORIDE SERPL-SCNC: 101 MMOL/L — SIGNIFICANT CHANGE UP (ref 96–108)
CO2 SERPL-SCNC: 21 MMOL/L — LOW (ref 22–31)
CREAT SERPL-MCNC: 1.91 MG/DL — HIGH (ref 0.5–1.3)
GLUCOSE BLDC GLUCOMTR-MCNC: 113 MG/DL — HIGH (ref 70–99)
GLUCOSE BLDC GLUCOMTR-MCNC: 170 MG/DL — HIGH (ref 70–99)
GLUCOSE BLDC GLUCOMTR-MCNC: 182 MG/DL — HIGH (ref 70–99)
GLUCOSE BLDC GLUCOMTR-MCNC: 238 MG/DL — HIGH (ref 70–99)
GLUCOSE SERPL-MCNC: 109 MG/DL — HIGH (ref 70–99)
HCT VFR BLD CALC: 25.8 % — LOW (ref 34.5–45)
HCT VFR BLD CALC: 30.1 % — LOW (ref 34.5–45)
HGB BLD-MCNC: 8.5 G/DL — LOW (ref 11.5–15.5)
HGB BLD-MCNC: 9.6 G/DL — LOW (ref 11.5–15.5)
MCHC RBC-ENTMCNC: 28 PG — SIGNIFICANT CHANGE UP (ref 27–34)
MCHC RBC-ENTMCNC: 28.6 PG — SIGNIFICANT CHANGE UP (ref 27–34)
MCHC RBC-ENTMCNC: 31.9 GM/DL — LOW (ref 32–36)
MCHC RBC-ENTMCNC: 32.9 GM/DL — SIGNIFICANT CHANGE UP (ref 32–36)
MCV RBC AUTO: 86.9 FL — SIGNIFICANT CHANGE UP (ref 80–100)
MCV RBC AUTO: 87.8 FL — SIGNIFICANT CHANGE UP (ref 80–100)
NRBC # BLD: 0 /100 WBCS — SIGNIFICANT CHANGE UP (ref 0–0)
NRBC # BLD: 0 /100 WBCS — SIGNIFICANT CHANGE UP (ref 0–0)
PLATELET # BLD AUTO: 293 K/UL — SIGNIFICANT CHANGE UP (ref 150–400)
PLATELET # BLD AUTO: 321 K/UL — SIGNIFICANT CHANGE UP (ref 150–400)
POTASSIUM SERPL-MCNC: 4.3 MMOL/L — SIGNIFICANT CHANGE UP (ref 3.5–5.3)
POTASSIUM SERPL-SCNC: 4.3 MMOL/L — SIGNIFICANT CHANGE UP (ref 3.5–5.3)
RBC # BLD: 2.97 M/UL — LOW (ref 3.8–5.2)
RBC # BLD: 3.43 M/UL — LOW (ref 3.8–5.2)
RBC # FLD: 16.7 % — HIGH (ref 10.3–14.5)
RBC # FLD: 16.8 % — HIGH (ref 10.3–14.5)
RH IG SCN BLD-IMP: POSITIVE — SIGNIFICANT CHANGE UP
SODIUM SERPL-SCNC: 136 MMOL/L — SIGNIFICANT CHANGE UP (ref 135–145)
WBC # BLD: 12.53 K/UL — HIGH (ref 3.8–10.5)
WBC # BLD: 8.84 K/UL — SIGNIFICANT CHANGE UP (ref 3.8–10.5)
WBC # FLD AUTO: 12.53 K/UL — HIGH (ref 3.8–10.5)
WBC # FLD AUTO: 8.84 K/UL — SIGNIFICANT CHANGE UP (ref 3.8–10.5)

## 2020-02-18 PROCEDURE — 99232 SBSQ HOSP IP/OBS MODERATE 35: CPT

## 2020-02-18 PROCEDURE — 99232 SBSQ HOSP IP/OBS MODERATE 35: CPT | Mod: GC

## 2020-02-18 RX ADMIN — Medication 1 SUPPOSITORY(S): at 18:02

## 2020-02-18 RX ADMIN — SENNA PLUS 2 TABLET(S): 8.6 TABLET ORAL at 21:08

## 2020-02-18 RX ADMIN — Medication 2: at 18:03

## 2020-02-18 RX ADMIN — ATORVASTATIN CALCIUM 80 MILLIGRAM(S): 80 TABLET, FILM COATED ORAL at 21:08

## 2020-02-18 RX ADMIN — MIRTAZAPINE 3.75 MILLIGRAM(S): 45 TABLET, ORALLY DISINTEGRATING ORAL at 21:08

## 2020-02-18 RX ADMIN — Medication 1: at 13:11

## 2020-02-18 RX ADMIN — Medication 1 TABLET(S): at 11:10

## 2020-02-18 RX ADMIN — Medication 81 MILLIGRAM(S): at 11:09

## 2020-02-18 RX ADMIN — PANTOPRAZOLE SODIUM 40 MILLIGRAM(S): 20 TABLET, DELAYED RELEASE ORAL at 06:14

## 2020-02-18 RX ADMIN — Medication 50 MICROGRAM(S): at 06:14

## 2020-02-18 RX ADMIN — CLOPIDOGREL BISULFATE 75 MILLIGRAM(S): 75 TABLET, FILM COATED ORAL at 11:09

## 2020-02-18 RX ADMIN — Medication 1 SUPPOSITORY(S): at 06:14

## 2020-02-18 RX ADMIN — Medication 3 MILLIGRAM(S): at 21:08

## 2020-02-18 RX ADMIN — MONTELUKAST 10 MILLIGRAM(S): 4 TABLET, CHEWABLE ORAL at 18:48

## 2020-02-18 NOTE — CONSULT NOTE ADULT - SUBJECTIVE AND OBJECTIVE BOX
R3 GYN Consult Note    89F with HTN, DM, hypothyroidism, CKD3 admitted after coming to the Emergency Department with generalized fatigue, hardly getting out of bed, diagnosed with generalized anasarca and bilaterally pleural effusions and severe anemia, anemia found to be likely secondary to hemorrhoids per primary team.  HD#[id number]    GYN team consulted overnight for occasional uterine prolapse noted by family member.    Pt denies any symptoms.  States she has never noticed anything coming out of her vagina.  States she had menopause in her 50s, has not had any vaginal bleeding since.  Is urinating without issue.    OBHx: VD x3  GYNHx: denies hx of fibroids, cysts, abnl pap smears    MEDICATIONS  (STANDING):  aspirin enteric coated 81 milliGRAM(s) Oral daily  atorvastatin 80 milliGRAM(s) Oral at bedtime  clopidogrel Tablet 75 milliGRAM(s) Oral daily  hydrocortisone hemorrhoidal Suppository 1 Suppository(s) Rectal two times a day  insulin lispro (HumaLOG) corrective regimen sliding scale   SubCutaneous three times a day before meals  insulin lispro (HumaLOG) corrective regimen sliding scale   SubCutaneous at bedtime  levothyroxine 50 MICROGram(s) Oral daily  melatonin 3 milliGRAM(s) Oral at bedtime  mirtazapine 3.75 milliGRAM(s) Oral at bedtime  montelukast 10 milliGRAM(s) Oral every 24 hours  multivitamin/minerals 1 Tablet(s) Oral daily  pantoprazole    Tablet 40 milliGRAM(s) Oral before breakfast  senna 2 Tablet(s) Oral at bedtime    MEDICATIONS  (PRN):  glucagon  Injectable 1 milliGRAM(s) IntraMuscular once PRN Glucose LESS THAN 70 milligrams/deciliter  polyethylene glycol 3350 17 Gram(s) Oral daily PRN Constipation      Allergies    penicillin (Hives)  penicillin (Unknown)      12 point ROS negative except as outlined above    Vital Signs Last 24 Hrs  T(C): 36.7 (18 Feb 2020 04:38), Max: 36.7 (17 Feb 2020 20:04)  T(F): 98 (18 Feb 2020 04:38), Max: 98 (17 Feb 2020 20:04)  HR: 90 (18 Feb 2020 04:38) (83 - 90)  BP: 122/74 (18 Feb 2020 04:38) (101/64 - 122/74)  RR: 18 (18 Feb 2020 04:38) (17 - 18)  SpO2: 98% (18 Feb 2020 04:38) (96% - 98%)    PHYSICAL EXAM:    Pt refusing physical exam at this time.  States she will be amenable to exam later in the day.      LABS:                        8.9    7.84  )-----------( 303      ( 17 Feb 2020 06:06 )             28.8     02-18    136  |  101  |  42<H>  ----------------------------<  109<H>  4.3   |  21<L>  |  1.91<H>    Ca    8.8      18 Feb 2020 07:14      PT/INR - ( 17 Feb 2020 06:06 )   PT: 11.2 sec;   INR: 0.97 ratio         PTT - ( 17 Feb 2020 06:06 )  PTT:33.8 sec      RADIOLOGY & ADDITIONAL TESTS: R3 GYN Consult Note    89F with HTN, DM, hypothyroidism, CKD3 admitted after coming to the Emergency Department with generalized fatigue, hardly getting out of bed, diagnosed with generalized anasarca and bilaterally pleural effusions and severe anemia, anemia found to be likely secondary to hemorrhoids per primary team.  HD#[id number]    GYN team consulted overnight for occasional uterine prolapse noted by family member.    Pt denies any symptoms.  States she has never noticed anything coming out of her vagina.  States she had menopause in her 50s, has not had any vaginal bleeding since.  Is urinating without issue.    OBHx: VD x3  GYNHx: denies hx of fibroids, cysts, abnl pap smears    MEDICATIONS  (STANDING):  aspirin enteric coated 81 milliGRAM(s) Oral daily  atorvastatin 80 milliGRAM(s) Oral at bedtime  clopidogrel Tablet 75 milliGRAM(s) Oral daily  hydrocortisone hemorrhoidal Suppository 1 Suppository(s) Rectal two times a day  insulin lispro (HumaLOG) corrective regimen sliding scale   SubCutaneous three times a day before meals  insulin lispro (HumaLOG) corrective regimen sliding scale   SubCutaneous at bedtime  levothyroxine 50 MICROGram(s) Oral daily  melatonin 3 milliGRAM(s) Oral at bedtime  mirtazapine 3.75 milliGRAM(s) Oral at bedtime  montelukast 10 milliGRAM(s) Oral every 24 hours  multivitamin/minerals 1 Tablet(s) Oral daily  pantoprazole    Tablet 40 milliGRAM(s) Oral before breakfast  senna 2 Tablet(s) Oral at bedtime    MEDICATIONS  (PRN):  glucagon  Injectable 1 milliGRAM(s) IntraMuscular once PRN Glucose LESS THAN 70 milligrams/deciliter  polyethylene glycol 3350 17 Gram(s) Oral daily PRN Constipation      Allergies    penicillin (Hives)  penicillin (Unknown)      12 point ROS negative except as outlined above    Vital Signs Last 24 Hrs  T(C): 36.7 (18 Feb 2020 04:38), Max: 36.7 (17 Feb 2020 20:04)  T(F): 98 (18 Feb 2020 04:38), Max: 98 (17 Feb 2020 20:04)  HR: 90 (18 Feb 2020 04:38) (83 - 90)  BP: 122/74 (18 Feb 2020 04:38) (101/64 - 122/74)  RR: 18 (18 Feb 2020 04:38) (17 - 18)  SpO2: 98% (18 Feb 2020 04:38) (96% - 98%)    PHYSICAL EXAM:    Gen: NAD  Abd: soft, non tender, non distended  : no bleeding or discharge noted externally, no procidentia noted on valsalva  Pt unable to tolerate speculum exam.  Bimanual exam revealed pessary firmly in place.  No bleeding noted.  Extr: non tender to palpation b/l, no erythema, no edema noted      LABS:                        8.9    7.84  )-----------( 303      ( 17 Feb 2020 06:06 )             28.8     02-18    136  |  101  |  42<H>  ----------------------------<  109<H>  4.3   |  21<L>  |  1.91<H>    Ca    8.8      18 Feb 2020 07:14      PT/INR - ( 17 Feb 2020 06:06 )   PT: 11.2 sec;   INR: 0.97 ratio         PTT - ( 17 Feb 2020 06:06 )  PTT:33.8 sec      RADIOLOGY & ADDITIONAL TESTS:

## 2020-02-18 NOTE — PROGRESS NOTE ADULT - PROBLEM SELECTOR PLAN 3
- osteoporosis with no fractures in the past  - would continue to hold alendronate given decreased gfr, consider alternate agents as outpatient

## 2020-02-18 NOTE — CONSULT NOTE ADULT - ASSESSMENT
A/P: 88yo  postmenopausal female with HTN, DM, hypothyroidism, CKD3 admitted after coming to the Emergency Department with generalized fatigue, hardly getting out of bed, diagnosed with generalized anasarca and bilaterally pleural effusions and severe anemia, anemia found to be likely secondary to hemorrhoids per primary team.  HD#[id number]    GYN team consulted for occasional uterine prolapse noted by family which the pt denies.  Will come back later today for physical exam to assess.    JOLENE Doe PGY3 A/P: 89yo  postmenopausal female with HTN, DM, hypothyroidism, CKD3 admitted after coming to the Emergency Department with generalized fatigue, hardly getting out of bed, diagnosed with generalized anasarca and bilaterally pleural effusions and severe anemia, anemia found to be likely secondary to hemorrhoids per primary team.  HD#[id number]    GYN team consulted for occasional uterine prolapse noted by family which the pt denies.  On physical exam, pt found to have pessary in place.  Pt states this was placed during this hospital visit.  However, after discussion w primary team NP and attending, pt was admitted w pessary in place, unclear when pessary was placed.

## 2020-02-18 NOTE — PROGRESS NOTE ADULT - PROBLEM SELECTOR PLAN 1
- On arrival TSH 49, TT3 43, TT4 4.1 on LT4 50 mcg at home.  Pt was treated with IV levothyroxine, then switched back to oral levothyroxine 50 micrograms daily.    - Free T4 was 1.2 on 2/7- Patient restarted oral levothyroxine 50mcg daily.   - Repeat TSH 14.4 FT4 1.2  - Continue current dose of levothyroxine 50 mcg daily.  - TSH will take 6 weeks to normalize -would obtain repeat TSH and FT4 as outpatient  - will sign off, please reconsult as needed

## 2020-02-18 NOTE — CHART NOTE - NSCHARTNOTEFT_GEN_A_CORE
MEDICINE PA     EVENT SUMMARY   Notified by RN for BRBPR. Pt seen and examined at the bedside. Pt is alert and asymptomatic. Pt was having a BM and saw blood with the stool. Pt with BRBPR during the day. Pt was started on ASA/plavix which is on hold at this time. Pt denies abd pain, N/V, pain with BM, CP, SOB, dyspnea, numbness, weakness, dizziness.     MEDICATIONS  (STANDING):  atorvastatin 80 milliGRAM(s) Oral at bedtime  hydrocortisone hemorrhoidal Suppository 1 Suppository(s) Rectal two times a day  insulin lispro (HumaLOG) corrective regimen sliding scale   SubCutaneous three times a day before meals  insulin lispro (HumaLOG) corrective regimen sliding scale   SubCutaneous at bedtime  levothyroxine 50 MICROGram(s) Oral daily  melatonin 3 milliGRAM(s) Oral at bedtime  mirtazapine 3.75 milliGRAM(s) Oral at bedtime  montelukast 10 milliGRAM(s) Oral every 24 hours  multivitamin/minerals 1 Tablet(s) Oral daily  pantoprazole    Tablet 40 milliGRAM(s) Oral before breakfast  senna 2 Tablet(s) Oral at bedtime    MEDICATIONS  (PRN):  glucagon  Injectable 1 milliGRAM(s) IntraMuscular once PRN Glucose LESS THAN 70 milligrams/deciliter  polyethylene glycol 3350 17 Gram(s) Oral daily PRN Constipation    ICU Vital Signs Last 24 Hrs  T(C): 36.3 (18 Feb 2020 23:20), Max: 36.7 (18 Feb 2020 04:38)  T(F): 97.4 (18 Feb 2020 23:20), Max: 98.1 (18 Feb 2020 20:35)  HR: 88 (18 Feb 2020 23:20) (82 - 93)  BP: 124/74 (18 Feb 2020 23:20) (104/57 - 124/74)  BP(mean): --  ABP: --  ABP(mean): --  RR: 18 (18 Feb 2020 23:20) (17 - 18)  SpO2: 98% (18 Feb 2020 23:20) (97% - 99%)    PHYSICAL EXAM  GENERAL: NAD, resting comfortably in bed  CV: S1 S2 RRR  RESPIRATORY: CTA B/L unlabored   ABDOMEN: Soft NT ND +BS  : BRB seen at around the rectum, no visible external hemorrhoids  MSK: No edema, + peripheral pulses    Complete Blood Count (02.18.20 @ 09:36)    Nucleated RBC: 0 /100 WBCs    WBC Count: 12.53 K/uL    RBC Count: 3.43 M/uL    Hemoglobin: 9.6 g/dL    Hematocrit: 30.1 %    Mean Cell Volume: 87.8 fl    Mean Cell Hemoglobin: 28.0 pg    Mean Cell Hemoglobin Conc: 31.9 gm/dL    Red Cell Distrib Width: 16.7 %    Platelet Count - Automated: 321 K/uL    A&P  89F with HTN, DM, hypothyroidism admitted after coming to the Emergency Department with generalized fatigue, hardly getting out of bed, which the daughter states is unusual for her mom. She also c/o shortness of breath and swelling of upper and lower extremities. s/p CCU stay for cardiogenic shock requiring inotropic support;   Noted to have LGI bleed; seen by GI for hemorrhoidal bleed; initially OFF aspirin/plavix; bleeding self resolved; Restarted back; with recurrent BRBPR off ASA/plavix now. NOw with recurrent BRBPR    - STAT CBC  - Active T&S  - MEDICINE PA     EVENT SUMMARY   Notified by RN for BRBPR. Pt seen and examined at the bedside. Pt is alert and asymptomatic. Pt was having a BM and saw blood with the stool. Pt with BRBPR during the day. Pt was started on ASA/plavix which is on hold at this time. Pt denies abd pain, N/V, pain with BM, CP, SOB, dyspnea, numbness, weakness, dizziness.     MEDICATIONS  (STANDING):  atorvastatin 80 milliGRAM(s) Oral at bedtime  hydrocortisone hemorrhoidal Suppository 1 Suppository(s) Rectal two times a day  insulin lispro (HumaLOG) corrective regimen sliding scale   SubCutaneous three times a day before meals  insulin lispro (HumaLOG) corrective regimen sliding scale   SubCutaneous at bedtime  levothyroxine 50 MICROGram(s) Oral daily  melatonin 3 milliGRAM(s) Oral at bedtime  mirtazapine 3.75 milliGRAM(s) Oral at bedtime  montelukast 10 milliGRAM(s) Oral every 24 hours  multivitamin/minerals 1 Tablet(s) Oral daily  pantoprazole    Tablet 40 milliGRAM(s) Oral before breakfast  senna 2 Tablet(s) Oral at bedtime    MEDICATIONS  (PRN):  glucagon  Injectable 1 milliGRAM(s) IntraMuscular once PRN Glucose LESS THAN 70 milligrams/deciliter  polyethylene glycol 3350 17 Gram(s) Oral daily PRN Constipation    ICU Vital Signs Last 24 Hrs  T(C): 36.3 (18 Feb 2020 23:20), Max: 36.7 (18 Feb 2020 04:38)  T(F): 97.4 (18 Feb 2020 23:20), Max: 98.1 (18 Feb 2020 20:35)  HR: 88 (18 Feb 2020 23:20) (82 - 93)  BP: 124/74 (18 Feb 2020 23:20) (104/57 - 124/74)  BP(mean): --  ABP: --  ABP(mean): --  RR: 18 (18 Feb 2020 23:20) (17 - 18)  SpO2: 98% (18 Feb 2020 23:20) (97% - 99%)    PHYSICAL EXAM  GENERAL: NAD, resting comfortably in bed  CV: S1 S2 RRR  RESPIRATORY: CTA B/L unlabored   ABDOMEN: Soft NT ND +BS  : BRB seen at around the rectum, no visible external hemorrhoids  MSK: No edema, + peripheral pulses    Complete Blood Count (02.18.20 @ 09:36)    Nucleated RBC: 0 /100 WBCs    WBC Count: 12.53 K/uL    RBC Count: 3.43 M/uL    Hemoglobin: 9.6 g/dL    Hematocrit: 30.1 %    Mean Cell Volume: 87.8 fl    Mean Cell Hemoglobin: 28.0 pg    Mean Cell Hemoglobin Conc: 31.9 gm/dL    Red Cell Distrib Width: 16.7 %    Platelet Count - Automated: 321 K/uL    A&P  89F with HTN, DM, hypothyroidism admitted after coming to the Emergency Department with generalized fatigue, hardly getting out of bed, which the daughter states is unusual for her mom. She also c/o shortness of breath and swelling of upper and lower extremities. s/p CCU stay for cardiogenic shock requiring inotropic support;   Noted to have LGI bleed; seen by GI for hemorrhoidal bleed; initially OFF aspirin/plavix; bleeding self resolved; Restarted back; with recurrent BRBPR off ASA/plavix now. Now with recurrent BRBPR    - STAT CBC; transfuse as needed   - Active T&S  - continue to hold asa/plavix   - CBC q 8   - NPO  - GI f/u in AM; d/w Dr. Ramon; ? intervention in AM   D/w Dr. Moser; agrees with above plan.   Will continue to monitor    Kathy PATEL  #[id number]

## 2020-02-18 NOTE — PROGRESS NOTE ADULT - ASSESSMENT
89 year old woman with HTN, DM2, hypothyroidism, osteoporosis, admitted to CCU with HF exacerbation and hypotension. Consulted for hypothyroidism with TSH 49, c/w severe hypothyroidism.

## 2020-02-18 NOTE — CHART NOTE - NSCHARTNOTEFT_GEN_A_CORE
Patient is a 89y old  Female who presents with a chief complaint of generalized weakness and body swelling; Asked to evaluate for BRBPR> Pt s/p ambulation with PT and was noted to have small blood clots dripping down her leg; Pt used toilet bowl after and had brown stool mixed with bright blood and clots;       Vital Signs Last 24 Hrs  T(C): 36.6 (18 Feb 2020 11:24), Max: 36.7 (17 Feb 2020 20:04)  T(F): 97.9 (18 Feb 2020 11:24), Max: 98 (17 Feb 2020 20:04)  HR: 82 (18 Feb 2020 11:24) (82 - 90)  BP: 112/57 (18 Feb 2020 11:24) (101/64 - 122/74)  BP(mean): --  RR: 18 (18 Feb 2020 11:24) (17 - 18)  SpO2: 99% (18 Feb 2020 11:24) (96% - 99%)    Physical Exam:  General: WN/WD NAD  Neurology: Alert and awake  Head:  Normocephalic, atraumatic  Respiratory: CTA B/L  CV: RRR, S1S2, no murmur  Abdominal: Soft, NT, ND no palpable mass  Rectal exam:  dark blood around rectum, no vaginal bleed; No ext hemorrhoids  MSK: No edema, + peripheral pulses,   Labs:                          9.6    12.53 )-----------( 321      ( 18 Feb 2020 09:36 )             30.1     02-18    136  |  101  |  42<H>  ----------------------------<  109<H>  4.3   |  21<L>  |  1.91<H>    Ca    8.8      18 Feb 2020 07:14        HPI:  89F with HTN, DM, hypothyroidism admitted after coming to the Emergency Department with generalized fatigue, hardly getting out of bed, which the daughter states is unusual for her mom. She also c/o shortness of breath and swelling of upper and lower extremities. s/p CCU stay for cardiogenic shock requiring inotropic support;   Noted to have LGI bleed; seen by GI for hemorrhoidal bleed; initially OFF aspirin/plavix; bleeding self resolved; Restarted back , now with recurrent  BRBPR  >HOLD Aspirin/Plavix ( As per attending)  >GI to eval ( called by attending )  >Trend CBC  >    Kendy Diaz ANP-BC  #[id number]

## 2020-02-18 NOTE — CONSULT NOTE ADULT - REASON FOR ADMISSION
generalized weakness and body swelling

## 2020-02-18 NOTE — CONSULT NOTE ADULT - PROBLEM SELECTOR RECOMMENDATION 9
- Recs: NTD, pt to have outpatient follow-up w GYN for pessary maintenance  - care per primary team, please reconsult prn    Pt seen and d/w Dr. Thuan Doe PGY3

## 2020-02-18 NOTE — PROGRESS NOTE ADULT - ATTENDING COMMENTS
I have seen this patient with the fellow and agree with their assessment and plan. In addition,    # ATN - initial EFRAIN was secondary to ATN as a result of cardiogenic shock. Serum creatinine had initially improved to 1.68 but now gianna to 1.9 today. It appears that she had a new episode of BRBPR this afternoon and her BP readings were lower than usual (100s/70s). She could have developed another ATN from hypotension. Repeat UA today.  We will continue to monitor kidney function.     # Hyponatremia - secondary to low solute intake. Serum sodium had initially improved with salt tabs. Off salt tabs for several days now and serum sodium remains stable. We will continue to monitor.     # Medication toxicity Monitoring: medication dose reviewed. Since patient has an EFRAIN, please dose medications to CrCl<15    The rest of the recommendations as per fellow's note.    Bibiana Duffy MD  Attending Nephrologist  [phone]

## 2020-02-18 NOTE — PROGRESS NOTE ADULT - PROBLEM SELECTOR PLAN 2
- in the last 24 hours, BG has been at goal - 100-200 mg/dL.   -Continue to monitor on low dose correctional scale qac and qhs  -c/w consistent carb diet  -check FS qac and qhs   -can discharge off DM medications

## 2020-02-18 NOTE — PROGRESS NOTE ADULT - SUBJECTIVE AND OBJECTIVE BOX
INTERVAL HPI/OVERNIGHT EVENTS:    patient seen and examined  comfortably resting in bed  tolerating PO   no bms  denies n/v abd pain  labs noted; H/H stable     MEDICATIONS  (STANDING):  aspirin enteric coated 81 milliGRAM(s) Oral daily  atorvastatin 80 milliGRAM(s) Oral at bedtime  clopidogrel Tablet 75 milliGRAM(s) Oral daily  hydrocortisone hemorrhoidal Suppository 1 Suppository(s) Rectal two times a day  insulin lispro (HumaLOG) corrective regimen sliding scale   SubCutaneous three times a day before meals  insulin lispro (HumaLOG) corrective regimen sliding scale   SubCutaneous at bedtime  levothyroxine 50 MICROGram(s) Oral daily  melatonin 3 milliGRAM(s) Oral at bedtime  mirtazapine 3.75 milliGRAM(s) Oral at bedtime  montelukast 10 milliGRAM(s) Oral every 24 hours  multivitamin/minerals 1 Tablet(s) Oral daily  pantoprazole    Tablet 40 milliGRAM(s) Oral before breakfast  senna 2 Tablet(s) Oral at bedtime    MEDICATIONS  (PRN):  glucagon  Injectable 1 milliGRAM(s) IntraMuscular once PRN Glucose LESS THAN 70 milligrams/deciliter  polyethylene glycol 3350 17 Gram(s) Oral daily PRN Constipation      Allergies    penicillin (Hives)  penicillin (Unknown)    Intolerances        Review of Systems:    General:  No wt loss, fevers, chills, night sweats,fatigue,   Eyes:  Good vision, no reported pain  ENT:  No sore throat, pain, runny nose, dysphagia  CV:  No pain, palpitatioins, hypo/hypertension  Resp:  No dyspnea, cough, tachypnea, wheezing  GI:  No pain, No nausea, No vomiting, No diarrhea, No constipatiion, No weight loss, No fever, No pruritis, No rectal bleeding, No tarry stools, No dysphagia,  :  No pain, bleeding, incontinence, nocturia  Muscle:  No pain, weakness  Neuro:  No weakness, tingling, memory problems  Psych:  No fatigue, insomnia, mood problems, depression  Endocrine:  No polyuria, polydypsia, cold/heat intolerance  Heme:  No petechiae, ecchymosis, easy bruisability  Skin:  No rash, tattoos, scars, edema      Vital Signs Last 24 Hrs  T(C): 36.7 (18 Feb 2020 04:38), Max: 36.7 (17 Feb 2020 20:04)  T(F): 98 (18 Feb 2020 04:38), Max: 98 (17 Feb 2020 20:04)  HR: 90 (18 Feb 2020 04:38) (83 - 90)  BP: 122/74 (18 Feb 2020 04:38) (101/64 - 122/74)  BP(mean): --  RR: 18 (18 Feb 2020 04:38) (17 - 18)  SpO2: 98% (18 Feb 2020 04:38) (96% - 98%)    PHYSICAL EXAM:    Constitutional: frail, NAD  HEENT: ncat   Respiratory: breathing comfortably on RA   Cardiovascular: S1 and S2, RRR  Gastrointestinal: BS+, soft, NT/ND  Extremities: No peripheral edema  Vascular: 2+ peripheral pulses  Neurological: alert, awake, responding appropriately        LABS:                        9.6    12.53 )-----------( 321      ( 18 Feb 2020 09:36 )             30.1     02-18    136  |  101  |  42<H>  ----------------------------<  109<H>  4.3   |  21<L>  |  1.91<H>    Ca    8.8      18 Feb 2020 07:14      PT/INR - ( 17 Feb 2020 06:06 )   PT: 11.2 sec;   INR: 0.97 ratio         PTT - ( 17 Feb 2020 06:06 )  PTT:33.8 sec      RADIOLOGY & ADDITIONAL TESTS:

## 2020-02-18 NOTE — PROGRESS NOTE ADULT - SUBJECTIVE AND OBJECTIVE BOX
[facility] DIVISION OF KIDNEY DISEASES AND HYPERTENSION -- FOLLOW UP NOTE  --------------------------------------------------------------------------------  HPI: 88 yo F with  HTN, DM, hypothyroidism admitted on 2/1/20 for generalized fatigue, found to be in cardiogenic shock, transferred to CCU for further management. Pt. was started on diuretics (now discontinued). On admission Scr was 1.45, increased to 1.68 on 2/2/20. Scr improved to 1.45 on 2/14/20, however has worsened over the weekend to 1.91 this AM.     Pt. seen and examined at bedside this AM. Denies CP, SOB, N/V/F/C.   PAST HISTORY  --------------------------------------------------------------------------------  No significant changes to PMH, PSH, FHx, SHx, unless otherwise noted    ALLERGIES & MEDICATIONS  --------------------------------------------------------------------------------  Allergies    penicillin (Hives)  penicillin (Unknown)    Intolerances    Standing Inpatient Medications  aspirin enteric coated 81 milliGRAM(s) Oral daily  atorvastatin 80 milliGRAM(s) Oral at bedtime  clopidogrel Tablet 75 milliGRAM(s) Oral daily  hydrocortisone hemorrhoidal Suppository 1 Suppository(s) Rectal two times a day  insulin lispro (HumaLOG) corrective regimen sliding scale   SubCutaneous three times a day before meals  insulin lispro (HumaLOG) corrective regimen sliding scale   SubCutaneous at bedtime  levothyroxine 50 MICROGram(s) Oral daily  melatonin 3 milliGRAM(s) Oral at bedtime  mirtazapine 3.75 milliGRAM(s) Oral at bedtime  montelukast 10 milliGRAM(s) Oral every 24 hours  multivitamin/minerals 1 Tablet(s) Oral daily  pantoprazole    Tablet 40 milliGRAM(s) Oral before breakfast  senna 2 Tablet(s) Oral at bedtime    REVIEW OF SYSTEMS  --------------------------------------------------------------------------------  Gen: + lethargy  Respiratory: No dyspnea  CV: No chest pain  GI: No abdominal pain  MSK: No LE tenderness  Neuro: No dizziness  Heme: No bleeding    All other systems were reviewed and are negative, except as noted.  VITALS/PHYSICAL EXAM  --------------------------------------------------------------------------------  T(C): 36.7 (02-18-20 @ 04:38), Max: 36.7 (02-17-20 @ 20:04)  HR: 90 (02-18-20 @ 04:38) (83 - 90)  BP: 122/74 (02-18-20 @ 04:38) (101/64 - 122/74)  RR: 18 (02-18-20 @ 04:38) (17 - 18)  SpO2: 98% (02-18-20 @ 04:38) (96% - 98%)  Wt(kg): --    02-17-20 @ 07:01  -  02-18-20 @ 07:00  --------------------------------------------------------  IN: 720 mL / OUT: 530 mL / NET: 190 mL    02-18-20 @ 07:01  -  02-18-20 @ 10:45  --------------------------------------------------------  IN: 180 mL / OUT: 0 mL / NET: 180 mL    Physical Exam:  	Gen: NAD  	HEENT: Anicteric  	Pulm: decreased b/l breath sounds  	CV: RRR, S1S2; systolic murmur best heard at left sternal border   	Abd: +BS, soft, nontender/nondistended       	: No suprapubic tenderness  	MSK: Warm, no edema  	Neuro: Awake  LABS/STUDIES  --------------------------------------------------------------------------------              9.6    12.53 >-----------<  321      [02-18-20 @ 09:36]              30.1     136  |  101  |  42  ----------------------------<  109      [02-18-20 @ 07:14]  4.3   |  21  |  1.91        Ca     8.8     [02-18-20 @ 07:14]    Creatinine Trend:  SCr 1.91 [02-18 @ 07:14]  SCr 1.82 [02-17 @ 06:06]  SCr 1.69 [02-16 @ 06:35]  SCr 1.67 [02-16 @ 00:59]  SCr 1.58 [02-15 @ 06:51]

## 2020-02-18 NOTE — PROGRESS NOTE ADULT - SUBJECTIVE AND OBJECTIVE BOX
Chief Complaint: f/u DM2, hypothyroidism, osteoporosis     History:  Patient seen at bedside this morning, states that she is eating well. BG here at goal on sliding scale scale insulin only. Does not have nausea, vomiting, abdominal pain.       MEDICATIONS  (STANDING):  atorvastatin 80 milliGRAM(s) Oral at bedtime  hydrocortisone hemorrhoidal Suppository 1 Suppository(s) Rectal two times a day  insulin lispro (HumaLOG) corrective regimen sliding scale   SubCutaneous three times a day before meals  insulin lispro (HumaLOG) corrective regimen sliding scale   SubCutaneous at bedtime  levothyroxine 50 MICROGram(s) Oral daily  melatonin 3 milliGRAM(s) Oral at bedtime  mirtazapine 3.75 milliGRAM(s) Oral at bedtime  montelukast 10 milliGRAM(s) Oral every 24 hours  multivitamin/minerals 1 Tablet(s) Oral daily  pantoprazole    Tablet 40 milliGRAM(s) Oral before breakfast  senna 2 Tablet(s) Oral at bedtime    MEDICATIONS  (PRN):  glucagon  Injectable 1 milliGRAM(s) IntraMuscular once PRN Glucose LESS THAN 70 milligrams/deciliter  polyethylene glycol 3350 17 Gram(s) Oral daily PRN Constipation      Allergies  penicillin (Hives)  penicillin (Unknown)    Review of Systems:  ALL OTHER SYSTEMS REVIEWED AND NEGATIVE    PHYSICAL EXAM:  VITALS: T(C): 36.6 (02-18-20 @ 11:24)  T(F): 97.9 (02-18-20 @ 11:24), Max: 98 (02-17-20 @ 20:04)  HR: 82 (02-18-20 @ 11:24) (82 - 90)  BP: 112/57 (02-18-20 @ 11:24) (101/64 - 122/74)  RR:  (17 - 18)  SpO2:  (96% - 99%)  Wt(kg): --  GENERAL: NAD, well-developed  RESPIRATORY: Clear to auscultation bilaterally; No rales, rhonchi, wheezing, or rubs  CARDIOVASCULAR: Regular rate and rhythm; No murmurs  GI: Soft, nontender, non distended  PSYCH: reactive affect, euthymic mood    POCT Blood Glucose.: 182 mg/dL (02-18-20 @ 12:56)  POCT Blood Glucose.: 113 mg/dL (02-18-20 @ 08:48)  POCT Blood Glucose.: 191 mg/dL (02-17-20 @ 22:39)  POCT Blood Glucose.: 170 mg/dL (02-17-20 @ 17:32)  POCT Blood Glucose.: 213 mg/dL (02-17-20 @ 12:22)  POCT Blood Glucose.: 93 mg/dL (02-17-20 @ 08:20)  POCT Blood Glucose.: 162 mg/dL (02-16-20 @ 21:20)  POCT Blood Glucose.: 244 mg/dL (02-16-20 @ 17:15)  POCT Blood Glucose.: 102 mg/dL (02-16-20 @ 12:40)  POCT Blood Glucose.: 99 mg/dL (02-16-20 @ 09:02)  POCT Blood Glucose.: 169 mg/dL (02-15-20 @ 21:14)  POCT Blood Glucose.: 122 mg/dL (02-15-20 @ 17:22)      02-18    136  |  101  |  42<H>  ----------------------------<  109<H>  4.3   |  21<L>  |  1.91<H>    EGFR if : 26<L>  EGFR if non : 23<L>    Ca    8.8      02-18    TPro  7.4  /  Alb  2.8<L>  /  TBili  1.3<H>  /  DBili  0.8<H>  /  AST  73<H>  /  ALT  46<H>  /  AlkPhos  471<H>  02-16          Thyroid Function Tests:  02-14 @ 08:38 TSH 14.40 FreeT4 1.2 T3 -- Anti TPO -- Anti Thyroglobulin Ab -- TSI --  02-07 @ 08:08 TSH 0.02 FreeT4 1.2 T3 -- Anti TPO -- Anti Thyroglobulin Ab -- TSI --      Hemoglobin A1C, Whole Blood: 5.7 % <H> [4.0 - 5.6] (02-02-20 @ 09:20)

## 2020-02-18 NOTE — PROGRESS NOTE ADULT - SUBJECTIVE AND OBJECTIVE BOX
Patient is a 89y old  Female who presents with a chief complaint of generalized weakness and body swelling (18 Feb 2020 13:53)      SUBJECTIVE / OVERNIGHT EVENTS: overnight events noted  + continued rectal BRBPR     ROS:  Resp: No cough no sputum production  CVS: No chest pain no palpitations no orthopnea  GI: no N/V/D  : no dysuria, no hematuria  Neuro: no weakness no paresthesias  Heme: No petechiae no easy bruising  Msk: No joint pain no swelling  Skin: No rash no itching        MEDICATIONS  (STANDING):  atorvastatin 80 milliGRAM(s) Oral at bedtime  hydrocortisone hemorrhoidal Suppository 1 Suppository(s) Rectal two times a day  insulin lispro (HumaLOG) corrective regimen sliding scale   SubCutaneous three times a day before meals  insulin lispro (HumaLOG) corrective regimen sliding scale   SubCutaneous at bedtime  levothyroxine 50 MICROGram(s) Oral daily  melatonin 3 milliGRAM(s) Oral at bedtime  mirtazapine 3.75 milliGRAM(s) Oral at bedtime  montelukast 10 milliGRAM(s) Oral every 24 hours  multivitamin/minerals 1 Tablet(s) Oral daily  pantoprazole    Tablet 40 milliGRAM(s) Oral before breakfast  senna 2 Tablet(s) Oral at bedtime    MEDICATIONS  (PRN):  glucagon  Injectable 1 milliGRAM(s) IntraMuscular once PRN Glucose LESS THAN 70 milligrams/deciliter  polyethylene glycol 3350 17 Gram(s) Oral daily PRN Constipation        CAPILLARY BLOOD GLUCOSE      POCT Blood Glucose.: 182 mg/dL (18 Feb 2020 12:56)  POCT Blood Glucose.: 113 mg/dL (18 Feb 2020 08:48)  POCT Blood Glucose.: 191 mg/dL (17 Feb 2020 22:39)  POCT Blood Glucose.: 170 mg/dL (17 Feb 2020 17:32)    I&O's Summary    17 Feb 2020 07:01  -  18 Feb 2020 07:00  --------------------------------------------------------  IN: 720 mL / OUT: 530 mL / NET: 190 mL    18 Feb 2020 07:01  -  18 Feb 2020 14:55  --------------------------------------------------------  IN: 300 mL / OUT: 0 mL / NET: 300 mL        Vital Signs Last 24 Hrs  T(C): 36.6 (18 Feb 2020 11:24), Max: 36.7 (17 Feb 2020 20:04)  T(F): 97.9 (18 Feb 2020 11:24), Max: 98 (17 Feb 2020 20:04)  HR: 82 (18 Feb 2020 11:24) (82 - 90)  BP: 112/57 (18 Feb 2020 11:24) (101/64 - 122/74)  BP(mean): --  RR: 18 (18 Feb 2020 11:24) (17 - 18)  SpO2: 99% (18 Feb 2020 11:24) (96% - 99%)    PHYSICAL EXAM: vital signs as above  HEENT: KALPESH EOMI  Neck: Supple, no JVD, no thyromegaly  Lungs: decreased breath sounds at bases   CVS: S1 S2 soft ejection systolic murmur best heard at left sternal border   Abdomen: no tenderness, no organomegaly, BS present  Neuro: non focal  Skin: warm, dry  Ext: no cyanosis or clubbing, no edema  Msk: no joint swelling or deformities    LABS:                        9.6    12.53 )-----------( 321      ( 18 Feb 2020 09:36 )             30.1     02-18    136  |  101  |  42<H>  ----------------------------<  109<H>  4.3   |  21<L>  |  1.91<H>    Ca    8.8      18 Feb 2020 07:14      PT/INR - ( 17 Feb 2020 06:06 )   PT: 11.2 sec;   INR: 0.97 ratio         PTT - ( 17 Feb 2020 06:06 )  PTT:33.8 sec            All consultant(s) notes reviewed and care discussed with other providers        Contact Number, Dr Moser [phone]

## 2020-02-18 NOTE — PROGRESS NOTE ADULT - ATTENDING COMMENTS
continued rectal bleeding  hgb is stable   asa/plavix is off  cont asusol bid  cont regular diet  will follow

## 2020-02-18 NOTE — PROGRESS NOTE ADULT - ATTENDING COMMENTS
Adriel Meadows MD   Pager # [phone]  On evenings and weekends, please call the office at [phone] or page endocrine fellow on call. Please note that this patient may be followed by different provider tomorrow. If no answer, contact the office.

## 2020-02-18 NOTE — PROGRESS NOTE ADULT - ASSESSMENT
Rectal bleeding - resolved     - suspect hemorrhoidal bleed  - Hgb stable and patient is hemodynamically stable   - cont anusol suppository BID  - monitor cbc closley while on A/C, transfuse prn  - no GI objection to resume a/c  - no role for endoscopic evaluation at present  - diet as tolerated  - no GI objection to d/c once medically stable  - will follow

## 2020-02-18 NOTE — PROGRESS NOTE ADULT - PROBLEM SELECTOR PLAN 1
- Scr 1.45 on admission, continuously uptrended but now improved back to 1.45 on 2/14/20. Scr worsened over weekend and is now 1.91 this AM.  - Patient with cardiogenic shock, now resolved. Pt. now with ? hemodynamically mediated EFRAIN in the setting of lower BP.  - Recommend checking UA and urine electrolytes. Recommend checking bladder US to rule out obstruction.  - Monitor UOP and daily weights. Avoid volume depletion, NSAIDs, PPI's, ARB/ACE-I. Dose medications as per eGFR.  - SPEP with migrating paraprotein identified, SIFE with IgG Lambda. Pt. with negative scan for transthyretin amyloidosis. Had fat pad biopsy on 2/13/20.    Michoacano He  Nephrology Fellow  Cell: [phone] (from 8 am to 5 pm)  (After 5 pm or on weekends please page on-call fellow)

## 2020-02-19 LAB
ANION GAP SERPL CALC-SCNC: 13 MMOL/L — SIGNIFICANT CHANGE UP (ref 5–17)
APTT BLD: 34.3 SEC — SIGNIFICANT CHANGE UP (ref 27.5–36.3)
BASOPHILS # BLD AUTO: 0.05 K/UL — SIGNIFICANT CHANGE UP (ref 0–0.2)
BASOPHILS NFR BLD AUTO: 0.5 % — SIGNIFICANT CHANGE UP (ref 0–2)
BUN SERPL-MCNC: 36 MG/DL — HIGH (ref 7–23)
CALCIUM SERPL-MCNC: 8.6 MG/DL — SIGNIFICANT CHANGE UP (ref 8.4–10.5)
CHLORIDE SERPL-SCNC: 100 MMOL/L — SIGNIFICANT CHANGE UP (ref 96–108)
CO2 SERPL-SCNC: 21 MMOL/L — LOW (ref 22–31)
CREAT SERPL-MCNC: 1.6 MG/DL — HIGH (ref 0.5–1.3)
EOSINOPHIL # BLD AUTO: 0.52 K/UL — HIGH (ref 0–0.5)
EOSINOPHIL NFR BLD AUTO: 4.9 % — SIGNIFICANT CHANGE UP (ref 0–6)
GLUCOSE BLDC GLUCOMTR-MCNC: 101 MG/DL — HIGH (ref 70–99)
GLUCOSE BLDC GLUCOMTR-MCNC: 104 MG/DL — HIGH (ref 70–99)
GLUCOSE BLDC GLUCOMTR-MCNC: 124 MG/DL — HIGH (ref 70–99)
GLUCOSE BLDC GLUCOMTR-MCNC: 232 MG/DL — HIGH (ref 70–99)
GLUCOSE SERPL-MCNC: 101 MG/DL — HIGH (ref 70–99)
HCT VFR BLD CALC: 24.9 % — LOW (ref 34.5–45)
HCT VFR BLD CALC: 32.7 % — LOW (ref 34.5–45)
HGB BLD-MCNC: 10.4 G/DL — LOW (ref 11.5–15.5)
HGB BLD-MCNC: 8.3 G/DL — LOW (ref 11.5–15.5)
IMM GRANULOCYTES NFR BLD AUTO: 0.7 % — SIGNIFICANT CHANGE UP (ref 0–1.5)
INR BLD: 1.06 RATIO — SIGNIFICANT CHANGE UP (ref 0.88–1.16)
LYMPHOCYTES # BLD AUTO: 0.89 K/UL — LOW (ref 1–3.3)
LYMPHOCYTES # BLD AUTO: 8.4 % — LOW (ref 13–44)
MCHC RBC-ENTMCNC: 27.7 PG — SIGNIFICANT CHANGE UP (ref 27–34)
MCHC RBC-ENTMCNC: 28.7 PG — SIGNIFICANT CHANGE UP (ref 27–34)
MCHC RBC-ENTMCNC: 31.8 GM/DL — LOW (ref 32–36)
MCHC RBC-ENTMCNC: 33.3 GM/DL — SIGNIFICANT CHANGE UP (ref 32–36)
MCV RBC AUTO: 86.2 FL — SIGNIFICANT CHANGE UP (ref 80–100)
MCV RBC AUTO: 87.2 FL — SIGNIFICANT CHANGE UP (ref 80–100)
MONOCYTES # BLD AUTO: 0.46 K/UL — SIGNIFICANT CHANGE UP (ref 0–0.9)
MONOCYTES NFR BLD AUTO: 4.3 % — SIGNIFICANT CHANGE UP (ref 2–14)
NEUTROPHILS # BLD AUTO: 8.61 K/UL — HIGH (ref 1.8–7.4)
NEUTROPHILS NFR BLD AUTO: 81.2 % — HIGH (ref 43–77)
NRBC # BLD: 0 /100 WBCS — SIGNIFICANT CHANGE UP (ref 0–0)
NRBC # BLD: 0 /100 WBCS — SIGNIFICANT CHANGE UP (ref 0–0)
PLATELET # BLD AUTO: 307 K/UL — SIGNIFICANT CHANGE UP (ref 150–400)
PLATELET # BLD AUTO: 342 K/UL — SIGNIFICANT CHANGE UP (ref 150–400)
POTASSIUM SERPL-MCNC: 4.3 MMOL/L — SIGNIFICANT CHANGE UP (ref 3.5–5.3)
POTASSIUM SERPL-SCNC: 4.3 MMOL/L — SIGNIFICANT CHANGE UP (ref 3.5–5.3)
PROTHROM AB SERPL-ACNC: 12.2 SEC — SIGNIFICANT CHANGE UP (ref 10–12.9)
RBC # BLD: 2.89 M/UL — LOW (ref 3.8–5.2)
RBC # BLD: 3.75 M/UL — LOW (ref 3.8–5.2)
RBC # FLD: 16.7 % — HIGH (ref 10.3–14.5)
RBC # FLD: 16.9 % — HIGH (ref 10.3–14.5)
SODIUM SERPL-SCNC: 134 MMOL/L — LOW (ref 135–145)
WBC # BLD: 10.6 K/UL — HIGH (ref 3.8–10.5)
WBC # BLD: 6.82 K/UL — SIGNIFICANT CHANGE UP (ref 3.8–10.5)
WBC # FLD AUTO: 10.6 K/UL — HIGH (ref 3.8–10.5)
WBC # FLD AUTO: 6.82 K/UL — SIGNIFICANT CHANGE UP (ref 3.8–10.5)

## 2020-02-19 RX ORDER — DESMOPRESSIN ACETATE 0.1 MG/1
12 TABLET ORAL ONCE
Refills: 0 | Status: COMPLETED | OUTPATIENT
Start: 2020-02-19 | End: 2020-02-19

## 2020-02-19 RX ORDER — HYDROCORTISONE 1 %
1 OINTMENT (GRAM) TOPICAL
Refills: 0 | Status: DISCONTINUED | OUTPATIENT
Start: 2020-02-19 | End: 2020-02-19

## 2020-02-19 RX ORDER — DESMOPRESSIN ACETATE 0.1 MG/1
12 TABLET ORAL ONCE
Refills: 0 | Status: DISCONTINUED | OUTPATIENT
Start: 2020-02-19 | End: 2020-02-19

## 2020-02-19 RX ADMIN — ATORVASTATIN CALCIUM 80 MILLIGRAM(S): 80 TABLET, FILM COATED ORAL at 21:35

## 2020-02-19 RX ADMIN — MIRTAZAPINE 3.75 MILLIGRAM(S): 45 TABLET, ORALLY DISINTEGRATING ORAL at 21:35

## 2020-02-19 RX ADMIN — Medication 1 SUPPOSITORY(S): at 05:35

## 2020-02-19 RX ADMIN — PANTOPRAZOLE SODIUM 40 MILLIGRAM(S): 20 TABLET, DELAYED RELEASE ORAL at 06:47

## 2020-02-19 RX ADMIN — Medication 3 MILLIGRAM(S): at 21:35

## 2020-02-19 RX ADMIN — Medication 1 SUPPOSITORY(S): at 16:44

## 2020-02-19 RX ADMIN — DESMOPRESSIN ACETATE 212 MICROGRAM(S): 0.1 TABLET ORAL at 15:30

## 2020-02-19 RX ADMIN — Medication 1 TABLET(S): at 15:29

## 2020-02-19 RX ADMIN — MONTELUKAST 10 MILLIGRAM(S): 4 TABLET, CHEWABLE ORAL at 16:45

## 2020-02-19 RX ADMIN — Medication 50 MICROGRAM(S): at 05:35

## 2020-02-19 RX ADMIN — Medication 2: at 16:43

## 2020-02-19 RX ADMIN — SENNA PLUS 2 TABLET(S): 8.6 TABLET ORAL at 21:35

## 2020-02-19 NOTE — PROVIDER CONTACT NOTE (OTHER) - ASSESSMENT
Pt denies any pain, discomfort, 1 small blood clot observed on her feces  VS: 124/74 HR: 88 T: 97.4F Oral, RR19, O2: 98% RA

## 2020-02-19 NOTE — PROGRESS NOTE ADULT - ASSESSMENT
Rectal bleeding    - suspect hemorrhoidal bleed vs diverticular bleed  - rectal bleeding had resolved and ASA/plavix was restarted. However patient was passing clots last night with associated brbpr.   - Daughter wishes not to pursue colonoscopy at present, prefers to manage conservatively. Will consider endoscopic evaluation if patient continues to have rectal bleeding/drop in H/H.   - ASA/plavix on hold  - Desmopressin x 1 and 1 unit platelets ordered   -monitor cbc closely   - cont anusol suppository BID  - diet as tolerated  - will follow

## 2020-02-19 NOTE — PROGRESS NOTE ADULT - SUBJECTIVE AND OBJECTIVE BOX
INTERVAL HPI/OVERNIGHT EVENTS:    patient seen and examined  overnight events noted   resting comfortably in bed in NAD   denies CP, SOB, dizziness, n/v abd pain   labs noted     MEDICATIONS  (STANDING):  atorvastatin 80 milliGRAM(s) Oral at bedtime  desmopressin IVPB 12 MICROGram(s) IV Intermittent once  hydrocortisone hemorrhoidal Suppository 1 Suppository(s) Rectal two times a day  insulin lispro (HumaLOG) corrective regimen sliding scale   SubCutaneous three times a day before meals  insulin lispro (HumaLOG) corrective regimen sliding scale   SubCutaneous at bedtime  levothyroxine 50 MICROGram(s) Oral daily  melatonin 3 milliGRAM(s) Oral at bedtime  mirtazapine 3.75 milliGRAM(s) Oral at bedtime  montelukast 10 milliGRAM(s) Oral every 24 hours  multivitamin/minerals 1 Tablet(s) Oral daily  pantoprazole    Tablet 40 milliGRAM(s) Oral before breakfast  senna 2 Tablet(s) Oral at bedtime    MEDICATIONS  (PRN):  glucagon  Injectable 1 milliGRAM(s) IntraMuscular once PRN Glucose LESS THAN 70 milligrams/deciliter  polyethylene glycol 3350 17 Gram(s) Oral daily PRN Constipation      Allergies    penicillin (Hives)  penicillin (Unknown)    Intolerances        Review of Systems:    General:  No wt loss, fevers, chills, night sweats,fatigue,   Eyes:  Good vision, no reported pain  ENT:  No sore throat, pain, runny nose, dysphagia  CV:  No pain, palpitatioins, hypo/hypertension  Resp:  No dyspnea, cough, tachypnea, wheezing  GI:  No pain, No nausea, No vomiting, No diarrhea, No constipatiion, No weight loss, No fever, No pruritis, +rectal bleeding, No tarry stools, No dysphagia,  :  No pain, bleeding, incontinence, nocturia  Muscle:  No pain, weakness  Neuro:  No weakness, tingling, memory problems  Psych:  No fatigue, insomnia, mood problems, depression  Endocrine:  No polyuria, polydypsia, cold/heat intolerance  Heme:  No petechiae, ecchymosis, easy bruisability  Skin:  No rash, tattoos, scars, edema      Vital Signs Last 24 Hrs  T(C): 36.6 (19 Feb 2020 11:00), Max: 36.7 (18 Feb 2020 20:35)  T(F): 97.9 (19 Feb 2020 11:00), Max: 98.1 (18 Feb 2020 20:35)  HR: 89 (19 Feb 2020 11:00) (88 - 93)  BP: 100/51 (19 Feb 2020 11:00) (100/51 - 124/74)  BP(mean): --  RR: 18 (19 Feb 2020 11:00) (17 - 18)  SpO2: 97% (19 Feb 2020 11:00) (97% - 98%)    PHYSICAL EXAM:    Constitutional: NAD  HEENT: ncat   Neck: No LAD, supple  Respiratory: breathing comfortably on RA   Cardiovascular: S1 and S2, RRR  Gastrointestinal: BS+, soft, NT/ND, +residual brbpr seen on exam   Extremities: No peripheral edema, neg clubing, cyanosis  Vascular: 2+ peripheral pulses  Neurological: A/O x 3, no focal deficits        LABS:                        8.3    10.60 )-----------( 307      ( 19 Feb 2020 06:09 )             24.9     02-19    134<L>  |  100  |  36<H>  ----------------------------<  101<H>  4.3   |  21<L>  |  1.60<H>    Ca    8.6      19 Feb 2020 06:09      PT/INR - ( 19 Feb 2020 06:09 )   PT: 12.2 sec;   INR: 1.06 ratio         PTT - ( 19 Feb 2020 06:09 )  PTT:34.3 sec      RADIOLOGY & ADDITIONAL TESTS:

## 2020-02-19 NOTE — CHART NOTE - NSCHARTNOTEFT_GEN_A_CORE
Overnight event noted with passing clots with down-trending Hgb     Patient was on ASA and Plavix which are on hold at this time  Lab reviewed and discussed with GI.   Desmopressin x 1 and 1 unit of platelet for platelet dysfunction and reversal of antiplatelet agents    HD stable  Will continue to monitor     Jen Farr PA-C

## 2020-02-19 NOTE — PROVIDER CONTACT NOTE (OTHER) - ACTION/TREATMENT ORDERED:
PA notified and aware, ordered CBC STAT, patient to be placed NPO for possible future testing, and to continue monitoring. PA notified and aware, ordered CBC STAT, continue monitoring.

## 2020-02-19 NOTE — PROGRESS NOTE ADULT - SUBJECTIVE AND OBJECTIVE BOX
Patient is a 89y old  Female who presents with a chief complaint of generalized weakness and body swelling (19 Feb 2020 11:26)      SUBJECTIVE / OVERNIGHT EVENTS: overnight events noted  + BRBPR     ROS:  Resp: No cough no sputum production  CVS: No chest pain no palpitations no orthopnea  GI: no N/V/D  : no dysuria, no hematuria  Neuro: no weakness no paresthesias  Heme: No petechiae no easy bruising  Msk: No joint pain no swelling  Skin: No rash no itching        MEDICATIONS  (STANDING):  atorvastatin 80 milliGRAM(s) Oral at bedtime  hydrocortisone hemorrhoidal Suppository 1 Suppository(s) Rectal two times a day  insulin lispro (HumaLOG) corrective regimen sliding scale   SubCutaneous three times a day before meals  insulin lispro (HumaLOG) corrective regimen sliding scale   SubCutaneous at bedtime  levothyroxine 50 MICROGram(s) Oral daily  melatonin 3 milliGRAM(s) Oral at bedtime  mirtazapine 3.75 milliGRAM(s) Oral at bedtime  montelukast 10 milliGRAM(s) Oral every 24 hours  multivitamin/minerals 1 Tablet(s) Oral daily  pantoprazole    Tablet 40 milliGRAM(s) Oral before breakfast  senna 2 Tablet(s) Oral at bedtime    MEDICATIONS  (PRN):  glucagon  Injectable 1 milliGRAM(s) IntraMuscular once PRN Glucose LESS THAN 70 milligrams/deciliter  polyethylene glycol 3350 17 Gram(s) Oral daily PRN Constipation        CAPILLARY BLOOD GLUCOSE      POCT Blood Glucose.: 124 mg/dL (19 Feb 2020 12:17)  POCT Blood Glucose.: 101 mg/dL (19 Feb 2020 08:26)  POCT Blood Glucose.: 170 mg/dL (18 Feb 2020 22:24)  POCT Blood Glucose.: 238 mg/dL (18 Feb 2020 17:25)    I&O's Summary    18 Feb 2020 07:01  -  19 Feb 2020 07:00  --------------------------------------------------------  IN: 300 mL / OUT: 450 mL / NET: -150 mL    19 Feb 2020 07:01  -  19 Feb 2020 16:09  --------------------------------------------------------  IN: 360 mL / OUT: 0 mL / NET: 360 mL        Vital Signs Last 24 Hrs  T(C): 36.6 (19 Feb 2020 11:50), Max: 36.7 (18 Feb 2020 20:35)  T(F): 97.9 (19 Feb 2020 11:50), Max: 98.1 (18 Feb 2020 20:35)  HR: 91 (19 Feb 2020 11:50) (88 - 97)  BP: 120/63 (19 Feb 2020 11:50) (100/51 - 124/74)  BP(mean): --  RR: 18 (19 Feb 2020 11:50) (17 - 18)  SpO2: 97% (19 Feb 2020 11:50) (96% - 98%)    PHYSICAL EXAM: vital signs as above  HEENT: KALPESH EOMI  Neck: Supple, no JVD, no thyromegaly  Lungs: decreased breath sounds at bases   CVS: S1 S2 soft ejection systolic murmur best heard at left sternal border   Abdomen: no tenderness, no organomegaly, BS present  Neuro: non focal  Skin: warm, dry  Ext: no cyanosis or clubbing, no edema  Msk: no joint swelling or deformities    LABS:                        8.3    10.60 )-----------( 307      ( 19 Feb 2020 06:09 )             24.9     02-19    134<L>  |  100  |  36<H>  ----------------------------<  101<H>  4.3   |  21<L>  |  1.60<H>    Ca    8.6      19 Feb 2020 06:09      PT/INR - ( 19 Feb 2020 06:09 )   PT: 12.2 sec;   INR: 1.06 ratio         PTT - ( 19 Feb 2020 06:09 )  PTT:34.3 sec            All consultant(s) notes reviewed and care discussed with other providers        Contact Number, Dr Moser [phone]

## 2020-02-19 NOTE — PROGRESS NOTE ADULT - PROBLEM SELECTOR PLAN 1
continues to have BRBPR  GI help appreciated  will continue to monitor   no colonoscopy at this time per family  discharge on hold till BRBPR resolves   hemoglobin stays stable

## 2020-02-20 LAB
ANION GAP SERPL CALC-SCNC: 13 MMOL/L — SIGNIFICANT CHANGE UP (ref 5–17)
BLD GP AB SCN SERPL QL: NEGATIVE — SIGNIFICANT CHANGE UP
BUN SERPL-MCNC: 41 MG/DL — HIGH (ref 7–23)
CALCIUM SERPL-MCNC: 9 MG/DL — SIGNIFICANT CHANGE UP (ref 8.4–10.5)
CHLORIDE SERPL-SCNC: 96 MMOL/L — SIGNIFICANT CHANGE UP (ref 96–108)
CO2 SERPL-SCNC: 22 MMOL/L — SIGNIFICANT CHANGE UP (ref 22–31)
CREAT SERPL-MCNC: 1.82 MG/DL — HIGH (ref 0.5–1.3)
GLUCOSE BLDC GLUCOMTR-MCNC: 119 MG/DL — HIGH (ref 70–99)
GLUCOSE BLDC GLUCOMTR-MCNC: 136 MG/DL — HIGH (ref 70–99)
GLUCOSE BLDC GLUCOMTR-MCNC: 205 MG/DL — HIGH (ref 70–99)
GLUCOSE BLDC GLUCOMTR-MCNC: 211 MG/DL — HIGH (ref 70–99)
GLUCOSE SERPL-MCNC: 109 MG/DL — HIGH (ref 70–99)
HCT VFR BLD CALC: 20.1 % — CRITICAL LOW (ref 34.5–45)
HCT VFR BLD CALC: 23.5 % — LOW (ref 34.5–45)
HCT VFR BLD CALC: 41 % — SIGNIFICANT CHANGE UP (ref 34.5–45)
HGB BLD-MCNC: 13.5 G/DL — SIGNIFICANT CHANGE UP (ref 11.5–15.5)
HGB BLD-MCNC: 6.3 G/DL — CRITICAL LOW (ref 11.5–15.5)
HGB BLD-MCNC: 7.7 G/DL — LOW (ref 11.5–15.5)
MCHC RBC-ENTMCNC: 27.7 PG — SIGNIFICANT CHANGE UP (ref 27–34)
MCHC RBC-ENTMCNC: 28.1 PG — SIGNIFICANT CHANGE UP (ref 27–34)
MCHC RBC-ENTMCNC: 28.7 PG — SIGNIFICANT CHANGE UP (ref 27–34)
MCHC RBC-ENTMCNC: 31.3 GM/DL — LOW (ref 32–36)
MCHC RBC-ENTMCNC: 32.8 GM/DL — SIGNIFICANT CHANGE UP (ref 32–36)
MCHC RBC-ENTMCNC: 32.9 GM/DL — SIGNIFICANT CHANGE UP (ref 32–36)
MCV RBC AUTO: 84 FL — SIGNIFICANT CHANGE UP (ref 80–100)
MCV RBC AUTO: 87.7 FL — SIGNIFICANT CHANGE UP (ref 80–100)
MCV RBC AUTO: 89.7 FL — SIGNIFICANT CHANGE UP (ref 80–100)
NRBC # BLD: 0 /100 WBCS — SIGNIFICANT CHANGE UP (ref 0–0)
PLATELET # BLD AUTO: 356 K/UL — SIGNIFICANT CHANGE UP (ref 150–400)
PLATELET # BLD AUTO: 390 K/UL — SIGNIFICANT CHANGE UP (ref 150–400)
PLATELET # BLD AUTO: 429 K/UL — HIGH (ref 150–400)
POTASSIUM SERPL-MCNC: 4.6 MMOL/L — SIGNIFICANT CHANGE UP (ref 3.5–5.3)
POTASSIUM SERPL-SCNC: 4.6 MMOL/L — SIGNIFICANT CHANGE UP (ref 3.5–5.3)
RBC # BLD: 2.24 M/UL — LOW (ref 3.8–5.2)
RBC # BLD: 2.68 M/UL — LOW (ref 3.8–5.2)
RBC # BLD: 4.88 M/UL — SIGNIFICANT CHANGE UP (ref 3.8–5.2)
RBC # FLD: 17 % — HIGH (ref 10.3–14.5)
RBC # FLD: 17.1 % — HIGH (ref 10.3–14.5)
RBC # FLD: 17.2 % — HIGH (ref 10.3–14.5)
RH IG SCN BLD-IMP: POSITIVE — SIGNIFICANT CHANGE UP
SODIUM SERPL-SCNC: 131 MMOL/L — LOW (ref 135–145)
WBC # BLD: 7.14 K/UL — SIGNIFICANT CHANGE UP (ref 3.8–10.5)
WBC # BLD: 7.39 K/UL — SIGNIFICANT CHANGE UP (ref 3.8–10.5)
WBC # BLD: 8.49 K/UL — SIGNIFICANT CHANGE UP (ref 3.8–10.5)
WBC # FLD AUTO: 7.14 K/UL — SIGNIFICANT CHANGE UP (ref 3.8–10.5)
WBC # FLD AUTO: 7.39 K/UL — SIGNIFICANT CHANGE UP (ref 3.8–10.5)
WBC # FLD AUTO: 8.49 K/UL — SIGNIFICANT CHANGE UP (ref 3.8–10.5)

## 2020-02-20 PROCEDURE — 99232 SBSQ HOSP IP/OBS MODERATE 35: CPT | Mod: GC

## 2020-02-20 RX ORDER — SOD SULF/SODIUM/NAHCO3/KCL/PEG
1 SOLUTION, RECONSTITUTED, ORAL ORAL EVERY 4 HOURS
Refills: 0 | Status: COMPLETED | OUTPATIENT
Start: 2020-02-20 | End: 2020-02-20

## 2020-02-20 RX ADMIN — Medication 2: at 13:33

## 2020-02-20 RX ADMIN — Medication 1 SUPPOSITORY(S): at 16:54

## 2020-02-20 RX ADMIN — Medication 10 MILLIGRAM(S): at 19:58

## 2020-02-20 RX ADMIN — ATORVASTATIN CALCIUM 80 MILLIGRAM(S): 80 TABLET, FILM COATED ORAL at 22:36

## 2020-02-20 RX ADMIN — Medication 2: at 18:04

## 2020-02-20 RX ADMIN — Medication 1 TABLET(S): at 12:00

## 2020-02-20 RX ADMIN — SENNA PLUS 2 TABLET(S): 8.6 TABLET ORAL at 22:36

## 2020-02-20 RX ADMIN — Medication 1 SUPPOSITORY(S): at 05:59

## 2020-02-20 RX ADMIN — MIRTAZAPINE 3.75 MILLIGRAM(S): 45 TABLET, ORALLY DISINTEGRATING ORAL at 22:36

## 2020-02-20 RX ADMIN — Medication 1 LITER(S): at 18:02

## 2020-02-20 RX ADMIN — PANTOPRAZOLE SODIUM 40 MILLIGRAM(S): 20 TABLET, DELAYED RELEASE ORAL at 05:59

## 2020-02-20 RX ADMIN — MONTELUKAST 10 MILLIGRAM(S): 4 TABLET, CHEWABLE ORAL at 16:53

## 2020-02-20 RX ADMIN — Medication 10 MILLIGRAM(S): at 16:53

## 2020-02-20 RX ADMIN — Medication 3 MILLIGRAM(S): at 22:36

## 2020-02-20 RX ADMIN — Medication 50 MICROGRAM(S): at 05:59

## 2020-02-20 NOTE — PROGRESS NOTE ADULT - ATTENDING COMMENTS
I have seen this patient with the fellow and agree with their assessment and plan. In addition,    # ATN - initial EFRAIN was secondary to ATN as a result of cardiogenic shock. Serum creatinine ranging from 1.6-1.9 now. We will continue to monitor kidney function.     # Hyponatremia - secondary to low solute intake. Serum sodium had initially improved with salt tabs. Off salt tabs for several days now and serum sodium remains stable. We will continue to monitor.     # Anemia - secondary to BRBPR. Recommend blood transfusion to keep Hb>7.     # Medication toxicity Monitoring: medication dose reviewed. Since patient has an EFRAIN, please dose medications to CrCl<15    The rest of the recommendations as per fellow's note.    Bibiana Duffy MD  Attending Nephrologist  [phone]

## 2020-02-20 NOTE — CHART NOTE - NSCHARTNOTEFT_GEN_A_CORE
Nutrition Follow Up Note  Patient seen for: malnutrition follow up    · Reason for Admission	generalized weakness and body swelling      Source: pt, chart    Diet : consistent carbohydrate, restricted fluid 1000ml, low sodium, Soft, restricted lactose  Suplena x 1 daily    Patient reports: not being very hungry-she eats and feels full. she can't remember if she is consuming Suplena-agrees to continue. her daughter helps her fill out her menu to select her preferences.      PO intake : >75% -RD observed  breakfast tray today     Source for PO intake: RD observation           Daily Weight in k.4 (-), Weight in k.4 (-18), Weight in k.4 (-17), Weight in k.8 (-), Weight in k.8 (-15), Weight in k.8 (-)  % Weight Change: 7% loss since     Pertinent Medications: MEDICATIONS  (STANDING):  atorvastatin 80 milliGRAM(s) Oral at bedtime  hydrocortisone hemorrhoidal Suppository 1 Suppository(s) Rectal two times a day  insulin lispro (HumaLOG) corrective regimen sliding scale   SubCutaneous three times a day before meals  insulin lispro (HumaLOG) corrective regimen sliding scale   SubCutaneous at bedtime  levothyroxine 50 MICROGram(s) Oral daily  melatonin 3 milliGRAM(s) Oral at bedtime  mirtazapine 3.75 milliGRAM(s) Oral at bedtime  montelukast 10 milliGRAM(s) Oral every 24 hours  multivitamin/minerals 1 Tablet(s) Oral daily  pantoprazole    Tablet 40 milliGRAM(s) Oral before breakfast  senna 2 Tablet(s) Oral at bedtime    MEDICATIONS  (PRN):  glucagon  Injectable 1 milliGRAM(s) IntraMuscular once PRN Glucose LESS THAN 70 milligrams/deciliter  polyethylene glycol 3350 17 Gram(s) Oral daily PRN Constipation    Pertinent Labs:  @ 07:08: Na 131<L>, BUN 41<H>, Cr 1.82<H>, <H>, K+ 4.6    Finger Sticks:  POCT Blood Glucose.: 119 mg/dL ( @ 08:45)  POCT Blood Glucose.: 104 mg/dL ( @ 21:57)  POCT Blood Glucose.: 232 mg/dL ( @ 16:42)  POCT Blood Glucose.: 124 mg/dL ( @ 12:17)      Skin per nursing documentation: no pressure injury  Edema: none    Estimated Needs:   [x ] no change since previous assessment  [ ] recalculated:     Previous Nutrition Diagnosis: severe malnutrition  Nutrition Diagnosis is: plan is achieved         Recommend  1) add 60 gram protein restriction (creatinine trending up) to Consistent Carbohydrate (no snacks), Restricted Fluid 1000ml, Soft, Low Sodium, Lactose Restricted (milk sugar intoler.), Soft  2) continue Suplena 1x daily in setting of CKD Stage III   3) Pt not candidate for HF or CKD nutrition diet education at this time (poor historian)   4) Encourage PO intake and provide feeding assistance PRN  5) continue multivitamin with minerals, monitor need to change to Nephro-Lavonne    Monitoring and Evaluation:     Continue to monitor Nutritional intake, Tolerance to diet prescription, weights, labs, skin integrity    RD remains available upon request and will follow up per protocol  Kira Smith MA, RD, CDN #[phone]

## 2020-02-20 NOTE — PROVIDER CONTACT NOTE (CRITICAL VALUE NOTIFICATION) - ACTION/TREATMENT ORDERED:
as above
repeat BMP ordered stat. results pending
Fibrinogen assay ,repeat PT/PTT ordered stat, close monitoring for s/s  of bleeeding
resend lactate with CMP
will transfuse 2  units

## 2020-02-20 NOTE — PROGRESS NOTE ADULT - PROBLEM SELECTOR PLAN 3
Pt. with hyponatremia, likely from decreased solute intake. Recommend increasing protein/solute intake. Monitor serum sodium.    [place] He  Nephrology Fellow  Cell: [phone] (from 8 am to 5 pm)  (After 5 pm or on weekends please page on-call fellow)

## 2020-02-20 NOTE — PROGRESS NOTE ADULT - PROBLEM SELECTOR PLAN 2
Pt. with acute blood loss anemia, with Hgb at 6.3 this AM. Recommend blood transfusion. Monitor Hgb.

## 2020-02-20 NOTE — PROGRESS NOTE ADULT - PROBLEM SELECTOR PLAN 1
- Scr 1.45 on admission, continuously uptrended but now improved back to 1.45 on 2/14/20. Scr worsened over weekend and is now 1.82 this AM.  - Patient with cardiogenic shock, now resolved. Pt. now with hemodynamically mediated EFRAIN in the setting of lower BP and acute blood loss anemia.  - Monitor UOP and daily weights. Avoid volume depletion, NSAIDs, PPI's, ARB/ACE-I. Dose medications as per eGFR.  - SPEP with migrating paraprotein identified, SIFE with IgG Lambda. Pt. with negative scan for transthyretin amyloidosis. Had fat pad biopsy on 2/13/20.

## 2020-02-20 NOTE — PROGRESS NOTE ADULT - PROBLEM SELECTOR PLAN 1
continues to have BRBPR now with significant drop in hemoglobin  GI help appreciated  transfuse 2 units  colonoscopy tomorrow   family amenable

## 2020-02-20 NOTE — PROGRESS NOTE ADULT - SUBJECTIVE AND OBJECTIVE BOX
INTERVAL HPI/OVERNIGHT EVENTS:    patient seen and examined  overnight events noted       MEDICATIONS  (STANDING):  atorvastatin 80 milliGRAM(s) Oral at bedtime  desmopressin IVPB 12 MICROGram(s) IV Intermittent once  hydrocortisone hemorrhoidal Suppository 1 Suppository(s) Rectal two times a day  insulin lispro (HumaLOG) corrective regimen sliding scale   SubCutaneous three times a day before meals  insulin lispro (HumaLOG) corrective regimen sliding scale   SubCutaneous at bedtime  levothyroxine 50 MICROGram(s) Oral daily  melatonin 3 milliGRAM(s) Oral at bedtime  mirtazapine 3.75 milliGRAM(s) Oral at bedtime  montelukast 10 milliGRAM(s) Oral every 24 hours  multivitamin/minerals 1 Tablet(s) Oral daily  pantoprazole    Tablet 40 milliGRAM(s) Oral before breakfast  senna 2 Tablet(s) Oral at bedtime    MEDICATIONS  (PRN):  glucagon  Injectable 1 milliGRAM(s) IntraMuscular once PRN Glucose LESS THAN 70 milligrams/deciliter  polyethylene glycol 3350 17 Gram(s) Oral daily PRN Constipation      Allergies    penicillin (Hives)  penicillin (Unknown)    Intolerances        Review of Systems:    General:  No wt loss, fevers, chills, night sweats,fatigue,   Eyes:  Good vision, no reported pain  ENT:  No sore throat, pain, runny nose, dysphagia  CV:  No pain, palpitatioins, hypo/hypertension  Resp:  No dyspnea, cough, tachypnea, wheezing  GI:  No pain, No nausea, No vomiting, No diarrhea, No constipatiion, No weight loss, No fever, No pruritis, +rectal bleeding, No tarry stools, No dysphagia,  :  No pain, bleeding, incontinence, nocturia  Muscle:  No pain, weakness  Neuro:  No weakness, tingling, memory problems  Psych:  No fatigue, insomnia, mood problems, depression  Endocrine:  No polyuria, polydypsia, cold/heat intolerance  Heme:  No petechiae, ecchymosis, easy bruisability  Skin:  No rash, tattoos, scars, edema      Vital Signs Last 24 Hrs  T(C): 36.6 (19 Feb 2020 11:00), Max: 36.7 (18 Feb 2020 20:35)  T(F): 97.9 (19 Feb 2020 11:00), Max: 98.1 (18 Feb 2020 20:35)  HR: 89 (19 Feb 2020 11:00) (88 - 93)  BP: 100/51 (19 Feb 2020 11:00) (100/51 - 124/74)  BP(mean): --  RR: 18 (19 Feb 2020 11:00) (17 - 18)  SpO2: 97% (19 Feb 2020 11:00) (97% - 98%)    PHYSICAL EXAM:    Constitutional: NAD  HEENT: ncat   Neck: No LAD, supple  Respiratory: breathing comfortably on RA   Cardiovascular: S1 and S2, RRR  Gastrointestinal: BS+, soft, NT/ND, +residual brbpr seen on exam   Extremities: No peripheral edema, neg clubing, cyanosis  Vascular: 2+ peripheral pulses  Neurological: A/O x 3, no focal deficits        LABS:                        8.3    10.60 )-----------( 307      ( 19 Feb 2020 06:09 )             24.9     02-19    134<L>  |  100  |  36<H>  ----------------------------<  101<H>  4.3   |  21<L>  |  1.60<H>    Ca    8.6      19 Feb 2020 06:09      PT/INR - ( 19 Feb 2020 06:09 )   PT: 12.2 sec;   INR: 1.06 ratio         PTT - ( 19 Feb 2020 06:09 )  PTT:34.3 sec      RADIOLOGY & ADDITIONAL TESTS:

## 2020-02-20 NOTE — PROGRESS NOTE ADULT - SUBJECTIVE AND OBJECTIVE BOX
[facility] DIVISION OF KIDNEY DISEASES AND HYPERTENSION -- FOLLOW UP NOTE  --------------------------------------------------------------------------------  HPI: 89 yo F with  HTN, DM, hypothyroidism admitted on 2/1/20 for generalized fatigue, found to be in cardiogenic shock, transferred to CCU for further management. Pt. was started on diuretics (now discontinued). On admission Scr was 1.45, increased to 1.68 on 2/2/20. Scr improved to 1.45 on 2/14/20, however has worsened over the weekend. Pt. improved to 1.60 yesterday, however is now 1.82. Pt. severely anemic this AM at 6.3 2/2 LGIB.    Pt. seen and examined at bedside this AM. Denies CP, SOB, N/V/F/C.     PAST HISTORY  --------------------------------------------------------------------------------  No significant changes to PMH, PSH, FHx, SHx, unless otherwise noted    ALLERGIES & MEDICATIONS  --------------------------------------------------------------------------------  Allergies    penicillin (Hives)  penicillin (Unknown)    Intolerances    Standing Inpatient Medications  atorvastatin 80 milliGRAM(s) Oral at bedtime  hydrocortisone hemorrhoidal Suppository 1 Suppository(s) Rectal two times a day  insulin lispro (HumaLOG) corrective regimen sliding scale   SubCutaneous three times a day before meals  insulin lispro (HumaLOG) corrective regimen sliding scale   SubCutaneous at bedtime  levothyroxine 50 MICROGram(s) Oral daily  melatonin 3 milliGRAM(s) Oral at bedtime  mirtazapine 3.75 milliGRAM(s) Oral at bedtime  montelukast 10 milliGRAM(s) Oral every 24 hours  multivitamin/minerals 1 Tablet(s) Oral daily  pantoprazole    Tablet 40 milliGRAM(s) Oral before breakfast  senna 2 Tablet(s) Oral at bedtime    REVIEW OF SYSTEMS  --------------------------------------------------------------------------------  Gen: + lethargy  Respiratory: No dyspnea  CV: No chest pain  GI: No abdominal pain  MSK: No LE tenderness  Neuro: No dizziness  Heme: No bleeding    All other systems were reviewed and are negative, except as noted.    VITALS/PHYSICAL EXAM  --------------------------------------------------------------------------------  T(C): 36.8 (02-20-20 @ 04:30), Max: 36.8 (02-20-20 @ 04:30)  HR: 97 (02-20-20 @ 04:30) (89 - 97)  BP: 107/66 (02-20-20 @ 04:30) (94/59 - 120/63)  RR: 18 (02-20-20 @ 04:30) (18 - 18)  SpO2: 93% (02-20-20 @ 04:30) (93% - 97%)  Wt(kg): --    02-19-20 @ 07:01  -  02-20-20 @ 07:00  --------------------------------------------------------  IN: 720 mL / OUT: 225 mL / NET: 495 mL    Physical Exam:  	Gen: NAD  	HEENT: Anicteric  	Pulm: decreased b/l breath sounds  	CV: RRR, S1S2; systolic murmur best heard at left sternal border   	Abd: +BS, soft, nontender/nondistended       	: No suprapubic tenderness  	MSK: Warm, no edema  	Neuro: Awake    LABS/STUDIES  --------------------------------------------------------------------------------              6.3    7.39  >-----------<  429      [02-20-20 @ 09:07]              20.1     131  |  96  |  41  ----------------------------<  109      [02-20-20 @ 07:08]  4.6   |  22  |  1.82        Ca     9.0     [02-20-20 @ 07:08]    Creatinine Trend:  SCr 1.82 [02-20 @ 07:08]  SCr 1.60 [02-19 @ 06:09]  SCr 1.91 [02-18 @ 07:14]  SCr 1.82 [02-17 @ 06:06]  SCr 1.69 [02-16 @ 06:35] [facility] DIVISION OF KIDNEY DISEASES AND HYPERTENSION -- FOLLOW UP NOTE  --------------------------------------------------------------------------------  HPI: 88 yo F with  HTN, DM, hypothyroidism admitted on 2/1/20 for generalized fatigue, found to be in cardiogenic shock, transferred to CCU for further management. Pt. was started on diuretics (now discontinued). On admission Scr was 1.45, increased to 1.68 on 2/2/20. Scr improved to 1.45 on 2/14/20, however has worsened over the weekend. Pt. improved to 1.60 yesterday, however is now 1.82. Pt. severely anemic this AM at 6.3 2/2 LGIB.    Pt. seen and examined at bedside this AM. Denies CP, SOB, N/V/F/C.     PAST HISTORY  --------------------------------------------------------------------------------  No significant changes to PMH, PSH, FHx, SHx, unless otherwise noted    ALLERGIES & MEDICATIONS  --------------------------------------------------------------------------------  Allergies    penicillin (Hives)  penicillin (Unknown)    Intolerances    Standing Inpatient Medications  atorvastatin 80 milliGRAM(s) Oral at bedtime  hydrocortisone hemorrhoidal Suppository 1 Suppository(s) Rectal two times a day  insulin lispro (HumaLOG) corrective regimen sliding scale   SubCutaneous three times a day before meals  insulin lispro (HumaLOG) corrective regimen sliding scale   SubCutaneous at bedtime  levothyroxine 50 MICROGram(s) Oral daily  melatonin 3 milliGRAM(s) Oral at bedtime  mirtazapine 3.75 milliGRAM(s) Oral at bedtime  montelukast 10 milliGRAM(s) Oral every 24 hours  multivitamin/minerals 1 Tablet(s) Oral daily  pantoprazole    Tablet 40 milliGRAM(s) Oral before breakfast  senna 2 Tablet(s) Oral at bedtime    REVIEW OF SYSTEMS  --------------------------------------------------------------------------------  Gen: + lethargy  Respiratory: No dyspnea  CV: No chest pain  GI: No abdominal pain  MSK: No LE tenderness  Neuro: No dizziness  Heme: No bleeding    All other systems were reviewed and are negative, except as noted.    VITALS/PHYSICAL EXAM  --------------------------------------------------------------------------------  T(C): 36.8 (02-20-20 @ 04:30), Max: 36.8 (02-20-20 @ 04:30)  HR: 97 (02-20-20 @ 04:30) (89 - 97)  BP: 107/66 (02-20-20 @ 04:30) (94/59 - 120/63)  RR: 18 (02-20-20 @ 04:30) (18 - 18)  SpO2: 93% (02-20-20 @ 04:30) (93% - 97%)  Wt(kg): --    02-19-20 @ 07:01  -  02-20-20 @ 07:00  --------------------------------------------------------  IN: 720 mL / OUT: 225 mL / NET: 495 mL    Physical Exam:  	Gen: NAD  	HEENT: Anicteric  	Pulm: decreased b/l breath sounds  	CV: RRR, S1S2; systolic murmur best heard at left sternal border   	Abd: +BS, soft, nontender/nondistended       	: No suprapubic tenderness  	MSK: Warm, no edema  	Neuro: Awake              Skin: no rash     LABS/STUDIES  --------------------------------------------------------------------------------              6.3    7.39  >-----------<  429      [02-20-20 @ 09:07]              20.1     131  |  96  |  41  ----------------------------<  109      [02-20-20 @ 07:08]  4.6   |  22  |  1.82        Ca     9.0     [02-20-20 @ 07:08]    Creatinine Trend:  SCr 1.82 [02-20 @ 07:08]  SCr 1.60 [02-19 @ 06:09]  SCr 1.91 [02-18 @ 07:14]  SCr 1.82 [02-17 @ 06:06]  SCr 1.69 [02-16 @ 06:35]

## 2020-02-20 NOTE — PROGRESS NOTE ADULT - ASSESSMENT
Rectal bleeding    - hgb cont to trend down  - i discussed with family, initially they were reluctant to do colonoscopy but now they are in agreement  - will plan for colonoscopy friday pm  - clear liquid diet  - npo p mn  - prep ordered  - cont to hold antiplatelet agents

## 2020-02-20 NOTE — CHART NOTE - NSCHARTNOTEFT_GEN_A_CORE
Patient labs revealed anemia w/ hgb of 7.6; Repeat was 6.3    Examined patient at bedside.  States had BM yesterday that was bloody.  No bowel movements today.   Denies chest pain, SOB, nausea, vomiting, headache/dizziness      Vital Signs Last 24 Hrs  T(C): 36.8 (20 Feb 2020 04:30), Max: 36.8 (20 Feb 2020 04:30)  T(F): 98.3 (20 Feb 2020 04:30), Max: 98.3 (20 Feb 2020 04:30)  HR: 97 (20 Feb 2020 04:30) (89 - 97)  BP: 107/66 (20 Feb 2020 04:30) (94/59 - 120/63)  BP(mean): --  RR: 18 (20 Feb 2020 04:30) (18 - 18)  SpO2: 93% (20 Feb 2020 04:30) (93% - 97%)    Physical Exam:  General: NAD  Neurology: A&Ox3, nonfocal, TONY x 4  Head:  Normocephalic, atraumatic, pale conjunctiva  Respiratory: CTA B/L  CV: S1, S2,   Abdominal: Soft, NT, ND no palpable mass; +external hemorrhoids, dark stool residue in anus  MSK: No edema    Labs:                          6.3    7.39  )-----------( 429      ( 20 Feb 2020 09:07 )             20.1     02-20    131<L>  |  96  |  41<H>  ----------------------------<  109<H>  4.6   |  22  |  1.82<H>    Ca    9.0      20 Feb 2020 07:08      Attending informed.   patient asymptomatic  Will transfuse 2 units  Placed on fall precautions  will get updated vitals  Patient informed not to walk without assistance  will contact GI    Patient's daughter aware.       Trenton Nguyễn PA-C  Medicine   [id number]

## 2020-02-20 NOTE — PROGRESS NOTE ADULT - SUBJECTIVE AND OBJECTIVE BOX
Patient is a 89y old  Female who presents with a chief complaint of generalized weakness and body swelling (20 Feb 2020 12:03)      SUBJECTIVE / OVERNIGHT EVENTS: overnight events noted  + LGI bleeding continued    ROS:  Resp: No cough no sputum production  CVS: No chest pain no palpitations no orthopnea  GI: no N/V/D  : no dysuria, no hematuria  Neuro: no weakness no paresthesias  Heme: No petechiae no easy bruising  Msk: No joint pain no swelling  Skin: No rash no itching        MEDICATIONS  (STANDING):  atorvastatin 80 milliGRAM(s) Oral at bedtime  bisacodyl 10 milliGRAM(s) Oral every 4 hours  hydrocortisone hemorrhoidal Suppository 1 Suppository(s) Rectal two times a day  insulin lispro (HumaLOG) corrective regimen sliding scale   SubCutaneous three times a day before meals  insulin lispro (HumaLOG) corrective regimen sliding scale   SubCutaneous at bedtime  levothyroxine 50 MICROGram(s) Oral daily  melatonin 3 milliGRAM(s) Oral at bedtime  mirtazapine 3.75 milliGRAM(s) Oral at bedtime  montelukast 10 milliGRAM(s) Oral every 24 hours  multivitamin/minerals 1 Tablet(s) Oral daily  pantoprazole    Tablet 40 milliGRAM(s) Oral before breakfast  polyethylene glycol/electrolyte Solution 1 Liter(s) Oral every 4 hours  senna 2 Tablet(s) Oral at bedtime    MEDICATIONS  (PRN):  glucagon  Injectable 1 milliGRAM(s) IntraMuscular once PRN Glucose LESS THAN 70 milligrams/deciliter  polyethylene glycol 3350 17 Gram(s) Oral daily PRN Constipation        CAPILLARY BLOOD GLUCOSE      POCT Blood Glucose.: 211 mg/dL (20 Feb 2020 17:27)  POCT Blood Glucose.: 205 mg/dL (20 Feb 2020 13:07)  POCT Blood Glucose.: 119 mg/dL (20 Feb 2020 08:45)  POCT Blood Glucose.: 104 mg/dL (19 Feb 2020 21:57)    I&O's Summary    19 Feb 2020 07:01  -  20 Feb 2020 07:00  --------------------------------------------------------  IN: 720 mL / OUT: 225 mL / NET: 495 mL    20 Feb 2020 07:01  -  20 Feb 2020 19:26  --------------------------------------------------------  IN: 640 mL / OUT: 0 mL / NET: 640 mL        Vital Signs Last 24 Hrs  T(C): 36.9 (20 Feb 2020 15:52), Max: 36.9 (20 Feb 2020 15:52)  T(F): 98.5 (20 Feb 2020 15:52), Max: 98.5 (20 Feb 2020 15:52)  HR: 97 (20 Feb 2020 15:52) (90 - 97)  BP: 122/78 (20 Feb 2020 15:52) (94/59 - 123/74)  BP(mean): --  RR: 18 (20 Feb 2020 15:52) (18 - 18)  SpO2: 98% (20 Feb 2020 15:52) (93% - 99%)    PHYSICAL EXAM: vital signs as above  HEENT: KALPESH EOMI  Neck: Supple, no JVD, no thyromegaly  Lungs: decreased breath sounds at bases   CVS: S1 S2 soft ejection systolic murmur best heard at left sternal border   Abdomen: no tenderness, no organomegaly, BS present  Neuro: non focal  Skin: warm, dry  Ext: no cyanosis or clubbing, no edema  Msk: no joint swelling or deformities    LABS:                        6.3    7.39  )-----------( 429      ( 20 Feb 2020 09:07 )             20.1     02-20    131<L>  |  96  |  41<H>  ----------------------------<  109<H>  4.6   |  22  |  1.82<H>    Ca    9.0      20 Feb 2020 07:08      PT/INR - ( 19 Feb 2020 06:09 )   PT: 12.2 sec;   INR: 1.06 ratio         PTT - ( 19 Feb 2020 06:09 )  PTT:34.3 sec            All consultant(s) notes reviewed and care discussed with other providers        Contact Number, Dr Moser [phone]

## 2020-02-21 LAB
ANION GAP SERPL CALC-SCNC: 12 MMOL/L — SIGNIFICANT CHANGE UP (ref 5–17)
BUN SERPL-MCNC: 33 MG/DL — HIGH (ref 7–23)
CALCIUM SERPL-MCNC: 8.7 MG/DL — SIGNIFICANT CHANGE UP (ref 8.4–10.5)
CHLORIDE SERPL-SCNC: 105 MMOL/L — SIGNIFICANT CHANGE UP (ref 96–108)
CO2 SERPL-SCNC: 20 MMOL/L — LOW (ref 22–31)
CREAT SERPL-MCNC: 1.6 MG/DL — HIGH (ref 0.5–1.3)
GLUCOSE BLDC GLUCOMTR-MCNC: 121 MG/DL — HIGH (ref 70–99)
GLUCOSE BLDC GLUCOMTR-MCNC: 157 MG/DL — HIGH (ref 70–99)
GLUCOSE BLDC GLUCOMTR-MCNC: 183 MG/DL — HIGH (ref 70–99)
GLUCOSE SERPL-MCNC: 119 MG/DL — HIGH (ref 70–99)
HCT VFR BLD CALC: 37.2 % — SIGNIFICANT CHANGE UP (ref 34.5–45)
HCT VFR BLD CALC: 39.1 % — SIGNIFICANT CHANGE UP (ref 34.5–45)
HGB BLD-MCNC: 12 G/DL — SIGNIFICANT CHANGE UP (ref 11.5–15.5)
HGB BLD-MCNC: 12.7 G/DL — SIGNIFICANT CHANGE UP (ref 11.5–15.5)
MAGNESIUM SERPL-MCNC: 2.2 MG/DL — SIGNIFICANT CHANGE UP (ref 1.6–2.6)
MCHC RBC-ENTMCNC: 27 PG — SIGNIFICANT CHANGE UP (ref 27–34)
MCHC RBC-ENTMCNC: 27.4 PG — SIGNIFICANT CHANGE UP (ref 27–34)
MCHC RBC-ENTMCNC: 32.3 GM/DL — SIGNIFICANT CHANGE UP (ref 32–36)
MCHC RBC-ENTMCNC: 32.5 GM/DL — SIGNIFICANT CHANGE UP (ref 32–36)
MCV RBC AUTO: 83.6 FL — SIGNIFICANT CHANGE UP (ref 80–100)
MCV RBC AUTO: 84.4 FL — SIGNIFICANT CHANGE UP (ref 80–100)
NRBC # BLD: 0 /100 WBCS — SIGNIFICANT CHANGE UP (ref 0–0)
NRBC # BLD: 0 /100 WBCS — SIGNIFICANT CHANGE UP (ref 0–0)
PLATELET # BLD AUTO: 321 K/UL — SIGNIFICANT CHANGE UP (ref 150–400)
PLATELET # BLD AUTO: 330 K/UL — SIGNIFICANT CHANGE UP (ref 150–400)
POTASSIUM SERPL-MCNC: 3.8 MMOL/L — SIGNIFICANT CHANGE UP (ref 3.5–5.3)
POTASSIUM SERPL-SCNC: 3.8 MMOL/L — SIGNIFICANT CHANGE UP (ref 3.5–5.3)
RBC # BLD: 4.45 M/UL — SIGNIFICANT CHANGE UP (ref 3.8–5.2)
RBC # BLD: 4.63 M/UL — SIGNIFICANT CHANGE UP (ref 3.8–5.2)
RBC # FLD: 17.6 % — HIGH (ref 10.3–14.5)
RBC # FLD: 18.2 % — HIGH (ref 10.3–14.5)
SODIUM SERPL-SCNC: 137 MMOL/L — SIGNIFICANT CHANGE UP (ref 135–145)
WBC # BLD: 6.25 K/UL — SIGNIFICANT CHANGE UP (ref 3.8–10.5)
WBC # BLD: 7.8 K/UL — SIGNIFICANT CHANGE UP (ref 3.8–10.5)
WBC # FLD AUTO: 6.25 K/UL — SIGNIFICANT CHANGE UP (ref 3.8–10.5)
WBC # FLD AUTO: 7.8 K/UL — SIGNIFICANT CHANGE UP (ref 3.8–10.5)

## 2020-02-21 RX ADMIN — Medication 1: at 18:02

## 2020-02-21 RX ADMIN — ATORVASTATIN CALCIUM 80 MILLIGRAM(S): 80 TABLET, FILM COATED ORAL at 21:26

## 2020-02-21 RX ADMIN — PANTOPRAZOLE SODIUM 40 MILLIGRAM(S): 20 TABLET, DELAYED RELEASE ORAL at 06:13

## 2020-02-21 RX ADMIN — Medication 50 MICROGRAM(S): at 06:13

## 2020-02-21 RX ADMIN — MIRTAZAPINE 3.75 MILLIGRAM(S): 45 TABLET, ORALLY DISINTEGRATING ORAL at 21:26

## 2020-02-21 RX ADMIN — MONTELUKAST 10 MILLIGRAM(S): 4 TABLET, CHEWABLE ORAL at 17:03

## 2020-02-21 RX ADMIN — Medication 3 MILLIGRAM(S): at 21:26

## 2020-02-21 RX ADMIN — Medication 1 SUPPOSITORY(S): at 17:03

## 2020-02-21 NOTE — PROGRESS NOTE ADULT - SUBJECTIVE AND OBJECTIVE BOX
Pre-Endoscopy Evaluation      Referring Physician:  dr. prabhjot bolivar                                  Procedure: colonoscopy    Indication for Procedure: rectal bleeding    Sedation by Anesthesia [X]    PAST MEDICAL & SURGICAL HISTORY:  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  Hypertension  Hyperlipidemia  Hypothyroid  Osteoporosis  Gall stone  DM (diabetes mellitus)  History of gallstones  No significant past surgical history      PMH of Gastroparesis [ ]  Gastric Surgery [ ]  Gastric Outlet Obstruction [ ]    Allergies    penicillin (Hives)  penicillin (Unknown)    Intolerances    Latex allergy: [ ] yes [x] no    Medications:MEDICATIONS  (STANDING):  atorvastatin 80 milliGRAM(s) Oral at bedtime  hydrocortisone hemorrhoidal Suppository 1 Suppository(s) Rectal two times a day  insulin lispro (HumaLOG) corrective regimen sliding scale   SubCutaneous three times a day before meals  insulin lispro (HumaLOG) corrective regimen sliding scale   SubCutaneous at bedtime  levothyroxine 50 MICROGram(s) Oral daily  melatonin 3 milliGRAM(s) Oral at bedtime  mirtazapine 3.75 milliGRAM(s) Oral at bedtime  montelukast 10 milliGRAM(s) Oral every 24 hours  multivitamin/minerals 1 Tablet(s) Oral daily  pantoprazole    Tablet 40 milliGRAM(s) Oral before breakfast  senna 2 Tablet(s) Oral at bedtime    MEDICATIONS  (PRN):  glucagon  Injectable 1 milliGRAM(s) IntraMuscular once PRN Glucose LESS THAN 70 milligrams/deciliter  polyethylene glycol 3350 17 Gram(s) Oral daily PRN Constipation      Smoking: [ ] yes [X] no    AICD/PPM: [ ] yes   [X] no    Pertinent lab data:                        12.0   6.25  )-----------( 321      ( 2020 05:56 )             37.2         137  |  105  |  33<H>  ----------------------------<  119<H>  3.8   |  20<L>  |  1.60<H>    Ca    8.7      2020 05:56  Mg     2.2           < from: Transthoracic Echocardiogram (20 @ 12:59) >    ------------------------------------------------------------------------    EF (Visual Estimate): 75 %  Doppler Peak Velocity (m/sec): TV=3.2  ------------------------------------------------------------------------  Observations:  Mitral Valve: Severe mitral annular and leaflet  calcification. Calcified mitral chordae.  Moderate mitral stenosis. Mean mitral gradient 5 mmHg at  70/min.  Mild mitral regurgitation.  Aortic Valve/Aorta: Mild aortic stenosis.  Peak velocities in the LVOT and aorta 0.8 and 1.8 m/s,  respectively.  LVOTd 1.8 cm.  Aortic valve area by continuity 1.1 cm2 = 0.95 cm2/m2.  Mild aortic regurgitation.  Normal aortic root size.  Left Atrium: Severe left atrial enlargement.  Left Ventricle: Normal left ventricular internal dimensions  andwall thicknesses.  Hyperdynamic left ventricle.  Estimated left atrial pressure is severely elevated. IVRT  70 msec.  Right Heart: Severe right atrial enlargement.  Severe right ventricular enlargement with normal right  ventricular systolic function.  Calcified tricuspid valve leaflets. Severe tricuspid  regurgitation.  Normal pulmonic valve.  Pericardium/Pleura: Small pericardial effusion.  Bilateral pleural effusions.  Hemodynamic: Estimated right atrial pressure is severely  elevated.  Moderate pulmonary hypertension. Estimated PASP 60 mmHg.  ------------------------------------------------------------------------  Conclusions:  Hyperdynamic left ventricle.  Moderate mitral stenosis due to annular and leaflet  calcification.  Mild aortic stenosis.  Severe right ventricular enlargement with normal right  ventricular systolic function.  Moderate pulmonary hypertension.  ---------------------------------------------    CAPILLARY BLOOD GLUCOSE    POCT Blood Glucose.: 121 mg/dL (2020 08:31)      Physical Examination:  Daily     Daily Weight in k.8 (2020 04:56)  Vital Signs Last 24 Hrs  T(C): 36.4 (2020 11:38), Max: 36.9 (2020 15:52)  T(F): 97.6 (2020 11:38), Max: 98.5 (2020 15:52)  HR: 82 (2020 11:38) (82 - 104)  BP: 147/81 (2020 11:38) (118/66 - 147/81)  BP(mean): --  RR: 18 (2020 11:38) (18 - 18)  SpO2: 96% (2020 11:38) (94% - 99%)    Drug Dosing Weight  Height (cm): 139.7 (2020 18:31)  Weight (kg): 36.3 (2020 13:54)  BMI (kg/m2): 18.6 (2020 18:31)  BSA (m2): 1.19 (2020 18:31)    Constitutional: NAD     Neck:  No JVD    Respiratory: CTAB/L    Cardiovascular: S1 and S2    Gastrointestinal: BS+, soft, NT/ND    Extremities: No peripheral edema    Neurological: A/O x 3    : No Ovalle    Skin: No rashes    Comments:      The patient is a suitable candidate for the planned procedure unless box checked [ ]  No, explain:

## 2020-02-21 NOTE — PROGRESS NOTE ADULT - PROBLEM SELECTOR PLAN 1
colonoscopy noted  restart ASA  discussed with cardiology attending no need for Plavix  risk of rebleeding high with Plavix and ASA  hemoglobin improved s/p two units

## 2020-02-21 NOTE — PROGRESS NOTE ADULT - SUBJECTIVE AND OBJECTIVE BOX
Patient is a 89y old  Female who presents with a chief complaint of generalized weakness and body swelling (21 Feb 2020 12:11)      SUBJECTIVE / OVERNIGHT EVENTS: overnight events noted    ROS:  Resp: No cough no sputum production  CVS: No chest pain no palpitations no orthopnea  GI: no N/V/D  : no dysuria, no hematuria  Neuro: no weakness no paresthesias  Heme: No petechiae no easy bruising  Msk: No joint pain no swelling  Skin: No rash no itching        MEDICATIONS  (STANDING):  atorvastatin 80 milliGRAM(s) Oral at bedtime  hydrocortisone hemorrhoidal Suppository 1 Suppository(s) Rectal two times a day  insulin lispro (HumaLOG) corrective regimen sliding scale   SubCutaneous three times a day before meals  insulin lispro (HumaLOG) corrective regimen sliding scale   SubCutaneous at bedtime  levothyroxine 50 MICROGram(s) Oral daily  melatonin 3 milliGRAM(s) Oral at bedtime  mirtazapine 3.75 milliGRAM(s) Oral at bedtime  montelukast 10 milliGRAM(s) Oral every 24 hours  multivitamin/minerals 1 Tablet(s) Oral daily  pantoprazole    Tablet 40 milliGRAM(s) Oral before breakfast  senna 2 Tablet(s) Oral at bedtime    MEDICATIONS  (PRN):  glucagon  Injectable 1 milliGRAM(s) IntraMuscular once PRN Glucose LESS THAN 70 milligrams/deciliter  polyethylene glycol 3350 17 Gram(s) Oral daily PRN Constipation        CAPILLARY BLOOD GLUCOSE      POCT Blood Glucose.: 121 mg/dL (21 Feb 2020 08:31)  POCT Blood Glucose.: 136 mg/dL (20 Feb 2020 22:09)  POCT Blood Glucose.: 211 mg/dL (20 Feb 2020 17:27)    I&O's Summary    20 Feb 2020 07:01  -  21 Feb 2020 07:00  --------------------------------------------------------  IN: 1460 mL / OUT: 0 mL / NET: 1460 mL        Vital Signs Last 24 Hrs  T(C): 36.4 (21 Feb 2020 11:38), Max: 36.4 (20 Feb 2020 20:37)  T(F): 97.6 (21 Feb 2020 11:38), Max: 97.6 (20 Feb 2020 20:37)  HR: 82 (21 Feb 2020 11:38) (82 - 104)  BP: 147/81 (21 Feb 2020 11:38) (118/66 - 147/81)  BP(mean): --  RR: 18 (21 Feb 2020 11:38) (18 - 18)  SpO2: 96% (21 Feb 2020 11:38) (94% - 99%)    PHYSICAL EXAM: vital signs as above  HEENT: KALPESH EOMI  Neck: Supple, no JVD, no thyromegaly  Lungs: decreased breath sounds at bases   CVS: S1 S2 soft ejection systolic murmur best heard at left sternal border   Abdomen: no tenderness, no organomegaly, BS present  Neuro: non focal  Skin: warm, dry  Ext: no cyanosis or clubbing, no edema  Msk: no joint swelling or deformities    LABS:                        12.0   6.25  )-----------( 321      ( 21 Feb 2020 05:56 )             37.2     02-21    137  |  105  |  33<H>  ----------------------------<  119<H>  3.8   |  20<L>  |  1.60<H>    Ca    8.7      21 Feb 2020 05:56  Mg     2.2     02-21        All consultant(s) notes reviewed and care discussed with other providers        Contact Number, Dr Moser [phone]

## 2020-02-22 LAB
ANION GAP SERPL CALC-SCNC: 15 MMOL/L — SIGNIFICANT CHANGE UP (ref 5–17)
BUN SERPL-MCNC: 33 MG/DL — HIGH (ref 7–23)
CALCIUM SERPL-MCNC: 8.6 MG/DL — SIGNIFICANT CHANGE UP (ref 8.4–10.5)
CHLORIDE SERPL-SCNC: 104 MMOL/L — SIGNIFICANT CHANGE UP (ref 96–108)
CO2 SERPL-SCNC: 17 MMOL/L — LOW (ref 22–31)
CREAT SERPL-MCNC: 1.68 MG/DL — HIGH (ref 0.5–1.3)
GLUCOSE BLDC GLUCOMTR-MCNC: 103 MG/DL — HIGH (ref 70–99)
GLUCOSE BLDC GLUCOMTR-MCNC: 139 MG/DL — HIGH (ref 70–99)
GLUCOSE BLDC GLUCOMTR-MCNC: 146 MG/DL — HIGH (ref 70–99)
GLUCOSE BLDC GLUCOMTR-MCNC: 191 MG/DL — HIGH (ref 70–99)
GLUCOSE SERPL-MCNC: 106 MG/DL — HIGH (ref 70–99)
HCT VFR BLD CALC: 37.7 % — SIGNIFICANT CHANGE UP (ref 34.5–45)
HGB BLD-MCNC: 12 G/DL — SIGNIFICANT CHANGE UP (ref 11.5–15.5)
MAGNESIUM SERPL-MCNC: 2 MG/DL — SIGNIFICANT CHANGE UP (ref 1.6–2.6)
MCHC RBC-ENTMCNC: 27.1 PG — SIGNIFICANT CHANGE UP (ref 27–34)
MCHC RBC-ENTMCNC: 31.8 GM/DL — LOW (ref 32–36)
MCV RBC AUTO: 85.1 FL — SIGNIFICANT CHANGE UP (ref 80–100)
NRBC # BLD: 0 /100 WBCS — SIGNIFICANT CHANGE UP (ref 0–0)
PLATELET # BLD AUTO: 323 K/UL — SIGNIFICANT CHANGE UP (ref 150–400)
POTASSIUM SERPL-MCNC: 4.2 MMOL/L — SIGNIFICANT CHANGE UP (ref 3.5–5.3)
POTASSIUM SERPL-SCNC: 4.2 MMOL/L — SIGNIFICANT CHANGE UP (ref 3.5–5.3)
RBC # BLD: 4.43 M/UL — SIGNIFICANT CHANGE UP (ref 3.8–5.2)
RBC # FLD: 18.1 % — HIGH (ref 10.3–14.5)
SODIUM SERPL-SCNC: 136 MMOL/L — SIGNIFICANT CHANGE UP (ref 135–145)
WBC # BLD: 6.88 K/UL — SIGNIFICANT CHANGE UP (ref 3.8–10.5)
WBC # FLD AUTO: 6.88 K/UL — SIGNIFICANT CHANGE UP (ref 3.8–10.5)

## 2020-02-22 RX ORDER — ASPIRIN/CALCIUM CARB/MAGNESIUM 324 MG
81 TABLET ORAL DAILY
Refills: 0 | Status: DISCONTINUED | OUTPATIENT
Start: 2020-02-22 | End: 2020-02-25

## 2020-02-22 RX ADMIN — PANTOPRAZOLE SODIUM 40 MILLIGRAM(S): 20 TABLET, DELAYED RELEASE ORAL at 06:21

## 2020-02-22 RX ADMIN — MIRTAZAPINE 3.75 MILLIGRAM(S): 45 TABLET, ORALLY DISINTEGRATING ORAL at 21:55

## 2020-02-22 RX ADMIN — MONTELUKAST 10 MILLIGRAM(S): 4 TABLET, CHEWABLE ORAL at 18:15

## 2020-02-22 RX ADMIN — ATORVASTATIN CALCIUM 80 MILLIGRAM(S): 80 TABLET, FILM COATED ORAL at 21:55

## 2020-02-22 RX ADMIN — Medication 50 MICROGRAM(S): at 06:21

## 2020-02-22 RX ADMIN — Medication 1 SUPPOSITORY(S): at 06:21

## 2020-02-22 RX ADMIN — Medication 1: at 18:15

## 2020-02-22 RX ADMIN — Medication 1 SUPPOSITORY(S): at 18:15

## 2020-02-22 RX ADMIN — Medication 1 TABLET(S): at 12:17

## 2020-02-22 RX ADMIN — Medication 3 MILLIGRAM(S): at 21:55

## 2020-02-22 RX ADMIN — Medication 81 MILLIGRAM(S): at 12:21

## 2020-02-22 NOTE — PROGRESS NOTE ADULT - ASSESSMENT
Rectal bleeding    - s/p colonoscopy yesterday which revealed Diverticulosis in the ascending colon.                       - Internal hemorrhoids.  - trend hgb/hct  - diet as tolerated  -ok to resume ASA

## 2020-02-22 NOTE — PROGRESS NOTE ADULT - SUBJECTIVE AND OBJECTIVE BOX
GASTROENTEROLOGY    Patient seen and examined at bedside  Awake, alert, oriented  In good spirits  No nausea/vomiting  No melena/hematochezia  Tolerating diet        MEDICATIONS  (STANDING):  atorvastatin 80 milliGRAM(s) Oral at bedtime  hydrocortisone hemorrhoidal Suppository 1 Suppository(s) Rectal two times a day  insulin lispro (HumaLOG) corrective regimen sliding scale   SubCutaneous three times a day before meals  insulin lispro (HumaLOG) corrective regimen sliding scale   SubCutaneous at bedtime  levothyroxine 50 MICROGram(s) Oral daily  melatonin 3 milliGRAM(s) Oral at bedtime  mirtazapine 3.75 milliGRAM(s) Oral at bedtime  montelukast 10 milliGRAM(s) Oral every 24 hours  multivitamin/minerals 1 Tablet(s) Oral daily  pantoprazole    Tablet 40 milliGRAM(s) Oral before breakfast  senna 2 Tablet(s) Oral at bedtime        T(F): 98.7 (02-22-20 @ 04:26), Max: 98.7 (02-22-20 @ 04:26)  HR: 74 (02-22-20 @ 04:26) (74 - 88)  BP: 132/80 (02-22-20 @ 04:26) (123/81 - 147/81)  RR: 18 (02-22-20 @ 04:26) (18 - 18)  SpO2: 97% (02-22-20 @ 04:26) (96% - 97%)  Wt(kg): --    PHYSICAL EXAM  GENERAL:   NAD  HEENT:  NC/AT, no JVD, sclera-anicteric  CHEST:  clear to ascultation bilaterally, respirations nonlabored  HEART:  +S1+S2 regular rate and rhythm   ABDOMEN:  Soft, non-tender, non-distended, + bowel sounds  EXTREMITIES:  no cyanosis, clubbing or edema  NEURO:  Alert, oriented  SKIN:  No rash/warm/dry      LABS:                        12.0   6.88  )-----------( 323      ( 22 Feb 2020 07:16 )             37.7   22 Feb 2020 07:16    02-22    136  |  104  |  33<H>  ----------------------------<  106<H>  4.2   |  17<L>  |  1.68<H>    Ca    8.6      22 Feb 2020 07:16  Mg     2.0     02-22      < from: Colonoscopy (02.21.20 @ 12:58) >    Findings:       Multiple small-mouthed diverticula were found in the ascending colon.       Internal hemorrhoids were found during endoscopy. The hemorrhoids were moderate.                                                                                                        Impression:          - Diverticulosis in the ascending colon.                       - Internal hemorrhoids.                      - No specimens collected.  Recommendation:      - [facility].                       - Resume regular diet.                       - The findings and recommendations were discussed with the patient's family.  --------------------------------------------------------------------        I&O's Detail    21 Feb 2020 07:01  -  22 Feb 2020 07:00  --------------------------------------------------------  IN:  Total IN: 0 mL    OUT:  Total OUT: 0 mL    Total NET: 0 mL      22 Feb 2020 07:01  -  22 Feb 2020 11:13  --------------------------------------------------------  IN:    Oral Fluid: 180 mL  Total IN: 180 mL    OUT:  Total OUT: 0 mL    Total NET: 180 mL

## 2020-02-22 NOTE — PROGRESS NOTE ADULT - PROBLEM SELECTOR PLAN 1
no further BRBPR   restart ASA today   discussed with cardiology attending no need for Plavix  risk of rebleeding high with Plavix and ASA  hemoglobin improved s/p two units

## 2020-02-22 NOTE — PROVIDER CONTACT NOTE (OTHER) - ACTION/TREATMENT ORDERED:
PA notified and aware.  Okay to do CBC with AM labs.  Continue to monitor patient and maintain safety

## 2020-02-22 NOTE — PROGRESS NOTE ADULT - SUBJECTIVE AND OBJECTIVE BOX
Patient is a 89y old  Female who presents with a chief complaint of generalized weakness and body swelling (22 Feb 2020 11:13)      SUBJECTIVE / OVERNIGHT EVENTS: overnight events noted    ROS:  Resp: No cough no sputum production  CVS: No chest pain no palpitations no orthopnea  GI: no N/V/D  : no dysuria, no hematuria  Neuro: no weakness no paresthesias  Heme: No petechiae no easy bruising  Msk: No joint pain no swelling  Skin: No rash no itching        MEDICATIONS  (STANDING):  aspirin enteric coated 81 milliGRAM(s) Oral daily  atorvastatin 80 milliGRAM(s) Oral at bedtime  hydrocortisone hemorrhoidal Suppository 1 Suppository(s) Rectal two times a day  insulin lispro (HumaLOG) corrective regimen sliding scale   SubCutaneous three times a day before meals  insulin lispro (HumaLOG) corrective regimen sliding scale   SubCutaneous at bedtime  levothyroxine 50 MICROGram(s) Oral daily  melatonin 3 milliGRAM(s) Oral at bedtime  mirtazapine 3.75 milliGRAM(s) Oral at bedtime  montelukast 10 milliGRAM(s) Oral every 24 hours  multivitamin/minerals 1 Tablet(s) Oral daily  pantoprazole    Tablet 40 milliGRAM(s) Oral before breakfast  senna 2 Tablet(s) Oral at bedtime    MEDICATIONS  (PRN):  glucagon  Injectable 1 milliGRAM(s) IntraMuscular once PRN Glucose LESS THAN 70 milligrams/deciliter  polyethylene glycol 3350 17 Gram(s) Oral daily PRN Constipation        CAPILLARY BLOOD GLUCOSE      POCT Blood Glucose.: 139 mg/dL (22 Feb 2020 12:44)  POCT Blood Glucose.: 103 mg/dL (22 Feb 2020 08:44)  POCT Blood Glucose.: 157 mg/dL (21 Feb 2020 22:52)  POCT Blood Glucose.: 183 mg/dL (21 Feb 2020 17:27)    I&O's Summary    21 Feb 2020 07:01  -  22 Feb 2020 07:00  --------------------------------------------------------  IN: 0 mL / OUT: 0 mL / NET: 0 mL    22 Feb 2020 07:01  -  22 Feb 2020 13:29  --------------------------------------------------------  IN: 180 mL / OUT: 0 mL / NET: 180 mL        Vital Signs Last 24 Hrs  T(C): 36.8 (22 Feb 2020 12:12), Max: 37.1 (22 Feb 2020 04:26)  T(F): 98.2 (22 Feb 2020 12:12), Max: 98.7 (22 Feb 2020 04:26)  HR: 87 (22 Feb 2020 12:12) (74 - 88)  BP: 135/83 (22 Feb 2020 12:12) (123/81 - 135/83)  BP(mean): --  RR: 18 (22 Feb 2020 12:12) (18 - 18)  SpO2: 98% (22 Feb 2020 12:12) (97% - 98%)    PHYSICAL EXAM: vital signs as above  HEENT: KALPESH EOMI  Neck: Supple, no JVD, no thyromegaly  Lungs: decreased breath sounds at bases   CVS: S1 S2 soft ejection systolic murmur best heard at left sternal border   Abdomen: no tenderness, no organomegaly, BS present  Neuro: non focal  Skin: warm, dry  Ext: no cyanosis or clubbing, no edema  Msk: no joint swelling or deformities    LABS:                        12.0   6.88  )-----------( 323      ( 22 Feb 2020 07:16 )             37.7     02-22    136  |  104  |  33<H>  ----------------------------<  106<H>  4.2   |  17<L>  |  1.68<H>    Ca    8.6      22 Feb 2020 07:16  Mg     2.0     02-22                  All consultant(s) notes reviewed and care discussed with other providers        Contact Number, Dr Moser [phone]

## 2020-02-22 NOTE — PROGRESS NOTE ADULT - SUBJECTIVE AND OBJECTIVE BOX
ANESTHESIA POSTOP CHECK    Patient seen and examined at bedside.     Vital Signs Last 24 Hrs  T(C): 37.1 (22 Feb 2020 04:26), Max: 37.1 (22 Feb 2020 04:26)  T(F): 98.7 (22 Feb 2020 04:26), Max: 98.7 (22 Feb 2020 04:26)  HR: 74 (22 Feb 2020 04:26) (74 - 88)  BP: 132/80 (22 Feb 2020 04:26) (123/81 - 147/81)  BP(mean): --  RR: 18 (22 Feb 2020 04:26) (18 - 18)  SpO2: 97% (22 Feb 2020 04:26) (96% - 97%)  I&O's Summary    21 Feb 2020 07:01  -  22 Feb 2020 07:00  --------------------------------------------------------  IN: 0 mL / OUT: 0 mL / NET: 0 mL        No apparent anesthetic complications at this time.  All questions were answered.      Meg Shukla D.O.  Anesthesia CA-1   Pager [phone]

## 2020-02-22 NOTE — PROVIDER CONTACT NOTE (OTHER) - ASSESSMENT
A&O x4.  Patient with no blood clots and no active s/s of bleeding noted.  Last CBC done at 1730 on 2-19

## 2020-02-23 LAB
GLUCOSE BLDC GLUCOMTR-MCNC: 150 MG/DL — HIGH (ref 70–99)
GLUCOSE BLDC GLUCOMTR-MCNC: 157 MG/DL — HIGH (ref 70–99)
GLUCOSE BLDC GLUCOMTR-MCNC: 167 MG/DL — HIGH (ref 70–99)
GLUCOSE BLDC GLUCOMTR-MCNC: 176 MG/DL — HIGH (ref 70–99)
GLUCOSE BLDC GLUCOMTR-MCNC: 212 MG/DL — HIGH (ref 70–99)

## 2020-02-23 RX ORDER — HEPARIN SODIUM 5000 [USP'U]/ML
5000 INJECTION INTRAVENOUS; SUBCUTANEOUS EVERY 12 HOURS
Refills: 0 | Status: DISCONTINUED | OUTPATIENT
Start: 2020-02-23 | End: 2020-02-25

## 2020-02-23 RX ADMIN — Medication 81 MILLIGRAM(S): at 09:31

## 2020-02-23 RX ADMIN — Medication 1: at 09:32

## 2020-02-23 RX ADMIN — Medication 2: at 12:29

## 2020-02-23 RX ADMIN — Medication 1 SUPPOSITORY(S): at 06:49

## 2020-02-23 RX ADMIN — Medication 1 SUPPOSITORY(S): at 18:47

## 2020-02-23 RX ADMIN — Medication 3 MILLIGRAM(S): at 21:37

## 2020-02-23 RX ADMIN — Medication 1 TABLET(S): at 09:31

## 2020-02-23 RX ADMIN — MONTELUKAST 10 MILLIGRAM(S): 4 TABLET, CHEWABLE ORAL at 18:47

## 2020-02-23 RX ADMIN — HEPARIN SODIUM 5000 UNIT(S): 5000 INJECTION INTRAVENOUS; SUBCUTANEOUS at 18:51

## 2020-02-23 RX ADMIN — PANTOPRAZOLE SODIUM 40 MILLIGRAM(S): 20 TABLET, DELAYED RELEASE ORAL at 06:48

## 2020-02-23 RX ADMIN — MIRTAZAPINE 3.75 MILLIGRAM(S): 45 TABLET, ORALLY DISINTEGRATING ORAL at 21:37

## 2020-02-23 RX ADMIN — SENNA PLUS 2 TABLET(S): 8.6 TABLET ORAL at 21:37

## 2020-02-23 RX ADMIN — ATORVASTATIN CALCIUM 80 MILLIGRAM(S): 80 TABLET, FILM COATED ORAL at 21:37

## 2020-02-23 RX ADMIN — Medication 50 MICROGRAM(S): at 06:48

## 2020-02-23 NOTE — PROGRESS NOTE ADULT - ASSESSMENT
Rectal bleeding    - s/p colonoscopy yesterday which revealed Diverticulosis in the ascending colon.                       - Internal hemorrhoids.  - hgb/hct stable as of yesterday's labwork  - diet as tolerated  -ok to resume ASA  - discharge planning per primary team

## 2020-02-23 NOTE — PROGRESS NOTE ADULT - ATTENDING COMMENTS
discussed with patient in detail, expresses understanding of treatment plans.  anticipate discharge to [facility] tomorrow if bed available

## 2020-02-23 NOTE — PROGRESS NOTE ADULT - SUBJECTIVE AND OBJECTIVE BOX
Patient is a 89y old  Female who presents with a chief complaint of generalized weakness and body swelling (22 Feb 2020 13:28)      SUBJECTIVE / OVERNIGHT EVENTS: overnight events noted    ROS:  Resp: No cough no sputum production  CVS: No chest pain no palpitations no orthopnea  GI: no N/V/D  : no dysuria, no hematuria  Neuro: no weakness no paresthesias  Heme: No petechiae no easy bruising  Msk: No joint pain no swelling  Skin: No rash no itching        MEDICATIONS  (STANDING):  aspirin enteric coated 81 milliGRAM(s) Oral daily  atorvastatin 80 milliGRAM(s) Oral at bedtime  heparin  Injectable 5000 Unit(s) SubCutaneous every 12 hours  hydrocortisone hemorrhoidal Suppository 1 Suppository(s) Rectal two times a day  insulin lispro (HumaLOG) corrective regimen sliding scale   SubCutaneous three times a day before meals  insulin lispro (HumaLOG) corrective regimen sliding scale   SubCutaneous at bedtime  levothyroxine 50 MICROGram(s) Oral daily  melatonin 3 milliGRAM(s) Oral at bedtime  mirtazapine 3.75 milliGRAM(s) Oral at bedtime  montelukast 10 milliGRAM(s) Oral every 24 hours  multivitamin/minerals 1 Tablet(s) Oral daily  pantoprazole    Tablet 40 milliGRAM(s) Oral before breakfast  senna 2 Tablet(s) Oral at bedtime    MEDICATIONS  (PRN):  glucagon  Injectable 1 milliGRAM(s) IntraMuscular once PRN Glucose LESS THAN 70 milligrams/deciliter  polyethylene glycol 3350 17 Gram(s) Oral daily PRN Constipation        CAPILLARY BLOOD GLUCOSE      POCT Blood Glucose.: 212 mg/dL (23 Feb 2020 12:22)  POCT Blood Glucose.: 157 mg/dL (23 Feb 2020 08:39)  POCT Blood Glucose.: 146 mg/dL (22 Feb 2020 21:50)  POCT Blood Glucose.: 191 mg/dL (22 Feb 2020 17:39)    I&O's Summary    22 Feb 2020 07:01  -  23 Feb 2020 07:00  --------------------------------------------------------  IN: 960 mL / OUT: 600 mL / NET: 360 mL    23 Feb 2020 07:01  -  23 Feb 2020 13:04  --------------------------------------------------------  IN: 300 mL / OUT: 100 mL / NET: 200 mL        Vital Signs Last 24 Hrs  T(C): 36.6 (23 Feb 2020 11:41), Max: 36.7 (22 Feb 2020 21:02)  T(F): 97.8 (23 Feb 2020 11:41), Max: 98.1 (22 Feb 2020 21:02)  HR: 78 (23 Feb 2020 11:41) (78 - 88)  BP: 123/72 (23 Feb 2020 11:41) (123/72 - 138/86)  BP(mean): --  RR: 18 (23 Feb 2020 11:41) (18 - 18)  SpO2: 98% (23 Feb 2020 11:41) (97% - 98%)    PHYSICAL EXAM: vital signs as above  HEENT: KALPESH EOMI  Neck: Supple, no JVD, no thyromegaly  Lungs: decreased breath sounds at bases   CVS: S1 S2 soft ejection systolic murmur best heard at left sternal border   Abdomen: no tenderness, no organomegaly, BS present  Neuro: non focal  Skin: warm, dry  Ext: no cyanosis or clubbing, no edema  Msk: no joint swelling or deformities    LABS:                        12.0   6.88  )-----------( 323      ( 22 Feb 2020 07:16 )             37.7     02-22    136  |  104  |  33<H>  ----------------------------<  106<H>  4.2   |  17<L>  |  1.68<H>    Ca    8.6      22 Feb 2020 07:16  Mg     2.0     02-22                  All consultant(s) notes reviewed and care discussed with other providers        Contact Number, Dr Moser [phone]

## 2020-02-24 LAB
ANION GAP SERPL CALC-SCNC: 11 MMOL/L — SIGNIFICANT CHANGE UP (ref 5–17)
BUN SERPL-MCNC: 32 MG/DL — HIGH (ref 7–23)
CALCIUM SERPL-MCNC: 8.9 MG/DL — SIGNIFICANT CHANGE UP (ref 8.4–10.5)
CHLORIDE SERPL-SCNC: 101 MMOL/L — SIGNIFICANT CHANGE UP (ref 96–108)
CO2 SERPL-SCNC: 19 MMOL/L — LOW (ref 22–31)
CREAT SERPL-MCNC: 1.81 MG/DL — HIGH (ref 0.5–1.3)
GLUCOSE BLDC GLUCOMTR-MCNC: 135 MG/DL — HIGH (ref 70–99)
GLUCOSE BLDC GLUCOMTR-MCNC: 136 MG/DL — HIGH (ref 70–99)
GLUCOSE BLDC GLUCOMTR-MCNC: 162 MG/DL — HIGH (ref 70–99)
GLUCOSE BLDC GLUCOMTR-MCNC: 177 MG/DL — HIGH (ref 70–99)
GLUCOSE SERPL-MCNC: 150 MG/DL — HIGH (ref 70–99)
HCT VFR BLD CALC: 39.6 % — SIGNIFICANT CHANGE UP (ref 34.5–45)
HGB BLD-MCNC: 12.5 G/DL — SIGNIFICANT CHANGE UP (ref 11.5–15.5)
MCHC RBC-ENTMCNC: 27.4 PG — SIGNIFICANT CHANGE UP (ref 27–34)
MCHC RBC-ENTMCNC: 31.6 GM/DL — LOW (ref 32–36)
MCV RBC AUTO: 86.7 FL — SIGNIFICANT CHANGE UP (ref 80–100)
NRBC # BLD: 0 /100 WBCS — SIGNIFICANT CHANGE UP (ref 0–0)
PLATELET # BLD AUTO: 311 K/UL — SIGNIFICANT CHANGE UP (ref 150–400)
POTASSIUM SERPL-MCNC: 4.6 MMOL/L — SIGNIFICANT CHANGE UP (ref 3.5–5.3)
POTASSIUM SERPL-SCNC: 4.6 MMOL/L — SIGNIFICANT CHANGE UP (ref 3.5–5.3)
RBC # BLD: 4.57 M/UL — SIGNIFICANT CHANGE UP (ref 3.8–5.2)
RBC # FLD: 18.5 % — HIGH (ref 10.3–14.5)
SODIUM SERPL-SCNC: 131 MMOL/L — LOW (ref 135–145)
WBC # BLD: 8.23 K/UL — SIGNIFICANT CHANGE UP (ref 3.8–10.5)
WBC # FLD AUTO: 8.23 K/UL — SIGNIFICANT CHANGE UP (ref 3.8–10.5)

## 2020-02-24 RX ADMIN — MIRTAZAPINE 3.75 MILLIGRAM(S): 45 TABLET, ORALLY DISINTEGRATING ORAL at 21:03

## 2020-02-24 RX ADMIN — Medication 1 SUPPOSITORY(S): at 05:16

## 2020-02-24 RX ADMIN — Medication 81 MILLIGRAM(S): at 13:29

## 2020-02-24 RX ADMIN — MONTELUKAST 10 MILLIGRAM(S): 4 TABLET, CHEWABLE ORAL at 17:13

## 2020-02-24 RX ADMIN — HEPARIN SODIUM 5000 UNIT(S): 5000 INJECTION INTRAVENOUS; SUBCUTANEOUS at 05:16

## 2020-02-24 RX ADMIN — ATORVASTATIN CALCIUM 80 MILLIGRAM(S): 80 TABLET, FILM COATED ORAL at 21:03

## 2020-02-24 RX ADMIN — Medication 50 MICROGRAM(S): at 05:15

## 2020-02-24 RX ADMIN — Medication 3 MILLIGRAM(S): at 21:03

## 2020-02-24 RX ADMIN — Medication 1 TABLET(S): at 13:30

## 2020-02-24 RX ADMIN — SENNA PLUS 2 TABLET(S): 8.6 TABLET ORAL at 21:02

## 2020-02-24 RX ADMIN — PANTOPRAZOLE SODIUM 40 MILLIGRAM(S): 20 TABLET, DELAYED RELEASE ORAL at 05:15

## 2020-02-24 RX ADMIN — Medication 1: at 13:30

## 2020-02-24 RX ADMIN — HEPARIN SODIUM 5000 UNIT(S): 5000 INJECTION INTRAVENOUS; SUBCUTANEOUS at 17:13

## 2020-02-24 NOTE — PROGRESS NOTE ADULT - SUBJECTIVE AND OBJECTIVE BOX
Patient is a 89y old  Female who presents with a chief complaint of generalized weakness and body swelling (24 Feb 2020 11:07)      SUBJECTIVE / OVERNIGHT EVENTS: overnight events noted    ROS:  Resp: No cough no sputum production  CVS: No chest pain no palpitations no orthopnea  GI: no N/V/D  : no dysuria, no hematuria  Neuro: no weakness no paresthesias  Heme: No petechiae no easy bruising  Msk: No joint pain no swelling  Skin: No rash no itching        MEDICATIONS  (STANDING):  aspirin enteric coated 81 milliGRAM(s) Oral daily  atorvastatin 80 milliGRAM(s) Oral at bedtime  heparin  Injectable 5000 Unit(s) SubCutaneous every 12 hours  hydrocortisone hemorrhoidal Suppository 1 Suppository(s) Rectal two times a day  insulin lispro (HumaLOG) corrective regimen sliding scale   SubCutaneous three times a day before meals  insulin lispro (HumaLOG) corrective regimen sliding scale   SubCutaneous at bedtime  levothyroxine 50 MICROGram(s) Oral daily  melatonin 3 milliGRAM(s) Oral at bedtime  mirtazapine 3.75 milliGRAM(s) Oral at bedtime  montelukast 10 milliGRAM(s) Oral every 24 hours  multivitamin/minerals 1 Tablet(s) Oral daily  pantoprazole    Tablet 40 milliGRAM(s) Oral before breakfast  senna 2 Tablet(s) Oral at bedtime    MEDICATIONS  (PRN):  glucagon  Injectable 1 milliGRAM(s) IntraMuscular once PRN Glucose LESS THAN 70 milligrams/deciliter  polyethylene glycol 3350 17 Gram(s) Oral daily PRN Constipation        CAPILLARY BLOOD GLUCOSE      POCT Blood Glucose.: 136 mg/dL (24 Feb 2020 09:04)  POCT Blood Glucose.: 167 mg/dL (23 Feb 2020 21:46)  POCT Blood Glucose.: 176 mg/dL (23 Feb 2020 19:10)  POCT Blood Glucose.: 150 mg/dL (23 Feb 2020 17:22)  POCT Blood Glucose.: 212 mg/dL (23 Feb 2020 12:22)    I&O's Summary    23 Feb 2020 07:01  -  24 Feb 2020 07:00  --------------------------------------------------------  IN: 400 mL / OUT: 100 mL / NET: 300 mL        Vital Signs Last 24 Hrs  T(C): 36.1 (24 Feb 2020 04:32), Max: 36.6 (23 Feb 2020 11:41)  T(F): 97 (24 Feb 2020 04:32), Max: 97.8 (23 Feb 2020 11:41)  HR: 96 (24 Feb 2020 04:32) (78 - 96)  BP: 111/71 (24 Feb 2020 04:32) (111/71 - 144/88)  BP(mean): --  RR: 18 (24 Feb 2020 04:32) (18 - 18)  SpO2: 96% (24 Feb 2020 04:32) (96% - 98%)    PHYSICAL EXAM: vital signs as above  HEENT: KALPESH EOMI  Neck: Supple, no JVD, no thyromegaly  Lungs: decreased breath sounds at bases   CVS: S1 S2 soft ejection systolic murmur best heard at left sternal border   Abdomen: no tenderness, no organomegaly, BS present  Neuro: non focal  Skin: warm, dry  Ext: no cyanosis or clubbing, no edema  Msk: no joint swelling or deformities    LABS:                        12.5   8.23  )-----------( 311      ( 24 Feb 2020 06:59 )             39.6     02-24    131<L>  |  101  |  32<H>  ----------------------------<  150<H>  4.6   |  19<L>  |  1.81<H>    Ca    8.9      24 Feb 2020 06:59                  All consultant(s) notes reviewed and care discussed with other providers        Contact Number, Dr Moser [phone]

## 2020-02-24 NOTE — PROGRESS NOTE ADULT - SUBJECTIVE AND OBJECTIVE BOX
INTERVAL HPI/OVERNIGHT EVENTS:    Patient seen and examined  No acute events noted  No melena/hematochezia  Tolerating diet     MEDICATIONS  (STANDING):  aspirin enteric coated 81 milliGRAM(s) Oral daily  atorvastatin 80 milliGRAM(s) Oral at bedtime  heparin  Injectable 5000 Unit(s) SubCutaneous every 12 hours  hydrocortisone hemorrhoidal Suppository 1 Suppository(s) Rectal two times a day  insulin lispro (HumaLOG) corrective regimen sliding scale   SubCutaneous three times a day before meals  insulin lispro (HumaLOG) corrective regimen sliding scale   SubCutaneous at bedtime  levothyroxine 50 MICROGram(s) Oral daily  melatonin 3 milliGRAM(s) Oral at bedtime  mirtazapine 3.75 milliGRAM(s) Oral at bedtime  montelukast 10 milliGRAM(s) Oral every 24 hours  multivitamin/minerals 1 Tablet(s) Oral daily  pantoprazole    Tablet 40 milliGRAM(s) Oral before breakfast  senna 2 Tablet(s) Oral at bedtime    MEDICATIONS  (PRN):  glucagon  Injectable 1 milliGRAM(s) IntraMuscular once PRN Glucose LESS THAN 70 milligrams/deciliter  polyethylene glycol 3350 17 Gram(s) Oral daily PRN Constipation      Allergies    penicillin (Hives)  penicillin (Unknown)    Intolerances        Review of Systems:    General:  No wt loss, fevers, chills, night sweats,fatigue,   Eyes:  Good vision, no reported pain  ENT:  No sore throat, pain, runny nose, dysphagia  CV:  No pain, palpitations, hypo/hypertension  Resp:  No dyspnea, cough, tachypnea, wheezing  GI:  No pain, No nausea, No vomiting, No diarrhea, No constipation, No weight loss, No fever, No pruritis, No rectal bleeding, No melena, No dysphagia  :  No pain, bleeding, incontinence, nocturia  Muscle:  No pain, weakness  Neuro:  No weakness, tingling, memory problems  Psych:  No fatigue, insomnia, mood problems, depression  Endocrine:  No polyuria, polydypsia, cold/heat intolerance  Heme:  No petechiae, ecchymosis, easy bruisability  Skin:  No rash, tattoos, scars, edema      Vital Signs Last 24 Hrs  T(C): 36.1 (24 Feb 2020 04:32), Max: 36.6 (23 Feb 2020 11:41)  T(F): 97 (24 Feb 2020 04:32), Max: 97.8 (23 Feb 2020 11:41)  HR: 96 (24 Feb 2020 04:32) (78 - 96)  BP: 111/71 (24 Feb 2020 04:32) (111/71 - 144/88)  BP(mean): --  RR: 18 (24 Feb 2020 04:32) (18 - 18)  SpO2: 96% (24 Feb 2020 04:32) (96% - 98%)    PHYSICAL EXAM:    Constitutional: NAD, well-developed  HEENT: EOMI, throat clear  Neck: No LAD, supple  Respiratory: CTA and P  Cardiovascular: S1 and S2, RRR, no M  Gastrointestinal: BS+, soft, NT/ND, neg HSM,  Extremities: No peripheral edema, neg clubing, cyanosis  Vascular: 2+ peripheral pulses  Neurological: A/O x 2  Psychiatric: Normal mood, normal affect  Skin: No rashes      LABS:                        12.5   8.23  )-----------( 311      ( 24 Feb 2020 06:59 )             39.6     02-24    131<L>  |  101  |  32<H>  ----------------------------<  150<H>  4.6   |  19<L>  |  1.81<H>    Ca    8.9      24 Feb 2020 06:59            RADIOLOGY & ADDITIONAL TESTS:

## 2020-02-25 ENCOUNTER — TRANSCRIPTION ENCOUNTER (OUTPATIENT)
Age: 85
End: 2020-02-25

## 2020-02-25 VITALS
HEART RATE: 80 BPM | SYSTOLIC BLOOD PRESSURE: 133 MMHG | TEMPERATURE: 98 F | RESPIRATION RATE: 18 BRPM | OXYGEN SATURATION: 94 % | DIASTOLIC BLOOD PRESSURE: 72 MMHG

## 2020-02-25 LAB
GLUCOSE BLDC GLUCOMTR-MCNC: 130 MG/DL — HIGH (ref 70–99)
GLUCOSE BLDC GLUCOMTR-MCNC: 75 MG/DL — SIGNIFICANT CHANGE UP (ref 70–99)
GLUCOSE BLDC GLUCOMTR-MCNC: 89 MG/DL — SIGNIFICANT CHANGE UP (ref 70–99)

## 2020-02-25 PROCEDURE — 85610 PROTHROMBIN TIME: CPT

## 2020-02-25 PROCEDURE — 84480 ASSAY TRIIODOTHYRONINE (T3): CPT

## 2020-02-25 PROCEDURE — 71250 CT THORAX DX C-: CPT

## 2020-02-25 PROCEDURE — 83036 HEMOGLOBIN GLYCOSYLATED A1C: CPT

## 2020-02-25 PROCEDURE — 99285 EMERGENCY DEPT VISIT HI MDM: CPT

## 2020-02-25 PROCEDURE — 88313 SPECIAL STAINS GROUP 2: CPT

## 2020-02-25 PROCEDURE — 94660 CPAP INITIATION&MGMT: CPT

## 2020-02-25 PROCEDURE — 87186 SC STD MICRODIL/AGAR DIL: CPT

## 2020-02-25 PROCEDURE — 83880 ASSAY OF NATRIURETIC PEPTIDE: CPT

## 2020-02-25 PROCEDURE — 86335 IMMUNFIX E-PHORSIS/URINE/CSF: CPT

## 2020-02-25 PROCEDURE — 82803 BLOOD GASES ANY COMBINATION: CPT

## 2020-02-25 PROCEDURE — 76770 US EXAM ABDO BACK WALL COMP: CPT

## 2020-02-25 PROCEDURE — 82565 ASSAY OF CREATININE: CPT

## 2020-02-25 PROCEDURE — 83521 IG LIGHT CHAINS FREE EACH: CPT

## 2020-02-25 PROCEDURE — 80061 LIPID PANEL: CPT

## 2020-02-25 PROCEDURE — 83540 ASSAY OF IRON: CPT

## 2020-02-25 PROCEDURE — 93308 TTE F-UP OR LMTD: CPT

## 2020-02-25 PROCEDURE — 71045 X-RAY EXAM CHEST 1 VIEW: CPT

## 2020-02-25 PROCEDURE — 80053 COMPREHEN METABOLIC PANEL: CPT

## 2020-02-25 PROCEDURE — 83930 ASSAY OF BLOOD OSMOLALITY: CPT

## 2020-02-25 PROCEDURE — 84132 ASSAY OF SERUM POTASSIUM: CPT

## 2020-02-25 PROCEDURE — 82570 ASSAY OF URINE CREATININE: CPT

## 2020-02-25 PROCEDURE — 84466 ASSAY OF TRANSFERRIN: CPT

## 2020-02-25 PROCEDURE — 93005 ELECTROCARDIOGRAM TRACING: CPT | Mod: 76

## 2020-02-25 PROCEDURE — 86850 RBC ANTIBODY SCREEN: CPT

## 2020-02-25 PROCEDURE — 85384 FIBRINOGEN ACTIVITY: CPT

## 2020-02-25 PROCEDURE — P9016: CPT

## 2020-02-25 PROCEDURE — 78830 RP LOCLZJ TUM SPECT W/CT 1: CPT

## 2020-02-25 PROCEDURE — 82435 ASSAY OF BLOOD CHLORIDE: CPT

## 2020-02-25 PROCEDURE — 84436 ASSAY OF TOTAL THYROXINE: CPT

## 2020-02-25 PROCEDURE — 85379 FIBRIN DEGRADATION QUANT: CPT

## 2020-02-25 PROCEDURE — 82550 ASSAY OF CK (CPK): CPT

## 2020-02-25 PROCEDURE — 93306 TTE W/DOPPLER COMPLETE: CPT

## 2020-02-25 PROCEDURE — 97116 GAIT TRAINING THERAPY: CPT

## 2020-02-25 PROCEDURE — 87798 DETECT AGENT NOS DNA AMP: CPT

## 2020-02-25 PROCEDURE — 86334 IMMUNOFIX E-PHORESIS SERUM: CPT

## 2020-02-25 PROCEDURE — 97161 PT EVAL LOW COMPLEX 20 MIN: CPT

## 2020-02-25 PROCEDURE — 83550 IRON BINDING TEST: CPT

## 2020-02-25 PROCEDURE — 86900 BLOOD TYPING SEROLOGIC ABO: CPT

## 2020-02-25 PROCEDURE — 97530 THERAPEUTIC ACTIVITIES: CPT

## 2020-02-25 PROCEDURE — 82553 CREATINE MB FRACTION: CPT

## 2020-02-25 PROCEDURE — 84100 ASSAY OF PHOSPHORUS: CPT

## 2020-02-25 PROCEDURE — 97110 THERAPEUTIC EXERCISES: CPT

## 2020-02-25 PROCEDURE — 86901 BLOOD TYPING SEROLOGIC RH(D): CPT

## 2020-02-25 PROCEDURE — 84156 ASSAY OF PROTEIN URINE: CPT

## 2020-02-25 PROCEDURE — 85027 COMPLETE CBC AUTOMATED: CPT

## 2020-02-25 PROCEDURE — 87040 BLOOD CULTURE FOR BACTERIA: CPT

## 2020-02-25 PROCEDURE — 82330 ASSAY OF CALCIUM: CPT

## 2020-02-25 PROCEDURE — P9011: CPT

## 2020-02-25 PROCEDURE — 88305 TISSUE EXAM BY PATHOLOGIST: CPT

## 2020-02-25 PROCEDURE — 84484 ASSAY OF TROPONIN QUANT: CPT

## 2020-02-25 PROCEDURE — 83735 ASSAY OF MAGNESIUM: CPT

## 2020-02-25 PROCEDURE — 84439 ASSAY OF FREE THYROXINE: CPT

## 2020-02-25 PROCEDURE — 87086 URINE CULTURE/COLONY COUNT: CPT

## 2020-02-25 PROCEDURE — 80076 HEPATIC FUNCTION PANEL: CPT

## 2020-02-25 PROCEDURE — 84165 PROTEIN E-PHORESIS SERUM: CPT

## 2020-02-25 PROCEDURE — 83615 LACTATE (LD) (LDH) ENZYME: CPT

## 2020-02-25 PROCEDURE — 82728 ASSAY OF FERRITIN: CPT

## 2020-02-25 PROCEDURE — 87581 M.PNEUMON DNA AMP PROBE: CPT

## 2020-02-25 PROCEDURE — 83605 ASSAY OF LACTIC ACID: CPT

## 2020-02-25 PROCEDURE — 85045 AUTOMATED RETICULOCYTE COUNT: CPT

## 2020-02-25 PROCEDURE — 86923 COMPATIBILITY TEST ELECTRIC: CPT

## 2020-02-25 PROCEDURE — 85730 THROMBOPLASTIN TIME PARTIAL: CPT

## 2020-02-25 PROCEDURE — 36430 TRANSFUSION BLD/BLD COMPNT: CPT

## 2020-02-25 PROCEDURE — 99238 HOSP IP/OBS DSCHRG MGMT 30/<: CPT

## 2020-02-25 PROCEDURE — 82607 VITAMIN B-12: CPT

## 2020-02-25 PROCEDURE — 82947 ASSAY GLUCOSE BLOOD QUANT: CPT

## 2020-02-25 PROCEDURE — P9037: CPT

## 2020-02-25 PROCEDURE — 84155 ASSAY OF PROTEIN SERUM: CPT

## 2020-02-25 PROCEDURE — 87633 RESP VIRUS 12-25 TARGETS: CPT

## 2020-02-25 PROCEDURE — 84295 ASSAY OF SERUM SODIUM: CPT

## 2020-02-25 PROCEDURE — 83010 ASSAY OF HAPTOGLOBIN QUANT: CPT

## 2020-02-25 PROCEDURE — 81001 URINALYSIS AUTO W/SCOPE: CPT

## 2020-02-25 PROCEDURE — 87486 CHLMYD PNEUM DNA AMP PROBE: CPT

## 2020-02-25 PROCEDURE — 71046 X-RAY EXAM CHEST 2 VIEWS: CPT

## 2020-02-25 PROCEDURE — 83935 ASSAY OF URINE OSMOLALITY: CPT

## 2020-02-25 PROCEDURE — 99232 SBSQ HOSP IP/OBS MODERATE 35: CPT | Mod: GC

## 2020-02-25 PROCEDURE — 85014 HEMATOCRIT: CPT

## 2020-02-25 PROCEDURE — 80048 BASIC METABOLIC PNL TOTAL CA: CPT

## 2020-02-25 PROCEDURE — 82962 GLUCOSE BLOOD TEST: CPT

## 2020-02-25 PROCEDURE — 84443 ASSAY THYROID STIM HORMONE: CPT

## 2020-02-25 RX ORDER — ALENDRONATE SODIUM 70 MG/1
1 TABLET ORAL
Qty: 0 | Refills: 0 | DISCHARGE

## 2020-02-25 RX ORDER — HEPARIN SODIUM 5000 [USP'U]/ML
5000 INJECTION INTRAVENOUS; SUBCUTANEOUS
Qty: 0 | Refills: 0 | DISCHARGE

## 2020-02-25 RX ORDER — LEVOTHYROXINE SODIUM 125 MCG
1 TABLET ORAL
Qty: 0 | Refills: 0 | DISCHARGE

## 2020-02-25 RX ORDER — ASPIRIN/CALCIUM CARB/MAGNESIUM 324 MG
1 TABLET ORAL
Qty: 0 | Refills: 0 | DISCHARGE

## 2020-02-25 RX ORDER — IBUPROFEN 200 MG
0 TABLET ORAL
Qty: 0 | Refills: 0 | DISCHARGE

## 2020-02-25 RX ORDER — ROSUVASTATIN CALCIUM 5 MG/1
2 TABLET ORAL
Qty: 0 | Refills: 0 | DISCHARGE

## 2020-02-25 RX ORDER — MULTIVIT-MIN/FERROUS GLUCONATE 9 MG/15 ML
1 LIQUID (ML) ORAL
Qty: 0 | Refills: 0 | DISCHARGE

## 2020-02-25 RX ORDER — PANTOPRAZOLE SODIUM 20 MG/1
1 TABLET, DELAYED RELEASE ORAL
Qty: 0 | Refills: 0 | DISCHARGE

## 2020-02-25 RX ORDER — CLOPIDOGREL BISULFATE 75 MG/1
1 TABLET, FILM COATED ORAL
Qty: 0 | Refills: 0 | DISCHARGE

## 2020-02-25 RX ADMIN — Medication 50 MICROGRAM(S): at 05:38

## 2020-02-25 RX ADMIN — Medication 81 MILLIGRAM(S): at 09:19

## 2020-02-25 RX ADMIN — HEPARIN SODIUM 5000 UNIT(S): 5000 INJECTION INTRAVENOUS; SUBCUTANEOUS at 05:38

## 2020-02-25 RX ADMIN — Medication 1 TABLET(S): at 09:19

## 2020-02-25 RX ADMIN — PANTOPRAZOLE SODIUM 40 MILLIGRAM(S): 20 TABLET, DELAYED RELEASE ORAL at 05:38

## 2020-02-25 NOTE — PROGRESS NOTE ADULT - ASSESSMENT
Rectal bleeding    - s/p colonoscopy 2/21 which revealed Diverticulosis in the ascending colon.  - Internal hemorrhoids.  - hgb/hct stable  - diet as tolerated  - ok to  ASA  - discharge planning per primary team

## 2020-02-25 NOTE — DISCHARGE NOTE NURSING/CASE MANAGEMENT/SOCIAL WORK - PATIENT PORTAL LINK FT
You can access the FollowMyHealth Patient Portal offered by [facility] by registering at the following website: [email or web address]. By joining [employer]’s FollowMyHealth portal, you will also be able to view your health information using other applications (apps) compatible with our system.

## 2020-02-25 NOTE — DISCHARGE NOTE NURSING/CASE MANAGEMENT/SOCIAL WORK - NSDCPEPTSTRK_GEN_ALL_CORE
Risk factors for stroke/Stroke warning signs and symptoms/Call 911 for stroke/Need for follow up after discharge/Prescribed medications/Stroke education booklet/Stroke support groups for patients, families, and friends/Signs and symptoms of stroke

## 2020-02-25 NOTE — PROGRESS NOTE ADULT - PROBLEM SELECTOR PLAN 2
Pt. with hyponatremia, likely from decreased solute intake. Recommend increasing protein/solute intake. Monitor serum sodium.    [place] He  Nephrology Fellow  Cell: [phone] (from 8 am to 5 pm)  (After 5 pm or on weekends please page on-call fellow)

## 2020-02-25 NOTE — PROGRESS NOTE ADULT - REASON FOR ADMISSION
Cardiogenic Shock
generalized weakness and body swelling

## 2020-02-25 NOTE — PROGRESS NOTE ADULT - SUBJECTIVE AND OBJECTIVE BOX
[facility] DIVISION OF KIDNEY DISEASES AND HYPERTENSION -- FOLLOW UP NOTE  --------------------------------------------------------------------------------  HPI: 89 yo F with  HTN, DM, hypothyroidism admitted on 2/1/20 for generalized fatigue, found to be in cardiogenic shock, transferred to CCU for further management. Pt. was started on diuretics (now discontinued). On admission Scr was 1.45, increased to 1.68 on 2/2/20. Scr improved to 1.45 on 2/14/20, however has worsened over the weekend. Pt. appears to have baseline Scr between 1.6-1.9 based on trends during hospitalization, often times impacted by hemodynamic status and anemia. Scr is stable today at 1.8,    Pt. seen and examined at bedside this AM. Awaiting transfer to rehab. Denies CP, SOB, N/V/F/C.   PAST HISTORY  --------------------------------------------------------------------------------  No significant changes to PMH, PSH, FHx, SHx, unless otherwise noted    ALLERGIES & MEDICATIONS  --------------------------------------------------------------------------------  Allergies    penicillin (Hives)  penicillin (Unknown)    Intolerances    Standing Inpatient Medications  aspirin enteric coated 81 milliGRAM(s) Oral daily  atorvastatin 80 milliGRAM(s) Oral at bedtime  heparin  Injectable 5000 Unit(s) SubCutaneous every 12 hours  hydrocortisone hemorrhoidal Suppository 1 Suppository(s) Rectal two times a day  insulin lispro (HumaLOG) corrective regimen sliding scale   SubCutaneous three times a day before meals  insulin lispro (HumaLOG) corrective regimen sliding scale   SubCutaneous at bedtime  levothyroxine 50 MICROGram(s) Oral daily  melatonin 3 milliGRAM(s) Oral at bedtime  mirtazapine 3.75 milliGRAM(s) Oral at bedtime  montelukast 10 milliGRAM(s) Oral every 24 hours  multivitamin/minerals 1 Tablet(s) Oral daily  pantoprazole    Tablet 40 milliGRAM(s) Oral before breakfast  senna 2 Tablet(s) Oral at bedtime    REVIEW OF SYSTEMS  --------------------------------------------------------------------------------  Gen: + lethargy  Respiratory: No dyspnea  CV: No chest pain  GI: No abdominal pain  MSK: No LE tenderness  Neuro: No dizziness  Heme: No bleeding    All other systems were reviewed and are negative, except as noted.    VITALS/PHYSICAL EXAM  --------------------------------------------------------------------------------  T(C): 36.4 (02-25-20 @ 04:35), Max: 36.6 (02-24-20 @ 20:35)  HR: 76 (02-25-20 @ 04:35) (76 - 82)  BP: 114/73 (02-25-20 @ 04:35) (114/73 - 133/76)  RR: 18 (02-25-20 @ 04:35) (18 - 18)  SpO2: 94% (02-25-20 @ 04:35) (94% - 96%)  Wt(kg): --    02-24-20 @ 07:01  -  02-25-20 @ 07:00  --------------------------------------------------------  IN: 480 mL / OUT: 0 mL / NET: 480 mL    Physical Exam:  	Gen: NAD; skinny, frail  	HEENT: Anicteric  	Pulm: decreased b/l breath sounds  	CV: RRR, S1S2; systolic murmur best heard at left sternal border   	Abd: +BS, soft, nontender/nondistended       	: No suprapubic tenderness  	MSK: Warm, no edema  	Neuro: Awake              Skin: no rash     LABS/STUDIES  --------------------------------------------------------------------------------              12.5   8.23  >-----------<  311      [02-24-20 @ 06:59]              39.6     131  |  101  |  32  ----------------------------<  150      [02-24-20 @ 06:59]  4.6   |  19  |  1.81        Ca     8.9     [02-24-20 @ 06:59]    Creatinine Trend:  SCr 1.81 [02-24 @ 06:59]  SCr 1.68 [02-22 @ 07:16]  SCr 1.60 [02-21 @ 05:56]  SCr 1.82 [02-20 @ 07:08]  SCr 1.60 [02-19 @ 06:09]

## 2020-02-25 NOTE — DISCHARGE NOTE NURSING/CASE MANAGEMENT/SOCIAL WORK - NSPROEXTENSIONSOFSELF_GEN_A_NUR
cellphone, red bad with clothing and diapers in it  As per patient she has dentures in her mouth, full upper and lower

## 2020-02-25 NOTE — PROGRESS NOTE ADULT - SUBJECTIVE AND OBJECTIVE BOX
Patient is a 89y old  Female who presents with a chief complaint of generalized weakness and body swelling (25 Feb 2020 10:49)      SUBJECTIVE / OVERNIGHT EVENTS: overnight events noted    ROS:  Resp: No cough no sputum production  CVS: No chest pain no palpitations no orthopnea  GI: no N/V/D  : no dysuria, no hematuria  Neuro: no weakness no paresthesias  Heme: No petechiae no easy bruising  Msk: No joint pain no swelling  Skin: No rash no itching        MEDICATIONS  (STANDING):  aspirin enteric coated 81 milliGRAM(s) Oral daily  atorvastatin 80 milliGRAM(s) Oral at bedtime  heparin  Injectable 5000 Unit(s) SubCutaneous every 12 hours  hydrocortisone hemorrhoidal Suppository 1 Suppository(s) Rectal two times a day  insulin lispro (HumaLOG) corrective regimen sliding scale   SubCutaneous three times a day before meals  insulin lispro (HumaLOG) corrective regimen sliding scale   SubCutaneous at bedtime  levothyroxine 50 MICROGram(s) Oral daily  melatonin 3 milliGRAM(s) Oral at bedtime  mirtazapine 3.75 milliGRAM(s) Oral at bedtime  montelukast 10 milliGRAM(s) Oral every 24 hours  multivitamin/minerals 1 Tablet(s) Oral daily  pantoprazole    Tablet 40 milliGRAM(s) Oral before breakfast  senna 2 Tablet(s) Oral at bedtime    MEDICATIONS  (PRN):  glucagon  Injectable 1 milliGRAM(s) IntraMuscular once PRN Glucose LESS THAN 70 milligrams/deciliter  polyethylene glycol 3350 17 Gram(s) Oral daily PRN Constipation        CAPILLARY BLOOD GLUCOSE      POCT Blood Glucose.: 130 mg/dL (25 Feb 2020 13:14)  POCT Blood Glucose.: 89 mg/dL (25 Feb 2020 09:54)  POCT Blood Glucose.: 75 mg/dL (25 Feb 2020 09:08)  POCT Blood Glucose.: 162 mg/dL (24 Feb 2020 22:17)  POCT Blood Glucose.: 135 mg/dL (24 Feb 2020 17:29)    I&O's Summary    24 Feb 2020 07:01  -  25 Feb 2020 07:00  --------------------------------------------------------  IN: 480 mL / OUT: 0 mL / NET: 480 mL    25 Feb 2020 07:01  -  25 Feb 2020 16:01  --------------------------------------------------------  IN: 420 mL / OUT: 0 mL / NET: 420 mL        Vital Signs Last 24 Hrs  T(C): 36.4 (25 Feb 2020 13:05), Max: 36.6 (24 Feb 2020 20:35)  T(F): 97.5 (25 Feb 2020 13:05), Max: 97.8 (24 Feb 2020 20:35)  HR: 80 (25 Feb 2020 13:05) (76 - 82)  BP: 133/72 (25 Feb 2020 13:05) (114/73 - 133/76)  BP(mean): --  RR: 18 (25 Feb 2020 13:05) (18 - 18)  SpO2: 94% (25 Feb 2020 13:05) (94% - 96%)    PHYSICAL EXAM: vital signs as above  HEENT: KALPESH EOMI  Neck: Supple, no JVD, no thyromegaly  Lungs: decreased breath sounds at bases   CVS: S1 S2 soft ejection systolic murmur best heard at left sternal border   Abdomen: no tenderness, no organomegaly, BS present  Neuro: non focal  Skin: warm, dry  Ext: no cyanosis or clubbing, no edema  Msk: no joint swelling or deformities    LABS:                        12.5   8.23  )-----------( 311      ( 24 Feb 2020 06:59 )             39.6     02-24    131<L>  |  101  |  32<H>  ----------------------------<  150<H>  4.6   |  19<L>  |  1.81<H>    Ca    8.9      24 Feb 2020 06:59                  All consultant(s) notes reviewed and care discussed with other providers        Contact Number, Dr Moser [phone]

## 2020-02-25 NOTE — PROGRESS NOTE ADULT - SUBJECTIVE AND OBJECTIVE BOX
INTERVAL HPI/OVERNIGHT EVENTS:    Patient seen and examined  No acute events noted  No melena/hematochezia  Tolerating diet     MEDICATIONS  (STANDING):  aspirin enteric coated 81 milliGRAM(s) Oral daily  atorvastatin 80 milliGRAM(s) Oral at bedtime  heparin  Injectable 5000 Unit(s) SubCutaneous every 12 hours  hydrocortisone hemorrhoidal Suppository 1 Suppository(s) Rectal two times a day  insulin lispro (HumaLOG) corrective regimen sliding scale   SubCutaneous three times a day before meals  insulin lispro (HumaLOG) corrective regimen sliding scale   SubCutaneous at bedtime  levothyroxine 50 MICROGram(s) Oral daily  melatonin 3 milliGRAM(s) Oral at bedtime  mirtazapine 3.75 milliGRAM(s) Oral at bedtime  montelukast 10 milliGRAM(s) Oral every 24 hours  multivitamin/minerals 1 Tablet(s) Oral daily  pantoprazole    Tablet 40 milliGRAM(s) Oral before breakfast  senna 2 Tablet(s) Oral at bedtime    MEDICATIONS  (PRN):  glucagon  Injectable 1 milliGRAM(s) IntraMuscular once PRN Glucose LESS THAN 70 milligrams/deciliter  polyethylene glycol 3350 17 Gram(s) Oral daily PRN Constipation      Allergies    penicillin (Hives)  penicillin (Unknown)    Intolerances        Review of Systems:    General:  No wt loss, fevers, chills, night sweats,fatigue,   Eyes:  Good vision, no reported pain  ENT:  No sore throat, pain, runny nose, dysphagia  CV:  No pain, palpitations, hypo/hypertension  Resp:  No dyspnea, cough, tachypnea, wheezing  GI:  No pain, No nausea, No vomiting, No diarrhea, No constipation, No weight loss, No fever, No pruritis, No rectal bleeding, No melena, No dysphagia  :  No pain, bleeding, incontinence, nocturia  Muscle:  No pain, weakness  Neuro:  No weakness, tingling, memory problems  Psych:  No fatigue, insomnia, mood problems, depression  Endocrine:  No polyuria, polydypsia, cold/heat intolerance  Heme:  No petechiae, ecchymosis, easy bruisability  Skin:  No rash, tattoos, scars, edema      Vital Signs Last 24 Hrs  T(C): 36.4 (25 Feb 2020 04:35), Max: 36.6 (24 Feb 2020 20:35)  T(F): 97.5 (25 Feb 2020 04:35), Max: 97.8 (24 Feb 2020 20:35)  HR: 76 (25 Feb 2020 04:35) (76 - 82)  BP: 114/73 (25 Feb 2020 04:35) (114/73 - 133/76)  BP(mean): --  RR: 18 (25 Feb 2020 04:35) (18 - 18)  SpO2: 94% (25 Feb 2020 04:35) (94% - 96%)    PHYSICAL EXAM:    Constitutional: NAD, well-developed  HEENT: EOMI, throat clear  Neck: No LAD, supple  Respiratory: CTA and P  Cardiovascular: S1 and S2, RRR, no M  Gastrointestinal: BS+, soft, NT/ND, neg HSM,  Extremities: No peripheral edema, neg clubing, cyanosis  Vascular: 2+ peripheral pulses  Neurological: A/O x 2  Psychiatric: Normal mood, normal affect  Skin: No rashes    LABS:                        12.5   8.23  )-----------( 311      ( 24 Feb 2020 06:59 )             39.6     02-24    131<L>  |  101  |  32<H>  ----------------------------<  150<H>  4.6   |  19<L>  |  1.81<H>    Ca    8.9      24 Feb 2020 06:59            RADIOLOGY & ADDITIONAL TESTS:

## 2020-02-25 NOTE — PROGRESS NOTE ADULT - PROBLEM SELECTOR PLAN 1
- Scr 1.45 on admission, continuously uptrended and had improved back to 1.45 on 2/14/20. However, overall Scr trend appears to be between 1.6-1.9. Scr this AM is stable at 1.8.  - Patient with cardiogenic shock, now resolved. Pt. now with hemodynamically mediated EFRAIN in the setting of lower BP and acute blood loss anemia from likely moderate internal hemorrhoid bleeding.  - Monitor UOP and daily weights. Avoid volume depletion, NSAIDs, PPI's, ARB/ACE-I. Dose medications as per eGFR.  - SPEP with migrating paraprotein identified, SIFE with IgG Lambda. Pt. with negative scan for transthyretin amyloidosis.

## 2020-04-20 NOTE — PROGRESS NOTE ADULT - PROBLEM SELECTOR PLAN 6
----- Message from Clarissa Bae sent at 4/20/2020 11:35 AM CDT -----  Contact: Pt  Pt called in to schedule a hospital f/up appt this week please.    Pt can be reached at [phone]    Thank you   monitor pre renal

## 2020-11-10 NOTE — PATIENT PROFILE ADULT. - NS PRO OT REFERRAL QUES 1 YN
Pt requesting COVID swab. Patient evaluated, treated, and discharged by PA. Refer to PA note for assessment.  Discharge instructions given verbally and understood by patient. Self-quarantine and COVID-19 information provided to patient.
no

## 2020-11-23 NOTE — PROGRESS NOTE ADULT - SUBJECTIVE AND OBJECTIVE BOX
GASTROENTEROLOGY    Patient seen and examined this morning  No acute events noted  No melena/hematochezia  Tolerating diet   She reports having brown stool      MEDICATIONS  (STANDING):  aspirin enteric coated 81 milliGRAM(s) Oral daily  atorvastatin 80 milliGRAM(s) Oral at bedtime  heparin  Injectable 5000 Unit(s) SubCutaneous every 12 hours  hydrocortisone hemorrhoidal Suppository 1 Suppository(s) Rectal two times a day  insulin lispro (HumaLOG) corrective regimen sliding scale   SubCutaneous three times a day before meals  insulin lispro (HumaLOG) corrective regimen sliding scale   SubCutaneous at bedtime  levothyroxine 50 MICROGram(s) Oral daily  melatonin 3 milliGRAM(s) Oral at bedtime  mirtazapine 3.75 milliGRAM(s) Oral at bedtime  montelukast 10 milliGRAM(s) Oral every 24 hours  multivitamin/minerals 1 Tablet(s) Oral daily  pantoprazole    Tablet 40 milliGRAM(s) Oral before breakfast  senna 2 Tablet(s) Oral at bedtime        T(F): 97.8 (02-23-20 @ 11:41), Max: 98.1 (02-22-20 @ 21:02)  HR: 78 (02-23-20 @ 11:41) (78 - 88)  BP: 123/72 (02-23-20 @ 11:41) (123/72 - 138/86)  RR: 18 (02-23-20 @ 11:41) (18 - 18)  SpO2: 98% (02-23-20 @ 11:41) (97% - 98%)  Wt(kg): --    PHYSICAL EXAM  GENERAL:   NAD  HEENT:  NC/AT, no JVD, sclera-anicteric  CHEST:  clear to ascultation bilaterally, respirations nonlabored  HEART:  +S1+S2   ABDOMEN:  Soft, non-tender, non-distended, + bowel sounds  EXTREMITIES:  no cyanosis, clubbing or edema  NEURO:  Alert, oriented  SKIN:  No rash/warm/dry      LABS:    02-22    136  |  104  |  33<H>  ----------------------------<  106<H>  4.2   |  17<L>  |  1.68<H>    Ca    8.6      22 Feb 2020 07:16  Mg     2.0     02-22          I&O's Detail    22 Feb 2020 07:01  -  23 Feb 2020 07:00  --------------------------------------------------------  IN:    Oral Fluid: 960 mL  Total IN: 960 mL    OUT:    Voided: 600 mL  Total OUT: 600 mL    Total NET: 360 mL      23 Feb 2020 07:01  -  23 Feb 2020 13:03  --------------------------------------------------------  IN:    Oral Fluid: 300 mL  Total IN: 300 mL    OUT:    Voided: 100 mL  Total OUT: 100 mL    Total NET: 200 mL David

## 2021-02-22 NOTE — CONSULT NOTE ADULT - CONSULT REQUESTED DATE/TIME
02-Feb-2020 13:27 Griseofulvin Counseling:  I discussed with the patient the risks of griseofulvin including but not limited to photosensitivity, cytopenia, liver damage, nausea/vomiting and severe allergy.  The patient understands that this medication is best absorbed when taken with a fatty meal (e.g., ice cream or french fries).

## 2021-08-22 NOTE — PROGRESS NOTE ADULT - NSHPATTENDINGPLANDISCUSS_GEN_ALL_CORE
Yes
Call Cardiology Fellow or Attending as listed on [email or web address] password: [email or web address].
Call Cardiology Fellow or Attending as listed on [email or web address] password: [email or web address].
Call Cardiology Fellow or Attending as listed on [email or web address] password: [email or web address].
Call Cardiology Fellow or Attending as listed on [email or web address] password: [email or web address].
CCU
medicine
CCU
CCU Team and patient's daughter
CCU resident
CCU team
NP
[facility]
NP
NP

## 2021-10-18 NOTE — PROGRESS NOTE ADULT - SUBJECTIVE AND OBJECTIVE BOX
Speech Therapy      Visit Type: Treatment  -  Swallow      Current Diet: general and nectar-thick liquids      - Dentition: upper dentures, with patient.      - Feeding: feeds self    SUBJECTIVE  Pt was seen at bedside with daughter. Extensive education given re: free water protocols and how this would be an effective way to manage dysphagia as long as precautions were followed. Pt will need some follow up with speech at next level of care- either at home or at rehab. Lists given re: nectar thick liquids Patient agreed to participate in therapy this date.  Patient verbally agrees to allow the following to be present during the session:  Daughter   Patient/family goals: maximize function     OBJECTIVE                ASSESSMENT                                                                          • Skilled therapy is required to establish safe means of nutrition, hydration and medication administration  • Per VFSS mild-moderate oropharyngeal dysphagia characterized by premature spillage of liquids resulting in SILENT aspiration of thin liquids and decreased pharyngeal stripping resulting in consistent mild-mod diffuse pharyngeal residue.   • Rehab Potential: fair    Patient at end of session:    - location: in bed    - safety measures: bed rails x2    - hand off to: family/caregiver    PLAN  Frequency: 10/18 LS dys tx      RECOMMENDATIONS     -Diet:          *general and nectar-thick liquids    -Medication Administration:         *with puree, halved, crushed, no liquid medications and liquid medications must be thickened    -Feeding Guidelines:          *feeds self, sit fully upright for all po intake, sit up for 30 minutes after meal, cough/re-swallow periodically, periodic liquid wash and distraction free environment    -Communication Cognition:          *Recommend the provider order outpatient speech therapy at discharge.      Documented in the chart in the following areas: Assessment.      Therapy procedure  Chief Complaint:  Hypothyroidism    History: Patient now decreased to 2 pressors-  and vasopressin. Feels fatigued. Improved LE edema. No other complaints. Daughter at bedside.    MEDICATIONS  (STANDING):  chlorhexidine 2% Cloths 1 Application(s) Topical <User Schedule>  dextrose 5%. 1000 milliLiter(s) (50 mL/Hr) IV Continuous <Continuous>  dextrose 50% Injectable 12.5 Gram(s) IV Push once  dextrose 50% Injectable 25 Gram(s) IV Push once  dextrose 50% Injectable 25 Gram(s) IV Push once  DOBUTamine Infusion 5 MICROgram(s)/kG/Min (5.445 mL/Hr) IV Continuous <Continuous>  heparin  Injectable 5000 Unit(s) SubCutaneous every 12 hours  levothyroxine Injectable 37 MICROGram(s) IV Push at bedtime  multivitamin/minerals 1 Tablet(s) Oral daily  norepinephrine Infusion 0.9 MICROgram(s)/kG/Min (30.628 mL/Hr) IV Continuous <Continuous>  pantoprazole    Tablet 40 milliGRAM(s) Oral before breakfast  vasopressin Infusion 0.02 Unit(s)/Min (1.2 mL/Hr) IV Continuous <Continuous>    MEDICATIONS  (PRN):  dextrose 40% Gel 15 Gram(s) Oral once PRN Blood Glucose LESS THAN 70 milliGRAM(s)/deciliter  glucagon  Injectable 1 milliGRAM(s) IntraMuscular once PRN Glucose LESS THAN 70 milligrams/deciliter      Allergies    penicillin (Hives)  penicillin (Unknown)    Intolerances      Review of Systems:  ALL OTHER SYSTEMS REVIEWED AND NEGATIVE      PHYSICAL EXAM:  VITALS: T(C): 36.7 (20 @ 12:00)  T(F): 98.1 (20 @ 12:00), Max: 98.6 (20 @ 22:00)  HR: 80 (20 @ 14:45) (66 - 88)  BP: 136/60 (20 @ 08:15) (136/60 - 136/60)  RR:  (10 - 28)  SpO2:  (91% - 100%)  Wt(kg): --  GENERAL: NAD, well-groomed, well-developed  EYES: No proptosis, anicteric  HEENT:  Atraumatic, Normocephalic, moist mucous membranes  THYROID: Normal size, nodular. Nontender, no surgical scars.   GI: Soft, nontender  SKIN: Dry, intact, No rashes or lesions. trace LE edema  NEURO: AOx2, moves all extremities spontaneously   PSYCH: Reactive affect, euthymic mood    POCT Blood Glucose.: 83 mg/dL (20 @ 11:18)  POCT Blood Glucose.: 85 mg/dL (20 @ 07:49)  POCT Blood Glucose.: 150 mg/dL (20 @ 00:55)  POCT Blood Glucose.: 123 mg/dL (20 @ 22:07)          133<L>  |  99  |  34<H>  ----------------------------<  158<H>  4.4   |  19<L>  |  2.02<H>    EGFR if : 25<L>  EGFR if non : 21<L>    Ca    8.8        Mg     2.6       Phos  5.0         TPro  6.4  /  Alb  2.9<L>  /  TBili  0.9  /  DBili  x   /  AST  431<H>  /  ALT  137<H>  /  AlkPhos  143<H>  -          Thyroid Function Tests:   @ 14:14 TSH -- FreeT4 -- T3 43 Anti TPO -- Anti Thyroglobulin Ab -- TSI --   @ 09:48 TSH 49.30 FreeT4 -- T3 -- Anti TPO -- Anti Thyroglobulin Ab -- TSI --      Hemoglobin A1C, Whole Blood: 5.7 % <H> [4.0 - 5.6] (20 @ 09:20) time and total treatment time can be found documented on the Time Entry flowsheet   Chief Complaint:  Hypothyroidism    History: Patient now decreased to 2 pressors-  and vasopressin. Feels fatigued. Improved LE edema. No other complaints. Daughter at bedside.    MEDICATIONS  (STANDING):  chlorhexidine 2% Cloths 1 Application(s) Topical <User Schedule>  dextrose 5%. 1000 milliLiter(s) (50 mL/Hr) IV Continuous <Continuous>  dextrose 50% Injectable 12.5 Gram(s) IV Push once  dextrose 50% Injectable 25 Gram(s) IV Push once  dextrose 50% Injectable 25 Gram(s) IV Push once  DOBUTamine Infusion 5 MICROgram(s)/kG/Min (5.445 mL/Hr) IV Continuous <Continuous>  heparin  Injectable 5000 Unit(s) SubCutaneous every 12 hours  levothyroxine Injectable 37 MICROGram(s) IV Push at bedtime  multivitamin/minerals 1 Tablet(s) Oral daily  norepinephrine Infusion 0.9 MICROgram(s)/kG/Min (30.628 mL/Hr) IV Continuous <Continuous>  pantoprazole    Tablet 40 milliGRAM(s) Oral before breakfast  vasopressin Infusion 0.02 Unit(s)/Min (1.2 mL/Hr) IV Continuous <Continuous>    MEDICATIONS  (PRN):  dextrose 40% Gel 15 Gram(s) Oral once PRN Blood Glucose LESS THAN 70 milliGRAM(s)/deciliter  glucagon  Injectable 1 milliGRAM(s) IntraMuscular once PRN Glucose LESS THAN 70 milligrams/deciliter      Allergies    penicillin (Hives)  penicillin (Unknown)    Intolerances      Review of Systems:  ALL OTHER SYSTEMS REVIEWED AND NEGATIVE      PHYSICAL EXAM:  VITALS: T(C): 36.7 (20 @ 12:00)  T(F): 98.1 (20 @ 12:00), Max: 98.6 (20 @ 22:00)  HR: 80 (20 @ 14:45) (66 - 88)  BP: 136/60 (20 @ 08:15) (136/60 - 136/60)  RR:  (10 - 28)  SpO2:  (91% - 100%)  Wt(kg): --  GENERAL: NAD, well-groomed, well-developed  EYES: +periorbital edema  HEENT:  Atraumatic, Normocephalic, moist mucous membranes  THYROID: Normal size, nodular texture. Nontender, no surgical scars.   GI: Soft, nontender/ND, +bs  SKIN: Dry, intact, No rashes or lesions. trace LE edema    POCT Blood Glucose.: 83 mg/dL (20 @ 11:18)  POCT Blood Glucose.: 85 mg/dL (20 @ 07:49)  POCT Blood Glucose.: 150 mg/dL (20 @ 00:55)  POCT Blood Glucose.: 123 mg/dL (20 @ 22:07)          133<L>  |  99  |  34<H>  ----------------------------<  158<H>  4.4   |  19<L>  |  2.02<H>    EGFR if : 25<L>  EGFR if non : 21<L>    Ca    8.8        Mg     2.6       Phos  5.0     -    TPro  6.4  /  Alb  2.9<L>  /  TBili  0.9  /  DBili  x   /  AST  431<H>  /  ALT  137<H>  /  AlkPhos  143<H>  -          Thyroid Function Tests:   @ 14:14 TSH -- FreeT4 -- T3 43 Anti TPO -- Anti Thyroglobulin Ab -- TSI --   @ 09:48 TSH 49.30 FreeT4 -- T3 -- Anti TPO -- Anti Thyroglobulin Ab -- TSI --      Hemoglobin A1C, Whole Blood: 5.7 % <H> [4.0 - 5.6] (20 @ 09:20)

## 2021-10-27 NOTE — PROGRESS NOTE ADULT - ATTENDING COMMENTS
Left message for patient to return call at his convenience      Pt seen and examined. Agree with above with addendum. Pt currently resting so will defer thyroid exam at this time. Her daughter Mrs. Underwood is at the bedside and known to me as I care for her  as an outpt. Her brother is with whom the patient lives. Per the med list that they have the patient got her Synthroid before other morning medications. F/u FT4 on 2/5/2020.  Ally Main MD  [phone]

## 2022-08-04 NOTE — PROGRESS NOTE ADULT - ASSESSMENT
Rectal bleeding    - s/p colonoscopy 2/21 which revealed Diverticulosis in the ascending colon.  - Internal hemorrhoids.  - hgb/hct stable  - diet as tolerated  - ok to  ASA  - discharge planning per primary team Statement Selected

## 2022-12-12 NOTE — CONSULT NOTE ADULT - PROBLEM SELECTOR RECOMMENDATION 6
Clinician/Physician Canceled (Called patient [phone], could not leave voicemail message because VM box was full stating to call office to reschedule appointment with another provider because Dr. Herman is out on leave.  
Monitor per primary team

## 2023-04-14 NOTE — PATIENT PROFILE ADULT - ..
[facility]   Physical Therapy Treatment Note     Name: Tony Gordillo  [facility] Number: [id number]    Therapy Diagnosis:   Encounter Diagnoses   Name Primary?    S/P right rotator cuff repair Yes    Biceps tendonosis of right shoulder     Decreased range of motion of right shoulder     Impaired strength of shoulder muscles      Physician: Crissy Bradford MD    Visit Date: 4/14/2023    Physician Orders: PT Eval and Treat   Medical Diagnosis from Referral: S46.011A (ICD-10-CM) - Traumatic rotator cuff tear, right, initial encounter   Evaluation Date: 3/15/2023  Authorization Period Expiration: 3/12/2024  Plan of Care Expiration: 7/7/2024 - most likely will have to extend due to nature of surgery and pt's job requirements   Visit # / Visits authorized: 1/ 1    Progress Note Due: 5/12/2023  FOTO: 1/1  HEP: Access Code: [email or web address]     Precautions: Diabetes;      POSTOPERATIVE PLAN: Plan to follow subscapularis-supraspinatus repair protocol (<3 cm in total size). Passive motion may begin at 4 weeks. Active motion at 6 weeks. No biceps resistive activity x 8 weeks. No cuff resistive activity x 12 weeks.      DATE OF PROCEDURE: 3/14/2023, 4 weeks & 3 days as of 04/14/2023       PREOPERATIVE DIAGNOSES:   Right     1. Rotator cuff tear   2. Biceps tendinopathy    POSTOPERATIVE DIAGNOSES:   Right   1. Rotator cuff tear, full thickness involving the subscapularis and supraspinatus,   2. Biceps tendinopathy     OPERATION:   Right Shoulder  1. Arthroscopic  subscapularis and supraspinatus rotator cuff repair   2. Arthroscopic biceps tenodesis  3. Extensive debridement  Surgeon(s) and Role:     * Crissy Bradford MD - Primary     Per 2wk f/u with surgeon on 3/29/23:   Assessment/Plan:  43 y.o. male  2 weeks s/p right shoulder arthroscopy , rotator cuff repair, and arthroscopic biceps tenodesis  (DOS-3/14/23)     Plan  Sutures were removed today and steri strips were placed   The patient will continue  "with the sling and precautions as outlined in the postoperative instructions given to the patient.  The patient was given refill for non narcotic pain medications  elbow, wrist, and hand exercises as instructed   Physical therapy prescription placed at time of surgery - has appt scheduled  Observe changes in hand - let me know if this gets any worse.   Follow up in 4 weeks      Time In: 1000  Time Out: 1100  Total Appointment Time (timed & untimed codes): 50 minutes      SUBJECTIVE     Pt reports:a little sore the same day and the day after.     He was compliant with home exercise program.  Response to previous treatment: no change - initial eval was last visit  Functional change: weaning out of sling     Pain: 5/10  Location: right shoulder      OBJECTIVE       TREATMENT     Tony received the treatments listed below:      Tony received therapeutic exercises to develop strength, endurance, ROM, flexibility, posture, and core stabilization for 5 minutes including:    Pendulums (fwd/bwd;L/R; CW, CCW) 1 min/ea      Tony received the following manual therapy techniques: Soft tissue Mobilization were applied to the: R shoulder complex for 30 minutes, including:  Manual mobilizations to posterior and inferior capsule gr II-III  PROM all planes with protocol precautions     neuromuscular re-education activities to improve: Balance, Coordination, Kinesthetic, Sense, Proprioception, and Posture for 10 minutes. The following activities were included:  Ambulation t/o clinic to promote normalized arm swing pain free x5min  Seated scap retractions 2x12,3"  Standing scap depressions 2x12, 3"  AA supine serratus punches 2x8 with DPT hand placed on scapula to guide movement pattern-Re    PATIENT EDUCATION AND HOME EXERCISES     Home Exercises Provided and Patient Education Provided     Education provided:   - HEP   - post op precautions     Written Home Exercises Provided: Patient instructed to cont prior HEP. Exercises were " reviewed and Tony was able to demonstrate them prior to the end of the session.  Tony demonstrated fair  understanding of the education provided. See EMR under Patient Instructions for exercises provided during therapy sessions    ASSESSMENT     Tony tolerated the above tx session well without increased pain. Pt is approx. 4wks & 2 days post-op today. Continued with most therEx from previous session with protocol and healing process in mind.  Tony will continue to benefit from skilled therapy in order to return to job without limitation.    Tony Is progressing well towards his goals.   Pt prognosis is Guarded.     Pt will continue to benefit from skilled outpatient physical therapy to address the deficits listed in the problem list box on initial evaluation, provide pt/family education and to maximize pt's level of independence in the home and community environment.     Pt's spiritual, cultural and educational needs considered and pt agreeable to plan of care and goals.     Anticipated Barriers for therapy: compliance with post-op precautions     GOALS: Short Term Goals: 8 weeks  1.Report decreased in pain at worse less than  <   / =  6  /10  to increase tolerance for functional mobility. On going  2. Pt to improve PROM of flexion, scaption and abduction to 90deg and IR/ER to 30deg and R elbow to 0-130deg to allow for improved functional mobility to allow for improvement in IADLs. Met 4/12/23  3. Increased BUE MMT 1/2 grade to increase tolerance for ADL and work activities. On going  4. Pt to report be conscious of impaired sitting and standing posture daily to decrease pain. On going  5. Pt to tolerate HEP to improve ROM and independence with ADL's. On going     Long Term Goals: 16 weeks  1.Report decreased in pain at worse less than  <   / =  4  /10  to increase tolerance for functional mobility. On going  2.  Patient will report clinically significant improvements on FOTO score.On going  3.Increased BUE MMT 1  grade to increase tolerance for ADL and work activities.On going  4. Pt to report and demonstrate proper posture in standing and sitting to decrease pain. On going  5. Pt to be Independent with HEP to improve ROM and independence with ADL's.On going  6. Pt to improve AROM of flexion, scaption and abduction to 150deg and IR/ER to 60deg and R elbow to 0-150deg to allow for improved functional mobility to allow for improvement in IADLs. On going    PLAN   Continue progressing per pt protocol in future sessions  Plan of Care: 2x/wk for 12wks (4/12/23 - 7/7/23)      4/12/23 - 4wks post-op - (started PT)  4/26/23 - 6wks post-op  5/10/23 - 8wks post-op  5/24/23 - 10wks post-op   6/7/23 - 12 wks post-op - (only 8wks in PT)      Rebeca Richardson, PTA  04/14/2023           01-Feb-2020 19:00:42

## 2023-05-30 NOTE — PATIENT PROFILE ADULT. - ALCOHOL USE HISTORY SINGLE SELECT
Include Z78.9 (Other Specified Conditions Influencing Health Status) As An Associated Diagnosis?: No never

## 2023-06-12 NOTE — PROGRESS NOTE ADULT - ASSESSMENT
Scheduled virtual visit to refill meds    89F with HTN, DM, hypothyroidism admitted after coming to the Emergency Department with generalized fatigue bilaterally pleural effusions and severe anemia. Course complicated by ADHF and cardiogenic shock.     Neuro  - no active issue at this time   - restarted home aspirin and plavix for TIA    Resp  -tolerating O2 2L SpO2 >92%    Cards  #ADHF   - TTE EF 40-45%, severe TR, RA enlargement  - off levophed, off vasopressin since 5AM. SBP goal >90  - monitor Is and Os, keep net negative. K>4, Mg>2. currently net negative 4L  - unknown etiology of heart failure and enlargement of RA/LV.   - will get cardiac MRI once creatine is stable (seems to have plateaued   - f/u repeat echocardiogram today    #Cardiogenic Shock  - likely due to beta blockers in the setting of existing severe HF  - received glucagon x3 and then briefly put on glucagon gtt in the CCU but started dry heaving  - off bumex, net negative 4L  - latest HD 2/4 8Am -> CO 4.77, CI 2.5, SVR 1174 with CVP of 7  - will add hydralazine 10 TID for afterload reduction. If patient's SBP remains >90, will decrease dobumatine to 2.5 on 2/4 in the afternoon.       GI  -soft +consistent carb diet  - elevated LFTs likely 2/2 ADHF    ID  - no s/s of infection  - afebrile, negative RVP    Renal  #EFRAIN likely ATN 2/2 to cardiogenic shock  -creat 2.33, likely plateud, unknown baseline   -making urine, now off bumex  -likely secondary to fluid overload, now diuresing   -strict Is and Os   -replete electrolytes as needed  -monitor CMP   -nephro recs appreciated: f/u renal US, SPEP, SIFE, serum FLC, spot urine TP/CR    Heme  #Anemia  -likely multifactorial in nature, 2/2 to iron deficiency (given patient initially had failure to thrive) and also CKD  - s/p 2 units PRBC   - iron 28, TIBC 238 iron sat 11 ferritin 151.    - encourage oral intake    Endo  #T2DM  - well controlled, hold off ISS    #Hypothyroidism  -c/w levothyroxine IV given bowel edema in the setting of ADHF    #DVT PPx  -Hep subQ 89F with HTN, DM, hypothyroidism admitted after coming to the Emergency Department with generalized fatigue bilaterally pleural effusions and severe anemia. Course complicated by acute on chronic heart failure and cardiogenic shock. course also complicated by renal failure likely 2/2 to ATN, now improving.     Neuro  - no active issue at this time   - restarted home aspirin and plavix for TIA    Resp  -tolerating O2 2L. goal SpO2 >92%    Cards  #Acute on chronic heart failure with preserved EF  - monitor Is and Os, keep net negative. K>4, Mg>2. currently net negative 4L  - unknown etiology of heart failure and enlargement of RA/LV.   - repeat echocardiogram from 2/4: hyperdynamic LV, moderate MS, mild AS, severe RV enlargement with normal RV systolic function. moderate pulm HTN. EF 75%  - should receive RHC and LHC for further workup  - will get cardiac MRI once creatine returns to normal (now downtrending).     #Cardiogenic Shock  - likely due to beta blockers in the setting of existing severe HF  - received glucagon x3 and then briefly put on glucagon gtt in the CCU but started dry heaving  - off levophed, off vasopressin, SBP goal >90  - off bumex, net negative 2L/day  - latest HD 2/5 5Am -> CO 3.91, CI 3.2, SVR 1350 with CVP of 7  -   - will add hydralazine 10 TID for afterload reduction. If patient's SBP remains >90, will decrease dobumatine to 2.5 on 2/4 in the afternoon.       GI  -soft +consistent carb diet  - elevated LFTs likely 2/2 ADHF    ID  - no s/s of infection  - afebrile, negative RVP    Renal  #EFRAIN likely ATN 2/2 to cardiogenic shock  -creat 2.33, likely plateud, unknown baseline   -making urine, now off bumex  -likely secondary to fluid overload, now diuresing   -strict Is and Os   -replete electrolytes as needed  -monitor CMP   -nephro recs appreciated: f/u renal US, SPEP, SIFE, serum FLC, spot urine TP/CR    Heme  #Anemia  -likely multifactorial in nature, 2/2 to iron deficiency (given patient initially had failure to thrive) and also CKD  - s/p 2 units PRBC   - iron 28, TIBC 238 iron sat 11 ferritin 151.    - encourage oral intake    Endo  #T2DM  - well controlled, hold off ISS    #Hypothyroidism  -c/w levothyroxine IV given bowel edema in the setting of ADHF    #DVT PPx  -Hep subQ 89F with HTN, DM, hypothyroidism admitted after coming to the Emergency Department with generalized fatigue bilaterally pleural effusions and severe anemia. Course complicated by acute on chronic heart failure and cardiogenic shock. course also complicated by renal failure likely 2/2 to ATN, now improving.     Neuro  - no active issue at this time   - restarted home aspirin and plavix for TIA    Resp  -tolerating O2 2L. goal SpO2 >92%    Cards  #Acute on chronic heart failure with preserved EF  - monitor Is and Os, keep net negative. K>4, Mg>2. currently net negative 4L  - unknown etiology of heart failure and enlargement of RA/LV.   - repeat echocardiogram from 2/4: hyperdynamic LV, moderate MS, mild AS, severe RV enlargement with normal RV systolic function. moderate pulm HTN. EF 75%  - should receive RHC and LHC for further workup  - will get cardiac MRI once creatine returns to normal (now downtrending).     #Cardiogenic Shock  - likely due to beta blockers in the setting of existing severe HF  - received glucagon x3 and then briefly put on glucagon gtt in the CCU but started dry heaving  - off levophed, off vasopressin, SBP goal >90  - off bumex, net negative 2L/day  - HD from 2/5 5Am -> CO 3.91, CI 3.2, SVR 1350 with CVP of 7  - HD at ~9am after   - will add hydralazine 10 TID for afterload reduction. If patient's SBP remains >90, will decrease dobumatine to 2.5 on 2/4 in the afternoon.       GI  -soft +consistent carb diet  - elevated LFTs likely 2/2 ADHF    ID  - no s/s of infection  - afebrile, negative RVP    Renal  #EFRAIN likely ATN 2/2 to cardiogenic shock  -creat 2.33, likely plateud, unknown baseline   -making urine, now off bumex  -likely secondary to fluid overload, now diuresing   -strict Is and Os   -replete electrolytes as needed  -monitor CMP   -nephro recs appreciated: f/u renal US, SPEP, SIFE, serum FLC, spot urine TP/CR    Heme  #Anemia  -likely multifactorial in nature, 2/2 to iron deficiency (given patient initially had failure to thrive) and also CKD  - s/p 2 units PRBC   - iron 28, TIBC 238 iron sat 11 ferritin 151.    - encourage oral intake    Endo  #T2DM  - well controlled, hold off ISS    #Hypothyroidism  -c/w levothyroxine IV given bowel edema in the setting of ADHF    #DVT PPx  -Hep subQ 89F with HTN, DM, hypothyroidism admitted after coming to the Emergency Department with generalized fatigue bilaterally pleural effusions and severe anemia. Course complicated by acute on chronic heart failure and cardiogenic shock. course also complicated by renal failure likely 2/2 to ATN, now improving.     Neuro  - no active issue at this time   - restarted home aspirin and plavix for TIA    Resp  -tolerating O2 2L. goal SpO2 >92%    Cards  #Acute on chronic heart failure with preserved EF  - monitor Is and Os, keep net negative. K>4, Mg>2. currently net negative 4L  - unknown etiology of heart failure and enlargement of RA/LV.   - repeat echocardiogram from 2/4: hyperdynamic LV, moderate MS, mild AS, severe RV enlargement with normal RV systolic function. moderate pulm HTN. EF 75%  - should receive RHC and LHC for further workup  - will get cardiac MRI once creatine returns to normal (now downtrending).     #Cardiogenic Shock  - likely due to beta blockers in the setting of existing severe HF  - received glucagon x3 and then briefly put on glucagon gtt in the CCU but started dry heaving  - off levophed, off vasopressin, SBP goal >90  - off bumex, net negative 2L/day  - HD from 2/5 5Am -> CO 3.91, CI 3.2, SVR 1350 with CVP of 7  - HD at ~9am after dobutamine was turned off: CO 3.056 CI 2.567 SVR 1300  - hydralazine DC'd because SVR has been acceptable and BPs have been acceptable   - CVPs have been 3-4, likely volume down, will give 50cc/hr for 10 hours.     GI  -soft +consistent carb diet  - elevated LFTs likely 2/2 ADHF    ID  - no s/s of infection  - afebrile, negative RVP    Renal  #EFRAIN likely ATN 2/2 to cardiogenic shock  -creat 2.16, improved, unknown baseline   -making urine, now off bumex  -strict Is and Os   -replete electrolytes as needed  -monitor CMP   -nephro recs appreciated: f/u renal US, SPEP, SIFE, serum FLC, spot urine TP/CR    Heme  #Anemia  -likely multifactorial in nature, 2/2 to iron deficiency (given patient initially had failure to thrive) and also CKD  - s/p 2 units PRBC   - iron 28, TIBC 238 iron sat 11 ferritin 151.    - encourage oral intake    Endo  #T2DM  - well controlled, hold off ISS    #Hypothyroidism  -c/w levothyroxine IV given bowel edema in the setting of ADHF    #DVT PPx  -Hep subQ

## 2023-07-01 NOTE — PROGRESS NOTE ADULT - ASSESSMENT
Progress Note      Subjective:  F/u dizziness tooth extraction on yesterday ? Too much anesthesia after procedure    Objective:vitals noted neuro note seen apprec  Speech eval seen apprec  Exam face no left face edema or tenderness ? Left facial droop right face no edema or tenderness  Heart sr lungs clear abd soft no pain ext no edema deformity noted of hand (2nd to RA)  Gait steady no apparent dizziness  Labs noted   Ct noted / mri pending      Assessment:dizziness less today  ?cva re left facial droop  Decreased today  RA  Asthma  copd      Plan:cpm  Monitor neuro signs q 4 hrs  Await mri/ mra      Daphnie Raines MD       89F with HTN, DM, hypothyroidism admitted after coming to the Emergency Department with generalized fatigue, hardly getting out of bed, diagnosed with generalized anasarca and bilaterally pleural effusions and severe anemia

## 2023-10-19 NOTE — PROGRESS NOTE ADULT - PROVIDER SPECIALTY LIST ADULT
Nephrology Spoke to patient, made her aware that Dr. Morton requested for a CMP to be completed before we can refill Hydrochlorothiazide. Patient understood and will come in tomorrow to get blood work done.

## 2023-12-06 NOTE — CONSULT NOTE ADULT - PROBLEM SELECTOR PROBLEM 5
Maintaining normal sinus rhythm on amiodarone  Last A-fib documented June 2023 Holter - amio start date Sept 2023  Denies palpitations.   Hypothyroid

## 2023-12-27 NOTE — DISCHARGE NOTE ADULT - HOSPITAL COURSE
86 years old female lives at home alone, AAO x3, walks with cane, with PMH of DM on metformin, acid reflux, constipation, gall stones, TIA multiple times, HTN, hypothyroidism, and osteoporosis came to ED with above symptoms last night. Patient's daughter at the bedside helped history taking, both patient and daughter are a good historian. She ate hamburger in lunch yesterday and started feeling burning like epigastric pain. In evening after she ate dinner, pain got worse and she started vomiting 4 times with food particles coming out. Denies any chest pain, SOB, dizziness, headache, melena, bowel habit change, urinary symptoms, fever, chills, or any other symptoms. Also endorsed unintentional gradual weight loss over the last few years. She frequently takes Advil for 'any pain' on her body PRN, which she took as well yesterday.     In ED, patient's vital signs normal, labs grossly normal except mild elevation of lipase to 453. CT of the abdomen with contrast was performed and showed; Cholelithiasis with hyperemia of the gallbladder and biliary tree. Mild intrahepatic bile duct dilatation. Correlate for cholecystitis. Superimposed cholangitis not excluded. Recommend MRI/MRCP.    MRCP was performed and result is pending. When seen by me, patient was totally asymptomatic with negative findings on abdominal exam. Surgery was consulted overnight and no acute surgical intervention was recommended. Admitted to the medicine floor for epigastric pain likely from gastritis, also possible acute cholecystitis however is quite less likely considering clinical presentation.     GOC was discussed with patient and daughter at bedside, patient does not want any invasive procedure including EGD/colonoscopy, and wants DNR/DNI.    Patient is a 86y old  Female who presents with a chief complaint of 4 times of NBNB vomiting and epigastric pain onset last night after food intake. (15 Sep 2017 12:30)  pt seen and examined comfortable no abd pain no vomiting moved bowels. MRCP suggests possible cholecystitis    Patient seen and examined at bedside in no acute distress requesting to be discharged because she is pain free. I explained to the patient that since her diet in the hospital isn't causing her abdominal pain while fat containing food may - she and the daughter at bedside understand the risks with leaving against medical advice while the work up is incomplete. The risks explain include worsening abdominal pain, infection, sepsis, and possible death from complications. The patient is advised to follow up with her primary care physician, her neurologist, and her gastroenterologist. Please take the medications sent to your pharmacy as prescribed and if you experience worsening of your clinical condition to return to the nearest emergency department. The daughter at bedside agrees and demonstrates understanding. The attending was notified. no...

## 2025-01-29 NOTE — PROGRESS NOTE ADULT - ASSESSMENT
Prepped: right neck. Prepped with: ChloraPrep. The patient was draped. .Pre-procedure site marking:N/A 89F with HTN, DM, hypothyroidism admitted after coming to the Emergency Department with generalized fatigue, hardly getting out of bed, diagnosed with generalized anasarca and bilaterally pleural effusions and severe anemia  transferred to CCU for hypotension and now transferred out to floor, doing well
